# Patient Record
Sex: MALE | Race: WHITE | NOT HISPANIC OR LATINO | Employment: OTHER | URBAN - METROPOLITAN AREA
[De-identification: names, ages, dates, MRNs, and addresses within clinical notes are randomized per-mention and may not be internally consistent; named-entity substitution may affect disease eponyms.]

---

## 2017-02-16 ENCOUNTER — OFFICE VISIT - HEALTHEAST (OUTPATIENT)
Dept: FAMILY MEDICINE | Facility: CLINIC | Age: 67
End: 2017-02-16

## 2017-02-16 DIAGNOSIS — E11.9 TYPE 2 DIABETES MELLITUS (H): ICD-10-CM

## 2017-02-16 DIAGNOSIS — E78.00 PURE HYPERCHOLESTEROLEMIA: ICD-10-CM

## 2017-02-16 DIAGNOSIS — I10 ESSENTIAL HYPERTENSION: ICD-10-CM

## 2017-02-16 DIAGNOSIS — R05.9 COUGH: ICD-10-CM

## 2017-02-16 LAB
CHOLEST SERPL-MCNC: 175 MG/DL
FASTING STATUS PATIENT QL REPORTED: NO
HBA1C MFR BLD: 7.4 % (ref 3.5–6)
HDLC SERPL-MCNC: 39 MG/DL
LDLC SERPL CALC-MCNC: 105 MG/DL
TRIGL SERPL-MCNC: 157 MG/DL

## 2017-02-23 ENCOUNTER — COMMUNICATION - HEALTHEAST (OUTPATIENT)
Dept: FAMILY MEDICINE | Facility: CLINIC | Age: 67
End: 2017-02-23

## 2017-02-23 DIAGNOSIS — R05.9 COUGH: ICD-10-CM

## 2017-03-05 ENCOUNTER — COMMUNICATION - HEALTHEAST (OUTPATIENT)
Dept: FAMILY MEDICINE | Facility: CLINIC | Age: 67
End: 2017-03-05

## 2017-03-05 DIAGNOSIS — I10 HYPERTENSION: ICD-10-CM

## 2017-03-06 ENCOUNTER — OFFICE VISIT - HEALTHEAST (OUTPATIENT)
Dept: FAMILY MEDICINE | Facility: CLINIC | Age: 67
End: 2017-03-06

## 2017-03-06 ENCOUNTER — COMMUNICATION - HEALTHEAST (OUTPATIENT)
Dept: FAMILY MEDICINE | Facility: CLINIC | Age: 67
End: 2017-03-06

## 2017-03-06 DIAGNOSIS — J18.9 RLL PNEUMONIA: ICD-10-CM

## 2017-03-06 DIAGNOSIS — R05.9 COUGH: ICD-10-CM

## 2017-03-06 DIAGNOSIS — Z20.818 EXPOSURE TO STREP THROAT: ICD-10-CM

## 2017-03-28 ENCOUNTER — COMMUNICATION - HEALTHEAST (OUTPATIENT)
Dept: FAMILY MEDICINE | Facility: CLINIC | Age: 67
End: 2017-03-28

## 2017-03-31 ENCOUNTER — OFFICE VISIT - HEALTHEAST (OUTPATIENT)
Dept: FAMILY MEDICINE | Facility: CLINIC | Age: 67
End: 2017-03-31

## 2017-03-31 DIAGNOSIS — J20.9 BRONCHITIS WITH BRONCHOSPASM: ICD-10-CM

## 2017-03-31 DIAGNOSIS — E11.9 TYPE 2 DIABETES MELLITUS (H): ICD-10-CM

## 2017-03-31 DIAGNOSIS — E66.9 OBESITY, UNSPECIFIED: ICD-10-CM

## 2017-03-31 DIAGNOSIS — I10 ESSENTIAL HYPERTENSION: ICD-10-CM

## 2017-03-31 ASSESSMENT — MIFFLIN-ST. JEOR: SCORE: 1948.74

## 2017-04-07 ENCOUNTER — AMBULATORY - HEALTHEAST (OUTPATIENT)
Dept: NURSING | Facility: CLINIC | Age: 67
End: 2017-04-07

## 2017-04-25 ENCOUNTER — AMBULATORY - HEALTHEAST (OUTPATIENT)
Dept: NURSING | Facility: CLINIC | Age: 67
End: 2017-04-25

## 2017-04-25 DIAGNOSIS — Z00.00 HEALTH CARE MAINTENANCE: ICD-10-CM

## 2017-05-08 ENCOUNTER — COMMUNICATION - HEALTHEAST (OUTPATIENT)
Dept: FAMILY MEDICINE | Facility: CLINIC | Age: 67
End: 2017-05-08

## 2017-05-09 ENCOUNTER — AMBULATORY - HEALTHEAST (OUTPATIENT)
Dept: FAMILY MEDICINE | Facility: CLINIC | Age: 67
End: 2017-05-09

## 2017-05-10 ENCOUNTER — COMMUNICATION - HEALTHEAST (OUTPATIENT)
Dept: FAMILY MEDICINE | Facility: CLINIC | Age: 67
End: 2017-05-10

## 2017-06-01 ENCOUNTER — COMMUNICATION - HEALTHEAST (OUTPATIENT)
Dept: FAMILY MEDICINE | Facility: CLINIC | Age: 67
End: 2017-06-01

## 2017-06-01 DIAGNOSIS — I10 HTN (HYPERTENSION): ICD-10-CM

## 2017-06-03 ENCOUNTER — COMMUNICATION - HEALTHEAST (OUTPATIENT)
Dept: FAMILY MEDICINE | Facility: CLINIC | Age: 67
End: 2017-06-03

## 2017-06-03 DIAGNOSIS — I10 HYPERTENSION: ICD-10-CM

## 2017-08-01 ENCOUNTER — COMMUNICATION - HEALTHEAST (OUTPATIENT)
Dept: FAMILY MEDICINE | Facility: CLINIC | Age: 67
End: 2017-08-01

## 2017-08-01 DIAGNOSIS — I10 ESSENTIAL HYPERTENSION: ICD-10-CM

## 2017-08-31 ENCOUNTER — COMMUNICATION - HEALTHEAST (OUTPATIENT)
Dept: FAMILY MEDICINE | Facility: CLINIC | Age: 67
End: 2017-08-31

## 2017-08-31 DIAGNOSIS — I10 HTN (HYPERTENSION): ICD-10-CM

## 2017-09-14 ENCOUNTER — COMMUNICATION - HEALTHEAST (OUTPATIENT)
Dept: FAMILY MEDICINE | Facility: CLINIC | Age: 67
End: 2017-09-14

## 2017-09-14 DIAGNOSIS — I10 ESSENTIAL HYPERTENSION: ICD-10-CM

## 2017-10-11 ENCOUNTER — OFFICE VISIT - HEALTHEAST (OUTPATIENT)
Dept: FAMILY MEDICINE | Facility: CLINIC | Age: 67
End: 2017-10-11

## 2017-10-11 DIAGNOSIS — Z23 NEED FOR PNEUMOCOCCAL VACCINE: ICD-10-CM

## 2017-10-11 DIAGNOSIS — Z12.5 PROSTATE CANCER SCREENING: ICD-10-CM

## 2017-10-11 DIAGNOSIS — D64.9 ANEMIA: ICD-10-CM

## 2017-10-11 DIAGNOSIS — E11.9 TYPE 2 DIABETES MELLITUS (H): ICD-10-CM

## 2017-10-11 DIAGNOSIS — R22.2 LUMP OF SKIN OF BACK: ICD-10-CM

## 2017-10-11 DIAGNOSIS — E78.00 PURE HYPERCHOLESTEROLEMIA: ICD-10-CM

## 2017-10-11 DIAGNOSIS — I10 ESSENTIAL HYPERTENSION: ICD-10-CM

## 2017-10-11 DIAGNOSIS — Z23 IMMUNIZATION DUE: ICD-10-CM

## 2017-10-11 LAB
CHOLEST SERPL-MCNC: 214 MG/DL
FASTING STATUS PATIENT QL REPORTED: YES
HBA1C MFR BLD: 7.2 % (ref 3.5–6)
HDLC SERPL-MCNC: 50 MG/DL
LDLC SERPL CALC-MCNC: 137 MG/DL
PSA SERPL-MCNC: <0.1 NG/ML (ref 0–4.5)
TRIGL SERPL-MCNC: 134 MG/DL

## 2017-10-27 ENCOUNTER — RECORDS - HEALTHEAST (OUTPATIENT)
Dept: GENERAL RADIOLOGY | Facility: CLINIC | Age: 67
End: 2017-10-27

## 2017-10-27 ENCOUNTER — OFFICE VISIT - HEALTHEAST (OUTPATIENT)
Dept: FAMILY MEDICINE | Facility: CLINIC | Age: 67
End: 2017-10-27

## 2017-10-27 DIAGNOSIS — R05.9 COUGH: ICD-10-CM

## 2017-10-27 DIAGNOSIS — I10 HTN (HYPERTENSION): ICD-10-CM

## 2017-10-27 DIAGNOSIS — E11.9 DM (DIABETES MELLITUS) (H): ICD-10-CM

## 2017-11-01 ENCOUNTER — OFFICE VISIT - HEALTHEAST (OUTPATIENT)
Dept: FAMILY MEDICINE | Facility: CLINIC | Age: 67
End: 2017-11-01

## 2017-11-01 DIAGNOSIS — R05.9 COUGH: ICD-10-CM

## 2017-11-01 DIAGNOSIS — E66.9 OBESITY: ICD-10-CM

## 2017-11-01 DIAGNOSIS — H61.21 EXCESSIVE EAR WAX, RIGHT: ICD-10-CM

## 2017-11-01 ASSESSMENT — MIFFLIN-ST. JEOR: SCORE: 1939.67

## 2017-11-19 ENCOUNTER — OFFICE VISIT - HEALTHEAST (OUTPATIENT)
Dept: FAMILY MEDICINE | Facility: CLINIC | Age: 67
End: 2017-11-19

## 2017-11-19 DIAGNOSIS — R05.9 COUGH: ICD-10-CM

## 2017-11-20 ENCOUNTER — COMMUNICATION - HEALTHEAST (OUTPATIENT)
Dept: FAMILY MEDICINE | Facility: CLINIC | Age: 67
End: 2017-11-20

## 2017-11-22 ENCOUNTER — OFFICE VISIT - HEALTHEAST (OUTPATIENT)
Dept: FAMILY MEDICINE | Facility: CLINIC | Age: 67
End: 2017-11-22

## 2017-11-22 DIAGNOSIS — J45.909 ASTHMA: ICD-10-CM

## 2017-11-22 ASSESSMENT — MIFFLIN-ST. JEOR: SCORE: 1780.92

## 2017-12-16 ENCOUNTER — COMMUNICATION - HEALTHEAST (OUTPATIENT)
Dept: FAMILY MEDICINE | Facility: CLINIC | Age: 67
End: 2017-12-16

## 2018-03-23 ENCOUNTER — RECORDS - HEALTHEAST (OUTPATIENT)
Dept: ADMINISTRATIVE | Facility: OTHER | Age: 68
End: 2018-03-23

## 2018-03-28 ENCOUNTER — RECORDS - HEALTHEAST (OUTPATIENT)
Dept: ADMINISTRATIVE | Facility: OTHER | Age: 68
End: 2018-03-28

## 2018-05-04 ENCOUNTER — RECORDS - HEALTHEAST (OUTPATIENT)
Dept: ADMINISTRATIVE | Facility: OTHER | Age: 68
End: 2018-05-04

## 2018-05-17 ENCOUNTER — COMMUNICATION - HEALTHEAST (OUTPATIENT)
Dept: FAMILY MEDICINE | Facility: CLINIC | Age: 68
End: 2018-05-17

## 2018-05-17 DIAGNOSIS — E78.00 PURE HYPERCHOLESTEROLEMIA: ICD-10-CM

## 2018-05-17 DIAGNOSIS — I10 ESSENTIAL HYPERTENSION: ICD-10-CM

## 2018-05-22 ENCOUNTER — COMMUNICATION - HEALTHEAST (OUTPATIENT)
Dept: FAMILY MEDICINE | Facility: CLINIC | Age: 68
End: 2018-05-22

## 2018-05-22 ENCOUNTER — OFFICE VISIT - HEALTHEAST (OUTPATIENT)
Dept: FAMILY MEDICINE | Facility: CLINIC | Age: 68
End: 2018-05-22

## 2018-05-22 ENCOUNTER — COMMUNICATION - HEALTHEAST (OUTPATIENT)
Dept: SCHEDULING | Facility: CLINIC | Age: 68
End: 2018-05-22

## 2018-05-22 DIAGNOSIS — M79.671 RIGHT FOOT PAIN: ICD-10-CM

## 2018-05-22 ASSESSMENT — MIFFLIN-ST. JEOR: SCORE: 1944.21

## 2018-08-08 ENCOUNTER — RECORDS - HEALTHEAST (OUTPATIENT)
Dept: ADMINISTRATIVE | Facility: OTHER | Age: 68
End: 2018-08-08

## 2018-10-17 ENCOUNTER — COMMUNICATION - HEALTHEAST (OUTPATIENT)
Dept: FAMILY MEDICINE | Facility: CLINIC | Age: 68
End: 2018-10-17

## 2018-10-17 DIAGNOSIS — I10 HTN (HYPERTENSION): ICD-10-CM

## 2018-11-02 ENCOUNTER — OFFICE VISIT - HEALTHEAST (OUTPATIENT)
Dept: FAMILY MEDICINE | Facility: CLINIC | Age: 68
End: 2018-11-02

## 2018-11-02 DIAGNOSIS — H61.23 BILATERAL IMPACTED CERUMEN: ICD-10-CM

## 2018-11-02 DIAGNOSIS — L98.9 SKIN LESION: ICD-10-CM

## 2018-11-02 DIAGNOSIS — Z23 IMMUNIZATION DUE: ICD-10-CM

## 2018-11-02 DIAGNOSIS — R05.9 COUGH: ICD-10-CM

## 2018-11-02 DIAGNOSIS — E11.9 TYPE 2 DIABETES MELLITUS (H): ICD-10-CM

## 2018-11-02 DIAGNOSIS — Z12.5 SCREENING FOR PROSTATE CANCER: ICD-10-CM

## 2018-11-02 DIAGNOSIS — D64.9 ANEMIA: ICD-10-CM

## 2018-11-02 DIAGNOSIS — E78.00 PURE HYPERCHOLESTEROLEMIA: ICD-10-CM

## 2018-11-02 DIAGNOSIS — I10 ESSENTIAL HYPERTENSION: ICD-10-CM

## 2018-11-02 LAB
ALBUMIN SERPL-MCNC: 4 G/DL (ref 3.5–5)
ALP SERPL-CCNC: 77 U/L (ref 45–120)
ALT SERPL W P-5'-P-CCNC: 26 U/L (ref 0–45)
ANION GAP SERPL CALCULATED.3IONS-SCNC: 13 MMOL/L (ref 5–18)
AST SERPL W P-5'-P-CCNC: 18 U/L (ref 0–40)
BASOPHILS # BLD AUTO: 0 THOU/UL (ref 0–0.2)
BASOPHILS NFR BLD AUTO: 1 % (ref 0–2)
BILIRUB SERPL-MCNC: 0.7 MG/DL (ref 0–1)
BUN SERPL-MCNC: 24 MG/DL (ref 8–28)
CALCIUM SERPL-MCNC: 10.5 MG/DL (ref 8.5–10.5)
CHLORIDE BLD-SCNC: 107 MMOL/L (ref 98–107)
CHOLEST SERPL-MCNC: 216 MG/DL
CO2 SERPL-SCNC: 21 MMOL/L (ref 22–31)
CREAT SERPL-MCNC: 1.14 MG/DL (ref 0.7–1.3)
EOSINOPHIL # BLD AUTO: 0.3 THOU/UL (ref 0–0.4)
EOSINOPHIL NFR BLD AUTO: 5 % (ref 0–6)
ERYTHROCYTE [DISTWIDTH] IN BLOOD BY AUTOMATED COUNT: 12.7 % (ref 11–14.5)
FASTING STATUS PATIENT QL REPORTED: NO
FERRITIN SERPL-MCNC: 287 NG/ML (ref 27–300)
GFR SERPL CREATININE-BSD FRML MDRD: >60 ML/MIN/1.73M2
GLUCOSE BLD-MCNC: 179 MG/DL (ref 70–125)
HBA1C MFR BLD: 9.4 % (ref 3.5–6)
HCT VFR BLD AUTO: 35 % (ref 40–54)
HDLC SERPL-MCNC: 45 MG/DL
HGB BLD-MCNC: 11.9 G/DL (ref 14–18)
IRON SATN MFR SERPL: 20 % (ref 20–50)
IRON SERPL-MCNC: 57 UG/DL (ref 42–175)
LDLC SERPL CALC-MCNC: 131 MG/DL
LYMPHOCYTES # BLD AUTO: 2.4 THOU/UL (ref 0.8–4.4)
LYMPHOCYTES NFR BLD AUTO: 41 % (ref 20–40)
MCH RBC QN AUTO: 31.2 PG (ref 27–34)
MCHC RBC AUTO-ENTMCNC: 33.8 G/DL (ref 32–36)
MCV RBC AUTO: 92 FL (ref 80–100)
MONOCYTES # BLD AUTO: 0.5 THOU/UL (ref 0–0.9)
MONOCYTES NFR BLD AUTO: 9 % (ref 2–10)
NEUTROPHILS # BLD AUTO: 2.6 THOU/UL (ref 2–7.7)
NEUTROPHILS NFR BLD AUTO: 45 % (ref 50–70)
PLATELET # BLD AUTO: 253 THOU/UL (ref 140–440)
PMV BLD AUTO: 7.6 FL (ref 7–10)
POTASSIUM BLD-SCNC: 4.8 MMOL/L (ref 3.5–5)
PROT SERPL-MCNC: 8.4 G/DL (ref 6–8)
PSA SERPL-MCNC: <0.1 NG/ML (ref 0–6.5)
RBC # BLD AUTO: 3.8 MILL/UL (ref 4.4–6.2)
SODIUM SERPL-SCNC: 141 MMOL/L (ref 136–145)
TIBC SERPL-MCNC: 288 UG/DL (ref 313–563)
TRANSFERRIN SERPL-MCNC: 231 MG/DL (ref 212–360)
TRIGL SERPL-MCNC: 201 MG/DL
WBC: 5.8 THOU/UL (ref 4–11)

## 2018-11-19 ENCOUNTER — COMMUNICATION - HEALTHEAST (OUTPATIENT)
Dept: ADMINISTRATIVE | Facility: CLINIC | Age: 68
End: 2018-11-19

## 2018-11-21 ENCOUNTER — COMMUNICATION - HEALTHEAST (OUTPATIENT)
Dept: FAMILY MEDICINE | Facility: CLINIC | Age: 68
End: 2018-11-21

## 2018-11-21 DIAGNOSIS — E11.9 TYPE 2 DIABETES MELLITUS WITHOUT COMPLICATION, WITHOUT LONG-TERM CURRENT USE OF INSULIN (H): ICD-10-CM

## 2018-12-04 ENCOUNTER — OFFICE VISIT - HEALTHEAST (OUTPATIENT)
Dept: FAMILY MEDICINE | Facility: CLINIC | Age: 68
End: 2018-12-04

## 2018-12-04 DIAGNOSIS — I10 ESSENTIAL HYPERTENSION: ICD-10-CM

## 2018-12-04 DIAGNOSIS — R09.81 NASAL CONGESTION: ICD-10-CM

## 2018-12-04 DIAGNOSIS — R06.2 WHEEZING: ICD-10-CM

## 2018-12-04 DIAGNOSIS — J45.31 MILD PERSISTENT ASTHMA WITH ACUTE EXACERBATION: ICD-10-CM

## 2018-12-04 DIAGNOSIS — R05.9 COUGH: ICD-10-CM

## 2018-12-04 ASSESSMENT — MIFFLIN-ST. JEOR: SCORE: 1930.6

## 2019-02-01 ENCOUNTER — COMMUNICATION - HEALTHEAST (OUTPATIENT)
Dept: FAMILY MEDICINE | Facility: CLINIC | Age: 69
End: 2019-02-01

## 2019-02-01 ENCOUNTER — OFFICE VISIT - HEALTHEAST (OUTPATIENT)
Dept: FAMILY MEDICINE | Facility: CLINIC | Age: 69
End: 2019-02-01

## 2019-02-01 ENCOUNTER — AMBULATORY - HEALTHEAST (OUTPATIENT)
Dept: FAMILY MEDICINE | Facility: CLINIC | Age: 69
End: 2019-02-01

## 2019-02-01 DIAGNOSIS — J45.31 MILD PERSISTENT ASTHMA WITH ACUTE EXACERBATION: ICD-10-CM

## 2019-02-01 DIAGNOSIS — R05.9 COUGH: ICD-10-CM

## 2019-02-01 DIAGNOSIS — R06.2 WHEEZING: ICD-10-CM

## 2019-02-01 DIAGNOSIS — R06.02 SOB (SHORTNESS OF BREATH): ICD-10-CM

## 2019-02-01 ASSESSMENT — MIFFLIN-ST. JEOR: SCORE: 1921.53

## 2019-02-04 ENCOUNTER — OFFICE VISIT - HEALTHEAST (OUTPATIENT)
Dept: FAMILY MEDICINE | Facility: CLINIC | Age: 69
End: 2019-02-04

## 2019-02-04 DIAGNOSIS — J11.1 INFLUENZA-LIKE ILLNESS: ICD-10-CM

## 2019-02-04 DIAGNOSIS — E11.65 TYPE 2 DIABETES MELLITUS WITH HYPERGLYCEMIA, WITHOUT LONG-TERM CURRENT USE OF INSULIN (H): ICD-10-CM

## 2019-02-04 DIAGNOSIS — J10.1 INFLUENZA B: ICD-10-CM

## 2019-02-04 DIAGNOSIS — R39.15 URINARY URGENCY: ICD-10-CM

## 2019-02-04 LAB
FLUAV AG SPEC QL IA: ABNORMAL
FLUBV AG SPEC QL IA: ABNORMAL

## 2019-02-04 ASSESSMENT — MIFFLIN-ST. JEOR: SCORE: 1910.47

## 2019-02-06 ENCOUNTER — OFFICE VISIT - HEALTHEAST (OUTPATIENT)
Dept: FAMILY MEDICINE | Facility: CLINIC | Age: 69
End: 2019-02-06

## 2019-02-06 DIAGNOSIS — J10.1 INFLUENZA B: ICD-10-CM

## 2019-02-06 ASSESSMENT — MIFFLIN-ST. JEOR: SCORE: 1898.85

## 2019-02-09 ENCOUNTER — RECORDS - HEALTHEAST (OUTPATIENT)
Dept: GENERAL RADIOLOGY | Facility: CLINIC | Age: 69
End: 2019-02-09

## 2019-02-09 ENCOUNTER — OFFICE VISIT - HEALTHEAST (OUTPATIENT)
Dept: FAMILY MEDICINE | Facility: CLINIC | Age: 69
End: 2019-02-09

## 2019-02-09 ENCOUNTER — COMMUNICATION - HEALTHEAST (OUTPATIENT)
Dept: SCHEDULING | Facility: CLINIC | Age: 69
End: 2019-02-09

## 2019-02-09 DIAGNOSIS — I10 ESSENTIAL HYPERTENSION: ICD-10-CM

## 2019-02-09 DIAGNOSIS — R50.9 FEVER, UNSPECIFIED FEVER CAUSE: ICD-10-CM

## 2019-02-09 DIAGNOSIS — J18.9 PNEUMONIA OF BOTH LOWER LOBES DUE TO INFECTIOUS ORGANISM: ICD-10-CM

## 2019-02-09 DIAGNOSIS — R50.9 FEVER, UNSPECIFIED: ICD-10-CM

## 2019-02-09 DIAGNOSIS — R79.81 LOW OXYGEN SATURATION: ICD-10-CM

## 2019-02-09 LAB
BASOPHILS # BLD AUTO: 0 THOU/UL (ref 0–0.2)
BASOPHILS NFR BLD AUTO: 0 % (ref 0–2)
EOSINOPHIL # BLD AUTO: 0 THOU/UL (ref 0–0.4)
EOSINOPHIL NFR BLD AUTO: 0 % (ref 0–6)
ERYTHROCYTE [DISTWIDTH] IN BLOOD BY AUTOMATED COUNT: 13.3 % (ref 11–14.5)
FLUAV AG SPEC QL IA: ABNORMAL
FLUBV AG SPEC QL IA: ABNORMAL
HCT VFR BLD AUTO: 34.5 % (ref 40–54)
HGB BLD-MCNC: 11.5 G/DL (ref 14–18)
LYMPHOCYTES # BLD AUTO: 1.9 THOU/UL (ref 0.8–4.4)
LYMPHOCYTES NFR BLD AUTO: 9 % (ref 20–40)
MCH RBC QN AUTO: 30.2 PG (ref 27–34)
MCHC RBC AUTO-ENTMCNC: 33.3 G/DL (ref 32–36)
MCV RBC AUTO: 91 FL (ref 80–100)
MONOCYTES # BLD AUTO: 1.1 THOU/UL (ref 0–0.9)
MONOCYTES NFR BLD AUTO: 5 % (ref 2–10)
NEUTROPHILS # BLD AUTO: 18.2 THOU/UL (ref 2–7.7)
NEUTROPHILS NFR BLD AUTO: 86 % (ref 50–70)
PLATELET # BLD AUTO: 359 THOU/UL (ref 140–440)
PMV BLD AUTO: 11.2 FL (ref 8.5–12.5)
RBC # BLD AUTO: 3.81 MILL/UL (ref 4.4–6.2)
WBC: 21.7 THOU/UL (ref 4–11)

## 2019-02-12 ENCOUNTER — HOME CARE/HOSPICE - HEALTHEAST (OUTPATIENT)
Dept: HOME HEALTH SERVICES | Facility: HOME HEALTH | Age: 69
End: 2019-02-12

## 2019-02-13 ENCOUNTER — COMMUNICATION - HEALTHEAST (OUTPATIENT)
Dept: CARE COORDINATION | Facility: CLINIC | Age: 69
End: 2019-02-13

## 2019-02-14 ENCOUNTER — COMMUNICATION - HEALTHEAST (OUTPATIENT)
Dept: ADMINISTRATIVE | Facility: CLINIC | Age: 69
End: 2019-02-14

## 2019-02-14 ENCOUNTER — COMMUNICATION - HEALTHEAST (OUTPATIENT)
Dept: FAMILY MEDICINE | Facility: CLINIC | Age: 69
End: 2019-02-14

## 2019-02-15 ENCOUNTER — COMMUNICATION - HEALTHEAST (OUTPATIENT)
Dept: FAMILY MEDICINE | Facility: CLINIC | Age: 69
End: 2019-02-15

## 2019-02-15 ENCOUNTER — OFFICE VISIT - HEALTHEAST (OUTPATIENT)
Dept: FAMILY MEDICINE | Facility: CLINIC | Age: 69
End: 2019-02-15

## 2019-02-15 DIAGNOSIS — E11.65 UNCONTROLLED TYPE 2 DIABETES MELLITUS WITH HYPERGLYCEMIA (H): ICD-10-CM

## 2019-02-15 DIAGNOSIS — J18.9 PNEUMONIA OF BOTH LUNGS DUE TO INFECTIOUS ORGANISM, UNSPECIFIED PART OF LUNG: ICD-10-CM

## 2019-02-15 DIAGNOSIS — J10.1 INFLUENZA B: ICD-10-CM

## 2019-02-15 DIAGNOSIS — N17.9 ACUTE KIDNEY INJURY (H): ICD-10-CM

## 2019-02-15 DIAGNOSIS — Z09 ENCOUNTER FOR EXAMINATION FOLLOWING TREATMENT AT HOSPITAL: ICD-10-CM

## 2019-02-15 ASSESSMENT — MIFFLIN-ST. JEOR: SCORE: 1880.7

## 2019-02-19 ENCOUNTER — RECORDS - HEALTHEAST (OUTPATIENT)
Dept: ADMINISTRATIVE | Facility: OTHER | Age: 69
End: 2019-02-19

## 2019-02-19 ENCOUNTER — COMMUNICATION - HEALTHEAST (OUTPATIENT)
Dept: ADMINISTRATIVE | Facility: CLINIC | Age: 69
End: 2019-02-19

## 2019-02-19 ENCOUNTER — OFFICE VISIT - HEALTHEAST (OUTPATIENT)
Dept: ENDOCRINOLOGY | Facility: CLINIC | Age: 69
End: 2019-02-19

## 2019-02-19 DIAGNOSIS — E11.9 TYPE 2 DIABETES MELLITUS WITHOUT COMPLICATION, WITHOUT LONG-TERM CURRENT USE OF INSULIN (H): ICD-10-CM

## 2019-02-19 ASSESSMENT — MIFFLIN-ST. JEOR: SCORE: 1876.62

## 2019-02-20 ENCOUNTER — COMMUNICATION - HEALTHEAST (OUTPATIENT)
Dept: FAMILY MEDICINE | Facility: CLINIC | Age: 69
End: 2019-02-20

## 2019-02-25 ENCOUNTER — COMMUNICATION - HEALTHEAST (OUTPATIENT)
Dept: FAMILY MEDICINE | Facility: CLINIC | Age: 69
End: 2019-02-25

## 2019-02-26 ENCOUNTER — COMMUNICATION - HEALTHEAST (OUTPATIENT)
Dept: FAMILY MEDICINE | Facility: CLINIC | Age: 69
End: 2019-02-26

## 2019-02-26 ENCOUNTER — AMBULATORY - HEALTHEAST (OUTPATIENT)
Dept: FAMILY MEDICINE | Facility: CLINIC | Age: 69
End: 2019-02-26

## 2019-02-26 DIAGNOSIS — J45.31 MILD PERSISTENT ASTHMA WITH ACUTE EXACERBATION: ICD-10-CM

## 2019-02-27 ENCOUNTER — COMMUNICATION - HEALTHEAST (OUTPATIENT)
Dept: FAMILY MEDICINE | Facility: CLINIC | Age: 69
End: 2019-02-27

## 2019-02-27 DIAGNOSIS — I10 ESSENTIAL HYPERTENSION: ICD-10-CM

## 2019-03-05 ENCOUNTER — COMMUNICATION - HEALTHEAST (OUTPATIENT)
Dept: FAMILY MEDICINE | Facility: CLINIC | Age: 69
End: 2019-03-05

## 2019-03-13 ENCOUNTER — RECORDS - HEALTHEAST (OUTPATIENT)
Dept: ADMINISTRATIVE | Facility: OTHER | Age: 69
End: 2019-03-13

## 2019-03-14 ENCOUNTER — OFFICE VISIT - HEALTHEAST (OUTPATIENT)
Dept: FAMILY MEDICINE | Facility: CLINIC | Age: 69
End: 2019-03-14

## 2019-03-14 DIAGNOSIS — M25.562 ACUTE PAIN OF LEFT KNEE: ICD-10-CM
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
## 2019-03-14 DIAGNOSIS — J18.9 PNEUMONIA OF BOTH LUNGS DUE TO INFECTIOUS ORGANISM, UNSPECIFIED PART OF LUNG: ICD-10-CM

## 2019-03-14 ASSESSMENT — MIFFLIN-ST. JEOR: SCORE: 1863.69

## 2019-03-18 ENCOUNTER — COMMUNICATION - HEALTHEAST (OUTPATIENT)
Dept: ADMINISTRATIVE | Facility: CLINIC | Age: 69
End: 2019-03-18

## 2019-03-20 ENCOUNTER — RECORDS - HEALTHEAST (OUTPATIENT)
Dept: HEALTH INFORMATION MANAGEMENT | Facility: CLINIC | Age: 69
End: 2019-03-20

## 2019-03-20 ENCOUNTER — COMMUNICATION - HEALTHEAST (OUTPATIENT)
Dept: SCHEDULING | Facility: CLINIC | Age: 69
End: 2019-03-20

## 2019-03-20 ENCOUNTER — COMMUNICATION - HEALTHEAST (OUTPATIENT)
Dept: FAMILY MEDICINE | Facility: CLINIC | Age: 69
End: 2019-03-20

## 2019-03-20 ENCOUNTER — OFFICE VISIT - HEALTHEAST (OUTPATIENT)
Dept: FAMILY MEDICINE | Facility: CLINIC | Age: 69
End: 2019-03-20

## 2019-03-20 DIAGNOSIS — E11.65 TYPE 2 DIABETES MELLITUS WITH HYPERGLYCEMIA, UNSPECIFIED WHETHER LONG TERM INSULIN USE (H): ICD-10-CM

## 2019-03-20 DIAGNOSIS — I10 ESSENTIAL HYPERTENSION: ICD-10-CM

## 2019-03-20 DIAGNOSIS — R63.0 ANOREXIA: ICD-10-CM

## 2019-03-20 DIAGNOSIS — R19.7 DIARRHEA, UNSPECIFIED TYPE: ICD-10-CM

## 2019-03-20 LAB
ALBUMIN SERPL-MCNC: 4 G/DL (ref 3.5–5)
ALBUMIN UR-MCNC: NEGATIVE MG/DL
ALP SERPL-CCNC: 64 U/L (ref 45–120)
ALT SERPL W P-5'-P-CCNC: 25 U/L (ref 0–45)
ANION GAP SERPL CALCULATED.3IONS-SCNC: 12 MMOL/L (ref 5–18)
APPEARANCE UR: CLEAR
AST SERPL W P-5'-P-CCNC: 19 U/L (ref 0–40)
BILIRUB SERPL-MCNC: 0.6 MG/DL (ref 0–1)
BILIRUB UR QL STRIP: NEGATIVE
BUN SERPL-MCNC: 35 MG/DL (ref 8–28)
CALCIUM SERPL-MCNC: 10.1 MG/DL (ref 8.5–10.5)
CHLORIDE BLD-SCNC: 105 MMOL/L (ref 98–107)
CO2 SERPL-SCNC: 25 MMOL/L (ref 22–31)
COLOR UR AUTO: YELLOW
CREAT SERPL-MCNC: 1.32 MG/DL (ref 0.7–1.3)
ERYTHROCYTE [DISTWIDTH] IN BLOOD BY AUTOMATED COUNT: 13.4 % (ref 11–14.5)
ERYTHROCYTE [SEDIMENTATION RATE] IN BLOOD BY WESTERGREN METHOD: 89 MM/HR (ref 0–15)
GFR SERPL CREATININE-BSD FRML MDRD: 53 ML/MIN/1.73M2
GLUCOSE BLD-MCNC: 103 MG/DL (ref 70–125)
GLUCOSE UR STRIP-MCNC: NEGATIVE MG/DL
HCT VFR BLD AUTO: 34.3 % (ref 40–54)
HGB BLD-MCNC: 11.4 G/DL (ref 14–18)
HGB UR QL STRIP: NEGATIVE
KETONES UR STRIP-MCNC: NEGATIVE MG/DL
LEUKOCYTE ESTERASE UR QL STRIP: NEGATIVE
MCH RBC QN AUTO: 30.5 PG (ref 27–34)
MCHC RBC AUTO-ENTMCNC: 33.4 G/DL (ref 32–36)
MCV RBC AUTO: 91 FL (ref 80–100)
NITRATE UR QL: NEGATIVE
PH UR STRIP: 5 [PH] (ref 5–8)
PLATELET # BLD AUTO: 308 THOU/UL (ref 140–440)
PMV BLD AUTO: 7 FL (ref 7–10)
POTASSIUM BLD-SCNC: 3.7 MMOL/L (ref 3.5–5)
PROT SERPL-MCNC: 7.6 G/DL (ref 6–8)
RBC # BLD AUTO: 3.76 MILL/UL (ref 4.4–6.2)
SODIUM SERPL-SCNC: 142 MMOL/L (ref 136–145)
SP GR UR STRIP: 1.02 (ref 1–1.03)
UROBILINOGEN UR STRIP-ACNC: NORMAL
WBC: 6.1 THOU/UL (ref 4–11)

## 2019-03-21 ENCOUNTER — AMBULATORY - HEALTHEAST (OUTPATIENT)
Dept: FAMILY MEDICINE | Facility: CLINIC | Age: 69
End: 2019-03-21

## 2019-03-21 ENCOUNTER — COMMUNICATION - HEALTHEAST (OUTPATIENT)
Dept: FAMILY MEDICINE | Facility: CLINIC | Age: 69
End: 2019-03-21

## 2019-03-21 DIAGNOSIS — R19.7 DIARRHEA, UNSPECIFIED TYPE: ICD-10-CM

## 2019-03-22 ENCOUNTER — AMBULATORY - HEALTHEAST (OUTPATIENT)
Dept: LAB | Facility: CLINIC | Age: 69
End: 2019-03-22

## 2019-03-22 ENCOUNTER — COMMUNICATION - HEALTHEAST (OUTPATIENT)
Dept: FAMILY MEDICINE | Facility: CLINIC | Age: 69
End: 2019-03-22

## 2019-03-22 DIAGNOSIS — R19.7 DIARRHEA, UNSPECIFIED TYPE: ICD-10-CM

## 2019-03-22 LAB
C DIFF TOX B STL QL: NEGATIVE
RIBOTYPE 027/NAP1/BI: NORMAL

## 2019-03-23 LAB
SHIGA TOXIN 1: NEGATIVE
SHIGA TOXIN 2: NEGATIVE

## 2019-03-25 ENCOUNTER — COMMUNICATION - HEALTHEAST (OUTPATIENT)
Dept: FAMILY MEDICINE | Facility: CLINIC | Age: 69
End: 2019-03-25

## 2019-03-25 ENCOUNTER — HOSPITAL ENCOUNTER (OUTPATIENT)
Dept: CT IMAGING | Facility: CLINIC | Age: 69
Discharge: HOME | End: 2019-03-25
Attending: NURSE PRACTITIONER

## 2019-03-25 DIAGNOSIS — J18.9 PNEUMONIA OF BOTH LUNGS DUE TO INFECTIOUS ORGANISM, UNSPECIFIED PART OF LUNG: ICD-10-CM

## 2019-03-25 LAB — BACTERIA SPEC CULT: NORMAL

## 2019-03-26 ENCOUNTER — COMMUNICATION - HEALTHEAST (OUTPATIENT)
Dept: ADMINISTRATIVE | Facility: CLINIC | Age: 69
End: 2019-03-26

## 2019-03-26 ENCOUNTER — COMMUNICATION - HEALTHEAST (OUTPATIENT)
Dept: FAMILY MEDICINE | Facility: CLINIC | Age: 69
End: 2019-03-26

## 2019-03-27 ENCOUNTER — COMMUNICATION - HEALTHEAST (OUTPATIENT)
Dept: FAMILY MEDICINE | Facility: CLINIC | Age: 69
End: 2019-03-27

## 2019-03-29 ENCOUNTER — OFFICE VISIT - HEALTHEAST (OUTPATIENT)
Dept: FAMILY MEDICINE | Facility: CLINIC | Age: 69
End: 2019-03-29

## 2019-03-29 DIAGNOSIS — R42 DIZZINESS: ICD-10-CM

## 2019-03-29 DIAGNOSIS — R19.7 DIARRHEA, UNSPECIFIED TYPE: ICD-10-CM

## 2019-03-29 LAB
ANION GAP SERPL CALCULATED.3IONS-SCNC: 14 MMOL/L (ref 5–18)
BUN SERPL-MCNC: 49 MG/DL (ref 8–28)
CALCIUM SERPL-MCNC: 9.9 MG/DL (ref 8.5–10.5)
CHLORIDE BLD-SCNC: 105 MMOL/L (ref 98–107)
CO2 SERPL-SCNC: 21 MMOL/L (ref 22–31)
CREAT SERPL-MCNC: 1.69 MG/DL (ref 0.7–1.3)
GFR SERPL CREATININE-BSD FRML MDRD: 40 ML/MIN/1.73M2
GLUCOSE BLD-MCNC: 150 MG/DL (ref 70–125)
POTASSIUM BLD-SCNC: 3.4 MMOL/L (ref 3.5–5)
SODIUM SERPL-SCNC: 140 MMOL/L (ref 136–145)

## 2019-03-29 RX ORDER — LANCETS
EACH MISCELLANEOUS
Refills: 0 | Status: SHIPPED | COMMUNITY
Start: 2019-03-16 | End: 2023-03-26

## 2019-03-29 ASSESSMENT — MIFFLIN-ST. JEOR: SCORE: 1839.88

## 2019-04-29 ENCOUNTER — COMMUNICATION - HEALTHEAST (OUTPATIENT)
Dept: FAMILY MEDICINE | Facility: CLINIC | Age: 69
End: 2019-04-29

## 2019-04-29 DIAGNOSIS — E11.65 TYPE 2 DIABETES MELLITUS WITH HYPERGLYCEMIA, UNSPECIFIED WHETHER LONG TERM INSULIN USE (H): ICD-10-CM

## 2019-04-29 DIAGNOSIS — Z02.4 ENCOUNTER FOR DEPARTMENT OF TRANSPORTATION (DOT) EXAMINATION FOR DRIVING LICENSE RENEWAL: ICD-10-CM

## 2019-04-30 ENCOUNTER — AMBULATORY - HEALTHEAST (OUTPATIENT)
Dept: FAMILY MEDICINE | Facility: CLINIC | Age: 69
End: 2019-04-30

## 2019-04-30 DIAGNOSIS — J45.31 MILD PERSISTENT ASTHMA WITH ACUTE EXACERBATION: ICD-10-CM

## 2019-04-30 DIAGNOSIS — Z02.4 ENCOUNTER FOR DEPARTMENT OF TRANSPORTATION (DOT) EXAMINATION FOR DRIVING LICENSE RENEWAL: ICD-10-CM

## 2019-05-06 ENCOUNTER — HOSPITAL ENCOUNTER (OUTPATIENT)
Dept: RESPIRATORY THERAPY | Facility: CLINIC | Age: 69
Discharge: HOME | End: 2019-05-06

## 2019-05-06 DIAGNOSIS — J45.31 MILD PERSISTENT ASTHMA WITH ACUTE EXACERBATION: ICD-10-CM

## 2019-05-06 DIAGNOSIS — Z02.4 ENCOUNTER FOR DEPARTMENT OF TRANSPORTATION (DOT) EXAMINATION FOR DRIVING LICENSE RENEWAL: ICD-10-CM

## 2019-05-06 LAB — HGB BLD-MCNC: 10.5 G/DL (ref 14–18)

## 2019-05-13 ENCOUNTER — COMMUNICATION - HEALTHEAST (OUTPATIENT)
Dept: FAMILY MEDICINE | Facility: CLINIC | Age: 69
End: 2019-05-13

## 2019-05-14 ENCOUNTER — AMBULATORY - HEALTHEAST (OUTPATIENT)
Dept: FAMILY MEDICINE | Facility: CLINIC | Age: 69
End: 2019-05-14

## 2019-05-14 DIAGNOSIS — Z02.4 ENCOUNTER FOR DEPARTMENT OF TRANSPORTATION (DOT) EXAMINATION FOR DRIVING LICENSE RENEWAL: ICD-10-CM

## 2019-05-15 ENCOUNTER — HOSPITAL ENCOUNTER (OUTPATIENT)
Dept: RESPIRATORY THERAPY | Facility: CLINIC | Age: 69
Discharge: HOME | End: 2019-05-15
Attending: NURSE PRACTITIONER

## 2019-05-15 DIAGNOSIS — Z02.4 ENCOUNTER FOR DEPARTMENT OF TRANSPORTATION (DOT) EXAMINATION FOR DRIVING LICENSE RENEWAL: ICD-10-CM

## 2019-05-15 LAB
BASE EXCESS BLDA CALC-SCNC: 2 MMOL/L
COHGB MFR BLD: 98 % (ref 95–96)
HCO3, ARTERIAL CALC - HISTORICAL: 26.5 MMOL/L (ref 23–29)
O2/TOTAL GAS SETTING VFR VENT: ABNORMAL %
OXYHEMOGLOBIN - HISTORICAL: 95.8 % (ref 95–96)
PCO2 BLD: 40 MM HG (ref 35–45)
PH BLD: 7.43 [PH] (ref 7.37–7.44)
PO2 BLD: 94 MM HG (ref 75–85)
TEMPERATURE: 37 DEGREES C
VENTILATION MODE: ABNORMAL

## 2019-05-16 ENCOUNTER — COMMUNICATION - HEALTHEAST (OUTPATIENT)
Dept: FAMILY MEDICINE | Facility: CLINIC | Age: 69
End: 2019-05-16

## 2019-05-16 DIAGNOSIS — I10 ESSENTIAL HYPERTENSION: ICD-10-CM

## 2019-05-17 ENCOUNTER — COMMUNICATION - HEALTHEAST (OUTPATIENT)
Dept: FAMILY MEDICINE | Facility: CLINIC | Age: 69
End: 2019-05-17

## 2019-05-17 DIAGNOSIS — I10 ESSENTIAL HYPERTENSION: ICD-10-CM

## 2019-05-20 ENCOUNTER — OFFICE VISIT - HEALTHEAST (OUTPATIENT)
Dept: FAMILY MEDICINE | Facility: CLINIC | Age: 69
End: 2019-05-20

## 2019-05-20 ENCOUNTER — COMMUNICATION - HEALTHEAST (OUTPATIENT)
Dept: FAMILY MEDICINE | Facility: CLINIC | Age: 69
End: 2019-05-20

## 2019-05-20 DIAGNOSIS — E78.00 PURE HYPERCHOLESTEROLEMIA: ICD-10-CM

## 2019-05-20 DIAGNOSIS — E11.65 TYPE 2 DIABETES MELLITUS WITH HYPERGLYCEMIA, WITH LONG-TERM CURRENT USE OF INSULIN (H): ICD-10-CM

## 2019-05-20 DIAGNOSIS — E66.811 CLASS 1 OBESITY WITH BODY MASS INDEX (BMI) OF 31.0 TO 31.9 IN ADULT, UNSPECIFIED OBESITY TYPE, UNSPECIFIED WHETHER SERIOUS COMORBIDITY PRESENT: ICD-10-CM

## 2019-05-20 DIAGNOSIS — Z79.4 TYPE 2 DIABETES MELLITUS WITH HYPERGLYCEMIA, WITH LONG-TERM CURRENT USE OF INSULIN (H): ICD-10-CM

## 2019-05-20 DIAGNOSIS — I10 ESSENTIAL HYPERTENSION: ICD-10-CM

## 2019-05-20 LAB
ANION GAP SERPL CALCULATED.3IONS-SCNC: 11 MMOL/L (ref 5–18)
BUN SERPL-MCNC: 34 MG/DL (ref 8–28)
CALCIUM SERPL-MCNC: 9.8 MG/DL (ref 8.5–10.5)
CHLORIDE BLD-SCNC: 112 MMOL/L (ref 98–107)
CHOLEST SERPL-MCNC: 169 MG/DL
CO2 SERPL-SCNC: 22 MMOL/L (ref 22–31)
CREAT SERPL-MCNC: 0.98 MG/DL (ref 0.7–1.3)
CREAT UR-MCNC: 75.9 MG/DL
FASTING STATUS PATIENT QL REPORTED: YES
GFR SERPL CREATININE-BSD FRML MDRD: >60 ML/MIN/1.73M2
GLUCOSE BLD-MCNC: 88 MG/DL (ref 70–125)
HBA1C MFR BLD: 6.1 % (ref 3.5–6)
HDLC SERPL-MCNC: 48 MG/DL
LDLC SERPL CALC-MCNC: 101 MG/DL
MICROALBUMIN UR-MCNC: <0.5 MG/DL (ref 0–1.99)
MICROALBUMIN/CREAT UR: NORMAL MG/G
POTASSIUM BLD-SCNC: 4 MMOL/L (ref 3.5–5)
SODIUM SERPL-SCNC: 145 MMOL/L (ref 136–145)
TRIGL SERPL-MCNC: 100 MG/DL

## 2019-05-20 ASSESSMENT — MIFFLIN-ST. JEOR: SCORE: 1885.24

## 2019-05-26 ENCOUNTER — COMMUNICATION - HEALTHEAST (OUTPATIENT)
Dept: FAMILY MEDICINE | Facility: CLINIC | Age: 69
End: 2019-05-26

## 2019-07-10 ENCOUNTER — COMMUNICATION - HEALTHEAST (OUTPATIENT)
Dept: MEDSURG UNIT | Facility: CLINIC | Age: 69
End: 2019-07-10

## 2019-07-10 DIAGNOSIS — J96.00 ACUTE RESPIRATORY FAILURE, UNSPECIFIED WHETHER WITH HYPOXIA OR HYPERCAPNIA (H): ICD-10-CM

## 2019-08-15 ENCOUNTER — COMMUNICATION - HEALTHEAST (OUTPATIENT)
Dept: ENDOCRINOLOGY | Facility: CLINIC | Age: 69
End: 2019-08-15

## 2019-08-15 DIAGNOSIS — E11.9 TYPE 2 DIABETES MELLITUS WITHOUT COMPLICATION, WITHOUT LONG-TERM CURRENT USE OF INSULIN (H): ICD-10-CM

## 2019-09-11 ENCOUNTER — COMMUNICATION - HEALTHEAST (OUTPATIENT)
Dept: ENDOCRINOLOGY | Facility: CLINIC | Age: 69
End: 2019-09-11

## 2019-09-11 DIAGNOSIS — E11.9 TYPE 2 DIABETES MELLITUS WITHOUT COMPLICATION, WITHOUT LONG-TERM CURRENT USE OF INSULIN (H): ICD-10-CM

## 2019-09-12 ENCOUNTER — OFFICE VISIT - HEALTHEAST (OUTPATIENT)
Dept: FAMILY MEDICINE | Facility: CLINIC | Age: 69
End: 2019-09-12

## 2019-09-12 DIAGNOSIS — H60.391 INFECTIVE OTITIS EXTERNA, RIGHT: ICD-10-CM

## 2019-10-18 ENCOUNTER — RECORDS - HEALTHEAST (OUTPATIENT)
Dept: ADMINISTRATIVE | Facility: OTHER | Age: 69
End: 2019-10-18

## 2019-10-22 ENCOUNTER — OFFICE VISIT - HEALTHEAST (OUTPATIENT)
Dept: FAMILY MEDICINE | Facility: CLINIC | Age: 69
End: 2019-10-22

## 2019-10-22 DIAGNOSIS — H61.22 IMPACTED CERUMEN OF LEFT EAR: ICD-10-CM

## 2019-10-22 DIAGNOSIS — H66.90 EAR INFECTION: ICD-10-CM

## 2019-10-22 DIAGNOSIS — Z23 NEED FOR VACCINATION: ICD-10-CM

## 2019-10-22 DIAGNOSIS — Z76.0 ENCOUNTER FOR MEDICATION REFILL: ICD-10-CM

## 2019-10-22 ASSESSMENT — MIFFLIN-ST. JEOR: SCORE: 1848.95

## 2019-10-28 ENCOUNTER — COMMUNICATION - HEALTHEAST (OUTPATIENT)
Dept: SCHEDULING | Facility: CLINIC | Age: 69
End: 2019-10-28

## 2019-10-28 ENCOUNTER — COMMUNICATION - HEALTHEAST (OUTPATIENT)
Dept: FAMILY MEDICINE | Facility: CLINIC | Age: 69
End: 2019-10-28

## 2019-10-28 DIAGNOSIS — R05.9 COUGH: ICD-10-CM

## 2019-10-28 DIAGNOSIS — J45.31 MILD PERSISTENT ASTHMA WITH ACUTE EXACERBATION: ICD-10-CM

## 2019-10-29 ENCOUNTER — OFFICE VISIT - HEALTHEAST (OUTPATIENT)
Dept: FAMILY MEDICINE | Facility: CLINIC | Age: 69
End: 2019-10-29

## 2019-10-29 DIAGNOSIS — R11.11 NON-INTRACTABLE VOMITING WITHOUT NAUSEA, UNSPECIFIED VOMITING TYPE: ICD-10-CM

## 2019-10-29 DIAGNOSIS — M25.512 ACUTE PAIN OF LEFT SHOULDER: ICD-10-CM

## 2019-10-29 DIAGNOSIS — I10 ESSENTIAL HYPERTENSION: ICD-10-CM

## 2019-10-29 DIAGNOSIS — E11.65 UNCONTROLLED TYPE 2 DIABETES MELLITUS WITH HYPERGLYCEMIA (H): ICD-10-CM

## 2019-10-29 DIAGNOSIS — R10.32 ABDOMINAL PAIN, LEFT LOWER QUADRANT: ICD-10-CM

## 2019-10-29 LAB
ATRIAL RATE - MUSE: 97 BPM
DIASTOLIC BLOOD PRESSURE - MUSE: NORMAL
INTERPRETATION ECG - MUSE: NORMAL
P AXIS - MUSE: 64 DEGREES
PR INTERVAL - MUSE: 158 MS
QRS DURATION - MUSE: 96 MS
QT - MUSE: 374 MS
QTC - MUSE: 474 MS
R AXIS - MUSE: 49 DEGREES
SYSTOLIC BLOOD PRESSURE - MUSE: NORMAL
T AXIS - MUSE: 30 DEGREES
VENTRICULAR RATE- MUSE: 97 BPM

## 2019-10-30 ENCOUNTER — RECORDS - HEALTHEAST (OUTPATIENT)
Dept: HEALTH INFORMATION MANAGEMENT | Facility: CLINIC | Age: 69
End: 2019-10-30

## 2019-11-01 ENCOUNTER — COMMUNICATION - HEALTHEAST (OUTPATIENT)
Dept: CARE COORDINATION | Facility: CLINIC | Age: 69
End: 2019-11-01

## 2019-11-02 ENCOUNTER — COMMUNICATION - HEALTHEAST (OUTPATIENT)
Dept: SCHEDULING | Facility: CLINIC | Age: 69
End: 2019-11-02

## 2019-11-04 ENCOUNTER — OFFICE VISIT - HEALTHEAST (OUTPATIENT)
Dept: FAMILY MEDICINE | Facility: CLINIC | Age: 69
End: 2019-11-04

## 2019-11-04 DIAGNOSIS — J18.9 PNEUMONIA OF BOTH LOWER LOBES DUE TO INFECTIOUS ORGANISM: ICD-10-CM

## 2019-11-04 DIAGNOSIS — Z09 ENCOUNTER FOR EXAMINATION FOLLOWING TREATMENT AT HOSPITAL: ICD-10-CM

## 2019-11-04 DIAGNOSIS — I10 ESSENTIAL HYPERTENSION, BENIGN: ICD-10-CM

## 2019-11-04 ASSESSMENT — MIFFLIN-ST. JEOR: SCORE: 1808.13

## 2019-11-15 ENCOUNTER — COMMUNICATION - HEALTHEAST (OUTPATIENT)
Dept: FAMILY MEDICINE | Facility: CLINIC | Age: 69
End: 2019-11-15

## 2019-11-15 ENCOUNTER — COMMUNICATION - HEALTHEAST (OUTPATIENT)
Dept: ENDOCRINOLOGY | Facility: CLINIC | Age: 69
End: 2019-11-15

## 2019-11-15 DIAGNOSIS — I10 ESSENTIAL HYPERTENSION: ICD-10-CM

## 2019-11-15 DIAGNOSIS — E11.9 TYPE 2 DIABETES MELLITUS WITHOUT COMPLICATION, WITHOUT LONG-TERM CURRENT USE OF INSULIN (H): ICD-10-CM

## 2019-11-18 ENCOUNTER — OFFICE VISIT - HEALTHEAST (OUTPATIENT)
Dept: FAMILY MEDICINE | Facility: CLINIC | Age: 69
End: 2019-11-18

## 2019-11-18 DIAGNOSIS — I10 ESSENTIAL HYPERTENSION, BENIGN: ICD-10-CM

## 2019-11-18 DIAGNOSIS — E78.00 PURE HYPERCHOLESTEROLEMIA: ICD-10-CM

## 2019-11-18 DIAGNOSIS — Z23 NEED FOR VACCINATION: ICD-10-CM

## 2019-11-18 DIAGNOSIS — E11.9 TYPE 2 DIABETES MELLITUS WITHOUT COMPLICATION, WITHOUT LONG-TERM CURRENT USE OF INSULIN (H): ICD-10-CM

## 2019-11-18 DIAGNOSIS — D22.9 ATYPICAL MOLE: ICD-10-CM

## 2019-11-18 DIAGNOSIS — J45.31 MILD PERSISTENT ASTHMA WITH ACUTE EXACERBATION: ICD-10-CM

## 2019-11-18 LAB
ANION GAP SERPL CALCULATED.3IONS-SCNC: 10 MMOL/L (ref 5–18)
BUN SERPL-MCNC: 31 MG/DL (ref 8–28)
CALCIUM SERPL-MCNC: 10.2 MG/DL (ref 8.5–10.5)
CHLORIDE BLD-SCNC: 107 MMOL/L (ref 98–107)
CHOLEST SERPL-MCNC: 193 MG/DL
CO2 SERPL-SCNC: 25 MMOL/L (ref 22–31)
CREAT SERPL-MCNC: 1.09 MG/DL (ref 0.7–1.3)
FASTING STATUS PATIENT QL REPORTED: NO
GFR SERPL CREATININE-BSD FRML MDRD: >60 ML/MIN/1.73M2
GLUCOSE BLD-MCNC: 141 MG/DL (ref 70–125)
HBA1C MFR BLD: 6.5 % (ref 3.5–6)
HDLC SERPL-MCNC: 47 MG/DL
LDLC SERPL CALC-MCNC: 93 MG/DL
POTASSIUM BLD-SCNC: 4 MMOL/L (ref 3.5–5)
SODIUM SERPL-SCNC: 142 MMOL/L (ref 136–145)
TRIGL SERPL-MCNC: 265 MG/DL

## 2019-11-18 ASSESSMENT — MIFFLIN-ST. JEOR: SCORE: 1826.27

## 2019-12-05 ENCOUNTER — COMMUNICATION - HEALTHEAST (OUTPATIENT)
Dept: FAMILY MEDICINE | Facility: CLINIC | Age: 69
End: 2019-12-05

## 2019-12-05 DIAGNOSIS — I10 ESSENTIAL HYPERTENSION, BENIGN: ICD-10-CM

## 2019-12-09 ENCOUNTER — COMMUNICATION - HEALTHEAST (OUTPATIENT)
Dept: ENDOCRINOLOGY | Facility: CLINIC | Age: 69
End: 2019-12-09

## 2019-12-09 DIAGNOSIS — E11.9 TYPE 2 DIABETES MELLITUS WITHOUT COMPLICATION, WITHOUT LONG-TERM CURRENT USE OF INSULIN (H): ICD-10-CM

## 2019-12-17 ENCOUNTER — COMMUNICATION - HEALTHEAST (OUTPATIENT)
Dept: ENDOCRINOLOGY | Facility: CLINIC | Age: 69
End: 2019-12-17

## 2019-12-17 DIAGNOSIS — Z76.0 ENCOUNTER FOR MEDICATION REFILL: ICD-10-CM

## 2020-01-15 ENCOUNTER — COMMUNICATION - HEALTHEAST (OUTPATIENT)
Dept: SCHEDULING | Facility: CLINIC | Age: 70
End: 2020-01-15

## 2020-01-15 ENCOUNTER — OFFICE VISIT - HEALTHEAST (OUTPATIENT)
Dept: FAMILY MEDICINE | Facility: CLINIC | Age: 70
End: 2020-01-15
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
## 2020-01-15 DIAGNOSIS — S50.02XA CONTUSION OF LEFT ELBOW, INITIAL ENCOUNTER: ICD-10-CM
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
## 2020-01-17 ENCOUNTER — RECORDS - HEALTHEAST (OUTPATIENT)
Dept: ADMINISTRATIVE | Facility: OTHER | Age: 70
End: 2020-01-17

## 2020-01-23 ENCOUNTER — COMMUNICATION - HEALTHEAST (OUTPATIENT)
Dept: FAMILY MEDICINE | Facility: CLINIC | Age: 70
End: 2020-01-23

## 2020-02-10 ENCOUNTER — RECORDS - HEALTHEAST (OUTPATIENT)
Dept: ADMINISTRATIVE | Facility: OTHER | Age: 70
End: 2020-02-10

## 2020-03-27 ENCOUNTER — OFFICE VISIT - HEALTHEAST (OUTPATIENT)
Dept: FAMILY MEDICINE | Facility: CLINIC | Age: 70
End: 2020-03-27

## 2020-03-27 DIAGNOSIS — M48.062 SPINAL STENOSIS OF LUMBAR REGION WITH NEUROGENIC CLAUDICATION: ICD-10-CM

## 2020-03-27 DIAGNOSIS — J45.31 MILD PERSISTENT ASTHMA WITH EXACERBATION: ICD-10-CM

## 2020-03-27 DIAGNOSIS — J30.1 SEASONAL ALLERGIC RHINITIS DUE TO POLLEN: ICD-10-CM

## 2020-04-14 ENCOUNTER — COMMUNICATION - HEALTHEAST (OUTPATIENT)
Dept: FAMILY MEDICINE | Facility: CLINIC | Age: 70
End: 2020-04-14

## 2020-04-14 DIAGNOSIS — I10 ESSENTIAL HYPERTENSION, BENIGN: ICD-10-CM

## 2020-06-04 ENCOUNTER — RECORDS - HEALTHEAST (OUTPATIENT)
Dept: ADMINISTRATIVE | Facility: OTHER | Age: 70
End: 2020-06-04

## 2020-06-19 ENCOUNTER — COMMUNICATION - HEALTHEAST (OUTPATIENT)
Dept: ENDOCRINOLOGY | Facility: CLINIC | Age: 70
End: 2020-06-19

## 2020-06-19 DIAGNOSIS — E11.9 TYPE 2 DIABETES MELLITUS WITHOUT COMPLICATION, WITHOUT LONG-TERM CURRENT USE OF INSULIN (H): ICD-10-CM

## 2020-07-14 ENCOUNTER — COMMUNICATION - HEALTHEAST (OUTPATIENT)
Dept: FAMILY MEDICINE | Facility: CLINIC | Age: 70
End: 2020-07-14

## 2020-07-14 DIAGNOSIS — Z76.0 ENCOUNTER FOR MEDICATION REFILL: ICD-10-CM

## 2020-07-20 ENCOUNTER — OFFICE VISIT - HEALTHEAST (OUTPATIENT)
Dept: FAMILY MEDICINE | Facility: CLINIC | Age: 70
End: 2020-07-20

## 2020-07-20 DIAGNOSIS — Z00.01 ENCOUNTER FOR GENERAL ADULT MEDICAL EXAMINATION WITH ABNORMAL FINDINGS: ICD-10-CM

## 2020-07-20 DIAGNOSIS — M19.041 PRIMARY OSTEOARTHRITIS OF BOTH HANDS: ICD-10-CM

## 2020-07-20 DIAGNOSIS — M48.062 SPINAL STENOSIS OF LUMBAR REGION WITH NEUROGENIC CLAUDICATION: ICD-10-CM

## 2020-07-20 DIAGNOSIS — Z96.652 STATUS POST TOTAL LEFT KNEE REPLACEMENT: ICD-10-CM

## 2020-07-20 DIAGNOSIS — J45.31 MILD PERSISTENT ASTHMA WITH EXACERBATION: ICD-10-CM

## 2020-07-20 DIAGNOSIS — N40.1 BPH WITH OBSTRUCTION/LOWER URINARY TRACT SYMPTOMS: ICD-10-CM

## 2020-07-20 DIAGNOSIS — Z11.59 ENCOUNTER FOR HEPATITIS C SCREENING TEST FOR LOW RISK PATIENT: ICD-10-CM

## 2020-07-20 DIAGNOSIS — M19.042 PRIMARY OSTEOARTHRITIS OF BOTH HANDS: ICD-10-CM

## 2020-07-20 DIAGNOSIS — N13.8 BPH WITH OBSTRUCTION/LOWER URINARY TRACT SYMPTOMS: ICD-10-CM

## 2020-07-20 DIAGNOSIS — E11.9 TYPE 2 DIABETES MELLITUS WITHOUT COMPLICATION, WITHOUT LONG-TERM CURRENT USE OF INSULIN (H): ICD-10-CM

## 2020-07-20 DIAGNOSIS — I10 ESSENTIAL HYPERTENSION, BENIGN: ICD-10-CM

## 2020-07-20 DIAGNOSIS — M25.521 RIGHT ELBOW PAIN: ICD-10-CM

## 2020-07-20 DIAGNOSIS — C44.320 SQUAMOUS CELL CARCINOMA OF SKIN OF FACE: ICD-10-CM

## 2020-07-20 LAB
CREAT UR-MCNC: 65.4 MG/DL
HBA1C MFR BLD: 7 % (ref 3.5–6)
MICROALBUMIN UR-MCNC: 1.99 MG/DL (ref 0–1.99)
MICROALBUMIN/CREAT UR: 30.4 MG/G

## 2020-07-20 RX ORDER — GLUCOSAMINE HCL/CHONDROITIN SU 500-400 MG
1 CAPSULE ORAL 2 TIMES DAILY
Qty: 200 STRIP | Refills: 3 | Status: SHIPPED | OUTPATIENT
Start: 2020-07-20 | End: 2021-11-25

## 2020-07-20 ASSESSMENT — MIFFLIN-ST. JEOR: SCORE: 1864.55

## 2020-07-21 LAB — HCV AB SERPL QL IA: NEGATIVE

## 2020-09-10 ENCOUNTER — OFFICE VISIT - HEALTHEAST (OUTPATIENT)
Dept: FAMILY MEDICINE | Facility: CLINIC | Age: 70
End: 2020-09-10

## 2020-09-10 DIAGNOSIS — R68.89 FLU-LIKE SYMPTOMS: ICD-10-CM

## 2020-09-11 ENCOUNTER — AMBULATORY - HEALTHEAST (OUTPATIENT)
Dept: FAMILY MEDICINE | Facility: CLINIC | Age: 70
End: 2020-09-11

## 2020-09-11 ENCOUNTER — COMMUNICATION - HEALTHEAST (OUTPATIENT)
Dept: SCHEDULING | Facility: CLINIC | Age: 70
End: 2020-09-11

## 2020-09-11 DIAGNOSIS — R68.89 FLU-LIKE SYMPTOMS: ICD-10-CM

## 2020-09-13 ENCOUNTER — COMMUNICATION - HEALTHEAST (OUTPATIENT)
Dept: SCHEDULING | Facility: CLINIC | Age: 70
End: 2020-09-13

## 2020-09-14 ENCOUNTER — OFFICE VISIT - HEALTHEAST (OUTPATIENT)
Dept: FAMILY MEDICINE | Facility: CLINIC | Age: 70
End: 2020-09-14

## 2020-09-14 DIAGNOSIS — L03.116 CELLULITIS OF LEFT LEG: ICD-10-CM

## 2020-09-14 ASSESSMENT — MIFFLIN-ST. JEOR: SCORE: 1858.02

## 2020-09-28 ENCOUNTER — COMMUNICATION - HEALTHEAST (OUTPATIENT)
Dept: FAMILY MEDICINE | Facility: CLINIC | Age: 70
End: 2020-09-28

## 2020-09-28 DIAGNOSIS — I10 ESSENTIAL HYPERTENSION, BENIGN: ICD-10-CM

## 2020-09-28 DIAGNOSIS — Z76.0 ENCOUNTER FOR MEDICATION REFILL: ICD-10-CM

## 2020-09-28 DIAGNOSIS — E11.9 TYPE 2 DIABETES MELLITUS WITHOUT COMPLICATION, WITHOUT LONG-TERM CURRENT USE OF INSULIN (H): ICD-10-CM

## 2020-09-28 DIAGNOSIS — I10 ESSENTIAL HYPERTENSION: ICD-10-CM

## 2020-10-01 RX ORDER — ATENOLOL 50 MG/1
TABLET ORAL
Qty: 90 TABLET | Refills: 3 | Status: SHIPPED | OUTPATIENT
Start: 2020-10-01 | End: 2021-12-08

## 2020-10-01 RX ORDER — METFORMIN HCL 500 MG
1000 TABLET, EXTENDED RELEASE 24 HR ORAL 2 TIMES DAILY
Qty: 360 TABLET | Refills: 2 | Status: SHIPPED | OUTPATIENT
Start: 2020-10-01 | End: 2021-08-16 | Stop reason: SINTOL

## 2020-10-01 RX ORDER — AMLODIPINE BESYLATE 10 MG/1
TABLET ORAL
Qty: 90 TABLET | Refills: 3 | Status: SHIPPED | OUTPATIENT
Start: 2020-10-01 | End: 2021-09-30

## 2020-10-01 RX ORDER — LOSARTAN POTASSIUM 25 MG/1
TABLET ORAL
Qty: 90 TABLET | Refills: 3 | Status: SHIPPED | OUTPATIENT
Start: 2020-10-01 | End: 2021-09-30

## 2020-11-12 ENCOUNTER — COMMUNICATION - HEALTHEAST (OUTPATIENT)
Dept: FAMILY MEDICINE | Facility: CLINIC | Age: 70
End: 2020-11-12

## 2020-11-24 ENCOUNTER — OFFICE VISIT - HEALTHEAST (OUTPATIENT)
Dept: FAMILY MEDICINE | Facility: CLINIC | Age: 70
End: 2020-11-24

## 2020-11-24 DIAGNOSIS — H61.21 IMPACTED CERUMEN OF RIGHT EAR: ICD-10-CM

## 2020-11-24 DIAGNOSIS — M26.609 TMJ (TEMPOROMANDIBULAR JOINT SYNDROME): ICD-10-CM

## 2020-11-24 DIAGNOSIS — Z23 NEED FOR VACCINATION: ICD-10-CM

## 2020-11-24 ASSESSMENT — MIFFLIN-ST. JEOR: SCORE: 1867.1

## 2021-01-20 ENCOUNTER — OFFICE VISIT - HEALTHEAST (OUTPATIENT)
Dept: FAMILY MEDICINE | Facility: CLINIC | Age: 71
End: 2021-01-20

## 2021-01-20 DIAGNOSIS — I10 ESSENTIAL HYPERTENSION, BENIGN: ICD-10-CM

## 2021-01-20 DIAGNOSIS — M26.609 TMJ (TEMPOROMANDIBULAR JOINT SYNDROME): ICD-10-CM

## 2021-01-20 DIAGNOSIS — H65.01 NON-RECURRENT ACUTE SEROUS OTITIS MEDIA OF RIGHT EAR: ICD-10-CM

## 2021-01-20 ASSESSMENT — MIFFLIN-ST. JEOR: SCORE: 1900.27

## 2021-01-21 ENCOUNTER — COMMUNICATION - HEALTHEAST (OUTPATIENT)
Dept: FAMILY MEDICINE | Facility: CLINIC | Age: 71
End: 2021-01-21

## 2021-01-21 DIAGNOSIS — L30.9 DERMATITIS: ICD-10-CM

## 2021-01-22 RX ORDER — TRIAMCINOLONE ACETONIDE 1 MG/G
CREAM TOPICAL
Qty: 45 G | Refills: 0 | Status: SHIPPED | OUTPATIENT
Start: 2021-01-22

## 2021-02-06 ENCOUNTER — COMMUNICATION - HEALTHEAST (OUTPATIENT)
Dept: FAMILY MEDICINE | Facility: CLINIC | Age: 71
End: 2021-02-06

## 2021-02-06 DIAGNOSIS — J45.20 MILD INTERMITTENT ASTHMA WITHOUT COMPLICATION: ICD-10-CM

## 2021-02-06 DIAGNOSIS — R05.9 COUGH: ICD-10-CM

## 2021-02-07 ENCOUNTER — COMMUNICATION - HEALTHEAST (OUTPATIENT)
Dept: FAMILY MEDICINE | Facility: CLINIC | Age: 71
End: 2021-02-07

## 2021-02-07 DIAGNOSIS — R05.9 COUGH: ICD-10-CM

## 2021-02-07 RX ORDER — DEXAMETHASONE 4 MG/1
TABLET ORAL
Qty: 1 INHALER | Refills: 1 | Status: SHIPPED | OUTPATIENT
Start: 2021-02-07 | End: 2023-04-10

## 2021-02-20 ENCOUNTER — AMBULATORY - HEALTHEAST (OUTPATIENT)
Dept: FAMILY MEDICINE | Facility: CLINIC | Age: 71
End: 2021-02-20

## 2021-02-20 DIAGNOSIS — J45.31 MILD PERSISTENT ASTHMA WITH EXACERBATION: ICD-10-CM

## 2021-02-20 RX ORDER — ALBUTEROL SULFATE 90 UG/1
2 AEROSOL, METERED RESPIRATORY (INHALATION) EVERY 6 HOURS PRN
Qty: 3 INHALER | Refills: 3 | Status: SHIPPED | OUTPATIENT
Start: 2021-02-20 | End: 2023-02-09

## 2021-03-16 ENCOUNTER — RECORDS - HEALTHEAST (OUTPATIENT)
Dept: ADMINISTRATIVE | Facility: OTHER | Age: 71
End: 2021-03-16

## 2021-03-16 LAB — RETINOPATHY: NORMAL

## 2021-04-23 ENCOUNTER — OFFICE VISIT - HEALTHEAST (OUTPATIENT)
Dept: FAMILY MEDICINE | Facility: CLINIC | Age: 71
End: 2021-04-23

## 2021-04-23 ENCOUNTER — COMMUNICATION - HEALTHEAST (OUTPATIENT)
Dept: FAMILY MEDICINE | Facility: CLINIC | Age: 71
End: 2021-04-23

## 2021-04-23 DIAGNOSIS — J45.31 MILD PERSISTENT ASTHMA WITH ACUTE EXACERBATION: ICD-10-CM

## 2021-04-23 DIAGNOSIS — J30.1 SEASONAL ALLERGIC RHINITIS DUE TO POLLEN: ICD-10-CM

## 2021-04-23 DIAGNOSIS — R06.02 SHORTNESS OF BREATH: ICD-10-CM

## 2021-04-23 RX ORDER — CETIRIZINE HYDROCHLORIDE 10 MG/1
10 TABLET ORAL DAILY
Status: SHIPPED | COMMUNITY
Start: 2021-04-23 | End: 2023-03-28

## 2021-04-26 ENCOUNTER — OFFICE VISIT - HEALTHEAST (OUTPATIENT)
Dept: FAMILY MEDICINE | Facility: CLINIC | Age: 71
End: 2021-04-26

## 2021-04-26 DIAGNOSIS — J98.4 PNEUMONITIS: ICD-10-CM

## 2021-04-26 DIAGNOSIS — J45.31 MILD PERSISTENT ASTHMA WITH EXACERBATION: ICD-10-CM

## 2021-04-26 DIAGNOSIS — I10 ESSENTIAL HYPERTENSION, BENIGN: ICD-10-CM

## 2021-04-26 DIAGNOSIS — Z13.220 LIPID SCREENING: ICD-10-CM

## 2021-04-26 DIAGNOSIS — J96.00 ACUTE RESPIRATORY FAILURE, UNSPECIFIED WHETHER WITH HYPOXIA OR HYPERCAPNIA (H): ICD-10-CM

## 2021-04-26 DIAGNOSIS — E11.9 TYPE 2 DIABETES MELLITUS WITHOUT COMPLICATION, WITHOUT LONG-TERM CURRENT USE OF INSULIN (H): ICD-10-CM

## 2021-04-26 LAB
CHOLEST SERPL-MCNC: 218 MG/DL
FASTING STATUS PATIENT QL REPORTED: NO
HBA1C MFR BLD: 8.6 %
HDLC SERPL-MCNC: 49 MG/DL
LDLC SERPL CALC-MCNC: 121 MG/DL
TRIGL SERPL-MCNC: 239 MG/DL

## 2021-04-26 RX ORDER — ALBUTEROL SULFATE 0.83 MG/ML
SOLUTION RESPIRATORY (INHALATION)
Qty: 90 ML | Refills: 1 | Status: SHIPPED | OUTPATIENT
Start: 2021-04-26 | End: 2023-03-26

## 2021-04-26 ASSESSMENT — MIFFLIN-ST. JEOR: SCORE: 1889.78

## 2021-05-03 ENCOUNTER — RECORDS - HEALTHEAST (OUTPATIENT)
Dept: HEALTH INFORMATION MANAGEMENT | Facility: CLINIC | Age: 71
End: 2021-05-03

## 2021-05-05 ENCOUNTER — COMMUNICATION - HEALTHEAST (OUTPATIENT)
Dept: FAMILY MEDICINE | Facility: CLINIC | Age: 71
End: 2021-05-05

## 2021-05-05 DIAGNOSIS — I10 ESSENTIAL HYPERTENSION, BENIGN: ICD-10-CM

## 2021-05-06 RX ORDER — HYDROCHLOROTHIAZIDE 25 MG/1
25 TABLET ORAL DAILY
Qty: 90 TABLET | Refills: 3 | Status: SHIPPED | OUTPATIENT
Start: 2021-05-06 | End: 2022-06-07

## 2021-05-17 ENCOUNTER — OFFICE VISIT - HEALTHEAST (OUTPATIENT)
Dept: FAMILY MEDICINE | Facility: CLINIC | Age: 71
End: 2021-05-17

## 2021-05-17 DIAGNOSIS — J98.4 PNEUMONITIS: ICD-10-CM

## 2021-05-17 DIAGNOSIS — E11.9 TYPE 2 DIABETES MELLITUS WITHOUT COMPLICATION, WITHOUT LONG-TERM CURRENT USE OF INSULIN (H): ICD-10-CM

## 2021-05-17 DIAGNOSIS — R91.8 PULMONARY NODULES: ICD-10-CM

## 2021-05-17 DIAGNOSIS — I10 ESSENTIAL HYPERTENSION, BENIGN: ICD-10-CM

## 2021-05-17 ASSESSMENT — MIFFLIN-ST. JEOR: SCORE: 1912.46

## 2021-05-26 NOTE — TELEPHONE ENCOUNTER
I spoke with Lester and let him know thati faxed the letter and also called pt and he is going to

## 2021-05-26 NOTE — TELEPHONE ENCOUNTER
Who is calling:  Patient  Reason for Call:  Calling to report he needs to have his A1C draw earlier than 5/14/19 OV.    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 He is currently on FMLA but that is only for 12 weeks. After 12 weeks he will not have any coverage for being off work and does not want to lose his insurance.    FMLA started on 2/1/19 and has it through 5/1/19, but then there is 2 weeks he wont have any coverage for being off work.     Patient is wanting to know if he can come in earlier to have his A1C drawn to get back to work before his OV that is scheduled for 5/14/2019.    Please contact patient to advise on this.  Date of last appointment with primary care: 3/20/2019  Okay to leave a detailed message: Yes

## 2021-05-26 NOTE — TELEPHONE ENCOUNTER
Who is calling:  Patient .  Reason for Call:  States would like to speak to providers nurse regarding his work letter.  Please call patient back.  Date of last appointment with primary care: 3/20/2019  Okay to leave a detailed message: Yes

## 2021-05-26 NOTE — TELEPHONE ENCOUNTER
Pt contacted and I advised that A1C can not be drawn yet. Earlist would be 05/18/19/90 days from his med change with Dr. Holloway

## 2021-05-27 VITALS
WEIGHT: 249 LBS | SYSTOLIC BLOOD PRESSURE: 138 MMHG | DIASTOLIC BLOOD PRESSURE: 82 MMHG | BODY MASS INDEX: 33.72 KG/M2 | HEIGHT: 72 IN | HEART RATE: 70 BPM | OXYGEN SATURATION: 96 %

## 2021-05-27 NOTE — TELEPHONE ENCOUNTER
I have called the pt his creatinine went up from 1.1  To 1.3    I have advised him to stop victoza and metformin and take lantus 28 u daily and novolog 5 u before each meal. He will call in 1 week with his blood sugar. Currently between 90 and 130

## 2021-05-27 NOTE — TELEPHONE ENCOUNTER
I called the pt, he informs me that he would like to d/c victoza d/t the SE. The pt would like to take 4 tabs of metformin per day and 28 units of lantus per day. He will call in one wk with a BG update. I will forward to Dr Eaton.

## 2021-05-27 NOTE — TELEPHONE ENCOUNTER
Dr. Eaton patient    Patient is complaining of headaches, nausea and dizziness after starting on Victoza.   When he finally got up to 1.8 mg he developed diarrhea along with the other symptoms. He decided to decrease the medication down to 1.2 mg and the diarrhea went away, but he's still experiencing the other symptoms.    Please advise on what to do.      Lester @ [phone]

## 2021-05-27 NOTE — TELEPHONE ENCOUNTER
Who is calling:  Patient  Reason for Call: Pt requesting to speak to MD   Patient calling stating he decreased his Vistoza from 1.8 to 1.2  Pt was reading side effects of medication & he had almost all of them ( diarrhea, nausea, headache, dizzy spells)  Patients symptoms have decreased drastically since he lowered the dose , Please call patient to discuss   Date of last appointment with primary care:  3/20/19   Has the patient been recently seen:  Yes  Okay to leave a detailed message: Yes    Patient also decreased his Metformin from 2 tablets  two times a day to  1 tablet , twice daily.

## 2021-05-27 NOTE — TELEPHONE ENCOUNTER
Please advise him to call his endocrinology clinic and have him speak with his doctors nurse to inform her please. I am not managing his diabetes, endo is.

## 2021-05-28 ASSESSMENT — ASTHMA QUESTIONNAIRES
ACT_TOTALSCORE: 22
ACT_TOTALSCORE: 24
ACT_TOTALSCORE: 13
ACT_TOTALSCORE: 24

## 2021-05-28 NOTE — TELEPHONE ENCOUNTER
Refill Approved    Rx renewed per Medication Renewal Policy. Medication was last renewed on 11/2/2018 with 1 refill.   Last office visit: 3/29/2019 with PCP TORIBIO Rothman, [facility] Triage/Med Refill 5/18/2019     Requested Prescriptions   Pending Prescriptions Disp Refills     hydroCHLOROthiazide (HYDRODIURIL) 25 MG tablet [Pharmacy Med Name: HYDROCHLOROTHIAZIDE 25MG TABLETS] 90 tablet 0     Sig: TAKE 1 TABLET(25 MG) BY MOUTH DAILY       Diuretics/Combination Diuretics Refill Protocol  Passed - 5/17/2019  3:23 AM        Passed - Visit with PCP or prescribing provider visit in past 12 months     Last office visit with prescriber/PCP: 11/2/2018 Rhonda Wells MD OR same dept: 3/29/2019 Nohemi Lezama NP OR same specialty: 3/29/2019 Nohemi Lezama NP  Last physical: Visit date not found Last MTM visit: Visit date not found   Next visit within 3 mo: Visit date not found  Next physical within 3 mo: Visit date not found  Prescriber OR PCP: Rhonda Wells MD  Last diagnosis associated with med order: 1. Essential hypertension  - hydroCHLOROthiazide (HYDRODIURIL) 25 MG tablet [Pharmacy Med Name: HYDROCHLOROTHIAZIDE 25MG TABLETS]; TAKE 1 TABLET(25 MG) BY MOUTH DAILY  Dispense: 90 tablet; Refill: 0    If protocol passes may refill for 12 months if within 3 months of last provider visit (or a total of 15 months).             Passed - Serum Potassium in past 12 months      Lab Results   Component Value Date    Potassium 3.4 (L) 03/29/2019             Passed - Serum Sodium in past 12 months      Lab Results   Component Value Date    Sodium 140 03/29/2019             Passed - Blood pressure on file in past 12 months     BP Readings from Last 1 Encounters:   03/29/19 108/60             Passed - Serum Creatinine in past 12 months      Creatinine   Date Value Ref Range Status   03/29/2019 1.69 (H) 0.70 - 1.30 mg/dL Final

## 2021-05-28 NOTE — TELEPHONE ENCOUNTER
Patient notified of appointment for his  scheduled ABG on May 15 at the [facility] PFT office at 10:30 in the morning. Will call him to discuss results once available

## 2021-05-28 NOTE — TELEPHONE ENCOUNTER
I did place an order so he can get his PFT's done. 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 Doesn't look like we will need anything else going forward for his DOT exam based on chart review today.

## 2021-05-28 NOTE — TELEPHONE ENCOUNTER
I called the pt, he states that his BG levels have been between 100-130. The pt is taking lantus 28 U daily and novolog 5 U before each meal. I will inform Dr Eaton, however I did inform the pt there will likely not be any changes, but I will call if there are.

## 2021-05-28 NOTE — PROGRESS NOTES
1. Type 2 diabetes mellitus with hyperglycemia, with long-term current use of insulin (H)  Glycosylated Hemoglobin A1c    Basic Metabolic Panel    Microalbumin, Random Urine   2. Essential Hypercholesterolemia  Lipid Cascade FASTING   3. Class 1 obesity with body mass index (BMI) of 31.0 to 31.9 in adult, unspecified obesity type, unspecified whether serious comorbidity present           ASSESSMENT/PLAN:     The following high BMI interventions were performed this visit: encouragement to exercise and weight monitoring    1. Type 2 diabetes mellitus with hyperglycemia, with long-term current use of insulin (H)    - Glycosylated Hemoglobin A1c  - Basic Metabolic Panel  - Microalbumin, Random Urine    2. Essential Hypercholesterolemia    - Lipid Juab FASTING    3. Class 1 obesity with body mass index (BMI) of 31.0 to 31.9 in adult, unspecified obesity type, unspecified whether serious comorbidity present    -current BMI 32.9      There are no Patient Instructions on file for this visit.  There are no discontinued medications.  Return in about 3 months (around 8/20/2019) for diabetes.        Nohemi Lezama NP          SUBJECTIVE:  Lester Shi is a 69 y.o. male who presents for diabetes follow-up.  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  Morning blood sugars have been ranging from 90-1 42, he is taking insulin several times per day along with nighttime basal insulin.  He is now exercising by riding his bike with his granddaughter.  He does drink alcohol 1-2 times per year, he is a non-smoker.  He denies vision or urinary changes today.  He does have full sensation in his feet.  We did review his nutrition.  Breakfast: Eggs, cottage cheese with berries or  protein shake.  Lunch: [place], steak with vegetables, soup, sometimes sweets.  Dinner: Chicken with pasta, occasional snacks are fruit and sweets.  He recently had his arterial blood gas level checked for his DOT requirements, he did fail his pulmonary function test due to his history of Klinefelter's and concave chest cavity.  He did pass the ABG test per CSA guidelines.  Now we will wait to see what his A1c is today and review the required documentation from his endocrinologist to determine whether or not his diabetes is well controlled if he is able to return back to driving under the DOT guidelines.  He informs me today that the endocrinologist will need a copy of his recent labs, he will call me later today and leave a fax number for the endocrinology office and we will get those results over there as soon as we are able to.    Essential hypertension/hyperlipidemia: He is fasting today, he is due for cholesterol check.  He denies any chest pain, shortness of breath, headaches or dizziness, no reports of lower extremity swelling or diaphoresis.  Blood pressure is well controlled today on current antihypertensive therapy, he continues to take a daily baby aspirin.  He is currently not taking a statin at this time.  Chief Complaint   Patient presents with     Follow-up     A1C, and discuss PFT         Patient Active Problem List   Diagnosis     Eczema     Anemia     Otitis Media     Type 2 diabetes mellitus (H)     Essential Hypercholesterolemia     Essential Hypertension     Joint Pain, Localized In The Knee     Obesity     Allergic Rhinitis     Klinefelter's Syndrome     Varicose vein     Mild persistent asthma with acute exacerbation     Pneumonia     Hyperglycemia     Acute kidney injury (H)     Uncontrolled type 2 diabetes mellitus with hyperglycemia (H)     Encounter for examination following treatment at hospital     Dizziness       Current Outpatient Medications   Medication Sig Dispense Refill      ACCU-CHEK FASTCLIX LANCET DRUM U TO TEST TID  0     albuterol (PROAIR HFA;PROVENTIL HFA;VENTOLIN HFA) 90 mcg/actuation inhaler Inhale 2 puffs every 6 (six) hours as needed for wheezing. 8.5 g 11     albuterol (PROVENTIL) 2.5 mg /3 mL (0.083 %) nebulizer solution Take 3 mL (2.5 mg total) by nebulization every 4 (four) hours as needed for wheezing. 40 vial 0     amLODIPine (NORVASC) 5 MG tablet TAKE 1 TABLET(5 MG) BY MOUTH DAILY 90 tablet 2     ascorbic acid, vitamin C, (ASCORBIC ACID WITH DOROTHY HIPS) 500 MG tablet Take 500 mg by mouth daily.       aspirin 81 MG EC tablet Take 1 tablet (81 mg total) by mouth daily.  0     atenolol (TENORMIN) 50 MG tablet Take 1 tablet (50 mg total) by mouth 2 (two) times a day. 60 tablet 0     blood glucose test strips Use 5 each As Directed 4 (four) times a day. Dispense brand per patient's insurance at pharmacy discretion. 200 strip 2     FLOVENT  mcg/actuation inhaler INHALE 2 PUFFS BY MOUTH TWICE DAILY 1 Inhaler 3     fluticasone (FLONASE) 50 mcg/actuation nasal spray Apply 1 spray into each nostril daily as needed for rhinitis.       hydroCHLOROthiazide (HYDRODIURIL) 25 MG tablet TAKE 1 TABLET(25 MG) BY MOUTH DAILY 90 tablet 3     hydroCHLOROthiazide (HYDRODIURIL) 25 MG tablet Take 1 tablet (25 mg total) by mouth daily. 90 tablet 3     LANTUS U-100 INSULIN 100 unit/mL injection 40 unit daily am for 3 day then 28 unit daily (Patient taking differently: Inject 28 unit daily in the morning.      ) 10 mL PRN     liraglutide (VICTOZA) 0.6 mg/0.1 mL (18 mg/3 mL) PnIj injection Inject 0.6 mg daily for 1 week then increase to 1.2 mg once a day for a week then increase to, 1.8 mg daily.With or without food. 3 Syringe 1     losartan (COZAAR) 25 MG tablet Take 1 tablet (25 mg total) by mouth daily. 90 tablet 1     metFORMIN (GLUCOPHAGE XR) 500 MG 24 hr tablet take 500 mg with supper add extra pill at weekly until you are taking 2 in the morning and 2 with supper. 360 tablet 1      nebulizers Misc Use four times a day for 5 day then as  needed 1 each 0     NOVOLOG FLEXPEN U-100 INSULIN 100 unit/mL injection pen        No current facility-administered medications for this visit.        Social History     Tobacco Use   Smoking Status Former Smoker     Packs/day: 2.00     Years: 12.00     Pack years: 24.00     Last attempt to quit: 1979     Years since quittin.4   Smokeless Tobacco Never Used       REVIEW OF SYSTEMS: Denies dysuria, frequency, urgency, fevers, chills, back pain, nausea, vomiting or abdominal pain.      TOBACCO USE:  Social History     Tobacco Use   Smoking Status Former Smoker     Packs/day: 2.00     Years: 12.00     Pack years: 24.00     Last attempt to quit: 1979     Years since quittin.4   Smokeless Tobacco Never Used     Social History     Socioeconomic History     Marital status:      Spouse name: Not on file     Number of children: Not on file     Years of education: Not on file     Highest education level: Not on file   Occupational History     Not on file   Social Needs     Financial resource strain: Not on file     Food insecurity:     Worry: Not on file     Inability: Not on file     Transportation needs:     Medical: Not on file     Non-medical: Not on file   Tobacco Use     Smoking status: Former Smoker     Packs/day: 2.00     Years: 12.00     Pack years: 24.00     Last attempt to quit: 1979     Years since quittin.4     Smokeless tobacco: Never Used   Substance and Sexual Activity     Alcohol use: Yes     Comment: rare     Drug use: No     Sexual activity: Never     Partners: Female   Lifestyle     Physical activity:     Days per week: Not on file     Minutes per session: Not on file     Stress: Not on file   Relationships     Social connections:     Talks on phone: Not on file     Gets together: Not on file     Attends Nondenominational service: Not on file     Active member of club or organization: Not on file     Attends meetings of clubs  or organizations: Not on file     Relationship status: Not on file     Intimate partner violence:     Fear of current or ex partner: Not on file     Emotionally abused: Not on file     Physically abused: Not on file     Forced sexual activity: Not on file   Other Topics Concern     Not on file   Social History Narrative     Not on file         OBJECTIVE:            Vitals:    05/20/19 0832   BP: 138/90   Pulse: 69   Resp: 16   Temp: 97.9  F (36.6  C)   SpO2: 95%     Weight: (!) 243 lb (110.2 kg)    Wt Readings from Last 3 Encounters:   05/20/19 (!) 243 lb (110.2 kg)   03/29/19 (!) 233 lb (105.7 kg)   03/20/19 (!) 236 lb 1 oz (107.1 kg)     Body mass index is 32.96 kg/m .        Physical Exam:  GENERAL APPEARANCE: A&A, NAD, well hydrated, well nourished  HEAD: atraumatic, no deformity  EYES: PERRL, EOM's intact, no redness or swelling, wearing glasses  NECK: Supple, without lymphadenopathy, no thyroid mass, no JVD, no bruit  CV: RRR, no M/G/R, no edema   LUNGS: CTAB, normal respiratory effort  ABDOMEN: S&NT, no masses, no organomegaly, BS present x4   EXTREMITY: no edema, feet inspected, patient with full sensation per micro filament testing today.  No callus formation or ulcerations noted.  Nails without discoloration or thickness.  SKIN:  Normal skin turgor, no lesions/rashes, warm and dry

## 2021-05-28 NOTE — TELEPHONE ENCOUNTER
Who is calling:  Patient   Reason for Call:  He would like his labs from today to be faxed to Dr Tiffanie Gutiérrez as he is scheduled to see her on 5/22/2019. Fax number [phone].  Date of last appointment with primary care: 5/20/2019  Okay to leave a detailed message: No

## 2021-05-28 NOTE — TELEPHONE ENCOUNTER
I haven't had time yet to do this. His endocrinology appointment is around May 20th. I will get to it when I am able to in the next week and will discuss it with him then.

## 2021-05-28 NOTE — TELEPHONE ENCOUNTER
"Who is calling:  Patient is calling and he said the last time he was in to see Dr. Lezama, that she was going to look up \"everything that he needed\" and give him a call back.  He mentioned DOT Physical. I offered to make an appointment for him, but he just wants Dr. Lezama to call him back to discuss.   Reason for Call:  See above   Date of last appointment with primary care: N/A  Okay to leave a detailed message: Yes     "

## 2021-05-28 NOTE — TELEPHONE ENCOUNTER
Pt notified. He would like referral to a different endocrinology. Please send referral. Pt will await call to schedule PFT and endocrinologist. He will keep Nohemi in the loop as to when his appointments are.     Once his endocrinology appt in completed and letter is done by that specialist and he has his PFT results, he will call to get scheduled for DOT exam with Nohemi. Pt is aware of all information  H.DeGree, CMA

## 2021-05-28 NOTE — TELEPHONE ENCOUNTER
Spoke with patient regarding PFT results. He did not pass his PFT test based on current [employer] guidelines. 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 Will call [facility] PFT office tomorrow to set up a time for him to go over there on Wednesday May 15 to have his ABG collected.

## 2021-05-28 NOTE — PROGRESS NOTES
Patient came into PFT lab for an ABG ordered by MD, Patient on RA. Results are the following: ph 7.43, PCO2 40, PO2 94, Bicarb 26.5, SAO2 98%. Pt tolerated well and left lab in no distress.   RESPIRATORY CARE NOTE     Patient Name: Lester Shi  Today's Date: 5/15/2019     Problem List  Patient Active Problem List   Diagnosis     Eczema     Anemia     Otitis Media     Chest Pain     Type 2 diabetes mellitus (H)     Essential Hypercholesterolemia     Essential Hypertension     Joint Pain, Localized In The Knee     Frostbite     Obesity     Allergic Rhinitis     Klinefelter's Syndrome     Shortness Of Breath     Varicose vein     Right foot pain     Nasal congestion     Mild persistent asthma with acute exacerbation     Wheezing     Cough     Influenza B     Pneumonia     Acute respiratory failure, unspecified whether with hypoxia or hypercapnia (H)     Morbid obesity (H)     Hyperglycemia     Acute kidney injury (H)     Uncontrolled type 1 diabetes mellitus with hyperglycemia (H)     Uncontrolled type 2 diabetes mellitus with hyperglycemia (H)     Encounter for examination following treatment at hospital     Diarrhea, unspecified type     Dizziness                           Meredith BEDOYAT

## 2021-05-28 NOTE — TELEPHONE ENCOUNTER
Refill Approved    Rx renewed per Medication Renewal Policy. Medication was last renewed on 11/2/18.    Leandra Avalos, [facility] Triage/Med Refill 5/16/2019     Requested Prescriptions   Pending Prescriptions Disp Refills     amLODIPine (NORVASC) 5 MG tablet [Pharmacy Med Name: AMLODIPINE BESYLATE 5MG TABLETS] 90 tablet 0     Sig: TAKE 1 TABLET(5 MG) BY MOUTH DAILY       Calcium-Channel Blockers Protocol Passed - 5/16/2019  6:39 AM        Passed - PCP or prescribing provider visit in past 12 months or next 3 months     Last office visit with prescriber/PCP: 11/2/2018 Rhonda Wells MD OR same dept: 3/29/2019 Nohemi Lezama NP OR same specialty: 3/29/2019 Nohemi Lezama NP  Last physical: Visit date not found Last MTM visit: Visit date not found   Next visit within 3 mo: Visit date not found  Next physical within 3 mo: Visit date not found  Prescriber OR PCP: Rhonda Wells MD  Last diagnosis associated with med order: 1. Essential hypertension  - amLODIPine (NORVASC) 5 MG tablet [Pharmacy Med Name: AMLODIPINE BESYLATE 5MG TABLETS]; TAKE 1 TABLET(5 MG) BY MOUTH DAILY  Dispense: 90 tablet; Refill: 0    If protocol passes may refill for 12 months if within 3 months of last provider visit (or a total of 15 months).             Passed - Blood pressure filed in past 12 months     BP Readings from Last 1 Encounters:   03/29/19 108/60

## 2021-05-28 NOTE — PROGRESS NOTES
RESPIRATORY CARE NOTE     Patient Name: Lester Shi  Today's Date: 5/6/2019     Complete PFT done. Pt performed tests with good effort.Albuterol neb given.  Test results meet ATS criteria. Results scanned into epic. Pt left in no distress.       Meredith Mcarthur, LRT    RESPIRATORY CARE NOTE     Patient Name: Lester Shi  Today's Date: 5/6/2019     Problem List  Patient Active Problem List   Diagnosis     Eczema     Anemia     Otitis Media     Chest Pain     Type 2 diabetes mellitus (H)     Essential Hypercholesterolemia     Essential Hypertension     Joint Pain, Localized In The Knee     Frostbite     Obesity     Allergic Rhinitis     Klinefelter's Syndrome     Shortness Of Breath     Varicose vein     Right foot pain     Nasal congestion     Mild persistent asthma with acute exacerbation     Wheezing     Cough     Influenza B     Pneumonia     Acute respiratory failure, unspecified whether with hypoxia or hypercapnia (H)     Morbid obesity (H)     Hyperglycemia     Acute kidney injury (H)     Uncontrolled type 1 diabetes mellitus with hyperglycemia (H)     Uncontrolled type 2 diabetes mellitus with hyperglycemia (H)     Encounter for examination following treatment at hospital     Diarrhea, unspecified type     Dizziness                           Meredith Mcarthur LRT

## 2021-05-28 NOTE — TELEPHONE ENCOUNTER
Test Results  Who is calling?:  Patient   Who ordered the test:  Nohemi Lezama NP    Type of test: Other: PFT  Date of test:  5/6/19  Where was the test performed:  [facility]  What are your questions/concerns?:  I would like my results   Okay to leave a detailed message?:  Yes

## 2021-05-28 NOTE — TELEPHONE ENCOUNTER
Pt has an appt with Nohemi 5/20/2019. Endocrinology doctor is on medical leave and isn't scheduled with him until August. H.DeGree, CMA

## 2021-05-29 ENCOUNTER — RECORDS - HEALTHEAST (OUTPATIENT)
Dept: ADMINISTRATIVE | Facility: CLINIC | Age: 71
End: 2021-05-29

## 2021-05-29 NOTE — TELEPHONE ENCOUNTER
Pt contacted. He is requesting paperwork be completed so he can go back to work tomorrow 5/29/19.   When he was here for a DM check 5/20/19, he states he was told that Nohemi Lezama wouldn't be in the office 5/23-5/24/19 but back today and she would do it when she was back in clinic. Pt will drop off paperwork and will be called back whether or not a visit is needed.     [facility] DOT physician is in [place] and he isn't able to get this paperwork to her for completion. He has a letter from [facility] releasing him and is just needing this final paperwork filled out.  H.DeGree, CMA

## 2021-05-29 NOTE — TELEPHONE ENCOUNTER
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

## 2021-05-29 NOTE — TELEPHONE ENCOUNTER
New Appointment Needed  What is the reason for the visit:    Same Date/Next Day Appt Request  What is the reason for your visit?: Diabetes check for return to work Wednesday 05/29/2019    Provider Preference: PCP only  How soon do you need to be seen?: 05/28/2019  Waitlist offered?: No  Okay to leave a detailed message:  Yes

## 2021-05-29 NOTE — TELEPHONE ENCOUNTER
Name of form/paperwork: Other:  Return to work   Have you been seen for this request: Yes:  but needs to have follow up appointment 05/28/2019  Do we have the form: No  When is form needed by: 05/28/2019  How would you like the form returned: handed back so patient can bring to HR office 05/28/2019  Fax Number: N/A  Patient Notified form requests are processed in 3-5 business days: No  (If patient needs form sooner, please note that in this message.) Patient needs form completed 05/28/2019  Okay to leave a detailed message? Yes

## 2021-05-30 ENCOUNTER — RECORDS - HEALTHEAST (OUTPATIENT)
Dept: ADMINISTRATIVE | Facility: CLINIC | Age: 71
End: 2021-05-30

## 2021-05-30 VITALS — WEIGHT: 246.7 LBS | BODY MASS INDEX: 33.46 KG/M2

## 2021-05-30 VITALS — HEIGHT: 72 IN | WEIGHT: 257 LBS | BODY MASS INDEX: 34.81 KG/M2

## 2021-05-30 VITALS — WEIGHT: 253.2 LBS | BODY MASS INDEX: 34.34 KG/M2

## 2021-05-30 NOTE — TELEPHONE ENCOUNTER
RN cannot approve Refill Request    RN can NOT refill this medication med is not covered by policy/route to provider. Last office visit: 5/20/2019 Nohemi Lezama, NP Last Physical: Visit date not found Last MTM visit: Visit date not found Last visit same specialty: Visit date not found.  Next visit within 3 mo: Visit date not found  Next physical within 3 mo: Visit date not found      Rhonda Gabriel, [facility] Triage/Med Refill 7/11/2019    Requested Prescriptions   Pending Prescriptions Disp Refills     albuterol (PROVENTIL) 2.5 mg /3 mL (0.083 %) nebulizer solution [Pharmacy Med Name: ALBUTEROL 0.083%(2.5MG/3ML) 60X3ML] 180 mL 0     Sig: NEBULIZE 1 VIAL EVERY 4 HOURS AS NEEDED FOR WHEEZING       There is no refill protocol information for this order

## 2021-05-31 VITALS — BODY MASS INDEX: 34.58 KG/M2 | WEIGHT: 255 LBS

## 2021-05-31 VITALS — WEIGHT: 255 LBS | BODY MASS INDEX: 46.93 KG/M2 | HEIGHT: 62 IN

## 2021-05-31 VITALS — WEIGHT: 256.6 LBS | BODY MASS INDEX: 34.8 KG/M2

## 2021-05-31 VITALS — WEIGHT: 255 LBS | BODY MASS INDEX: 34.58 KG/M2

## 2021-05-31 VITALS — WEIGHT: 255 LBS | HEIGHT: 72 IN | BODY MASS INDEX: 34.54 KG/M2

## 2021-05-31 NOTE — TELEPHONE ENCOUNTER
I sent him 30 tablets. He is due for med check for BP and diabetes this month. Please schedule an appointment.

## 2021-06-01 ENCOUNTER — RECORDS - HEALTHEAST (OUTPATIENT)
Dept: ADMINISTRATIVE | Facility: CLINIC | Age: 71
End: 2021-06-01

## 2021-06-01 VITALS — BODY MASS INDEX: 34.67 KG/M2 | HEIGHT: 72 IN | WEIGHT: 256 LBS

## 2021-06-01 NOTE — TELEPHONE ENCOUNTER
Okay for 30 days of losartan but I do not see that patient has an appointment to see Nohemi back in the clinic.  Please call the patient and make sure that he has a follow-up appointment within the next month.  I suspect she will not renew his medication again unless he has that visit.

## 2021-06-01 NOTE — PROGRESS NOTES
Chief Complaint   Patient presents with     Ear Pain     Pt c/o R ear pain and possibly plugged x 3 days       HPI: 69-year-old male, who drives a bus, and who goes to  H[employer]uism, presents today with right ear pain for 3 days in duration and decreased hearing.  He also is noticing occasional runny nose.  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  He is also concerned because he drives a school bus as well as is going on a [employer] retreat this weekend and wants to be healthy.    ROS: No fever.  No otorrhea.  No cough or congestion.    SH:    reports that he quit smoking about 40 years ago. He has a 24.00 pack-year smoking history. He has never used smokeless tobacco. He reports that he drinks alcohol. He reports that he does not use drugs.      FH: The Patient's family history is not on file.     Meds:  Lester has a current medication list which includes the following prescription(s): accu-chek fastclix lancet drum, albuterol, albuterol, amlodipine, ascorbic acid (vitamin c), aspirin, atenolol, blood glucose test, flovent hfa, fluticasone propionate, hydrochlorothiazide, hydrochlorothiazide, lantus u-100 insulin, liraglutide, losartan, metformin, nebulizers, novolog flexpen u-100 insulin, azithromycin, and neomycin-polymyxin-hydrocortisone.    O:  /90   Pulse 66   Temp 97.9  F (36.6  C)   Resp 16   Wt (!) 235 lb (106.6 kg)   SpO2 97%   BMI 31.87 kg/m     Constitutional:    --Vitals as above  --No acute distress  Eyes-  --Sclera noninjected  --Lids and conjunctiva normal  ENT-  --TMs show left normal right is occluded with cerumen.  After cerumen removed there was erythema in the canal and a tympanic membrane  --Sclera noninjected  --Pharynx not erythematous  Neck-  --Neck supple    Lymph-  --No cervical lymphadenopathy  Lungs-  --Clear to Auscultation  Heart-  --Regular rate and rhythm  Skin-  --Pink and dry          A/P:   1. Infective otitis externa, right  The  patient has an otitis externa as well as a mild otitis media.  We will treat with antibiotics below.  - neomycin-polymyxin-hydrocortisone (CORTISPORIN) otic solution; Administer 3 drops into both ears 3 (three) times a day for 10 days.  Dispense: 10 mL; Refill: 0  - azithromycin (ZITHROMAX Z-CHAMP) 250 MG tablet; 2 tabs today then 1 daily x 4 days  Dispense: 6 tablet; Refill: 0    2.  Cerumen impaction  - Cerumen from the right ear was removed both with curette and with flushing.

## 2021-06-02 VITALS — WEIGHT: 241.1 LBS | BODY MASS INDEX: 32.66 KG/M2 | HEIGHT: 72 IN

## 2021-06-02 VITALS — BODY MASS INDEX: 34.27 KG/M2 | WEIGHT: 253 LBS | HEIGHT: 72 IN

## 2021-06-02 VITALS — BODY MASS INDEX: 32.78 KG/M2 | WEIGHT: 242 LBS | HEIGHT: 72 IN

## 2021-06-02 VITALS — HEIGHT: 72 IN | BODY MASS INDEX: 33.32 KG/M2 | WEIGHT: 246 LBS

## 2021-06-02 VITALS — WEIGHT: 243.5 LBS | BODY MASS INDEX: 33.02 KG/M2

## 2021-06-02 VITALS — WEIGHT: 238.25 LBS | HEIGHT: 72 IN | BODY MASS INDEX: 32.27 KG/M2

## 2021-06-02 VITALS — HEIGHT: 72 IN | BODY MASS INDEX: 33.66 KG/M2 | WEIGHT: 248.56 LBS

## 2021-06-02 VITALS — WEIGHT: 236.06 LBS | BODY MASS INDEX: 32.02 KG/M2

## 2021-06-02 VITALS — WEIGHT: 233 LBS | BODY MASS INDEX: 31.56 KG/M2 | HEIGHT: 72 IN

## 2021-06-02 VITALS — WEIGHT: 253.8 LBS | BODY MASS INDEX: 34.42 KG/M2

## 2021-06-02 VITALS — HEIGHT: 72 IN | BODY MASS INDEX: 34 KG/M2 | WEIGHT: 251 LBS

## 2021-06-02 NOTE — PROGRESS NOTES
Subjective:      Patient ID: Lester Shi is a 69 y.o. male.    Chief Complaint:    HPI     Lester Shi is a 69 y.o. male with a past medical history for diabetes mellitus type 2, hyperlipidemia, hypertension, past medical history of asthma and pneumonia, obesity, sedentary lifestyle who presents today complaining of a 2-day history of left lower quadrant abdominal pain.  Patient states it started out with a small amount of nauseousness and then one bout of emesis yesterday.  Subsequently today he has not had any nausea or emesis continuing.  The pain started out as flank pain on the left flank that radiated to the left lower quadrant.  It is now staying in the left lower quadrant of the abdomen.  He feels that the pain is roughly a 4 out of 10 that is constant but at times it increases to a 7 out of 10.  It is not associated with left arm jaw or chest pain, however he does state that he has left anterior shoulder pain with inspiration.  He is not describing pleuritic pain and no pain that is radiating to the scapular or shoulder region on the posterior thoracic wall.  He is not describing diaphoresis shortness of breath syncope presyncope bilateral lower extremity swelling.    The patient was a former smoker in his youth in his 20s or 30s but has not smoked since.    Patient has a history of blood pressure but it is never been elevated.  He did not take his blood pressure medications today and his blood pressure is 207/110 and is tachycardic at 104.  He does not have any palpitations.    He denies alcohol use, cigarette use or heavy caffeine use.      Past Medical History:   Diagnosis Date     Anemia     Created by Conversion      Eczema     Created by Conversion      Essential Hypercholesterolemia     Created by Conversion      Essential Hypertension     Created by Conversion      Klinefelter's syndrome     Created by Conversion      Obesity     Created by Conversion      Type 2 Diabetes Mellitus     Created  by Conversion      Varicose vein 2015       Past Surgical History:   Procedure Laterality Date     JOINT REPLACEMENT      left knee TKA 2013       No family history on file.    Social History     Tobacco Use     Smoking status: Former Smoker     Packs/day: 2.00     Years: 12.00     Pack years: 24.00     Last attempt to quit: 1979     Years since quittin.8     Smokeless tobacco: Never Used   Substance Use Topics     Alcohol use: Yes     Comment: rare     Drug use: No       Review of Systems  As above in HPI, otherwise balance of Review of Systems are negative.    Objective:     BP (!) 207/110 (Patient Site: Right Arm, Patient Position: Sitting, Cuff Size: Adult Large)   Pulse (!) 104   Temp 98.2  F (36.8  C) (Oral)   Resp 16   Wt (!) 230 lb (104.3 kg)   SpO2 96%   BMI 31.19 kg/m      Physical Exam  General: Patient is resting comfortably no acute distress is afebrile  HEENT: Head is normocephalic atraumatic   eyes are PERRL EOMI sclera anicteric   TMs are clear bilaterally  Throat is with mild pharyngeal wall erythema and no exudate  No cervical lymphadenopathy present  LUNGS: Clear to auscultation bilaterally  HEART: Regular rate and rhythm  Skin: Without rash non-diaphoretic    Lab:  Recent Results (from the past 24 hour(s))   Electrocardiogram Perform - Clinic   Result Value Ref Range    SYSTOLIC BLOOD PRESSURE      DIASTOLIC BLOOD PRESSURE      VENTRICULAR RATE 97 BPM    ATRIAL RATE 97 BPM    P-R INTERVAL 158 ms    QRS DURATION 96 ms    Q-T INTERVAL 374 ms    QTC CALCULATION (BEZET) 474 ms    P Axis 64 degrees    R AXIS 49 degrees    T AXIS 30 degrees    MUSE DIAGNOSIS       Sinus rhythm with Premature atrial complexes  Nonspecific ST abnormality  Abnormal ECG  When compared with ECG of 2019 16:51,  Premature atrial complexes are now Present  T wave inversion now evident in Inferior leads         Assessment:     Procedures    The primary encounter diagnosis was Abdominal pain, left  lower quadrant. Diagnoses of Acute pain of left shoulder, Non-intractable vomiting without nausea, unspecified vomiting type, Uncontrolled type 2 diabetes mellitus with hyperglycemia (H), and Essential hypertension were also pertinent to this visit.    Plan:     1. Abdominal pain, left lower quadrant  Electrocardiogram Perform - Clinic   2. Acute pain of left shoulder  Electrocardiogram Perform - Clinic   3. Non-intractable vomiting without nausea, unspecified vomiting type  Electrocardiogram Perform - Clinic   4. Uncontrolled type 2 diabetes mellitus with hyperglycemia (H)     5. Essential hypertension         Multiple etiologies and diagnoses were considered.  I am concerned that he may have a DVT, PE with radiation up into the shoulder, and he may have left lower quadrant abdominal pain consistent with diverticulitis or possibly an aneurysm since he has been a smoker in the past.,  He has multiple risk factors for cardiovascular event to include acute coronary syndrome, he has elevated blood pressure and tachycardia with symptomatic left shoulder pain and left lower quadrant abdominal pain.  However the patient did not have nauseousness shortness of breath diaphoresis or current nausea or emesis.  I am sending him by personal vehicle to the [facility] ER.  We did offer ambulance transport but the patient did decline that.  I called and gave report to the emergency room personnel and patient will present to the ER for evaluation and treatment.  He was evaluated in a timely manner and then triaged to the next higher level of care.

## 2021-06-02 NOTE — PROGRESS NOTES
TCM DISCHARGE FOLLOW UP CALL    Discharge Date:  10/31/2019  Reason for hospital stay (discharge diagnosis)::  Sepsis, Bilateral PNA  Are you feeling better, the same or worse since your discharge?:  Patient is feeling better (A little wheezing, a little bit of coughing & mucus, some ATKINSON, fatigued.  Denies abd pain, nausea, vomiting.)  Do you feel like you have a plan in the event of a health emergency?: Yes (Wife.  Informed pt of 24/7 nurse triage)    As part of your discharge plan, were  home care services ordered for you?: No    Did you receive any new medications, or was there a change to your medications?: Yes    Are you taking those medications, or do you have any established regiment?:  Pt states he is taking levofloxacin once a day, as prescribed, along w/his Flovent inhaler twice a day and albuterol inhaler PRN.  States he has albuterol nebs at home, but hasn't done one.   Do you have any follow up visits scheduled with your PCP or Specialist?:  Yes, with PCP  (RN) Is PCP appt scheduled soon enough (within 14 days of discharge date)?: Yes (Nohemi Lezama CNP 11/4/19)    RN NOTES::  Advised doing albuterol nebs q 4 hours for wheezing, cough, & shortness of breath, instead of his albuterol inhaler, especially while recovering from PNA.  Pt verbalized understanding & agrees w/plan.      Margarita Werner RN Care Manager, Population Health

## 2021-06-02 NOTE — TELEPHONE ENCOUNTER
Pt wife called in states Pt is vomited 1 time just now.  Pt was shaking before that.  Pt temp is 97.7 oral.  Pt has diabetic type 2  Pt blood sugar is 110. At this time  Pt is still shaking a little bit.  Pt ate soup few min ago.  Pt eat packed food.  Pt used inhaler for wheezing.  No abdominal pain.  No diarrhea.  No one sick at home.  No wheezing.  Pt has cold symptom.  No runny nose.  No vomited blood.  No headache.  The disposition is home care.  Care advice given per protocol..  Patient wife agrees with care advice given.   Agreed to call back if he has additional symptoms or questions.      Kevin Barragan RN, [facility] Triage/Med Refill 10/28/2019 2:32 PM      Reason for Disposition    MILD or MODERATE vomiting (e.g., 1 - 5 times / day)    Protocols used: VOMITING-A-AH

## 2021-06-02 NOTE — PROGRESS NOTES
"1. Ear infection     2. Impacted cerumen of left ear  Ear Cerumen Removal-Clinic   3. Encounter for medication refill  losartan (COZAAR) 25 MG tablet    triamcinolone (KENALOG) 0.1 % cream   4. Need for vaccination  Influenza High Dose, Seasonal 65+ yrs         ASSESSMENT/PLAN:     The following high BMI interventions were performed this visit: weight monitoring    1. Ear infection    - resolved    2. Impacted cerumen of left ear    - Ear Cerumen Removal-Clinic  -cerumen removed from left ear without complications    3. Need for vaccination    - Influenza High Dose, Seasonal 65+ yrs    4. Med refill    -Refill for Losartan #30 sent to patients pharmacy.  He is scheduled for an appointment in 4 weeks for his medication check, we will review his medications at that time and additional refills will be sent then.  He requested a refill of triamcinolone 0.1% cream which was also sent to his pharmacy.      There are no Patient Instructions on file for this visit.  Medications Discontinued During This Encounter   Medication Reason     losartan (COZAAR) 25 MG tablet Reorder     Return in about 4 weeks (around 11/19/2019) for diabetes, hypertension, asthma.         Nohemi Lezama NP          SUBJECTIVE:  Lester Shi is a 69 y.o. male presents for evaluation of right ear fullness.  Patient states he has a history of recurrent right ear infections.  He has been seen by ENT many years ago.  He was seen by Dr. Wang last month and diagnosed with otitis externa in the right ear.  He was given cortisporin ear drops and a z-juan jose and an his right ear flushed.  Patient states he does experience the sensation of his right ear plugging up while laying flat.  He will then have to sit up in a chair and it will clear up in 15-20 minutes.  He denies hearing loss.  He is not currently having pain in his ears but states the left ear \"tickles\".  He does have seasonal allergies and is no longer using Flonase or Zyrtec.  He works as a "  which is a potential exposure to all kinds of illnesses.  He currently denies ear pain, nasal congestions, cough, sore throat, fever, or chills.  He has not recently traveled.  He would like his flu vaccine today and he is up to date on pneumonia vaccine. Vital signs are stable today.       Chief Complaint   Patient presents with     Ear Pain     Follow up ear infection - RT eat         Patient Active Problem List   Diagnosis     Eczema     Anemia     Otitis Media     Type 2 diabetes mellitus (H)     Essential Hypercholesterolemia     Essential Hypertension     Joint Pain, Localized In The Knee     Obesity     Allergic Rhinitis     Klinefelter's Syndrome     Varicose vein     Mild persistent asthma with acute exacerbation     Pneumonia     Hyperglycemia     Acute kidney injury (H)     Uncontrolled type 2 diabetes mellitus with hyperglycemia (H)     Encounter for examination following treatment at hospital     Dizziness       Current Outpatient Medications   Medication Sig Dispense Refill     ACCU-CHEK FASTCLIX LANCET DRUM U TO TEST TID  0     albuterol (PROAIR HFA;PROVENTIL HFA;VENTOLIN HFA) 90 mcg/actuation inhaler Inhale 2 puffs every 6 (six) hours as needed for wheezing. 8.5 g 11     albuterol (PROVENTIL) 2.5 mg /3 mL (0.083 %) nebulizer solution NEBULIZE 1 VIAL EVERY 4 HOURS AS NEEDED FOR WHEEZING 180 mL 0     amLODIPine (NORVASC) 5 MG tablet TAKE 1 TABLET(5 MG) BY MOUTH DAILY 90 tablet 2     ascorbic acid, vitamin C, (ASCORBIC ACID WITH DOROTHY HIPS) 500 MG tablet Take 500 mg by mouth daily.       aspirin 81 MG EC tablet Take 1 tablet (81 mg total) by mouth daily.  0     atenolol (TENORMIN) 50 MG tablet Take 1 tablet (50 mg total) by mouth 2 (two) times a day. 60 tablet 0     azithromycin (ZITHROMAX Z-CHAMP) 250 MG tablet 2 tabs today then 1 daily x 4 days 6 tablet 0     blood glucose test strips Use 5 each As Directed 4 (four) times a day. Dispense brand per patient's insurance at pharmacy discretion.  200 strip 2     FLOVENT  mcg/actuation inhaler INHALE 2 PUFFS BY MOUTH TWICE DAILY 1 Inhaler 3     fluticasone (FLONASE) 50 mcg/actuation nasal spray Apply 1 spray into each nostril daily as needed for rhinitis.       hydroCHLOROthiazide (HYDRODIURIL) 25 MG tablet TAKE 1 TABLET(25 MG) BY MOUTH DAILY 90 tablet 3     hydroCHLOROthiazide (HYDRODIURIL) 25 MG tablet Take 1 tablet (25 mg total) by mouth daily. 90 tablet 3     LANTUS U-100 INSULIN 100 unit/mL injection 40 unit daily am for 3 day then 28 unit daily (Patient taking differently: Inject 28 unit daily in the morning.      ) 10 mL PRN     liraglutide (VICTOZA) 0.6 mg/0.1 mL (18 mg/3 mL) PnIj injection Inject 0.6 mg daily for 1 week then increase to 1.2 mg once a day for a week then increase to, 1.8 mg daily.With or without food. 3 Syringe 1     losartan (COZAAR) 25 MG tablet Take 1 tablet (25 mg total) by mouth daily. 30 tablet 0     metFORMIN (GLUCOPHAGE XR) 500 MG 24 hr tablet take 500 mg with supper add extra pill at weekly until you are taking 2 in the morning and 2 with supper. 360 tablet 1     nebulizers Misc Use four times a day for 5 day then as  needed 1 each 0     NOVOLOG FLEXPEN U-100 INSULIN 100 unit/mL injection pen        triamcinolone (KENALOG) 0.1 % cream Apply 1 application topically 2 (two) times a day for 14 days. 45 g 0     No current facility-administered medications for this visit.        Social History     Tobacco Use   Smoking Status Former Smoker     Packs/day: 2.00     Years: 12.00     Pack years: 24.00     Last attempt to quit: 1979     Years since quittin.8   Smokeless Tobacco Never Used       REVIEW OF SYSTEMS:  Negative except noted in HPI    TOBACCO USE:  Social History     Tobacco Use   Smoking Status Former Smoker     Packs/day: 2.00     Years: 12.00     Pack years: 24.00     Last attempt to quit: 1979     Years since quittin.8   Smokeless Tobacco Never Used     Social History     Socioeconomic History      Marital status:      Spouse name: Not on file     Number of children: Not on file     Years of education: Not on file     Highest education level: Not on file   Occupational History     Not on file   Social Needs     Financial resource strain: Not on file     Food insecurity:     Worry: Not on file     Inability: Not on file     Transportation needs:     Medical: Not on file     Non-medical: Not on file   Tobacco Use     Smoking status: Former Smoker     Packs/day: 2.00     Years: 12.00     Pack years: 24.00     Last attempt to quit: 1979     Years since quittin.8     Smokeless tobacco: Never Used   Substance and Sexual Activity     Alcohol use: Yes     Comment: rare     Drug use: No     Sexual activity: Never     Partners: Female   Lifestyle     Physical activity:     Days per week: Not on file     Minutes per session: Not on file     Stress: Not on file   Relationships     Social connections:     Talks on phone: Not on file     Gets together: Not on file     Attends [employer] service: Not on file     Active member of club or organization: Not on file     Attends meetings of clubs or organizations: Not on file     Relationship status: Not on file     Intimate partner violence:     Fear of current or ex partner: Not on file     Emotionally abused: Not on file     Physically abused: Not on file     Forced sexual activity: Not on file   Other Topics Concern     Not on file   Social History Narrative     Not on file         OBJECTIVE:            Vitals:    10/22/19 1053   BP: 132/78   Pulse: 70   Resp: 16   Temp: 98.5  F (36.9  C)   SpO2: 96%     Weight: (!) 235 lb (106.6 kg)    Wt Readings from Last 3 Encounters:   10/22/19 (!) 235 lb (106.6 kg)   19 (!) 235 lb (106.6 kg)   19 (!) 243 lb (110.2 kg)     Body mass index is 31.87 kg/m .        Physical Exam:  GENERAL APPEARANCE: A&A, NAD, well hydrated, well nourished  HEAD: atraumatic, no deformity  EYES: PERRL, EOM intact  EARS: Right TM  normal, gray with nl light reflex without effusion, external ear canal without cerumen or erythema.  Left TM occluded by cerumen.   POST CERUMEN REMOVAL LEFT EAR: Left TM normal, gray with nl light reflex without effusion    NOSE: no post nasal drainage or thrush  MOUTH: without erythema, exudate or thrush  NECK: Supple, without lymphadenopathy, no thyroid mass, no JVD, no bruit  CV: RRR, no M/G/R   LUNGS: CTAB, normal respiratory effort  SKIN:  Normal skin turgor, no lesions/rashes, warm and dry

## 2021-06-02 NOTE — TELEPHONE ENCOUNTER
Refill Approved    Rx renewed per Medication Renewal Policy. Medication was last renewed on 11/2/18.2/26/19.    Leandra Avalos, [facility] Triage/Med Refill 10/28/2019     Requested Prescriptions   Pending Prescriptions Disp Refills     albuterol (PROAIR HFA;PROVENTIL HFA;VENTOLIN HFA) 90 mcg/actuation inhaler 8.5 g 11     Sig: Inhale 2 puffs every 6 (six) hours as needed for wheezing.       Albuterol/Levalbuterol Refill Protocol Passed - 10/28/2019 10:12 AM        Passed - PCP or prescribing provider visit in last year     Last office visit with prescriber/PCP: 10/22/2019 Nohemi Lezama NP OR same dept: 10/22/2019 Nohemi Lezama NP OR [facility] specialty: 10/22/2019 Nohemi Lezama NP Last physical: Visit date not found       Next appt within 3 mo: Visit date not found  Next physical within 3 mo: Visit date not found  Prescriber OR PCP: Nohemi Lezama NP  Last diagnosis associated with med order: 1. Cough  - albuterol (PROAIR HFA;PROVENTIL HFA;VENTOLIN HFA) 90 mcg/actuation inhaler; Inhale 2 puffs every 6 (six) hours as needed for wheezing.  Dispense: 8.5 g; Refill: 11    2. Mild persistent asthma with acute exacerbation  - fluticasone propionate (FLOVENT HFA) 110 mcg/actuation inhaler; Inhale 2 puffs 2 (two) times a day.  Dispense: 1 Inhaler; Refill: 3    If protocol passes may refill for 6 months if within 3 months of last provider visit (or a total of 9 months). If patient requesting >1 inhaler per month refill x 6 months and have patient make appointment with provider.          fluticasone propionate (FLOVENT HFA) 110 mcg/actuation inhaler 1 Inhaler 3     Sig: Inhale 2 puffs 2 (two) times a day.       Asthma Medications Refill Protocol Passed - 10/28/2019 10:12 AM        Passed - PCP or prescribing provider visit in last year     Last office visit with prescriber/PCP: 10/22/2019 Nohemi Lezama NP OR [facility] dept: 10/22/2019 Nohemi Lezama NP OR same specialty: 10/22/2019 Lise,  Nohemi PLASENCIA NP  Last physical: Visit date not found Last MTM visit: Visit date not found    Next appt within 3 mo: Visit date not found Next physical within 3 mo: Visit date not found  Prescriber OR PCP: Nohemi Lezama NP  Last diagnosis associated with med order: 1. Cough  - albuterol (PROAIR HFA;PROVENTIL HFA;VENTOLIN HFA) 90 mcg/actuation inhaler; Inhale 2 puffs every 6 (six) hours as needed for wheezing.  Dispense: 8.5 g; Refill: 11    2. Mild persistent asthma with acute exacerbation  - fluticasone propionate (FLOVENT HFA) 110 mcg/actuation inhaler; Inhale 2 puffs 2 (two) times a day.  Dispense: 1 Inhaler; Refill: 3    If protocol passes may refill for 6 months if within 3 months of last provider visit (or a total of 9 months).

## 2021-06-02 NOTE — TELEPHONE ENCOUNTER
"RN Assessment/Reason for Call:   Okay to leave Detailed Message  Wife calling in, he is bed sleeping.  Concerned ; he was in the hospital 10/31/19; on  levofloxacin.  He is allergic to penicillin  Has pneumonia. Hx asthma. Doesn't have a peak flow meter.  He was acting strange; read instruction sheet.\"SI thoughts, irritable scared his wife.\"  Can't say anything to him and he gets upset.  Irritable with wife.   was frustrated with hospital Dr. ER Dr was wonderful.    RN Action/Disposition:  Nurse recommends see ER or call Dr. Or care connection; call 911 if needed.  Appt 11/4/19 with PCP.  Call back if worse symptoms  Discussed home care measures.  Agrees to plan.     Margarita Mederos RN    [facility] Triage/med refill  11/2/2019  10:27 AM      "

## 2021-06-03 VITALS
RESPIRATION RATE: 16 BRPM | SYSTOLIC BLOOD PRESSURE: 164 MMHG | TEMPERATURE: 98.1 F | OXYGEN SATURATION: 97 % | BODY MASS INDEX: 30.61 KG/M2 | HEIGHT: 72 IN | HEART RATE: 78 BPM | WEIGHT: 226 LBS | DIASTOLIC BLOOD PRESSURE: 86 MMHG

## 2021-06-03 VITALS
TEMPERATURE: 98.5 F | HEART RATE: 70 BPM | SYSTOLIC BLOOD PRESSURE: 132 MMHG | HEIGHT: 72 IN | RESPIRATION RATE: 16 BRPM | DIASTOLIC BLOOD PRESSURE: 78 MMHG | OXYGEN SATURATION: 96 % | WEIGHT: 235 LBS | BODY MASS INDEX: 31.83 KG/M2

## 2021-06-03 VITALS — BODY MASS INDEX: 32.91 KG/M2 | HEIGHT: 72 IN | WEIGHT: 243 LBS

## 2021-06-03 VITALS
TEMPERATURE: 97.9 F | HEART RATE: 66 BPM | OXYGEN SATURATION: 97 % | WEIGHT: 235 LBS | DIASTOLIC BLOOD PRESSURE: 90 MMHG | RESPIRATION RATE: 16 BRPM | SYSTOLIC BLOOD PRESSURE: 160 MMHG | BODY MASS INDEX: 31.87 KG/M2

## 2021-06-03 VITALS
BODY MASS INDEX: 31.19 KG/M2 | WEIGHT: 230 LBS | SYSTOLIC BLOOD PRESSURE: 207 MMHG | TEMPERATURE: 98.2 F | OXYGEN SATURATION: 96 % | RESPIRATION RATE: 16 BRPM | DIASTOLIC BLOOD PRESSURE: 110 MMHG | HEART RATE: 104 BPM

## 2021-06-03 NOTE — PROGRESS NOTES
1. Type 2 diabetes mellitus without complication, without long-term current use of insulin (H)  Glycosylated Hemoglobin A1c   2. Essential hypertension, benign  Basic Metabolic Panel   3. Essential Hypercholesterolemia  Lipid [place] RANDOM    CANCELED: Lipid [place] FASTING   4. Mild persistent asthma with acute exacerbation     5. Atypical mole  Ambulatory referral to Dermatology   6. Need for vaccination           ASSESSMENT/PLAN:     The following high BMI interventions were performed this visit: encouragement to exercise and weight monitoring    1. Non-Insulin dependent diabetes mellitus (H)    - Glycosylated Hemoglobin A1c  -well controlled on current Metformin dose, no med changes made  -Continue to work on weight loss, diet modifications and regular exercise.  He is planning on joining [employer] after the first of the year    2. Essential hypertension, benign    - Basic Metabolic Panel  -Well-controlled on current medications, no medication changes made today  -Continue to work on diet modifications, weight loss and regular exercise  -Limit sweets, alcohol intake and processed foods    3. Essential Hypercholesterolemia    - Lipid Cascade RANDOM  -not on statin  -The 10-year ASCVD risk score (Oziel ROLDAN Jr., et al., 2013) is: 39%    Values used to calculate the score:      Age: 69 years      Sex: Male      Is Non- : No      Diabetic: Yes      Tobacco smoker: No      Systolic Blood Pressure: 134 mmHg      Is BP treated: Yes      HDL Cholesterol: 48 mg/dL      Total Cholesterol: 169 mg/dL      4. Mild persistent asthma with acute exacerbation    -well controlled on current Flovent inhaler, continue to use albuterol as needed for wheezing.  -Avoid asthma triggers  -Asthma action plan updated today, ACT score: 24    5. Atypical mole    - Ambulatory referral to Dermatology  -atypical mole on right cheek of face, would like full body inspection completed  -Discussed the importance of  regular sunscreen use during the summertime, he should limit the amount of time he is using triamcinolone on the concerned area of the face.  Should not be using the triamcinolone for more than 5 days at a time and he should use the smallest amount possible    He will plan on checking with his insurance company regarding his shingles vaccination coverage and whether or not he can receive it here at the clinic or at a pharmacy.  We did discuss the frequency of the Shingrix dosing.      There are no Patient Instructions on file for this visit.  There are no discontinued medications.  Return in about 6 months (around 5/18/2020) for hypertension, hyperlipidemia, diabetes, annual physical.    The visit lasted a total of 45 minutes face to face with the patient.  Over 50% of the time spent counseling and educating the patient about hypertension control, diabetes, nutrition, exercise, vision, skin concerns, asthma control, asthma action plan updated, review of health maintenance plan and vaccination status    Nohemi Lezama NP          SUBJECTIVE:  Lester Shi is a 69 y.o. male who presents for med check today.    Type 2 diabetes: Patient is non-insulin-dependent, current medication: Metformin 1000 mg twice daily.  We did review his glucose meter readings today.  90-day fasting average: 128, 30-day average: 131, 14-day average: 129 and 7-day average: 127.  He denies any hypoglycemic events.  We did review his nutrition.  Breakfast typically a breakfast sandwich from [employer] before he goes to work.  Lunch is typically soup, deli meat or cottage cheese.  His biggest meal the day is dinnertime where he will have meat and vegetables or Posta with a meat.  Snacks include an energy bar, orange or apple.  He is down 9 pounds since his last office visit and he has set a goal of getting down to 200 pounds within the next year.  He is planning on joining [employer] after the first of the year, he is going to be setting  up for Medicare part B this year which is something he did not have last year.  During the summertime, he does take regular walks and plays pickle ball twice a day.  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  Has noticed some tingling in the toes intermittently but he does have hammertoes and relates his tingling to that.  Denies any vision or urinary changes.  He did see the ophthalmologist in October this year and was told to follow-up again in 1 year.  His glasses prescription was unchanged.  Denies any diarrhea with the metformin use.      Hypertension/hyperlipidemia: Blood pressure is well controlled on current antihypertensive therapy which includes atenolol, Norvasc, hydrochlorothiazide and losartan.  No history of coronary artery disease.  He is currently not taking a statin and is no longer taking aspirin at this time.  He is currently not exercising.  He is a non-smoker.  Alcohol use is 1-2 times per year.  He denies any illicit drug use.    Mild persistent asthma: Well-controlled on Flovent and as needed albuterol.  We did review his asthma triggers today and updated his asthma action plan and he is currently in the green zone.  He has had no hospitalizations in the last 12 months in regards to his asthma but he has been in the hospital for pneumonia problems this year.  He does sometimes forget to use his Flovent twice daily but overall he has been compliant.      Atypical mole: He would like a referral today to have a full body skin check completed.  He did see a dermatologist earlier this year but he felt that they did not inspect anything but his chest and they removed a small spot from his nose and he was left with a $400 bill because of this.  He has had an area on the right cheek of the face that he would like evaluated along with a full body inspection.      Need for vaccination: He is due for shingles  vaccination    Chief Complaint   Patient presents with     Follow Up     Med Check - not fasting         Patient Active Problem List   Diagnosis     Eczema     Anemia     Otitis Media     Insulin dependent diabetes mellitus (H)     Essential Hypercholesterolemia     Essential hypertension, benign     Joint Pain, Localized In The Knee     Obesity     Allergic Rhinitis     Klinefelter's Syndrome     Varicose vein     Mild persistent asthma with acute exacerbation     Pneumonia of both lungs due to infectious organism     Hyperglycemia     Acute kidney injury (H)     Uncontrolled type 2 diabetes mellitus with hyperglycemia (H)     Encounter for examination following treatment at hospital     Dizziness     Sepsis (H)     Abdominal pain, left upper quadrant       Current Outpatient Medications   Medication Sig Dispense Refill     ACCU-CHEK FASTCLIX LANCET DRUM U TO TEST TID  0     albuterol (PROAIR HFA;PROVENTIL HFA;VENTOLIN HFA) 90 mcg/actuation inhaler Inhale 2 puffs every 6 (six) hours as needed for wheezing. 8.5 g 5     albuterol (PROVENTIL) 2.5 mg /3 mL (0.083 %) nebulizer solution NEBULIZE 1 VIAL EVERY 4 HOURS AS NEEDED FOR WHEEZING 180 mL 0     amLODIPine (NORVASC) 10 MG tablet Take 1 tablet (10 mg total) by mouth daily. 30 tablet 0     ascorbic acid, vitamin C, (ASCORBIC ACID WITH DOROTHY HIPS) 500 MG tablet Take 500 mg by mouth daily.       atenolol (TENORMIN) 50 MG tablet Take 50 mg by mouth daily.       blood glucose test strips Use 5 each As Directed 4 (four) times a day. Dispense brand per patient's insurance at pharmacy discretion. 200 strip 2     fexofenadine (ALLEGRA) 180 MG tablet Take 180 mg by mouth daily.       fluticasone (FLONASE) 50 mcg/actuation nasal spray Apply 1 spray into each nostril daily as needed for rhinitis.       fluticasone propionate (FLOVENT HFA) 110 mcg/actuation inhaler Inhale 2 puffs 2 (two) times a day as needed.       hydroCHLOROthiazide (HYDRODIURIL) 25 MG tablet Take 25 mg by  mouth daily.       losartan (COZAAR) 25 MG tablet Take 1 tablet (25 mg total) by mouth daily. 30 tablet 0     metFORMIN (GLUCOPHAGE-XR) 500 MG 24 hr tablet Take 1,000 mg by mouth 2 (two) times a day with meals.       nebulizers Misc Use four times a day for 5 day then as  needed 1 each 0     triamcinolone (KENALOG) 0.1 % cream Apply 1 application topically 2 (two) times a day as needed.       No current facility-administered medications for this visit.        Social History     Tobacco Use   Smoking Status Former Smoker     Packs/day: 2.00     Years: 12.00     Pack years: 24.00     Last attempt to quit: 1979     Years since quittin.9   Smokeless Tobacco Never Used       REVIEW OF SYSTEMS: Denies radiation, diaphoresis, shortness of breath, dizziness, syncope, nausea, palpitations, and associated with activity.       TOBACCO USE:  Social History     Tobacco Use   Smoking Status Former Smoker     Packs/day: 2.00     Years: 12.00     Pack years: 24.00     Last attempt to quit: 1979     Years since quittin.9   Smokeless Tobacco Never Used     Social History     Socioeconomic History     Marital status:      Spouse name: Not on file     Number of children: Not on file     Years of education: Not on file     Highest education level: Not on file   Occupational History     Not on file   Social Needs     Financial resource strain: Not on file     Food insecurity:     Worry: Not on file     Inability: Not on file     Transportation needs:     Medical: Not on file     Non-medical: Not on file   Tobacco Use     Smoking status: Former Smoker     Packs/day: 2.00     Years: 12.00     Pack years: 24.00     Last attempt to quit: 1979     Years since quittin.9     Smokeless tobacco: Never Used   Substance and Sexual Activity     Alcohol use: Yes     Comment: rare     Drug use: No     Sexual activity: Never     Partners: Female   Lifestyle     Physical activity:     Days per week: Not on file      Minutes per session: Not on file     Stress: Not on file   Relationships     Social connections:     Talks on phone: Not on file     Gets together: Not on file     Attends Worship service: Not on file     Active member of club or organization: Not on file     Attends meetings of clubs or organizations: Not on file     Relationship status: Not on file     Intimate partner violence:     Fear of current or ex partner: Not on file     Emotionally abused: Not on file     Physically abused: Not on file     Forced sexual activity: Not on file   Other Topics Concern     Not on file   Social History Narrative     Not on file         OBJECTIVE:            Vitals:    11/18/19 1051   BP: 134/68   Pulse: 71   Resp: 18   Temp: 97.8  F (36.6  C)   SpO2: 94%     Weight: (!) 230 lb (104.3 kg)    Wt Readings from Last 3 Encounters:   11/18/19 (!) 230 lb (104.3 kg)   11/04/19 (!) 226 lb (102.5 kg)   10/31/19 (!) 239 lb 14.4 oz (108.8 kg)     Body mass index is 31.19 kg/m .        Physical Exam:  GENERAL APPEARANCE: A&A, NAD, well hydrated, well nourished  HEAD: atraumatic, no deformity  EYES: PERRL, EOM's intact, no redness or swelling, wearing glasses  EARS: TM's normal, gray with nl light reflex bilaterally  NOSE: no post nasal drainage or thrush  MOUTH: without erythema, exudate or thrush  NECK: Supple, without lymphadenopathy, no thyroid mass, no JVD, no bruit  CV: RRR, no M/G/R, 2+ LE pitting edema, bilateral posttibial pulses are equal and palpable, 2+  LUNGS: CTAB, normal respiratory effort  ABDOMEN: S&NT, no masses, no organomegaly, BS present x4    SKIN:  Normal skin turgor, no lesions/rashes, warm and dry, small area of moderate erythema on right cheek of face.  No ulceration noted or bleeding.  Area is nonraised, no pustule formation is noted.  NEURO: no gross deficits

## 2021-06-03 NOTE — PROGRESS NOTES
1. Encounter for examination following treatment at hospital     2. Pneumonia of both lower lobes due to infectious organism (H)     3. Essential hypertension, benign  amLODIPine (NORVASC) 10 MG tablet       Post Discharge Medication Reconciliation Status: discharge medications reconciled and changed, per note/orders (see AVS)        ASSESSMENT/PLAN:     The following high BMI interventions were performed this visit: weight monitoring    1. Encounter for examination following treatment at hospital      2. Pneumonia of both lower lobes due to infectious organism (H)    -antibiotic completed on 11/3/2019  -continue with prescribed nebulizer therapy for the next 48 hours  -continue with IS regularly for remainder of this week  -cover cough, wash hands  -repeat CXR in 2 weeks at next appointment with lab work    3. Essential hypertension, benign    - amLODIPine (NORVASC) 10 MG tablet; Take 1 tablet (10 mg total) by mouth daily.  Dispense: 30 tablet; Refill: 0  -Medications reviewed, if LE swelling occurs due to increased Norvasc dose, will adjust Losartan and lower Norvasc back down to 5 mg daily  -EKG reviewed today  -No other med changes made today    He will return back to the clinic in 2 weeks for his asthma, diabetes, hypertension and repeat chest x-ray for his pneumonia follow-up.       There are no Patient Instructions on file for this visit.  Medications Discontinued During This Encounter   Medication Reason     levoFLOXacin (LEVAQUIN) 750 MG tablet Therapy completed     amLODIPine (NORVASC) 5 MG tablet Dose adjustment     Return in about 2 weeks (around 11/18/2019) for hypertension, diabetes, asthma , pneumonia follow up.    The visit lasted a total of 40 minutes face to face with the patient.  Over 50% of the time spent counseling and educating the patient about blood pressure medications, current symptoms related to recent pneumonia infection, reviewed labs and imaging studies, needed medication adjustments.   "    Nohemi Lezama NP          SUBJECTIVE:  Lester Shi is a 69 y.o. male who presents for inpatient hospital follow-up for bilateral community-acquired pneumonia.    Patient was admitted to [facility] on October 29 and discharged home first.  He did complete his Levaquin medication yesterday.  He feels that he is breathing better, he is using his incentive spirometer regularly and is able to get the spirometer up to 3000.  He has noticed a few bouts of pain in the left lower quadrant of the abdomen  that is intermittent after eating, he did have diarrhea last night x1.  Stool has been brown in color.  He thinks that his diarrhea last night may have been related to what he ate yesterday.  We did review his initial symptoms that prompted him to visit the walk-in clinic, once the walk-in clinic got his white blood cell count of 22,000, they recommend he go to the ER for further follow-up.  He was found to have bilateral pneumonia in the lower lobes on his initial CT, left greater than right.  He did have elevated blood pressure during his hospital visit, blood pressure continues to be elevated today x2.  He denies any chest pain, shortness of breath, headaches, lower extremity swelling or dizziness today.  His appetite has returned, fatigue is slowly improving.  We did review the results of his PE run as well as his chest x-ray.  PE run negative. He does report that he did experience some irritability with anxiety and had some \"dark thoughts \"while he was on the Levaquin medication.  He is up-to-date with vaccinations.  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   Chief Complaint   Patient presents with     Hospital Visit Follow Up      10/29-10/31 - [facility]         Patient Active Problem List   Diagnosis     Eczema     Anemia     Otitis Media     Insulin dependent diabetes mellitus (H)     Essential Hypercholesterolemia     Essential " hypertension, benign     Joint Pain, Localized In The Knee     Obesity     Allergic Rhinitis     Klinefelter's Syndrome     Varicose vein     Mild persistent asthma with acute exacerbation     Pneumonia of both lungs due to infectious organism     Hyperglycemia     Acute kidney injury (H)     Uncontrolled type 2 diabetes mellitus with hyperglycemia (H)     Encounter for examination following treatment at hospital     Dizziness     Sepsis (H)     Abdominal pain, left upper quadrant       Current Outpatient Medications   Medication Sig Dispense Refill     ACCU-CHEK FASTCLIX LANCET DRUM U TO TEST TID  0     albuterol (PROAIR HFA;PROVENTIL HFA;VENTOLIN HFA) 90 mcg/actuation inhaler Inhale 2 puffs every 6 (six) hours as needed for wheezing. 8.5 g 5     albuterol (PROVENTIL) 2.5 mg /3 mL (0.083 %) nebulizer solution NEBULIZE 1 VIAL EVERY 4 HOURS AS NEEDED FOR WHEEZING 180 mL 0     ascorbic acid, vitamin C, (ASCORBIC ACID WITH DOROTHY HIPS) 500 MG tablet Take 500 mg by mouth daily.       atenolol (TENORMIN) 50 MG tablet Take 50 mg by mouth daily.       blood glucose test strips Use 5 each As Directed 4 (four) times a day. Dispense brand per patient's insurance at pharmacy discretion. 200 strip 2     fexofenadine (ALLEGRA) 180 MG tablet Take 180 mg by mouth daily.       fluticasone (FLONASE) 50 mcg/actuation nasal spray Apply 1 spray into each nostril daily as needed for rhinitis.       fluticasone propionate (FLOVENT HFA) 110 mcg/actuation inhaler Inhale 2 puffs 2 (two) times a day as needed.       hydroCHLOROthiazide (HYDRODIURIL) 25 MG tablet Take 25 mg by mouth daily.       losartan (COZAAR) 25 MG tablet Take 1 tablet (25 mg total) by mouth daily. 30 tablet 0     metFORMIN (GLUCOPHAGE-XR) 500 MG 24 hr tablet Take 1,000 mg by mouth 2 (two) times a day with meals.       nebulizers Misc Use four times a day for 5 day then as  needed 1 each 0     triamcinolone (KENALOG) 0.1 % cream Apply 1 application topically 2 (two) times a  day as needed.       amLODIPine (NORVASC) 10 MG tablet Take 1 tablet (10 mg total) by mouth daily. 30 tablet 0     No current facility-administered medications for this visit.        Social History     Tobacco Use   Smoking Status Former Smoker     Packs/day: 2.00     Years: 12.00     Pack years: 24.00     Last attempt to quit: 1979     Years since quittin.8   Smokeless Tobacco Never Used       REVIEW OF SYSTEMS: Denies fever/chills/sore throat/rhinorrhea/ear pain/swollen glands/shortness of breath/discomfort of chest/abdominal pain/changes in bowel habits/urinary symptoms/rash.      TOBACCO USE:  Social History     Tobacco Use   Smoking Status Former Smoker     Packs/day: 2.00     Years: 12.00     Pack years: 24.00     Last attempt to quit: 1979     Years since quittin.8   Smokeless Tobacco Never Used     Social History     Socioeconomic History     Marital status:      Spouse name: Not on file     Number of children: Not on file     Years of education: Not on file     Highest education level: Not on file   Occupational History     Not on file   Social Needs     Financial resource strain: Not on file     Food insecurity:     Worry: Not on file     Inability: Not on file     Transportation needs:     Medical: Not on file     Non-medical: Not on file   Tobacco Use     Smoking status: Former Smoker     Packs/day: 2.00     Years: 12.00     Pack years: 24.00     Last attempt to quit: 1979     Years since quittin.8     Smokeless tobacco: Never Used   Substance and Sexual Activity     Alcohol use: Yes     Comment: rare     Drug use: No     Sexual activity: Never     Partners: Female   Lifestyle     Physical activity:     Days per week: Not on file     Minutes per session: Not on file     Stress: Not on file   Relationships     Social connections:     Talks on phone: Not on file     Gets together: Not on file     Attends Restoration service: Not on file     Active member of club or  organization: Not on file     Attends meetings of clubs or organizations: Not on file     Relationship status: Not on file     Intimate partner violence:     Fear of current or ex partner: Not on file     Emotionally abused: Not on file     Physically abused: Not on file     Forced sexual activity: Not on file   Other Topics Concern     Not on file   Social History Narrative     Not on file         OBJECTIVE:            Vitals:    11/04/19 1313   BP: 164/86   Pulse:    Resp:    Temp:    SpO2:      Weight: (!) 226 lb (102.5 kg)    Wt Readings from Last 3 Encounters:   11/04/19 (!) 226 lb (102.5 kg)   10/31/19 (!) 239 lb 14.4 oz (108.8 kg)   10/29/19 (!) 230 lb (104.3 kg)     Body mass index is 30.65 kg/m .        Physical Exam:  GENERAL APPEARANCE: A&A, NAD, well hydrated, well nourished  HEAD: atraumatic, no deformity  EYES: PERRL, EOM's intact, no redness or swelling  NECK: Supple, without lymphadenopathy, no thyroid mass, no JVD, no bruit  CV: RRR, no M/G/R, 1+ bilateral LE edema    LUNGS: CTAB, normal respiratory effort  ABDOMEN: S&NT, no masses, no organomegaly, BS present x4   SKIN:  Normal skin turgor, no lesions/rashes, warm and dry   NEURO: no gross deficits

## 2021-06-03 NOTE — TELEPHONE ENCOUNTER
RN cannot approve Refill Request    RN can NOT refill this medication historical medication requested.   nd      Leandra Avalos, [facility] Triage/Med Refill 11/16/2019    Requested Prescriptions   Pending Prescriptions Disp Refills     atenolol (TENORMIN) 50 MG tablet [Pharmacy Med Name: ATENOLOL 50MG TABLETS] 90 tablet 0     Sig: TAKE 1 TABLET(50 MG) BY MOUTH DAILY       Beta-Blockers Refill Protocol Passed - 11/15/2019  3:23 AM        Passed - PCP or prescribing provider visit in past 12 months or next 3 months     Last office visit with prescriber/PCP: 11/2/2018 Rhonda Wells MD OR same dept: 11/4/2019 Nohemi Lezama NP OR same specialty: 11/4/2019 Nohemi Lezama NP  Last physical: Visit date not found Last MTM visit: Visit date not found   Next visit within 3 mo: Visit date not found  Next physical within 3 mo: Visit date not found  Prescriber OR PCP: Rhonda Wells MD  Last diagnosis associated with med order: 1. Essential hypertension  - atenolol (TENORMIN) 50 MG tablet [Pharmacy Med Name: ATENOLOL 50MG TABLETS]; TAKE 1 TABLET(50 MG) BY MOUTH DAILY  Dispense: 90 tablet; Refill: 0    If protocol passes may refill for 12 months if within 3 months of last provider visit (or a total of 15 months).             Passed - Blood pressure filed in past 12 months     BP Readings from Last 1 Encounters:   11/04/19 164/86

## 2021-06-04 VITALS
SYSTOLIC BLOOD PRESSURE: 134 MMHG | HEIGHT: 72 IN | OXYGEN SATURATION: 94 % | HEART RATE: 71 BPM | BODY MASS INDEX: 31.15 KG/M2 | RESPIRATION RATE: 18 BRPM | DIASTOLIC BLOOD PRESSURE: 68 MMHG | TEMPERATURE: 97.8 F | WEIGHT: 230 LBS

## 2021-06-04 VITALS
HEART RATE: 76 BPM | SYSTOLIC BLOOD PRESSURE: 122 MMHG | DIASTOLIC BLOOD PRESSURE: 72 MMHG | BODY MASS INDEX: 31.84 KG/M2 | OXYGEN SATURATION: 95 % | WEIGHT: 234.8 LBS

## 2021-06-04 VITALS
HEART RATE: 71 BPM | OXYGEN SATURATION: 97 % | DIASTOLIC BLOOD PRESSURE: 86 MMHG | WEIGHT: 238.44 LBS | SYSTOLIC BLOOD PRESSURE: 138 MMHG | HEIGHT: 72 IN | BODY MASS INDEX: 32.29 KG/M2

## 2021-06-04 NOTE — TELEPHONE ENCOUNTER
Refill Approved    Rx renewed per Medication Renewal Policy. Medication was last renewed on 11/4/19.    Leandra Avalos, [facility] Triage/Med Refill 12/6/2019     Requested Prescriptions   Pending Prescriptions Disp Refills     amLODIPine (NORVASC) 10 MG tablet [Pharmacy Med Name: AMLODIPINE BESYLATE 10MG TABLETS] 30 tablet 0     Sig: TAKE 1 TABLET(10 MG) BY MOUTH DAILY       Calcium-Channel Blockers Protocol Passed - 12/5/2019  3:23 AM        Passed - PCP or prescribing provider visit in past 12 months or next 3 months     Last office visit with prescriber/PCP: 11/18/2019 Nohemi Lezama NP OR same dept: 11/18/2019 Nohemi Lezama NP OR same specialty: 11/18/2019 Nohemi Lezama NP  Last physical: Visit date not found Last MTM visit: Visit date not found   Next visit within 3 mo: Visit date not found  Next physical within 3 mo: Visit date not found  Prescriber OR PCP: Nohemi Lezama NP  Last diagnosis associated with med order: 1. Essential hypertension, benign  - amLODIPine (NORVASC) 10 MG tablet [Pharmacy Med Name: AMLODIPINE BESYLATE 10MG TABLETS]; TAKE 1 TABLET(10 MG) BY MOUTH DAILY  Dispense: 30 tablet; Refill: 0    If protocol passes may refill for 12 months if within 3 months of last provider visit (or a total of 15 months).             Passed - Blood pressure filed in past 12 months     BP Readings from Last 1 Encounters:   11/18/19 134/68

## 2021-06-05 ENCOUNTER — RECORDS - HEALTHEAST (OUTPATIENT)
Dept: FAMILY MEDICINE | Facility: CLINIC | Age: 71
End: 2021-06-05

## 2021-06-05 VITALS
HEART RATE: 76 BPM | BODY MASS INDEX: 33.05 KG/M2 | SYSTOLIC BLOOD PRESSURE: 150 MMHG | WEIGHT: 244 LBS | HEIGHT: 72 IN | OXYGEN SATURATION: 98 % | DIASTOLIC BLOOD PRESSURE: 84 MMHG

## 2021-06-05 VITALS
RESPIRATION RATE: 19 BRPM | WEIGHT: 246.31 LBS | HEART RATE: 106 BPM | BODY MASS INDEX: 33.36 KG/M2 | SYSTOLIC BLOOD PRESSURE: 175 MMHG | DIASTOLIC BLOOD PRESSURE: 104 MMHG | OXYGEN SATURATION: 96 % | HEIGHT: 72 IN

## 2021-06-05 VITALS
WEIGHT: 239 LBS | HEIGHT: 72 IN | OXYGEN SATURATION: 97 % | HEART RATE: 77 BPM | SYSTOLIC BLOOD PRESSURE: 128 MMHG | BODY MASS INDEX: 32.37 KG/M2 | DIASTOLIC BLOOD PRESSURE: 80 MMHG

## 2021-06-05 VITALS
OXYGEN SATURATION: 96 % | BODY MASS INDEX: 32.1 KG/M2 | HEIGHT: 72 IN | WEIGHT: 237 LBS | HEART RATE: 77 BPM | SYSTOLIC BLOOD PRESSURE: 128 MMHG | DIASTOLIC BLOOD PRESSURE: 80 MMHG

## 2021-06-05 VITALS
RESPIRATION RATE: 23 BRPM | WEIGHT: 245.7 LBS | OXYGEN SATURATION: 94 % | HEART RATE: 84 BPM | TEMPERATURE: 98.2 F | DIASTOLIC BLOOD PRESSURE: 84 MMHG | SYSTOLIC BLOOD PRESSURE: 136 MMHG | BODY MASS INDEX: 33.32 KG/M2

## 2021-06-05 DIAGNOSIS — R06.09 OTHER DYSPNEA AND RESPIRATORY ABNORMALITY: ICD-10-CM

## 2021-06-05 DIAGNOSIS — R06.02 SHORTNESS OF BREATH: ICD-10-CM

## 2021-06-05 DIAGNOSIS — R09.89 OTHER DYSPNEA AND RESPIRATORY ABNORMALITY: ICD-10-CM

## 2021-06-05 NOTE — TELEPHONE ENCOUNTER
"RN Triage:     Patient fell on Monday on the ice. \"Pain all over\" right right knee, neck, and back.   Hit the left elbow and having pain. Patient requesting an xray. Work related, he reported the injury to his employer.  Right artificial knee advised patient to contact [facility] where orthopedic surgeon is located.     Nancy Hernandez RN, BSN [facility] Triage Nurse    Reason for Disposition    Patient wants to be seen    Protocols used: ELBOW INJURY-A-[place]      "

## 2021-06-05 NOTE — TELEPHONE ENCOUNTER
Received paperwork from [facility] on patient      Placed in clinic managers inbox for review        01/23/20

## 2021-06-05 NOTE — PROGRESS NOTES
ASSESSMENT/PLAN:       1. Fall, initial encounter    - XR Elbow Left 3 or More VWS    2. Contusion of left elbow, initial encounter    - XR Elbow Left 3 or More VWS, there are some arthritic changes in the left elbow but no evidence for fracture    3. Work related injury  I think the patient will be able to continue working because he does not have to do any significant lifting pushing or pulling.  Work activity form filled out and given to the patient.  No restrictions needed    He will continue to alternate with ice and heat and can use acetaminophen for pain.  Try to protect the posterior portion of the elbow and do not use his forearm or elbow region to push himself up.  If in 2 weeks there is no progressive improvement then he should return for recheck.  Patient will be informed of the radiologist report is different than my interpretation.          Nikita Mercado MD      PROGRESS NOTE   1/15/2020    SUBJECTIVE:  Lester Shi is a 69 y.o. male  who presents for   Chief Complaint   Patient presents with     Elbow Injury     Patient fell at work after slipping on the ice on 1/13/20 and has been having left elbow pain since. Patient notices some area of swelling around the left elbow.      Knee Injury     Patient fell at work after slipping on the ice on 1/13/20 and has been having pain in his left knee since. Patient does have a appointment with his ortho surgeron on Friday 1/17/20.      Patient is here today for an evaluation of his left elbow.  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  His elbow is been functional but painful and there is been some bruising noted.  The pain does not radiate down into his wrist or hand area and there is no numbness in his fingers.  He is right-hand dominant.  He has been able to continue doing his job.    Patient Active Problem List   Diagnosis     Eczema     Anemia      Otitis Media     Insulin dependent diabetes mellitus (H)     Essential Hypercholesterolemia     Essential hypertension, benign     Joint Pain, Localized In The Knee     Obesity     Allergic Rhinitis     Klinefelter's Syndrome     Varicose vein     Mild persistent asthma with acute exacerbation     Pneumonia of both lungs due to infectious organism     Hyperglycemia     Acute kidney injury (H)     Uncontrolled type 2 diabetes mellitus with hyperglycemia (H)     Encounter for examination following treatment at hospital     Dizziness     Sepsis (H)     Abdominal pain, left upper quadrant       Current Outpatient Medications   Medication Sig Dispense Refill     ACCU-CHEK FASTCLIX LANCET DRUM U TO TEST TID  0     albuterol (PROAIR HFA;PROVENTIL HFA;VENTOLIN HFA) 90 mcg/actuation inhaler Inhale 2 puffs every 6 (six) hours as needed for wheezing. 8.5 g 5     albuterol (PROVENTIL) 2.5 mg /3 mL (0.083 %) nebulizer solution NEBULIZE 1 VIAL EVERY 4 HOURS AS NEEDED FOR WHEEZING 180 mL 0     amLODIPine (NORVASC) 10 MG tablet TAKE 1 TABLET(10 MG) BY MOUTH DAILY 90 tablet 3     ascorbic acid, vitamin C, (ASCORBIC ACID WITH DOROTHY HIPS) 500 MG tablet Take 500 mg by mouth daily.       atenolol (TENORMIN) 50 MG tablet TAKE 1 TABLET(50 MG) BY MOUTH DAILY 90 tablet 3     blood glucose test strips Use 5 each As Directed 4 (four) times a day. Dispense brand per patient's insurance at pharmacy discretion. 200 strip 2     fexofenadine (ALLEGRA) 180 MG tablet Take 180 mg by mouth daily.       fluticasone propionate (FLOVENT HFA) 110 mcg/actuation inhaler Inhale 2 puffs 2 (two) times a day as needed.       hydroCHLOROthiazide (HYDRODIURIL) 25 MG tablet Take 25 mg by mouth daily.       losartan (COZAAR) 25 MG tablet TAKE 1 TABLET BY MOUTH ONCE DAILY 90 tablet 1     metFORMIN (GLUCOPHAGE-XR) 500 MG 24 hr tablet Take 2 tablets (1,000 mg total) by mouth 2 (two) times a day. 360 tablet 1     nebulizers Misc Use four times a day for 5 day then  as  needed 1 each 0     triamcinolone (KENALOG) 0.1 % cream Apply 1 application topically 2 (two) times a day as needed.       fluticasone (FLONASE) 50 mcg/actuation nasal spray Apply 1 spray into each nostril daily as needed for rhinitis.       No current facility-administered medications for this visit.        Social History     Tobacco Use   Smoking Status Former Smoker     Packs/day: 2.00     Years: 12.00     Pack years: 24.00     Last attempt to quit: 1979     Years since quittin.0   Smokeless Tobacco Never Used           OBJECTIVE:        No results found for this or any previous visit (from the past 240 hour(s)).    Vitals:    01/15/20 1107   BP: 122/72   Patient Site: Right Arm   Patient Position: Sitting   Cuff Size: Adult Large   Pulse: 76   SpO2: 95%   Weight: (!) 234 lb 12.8 oz (106.5 kg)     Weight: (!) 234 lb 12.8 oz (106.5 kg)          Physical Exam:  GENERAL APPEARANCE: 69-year-old male, NAD, well hydrated, well nourished  SKIN:  Normal skin turgor, no lesions/rashes   EXTREMITY: Inspection of the left elbow reveals some bruising over the posterior aspect of the elbow and forearm.  There is tenderness in that location but also in the region of the anterior elbow with passive range of motion.  On active range of motion he has about a -15 degrees of extension and 120 degrees of flexion.  There is no significant redness or warmth in the joint.  NEURO: no focal findings

## 2021-06-05 NOTE — TELEPHONE ENCOUNTER
Form was filled out and given to [place] to fax.  We will also need to include a copy of the workability form which I printed and have with the packet to fax as well.  Thank you,  Nikita Mercado MD

## 2021-06-07 NOTE — TELEPHONE ENCOUNTER
RN cannot approve Refill Request    RN can NOT refill this medication historical medication requested.       Leandra Avalos, [facility] Triage/Med Refill 4/15/2020    Requested Prescriptions   Pending Prescriptions Disp Refills     hydroCHLOROthiazide (HYDRODIURIL) 25 MG tablet [Pharmacy Med Name: HYDROCHLOROTHIAZIDE 25MG TABLETS] 90 tablet 0     Sig: TAKE 1 TABLET(25 MG) BY MOUTH DAILY       Diuretics/Combination Diuretics Refill Protocol  Passed - 4/14/2020 11:17 AM        Passed - Visit with PCP or prescribing provider visit in past 12 months     Last office visit with prescriber/PCP: 11/2/2018 Rhonda Wells MD OR same dept: 1/15/2020 Nikita Mercado MD OR same specialty: 1/15/2020 Nikita Mercado MD  Last physical: Visit date not found Last MTM visit: Visit date not found   Next visit within 3 mo: Visit date not found  Next physical within 3 mo: Visit date not found  Prescriber OR PCP: Rhonda Wells MD  Last diagnosis associated with med order: There are no diagnoses linked to this encounter.  If protocol passes may refill for 12 months if within 3 months of last provider visit (or a total of 15 months).             Passed - Serum Potassium in past 12 months      Lab Results   Component Value Date    Potassium 4.0 11/18/2019             Passed - Serum Sodium in past 12 months      Lab Results   Component Value Date    Sodium 142 11/18/2019             Passed - Blood pressure on file in past 12 months     BP Readings from Last 1 Encounters:   01/15/20 122/72             Passed - Serum Creatinine in past 12 months      Creatinine   Date Value Ref Range Status   11/18/2019 1.09 0.70 - 1.30 mg/dL Final

## 2021-06-07 NOTE — PROGRESS NOTES
"Lester Shi is a 70 y.o. male who is being evaluated via a billable telephone visit.      The patient has been notified of following:     \"This telephone visit will be conducted via a call between you and your physician/provider. We have found that certain health care needs can be provided without the need for a physical exam.  This service lets us provide the care you need with a short phone conversation.  If a prescription is necessary we can send it directly to your pharmacy.  If lab work is needed we can place an order for that and you can then stop by our lab to have the test done at a later time.    If during the course of the call the physician/provider feels a telephone visit is not appropriate, you will not be charged for this service.\"         Lester Shi complains of    Chief Complaint   Patient presents with     Back Pain     Patient has been having lower, left side back pain for the past two weeks. No pain with urination.      Ear Pain     Patient has been having right side ear pain for the past month. No fevers.      Asthma     Patient would like to follow up on his asthma. Patient has noticed some wheezing recently, feels like his breathing has changed. No shortness of breath or cough. Patient wonders if he needs a x-ray.        I have reviewed and updated the patient's Past Medical History, Social History, Family History and Medication List.    ALLERGIES  Amoxicillin; Codeine; Erythromycin base; Simvastatin; and Sulfamethoxazole-trimethoprim    Additional provider notes: See below    Assessment/Plan:  1. Mild persistent asthma    The I suggested that he resume the Flovent inhaler 2 puffs twice a day.  I shared with him that this controller medicine does not work very well when it is used intermittently.  Particularly if he has problems with springtime allergies he may want to just simply uses Flovent regularly through the spring and summer.  If he has symptoms through the winter he should use " it then as well.    2. Seasonal allergic rhinitis due to pollen  The patient is having some fullness or plugged sensation in the right ear and I suggested that he use the Flonase nasal spray 2 sprays each side of the nose daily for the nasal congestion and also his ear symptoms.  In the future if symptoms persist will need to see him to look at his ears as he has had cerumen impactions in the past.    3. Spinal stenosis of lumbar region with neurogenic claudication  The patient will continue with his current regimen for his back pain including stretching and strengthening exercises as well as regular walking and over-the-counter medications for pain management.        Phone call duration:  15 minutes    EDMOND Lowe MD    Telephone encounter today because of coronavirus pandemic.  1.  Low back pain  Left-sided 2 weeks little bit worse noted to be more of an issue when walking not interfering with his ability to sleep although does get up and sleep in the recliner quite frequently.  Not requesting any changes in therapy for his back pain.  Realizes that he needs to do his stretching and strengthening exercises and walk more.    2.  Seasonal allergies  Wondering if starting to have issues with springtime allergies.  He does use Allegra year-round but has not been using his Flonase nasal spray.  He feels congested in his nose as well as in his ears.    3.  Mild persistent asthma  the patient uses his Flovent Inhaler on a as needed basis.  He rarely uses the albuterol but complains of some wheezing periodically but no shortness of breath cough or fever.    Nikita Mercado MD

## 2021-06-08 NOTE — PROGRESS NOTES
ASSESSMENT/PLAN:     1. Cough  -The patient is leaving today.  I suspect he has at least a viral illness contributing to symptoms but given the duration, his known diabetes and concern for secondary bacterial infection.  Because of this I am treating him with an antibiotic today.  Additionally provided him with some Tessalon Perles to use as needed for the cough and albuterol inhaler as he has done well with this previously.  He should follow up here next week if things are still not resolving and at that point we would consider an x-ray.  We discussed doing one today but since an abnormal x-ray would not change my management at this time I deferred it.  He is comfortable with this.  - doxycycline (VIBRA-TABS) 100 MG tablet; Take 1 tablet (100 mg total) by mouth 2 (two) times a day for 7 days.  Dispense: 14 tablet; Refill: 0  - albuterol (PROVENTIL HFA;VENTOLIN HFA) 90 mcg/actuation inhaler; Inhale 2 puffs every 6 (six) hours as needed for wheezing.  Dispense: 8.5 g; Refill: 11  - benzonatate (TESSALON PERLES) 100 MG capsule; Take 1-2 capsules (100-200 mg total) by mouth every 6 (six) hours as needed for cough.  Dispense: 30 capsule; Refill: 0  - HM2(CBC w/o Differential)    2. Essential hypertension  -Blood pressure is out of control today likely secondary to his acute illness.  I've asked him to return for follow-up in the next few months and repeat blood pressure check.  Updating lab work today.  - Comprehensive Metabolic Panel    3. Type 2 diabetes mellitus  -A1c has increased some.  I will discuss with the patient if he is comfortable going up on the metformin or since he is working on weight loss if he would like to recheck this again in approximately 3 months.  - Glycosylated Hemoglobin A1c    4. Essential Hypercholesterolemia  -Due for updated lab work, ordered today  - Lipid Cascade      The following high BMI interventions were performed this visit: encouragement to exercise and weight  monitoring    Rhonda Wells MD      PROGRESS NOTE   2/16/2017    SUBJECTIVE:  Lester Shi is a 66 y.o. male  who presents for a cold.     He has been feeling unwell for the last 4-5 weeks. He has been trying to medicate himself. Last week he had a temp of  for a week. It would go away and then come back. 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 He has four grandchildren as well, 3 live elsewhere and one lives with them. She does always have a cough. He is getting some production now. Normally when he gets a cold it goes into his ears and this isn't.     His biggest concern is when he walks he has a hard time breathing. Maybe 5-6 years ago he ended up with an inhaler because he had a walking asthma. He will go to bed early and get up early. He is supposed to be having a birthday party for himself tonight and wanted to make sure that he is ok to go. He has a small headache today but yesterday he felt better except for his issues breathing. When he got home he felt off. He will climb up the hill and slide back down with his symptoms. He does not have a runny/stuff nose. He does use flonase when he gets stuffy, does have a history of bad allergies. His appetite is ok.     He is on a diet right now.  He has lost 15 pounds. He does not check his blood sugars or pressure. He is working on exercise when he can. He used to play pickle ball, hasn't in six months. He has no issues with the medications.     Chief Complaint   Patient presents with     Shortness of Breath     Patient states that he has troubles with becoming short of breath. Patient feels like he has been dealing with a cold as he has also had have a off and on fever, a wet sounding cough, coughing fits and headaches.          Patient Active Problem List   Diagnosis     Eczema     Anemia     Otitis Media     Chest Pain     Type 2 diabetes mellitus     Essential Hypercholesterolemia     Essential  Hypertension     Joint Pain, Localized In The Knee     Frostbite     Obesity     Allergic Rhinitis     Klinefelter's Syndrome     Shortness Of Breath     Varicose vein       Current Outpatient Prescriptions   Medication Sig Dispense Refill     amLODIPine (NORVASC) 5 MG tablet TAKE 1 TABLET BY MOUTH TWICE DAILY 180 tablet 0     atenolol (TENORMIN) 50 MG tablet TAKE ONE TABLET BY MOUTH ONCE DAILY 90 tablet 3     fexofenadine (ALLEGRA) 180 MG tablet Take 180 mg by mouth daily as needed.       fluticasone (FLONASE) 50 mcg/actuation nasal spray 1-2 sprays into each nostril daily as needed. PRN       hydroCHLOROthiazide (HYDRODIURIL) 25 MG tablet TAKE 1 TABLET BY MOUTH EVERY DAY 90 tablet 1     metFORMIN (GLUCOPHAGE) 500 MG tablet TAKE 1 TABLET BY MOUTH TWICE DAILY WITH MEALS 180 tablet 1     metFORMIN (GLUCOPHAGE) 500 MG tablet Take 1 tablet (500 mg total) by mouth 2 (two) times a day with meals. You are overdue for your diabetic visit and labs. 180 tablet 0     olopatadine (PATANOL) 0.1 % ophthalmic solution Administer 1 drop to both eyes 2 (two) times a day as needed for allergies. Instill 1 drop into both eyes twice daily at 6 to 8 hour intervals       spironolactone (ALDACTONE) 25 MG tablet TAKE 1 TABLET BY MOUTH EVERY DAY 90 tablet 1     triamcinolone (KENALOG) 0.1 % cream APPLY TO THE AFFECTED 3 TIMES DAILY AS NEEDED 30 g 1     albuterol (PROVENTIL HFA;VENTOLIN HFA) 90 mcg/actuation inhaler Inhale 2 puffs every 6 (six) hours as needed for wheezing. 8.5 g 11     benzonatate (TESSALON PERLES) 100 MG capsule Take 1-2 capsules (100-200 mg total) by mouth every 6 (six) hours as needed for cough. 30 capsule 0     doxycycline (VIBRA-TABS) 100 MG tablet Take 1 tablet (100 mg total) by mouth 2 (two) times a day for 7 days. 14 tablet 0     No current facility-administered medications for this visit.        History   Smoking Status     Former Smoker   Smokeless Tobacco     Never Used           OBJECTIVE:        Recent Results  (from the past 240 hour(s))   Glycosylated Hemoglobin A1c   Result Value Ref Range    Hemoglobin A1c 7.4 (H) 3.5 - 6.0 %   HM2(CBC w/o Differential)   Result Value Ref Range    WBC 11.5 (H) 4.0 - 11.0 thou/uL    RBC 3.55 (L) 4.40 - 6.20 mill/uL    Hemoglobin 10.8 (L) 14.0 - 18.0 g/dL    Hematocrit 31.6 (L) 40.0 - 54.0 %    MCV 89 80 - 100 fL    MCH 30.4 27.0 - 34.0 pg    MCHC 34.2 32.0 - 36.0 g/dL    RDW 13.1 11.0 - 14.5 %    Platelets 379 140 - 440 thou/uL    MPV 6.7 (L) 7.0 - 10.0 fL       Vitals:    02/16/17 1153 02/16/17 1221   BP: (!) 144/92 (!) 152/92   Patient Site: Right Arm    Patient Position: Sitting    Cuff Size: Adult Large    Pulse: 100    Temp: 98.9  F (37.2  C)    TempSrc: Oral    SpO2: 94%    Weight: (!) 246 lb 11.2 oz (111.9 kg)      Weight: 246 lb 11.2 oz (111.9 kg)        Physical Exam:  GENERAL APPEARANCE: A&A, NAD, well hydrated, well nourished  SKIN:  Normal skin turgor, no lesions/rashes   HEENT: moist mucous membranes, no rhinorrhea, pharynx with minimal erythema, tympanic membranes are clear bilaterally  NECK: Normal without lymphadenopathy  CV: RRR, no M/G/R   LUNGS: Diffuse wheezing bilaterally, no crackles  EXTREMITY: no edema   NEURO: no gross deficits

## 2021-06-09 NOTE — PROGRESS NOTES
Assessment and Plan:     Annual wellness visit    1. Encounter for general adult medical examination with abnormal findings  The patient does have spring and summer allergies related to grass and trees and uses and the symptoms are nonsedating on a as needed basis.  The patient's ability to walk is quite limited because of his neurogenic claudication.  - Hepatitis C Antibody (Anti-HCV)    2. Type 2 diabetes mellitus without complication, without long-term current use of insulin (H)     - Glycosylated Hemoglobin A1c  - Microalbumin, Random Urine  - blood glucose test strips; Use 1 each As Directed 2 (two) times a day. Dispense brand per patient's insurance at pharmacy discretion.  Dispense: 200 strip; Refill: 3  The patient's blood sugars in the morning fasting range from 115-150 and postprandial 2 hours just above 200  The patient presented for at our last milligrams twice daily    3. Right elbow pain     - XR Elbow Right 3 or More VWS, x-ray shows some osteophytes and degenerative changes in the right elbow  The patient has noticed some pain with range of motion of his right elbow and some locking.  The patient may need to limit some of his activity with his right elbow and if it progresses will need to have a referral to orthopedics.    4. Essential hypertension, benign  The patient's blood pressure is controlled on amlodipine 10 mg daily  Atenolol 50 mg daily  Hydrochlorothiazide 25 mg daily  Losartan 25 mg daily    5. Mild persistent asthma with out exacerbation  The patient has a history of persistent asthma but his symptoms are intermittent and he does not use his controller medicine which is Flovent on a regular basis and over the last month has not had to use his albuterol    6. Squamous cell carcinoma of skin of face  Recent diagnosis of squamous cell carcinoma on his right ear which has been treated by dermatology and is healing nicely    7. BPH with obstruction/lower urinary tract symptoms  The patient  has noticed some urge incontinence but also has a slow flow of urine and feels that he does not empty his bladder completely and does have nocturia times about 2.  No dysuria  I talked to the patient about treatment options for his BPH symptoms however using a medication like Flomax may actually make the problem of her urinary incontinence was.  If this problem is getting worse would suggest a referral to urology for some additional evaluation.  8. Primary osteoarthritis of both hands  The patient has pain at the base of both thumbs right side more than left    9. Spinal stenosis of lumbar region with neurogenic claudication  The patient has pain in his hips at about 2 blocks but will try to walk through it but is a very difficult time walking a mile.    10. Status post total left knee replacement  The patient does have some balance issues related to his left knee replacement and he has had some occasional falls without injury    11. Encounter for hepatitis C screening test for low risk patient     - Hepatitis C Antibody (Anti-HCV)    Test results by my chart    The patient's current medical problems were reviewed.    The following high BMI interventions were performed this visit: encouragement to exercise, weight monitoring and weight loss from baseline weight  The following health maintenance schedule was reviewed with the patient and provided in printed form in the after visit summary:   Health Maintenance   Topic Date Due     HEPATITIS C SCREENING  1950     ADVANCE CARE PLANNING  02/17/1966     ZOSTER VACCINES (1 of 2) 02/17/1998     MEDICARE ANNUAL WELLNESS VISIT  02/17/2013     FALL RISK ASSESSMENT  03/20/2020     A1C  05/18/2020     DIABETIC FOOT EXAM  05/20/2020     MICROALBUMIN  05/20/2020     INFLUENZA VACCINE RULE BASED (1) 08/01/2020     DIABETIC EYE EXAM  10/22/2020     ASTHMA ACTION PLAN  11/18/2020     BMP  11/18/2020     LIPID  11/18/2020     Asthma Control Test  11/21/2020     COLORECTAL  CANCER SCREENING  04/18/2022     TD 18+ HE  04/25/2027     PNEUMOCOCCAL IMMUNIZATION 65+ LOW/MEDIUM RISK  Completed     HEPATITIS B VACCINES  Completed        Subjective:   Chief Complaint: Lester Shi is an 70 y.o. male here for an Annual Wellness visit.   HPI:   The patient rates his physical health to be excellent  Exercises 0-2 times per week limited primarily by his lumbar spinal stenosis  0-2 alcoholic drinks per day  Does not eat enough fruits and vegetables  Carbon monoxide detector and smoke detector in the home  Does not need assistance with activities of daily living or other cares  Has some concerns about his hearing  Has to be very careful with getting to the bathroom quickly because of the potential for urge urinary incontinence  Feels that his emotional and mental health is good  Has a living well  Has had falls within the last 12 months related to balance problems which he attributes to his knee replacement and he has lumbar spinal stenosis  The patient does regularly go to Dr. [facility]lvarez at [facility] or her regular  Ongoing monitoring by dermatology for the squamous cell carcinoma on his right ear    Review of Systems:    Please see above.  The rest of the review of systems are negative for all systems.    Patient Care Team:  Nikita Mercado MD as PCP - General (Family Medicine)  Nohemi Lezama NP as Assigned PCP     Patient Active Problem List   Diagnosis     Eczema     Anemia     Otitis Media     Insulin dependent diabetes mellitus     Essential Hypercholesterolemia     Essential hypertension, benign     Joint Pain, Localized In The Knee     Obesity     Allergic Rhinitis     Klinefelter's Syndrome     Varicose vein     Mild persistent asthma with acute exacerbation     Pneumonia of both lungs due to infectious organism     Hyperglycemia     Acute kidney injury (H)     Uncontrolled type 2 diabetes mellitus with hyperglycemia (H)     Encounter for examination following treatment at  hospital     Dizziness     Sepsis (H)     Abdominal pain, left upper quadrant     Mild persistent asthma with exacerbation     Spinal stenosis of lumbar region with neurogenic claudication     Past Medical History:   Diagnosis Date     Anemia     Created by Conversion      Eczema     Created by Conversion      Essential Hypercholesterolemia     Created by Conversion      Essential Hypertension     Created by Conversion      Klinefelter's syndrome     Created by Conversion      Obesity     Created by Conversion      Type 2 Diabetes Mellitus     Created by Conversion      Varicose vein 2015      Past Surgical History:   Procedure Laterality Date     JOINT REPLACEMENT      left knee TKA 2013      No family history on file.   Social History     Socioeconomic History     Marital status:      Spouse name: Not on file     Number of children: Not on file     Years of education: Not on file     Highest education level: Not on file   Occupational History     Not on file   Social Needs     Financial resource strain: Not on file     Food insecurity     Worry: Not on file     Inability: Not on file     Transportation needs     Medical: Not on file     Non-medical: Not on file   Tobacco Use     Smoking status: Former Smoker     Packs/day: 2.00     Years: 12.00     Pack years: 24.00     Last attempt to quit: 1979     Years since quittin.5     Smokeless tobacco: Never Used   Substance and Sexual Activity     Alcohol use: Yes     Comment: rare     Drug use: No     Sexual activity: Never     Partners: Female   Lifestyle     Physical activity     Days per week: Not on file     Minutes per session: Not on file     Stress: Not on file   Relationships     Social connections     Talks on phone: Not on file     Gets together: Not on file     Attends Religion service: Not on file     Active member of club or organization: Not on file     Attends meetings of clubs or organizations: Not on file     Relationship  status: Not on file     Intimate partner violence     Fear of current or ex partner: Not on file     Emotionally abused: Not on file     Physically abused: Not on file     Forced sexual activity: Not on file   Other Topics Concern     Not on file   Social History Narrative     Not on file      Current Outpatient Medications   Medication Sig Dispense Refill     ACCU-CHEK FASTCLIX LANCET DRUM U TO TEST TID  0     albuterol (PROAIR HFA;PROVENTIL HFA;VENTOLIN HFA) 90 mcg/actuation inhaler Inhale 2 puffs every 6 (six) hours as needed for wheezing. 8.5 g 5     albuterol (PROVENTIL) 2.5 mg /3 mL (0.083 %) nebulizer solution NEBULIZE 1 VIAL EVERY 4 HOURS AS NEEDED FOR WHEEZING 180 mL 0     amLODIPine (NORVASC) 10 MG tablet TAKE 1 TABLET(10 MG) BY MOUTH DAILY 90 tablet 3     ascorbic acid, vitamin C, (ASCORBIC ACID WITH DOROTHY HIPS) 500 MG tablet Take 500 mg by mouth daily.       atenolol (TENORMIN) 50 MG tablet TAKE 1 TABLET(50 MG) BY MOUTH DAILY 90 tablet 3     blood glucose test strips Use 5 each As Directed 4 (four) times a day. Dispense brand per patient's insurance at pharmacy discretion. 200 strip 2     fexofenadine (ALLEGRA) 180 MG tablet Take 180 mg by mouth daily.       fluticasone (FLONASE) 50 mcg/actuation nasal spray Apply 1 spray into each nostril daily as needed for rhinitis.       fluticasone propionate (FLOVENT HFA) 110 mcg/actuation inhaler Inhale 2 puffs 2 (two) times a day as needed.       hydroCHLOROthiazide (HYDRODIURIL) 25 MG tablet TAKE 1 TABLET(25 MG) BY MOUTH DAILY 90 tablet 3     losartan (COZAAR) 25 MG tablet Take 1 tablet (25 mg total) by mouth daily. 90 tablet 0     metFORMIN (GLUCOPHAGE-XR) 500 MG 24 hr tablet TAKE 2 TABLETS BY MOUTH TWICE DAILY 360 tablet 0     nebulizers Misc Use four times a day for 5 day then as  needed 1 each 0     triamcinolone (KENALOG) 0.1 % cream Apply 1 application topically 2 (two) times a day as needed.       No current facility-administered medications for this  visit.       Objective:   Vital Signs:   Visit Vitals  /86 (Patient Position: Sitting, Cuff Size: Adult Large)   Pulse 71   Ht 6' (1.829 m)   Wt (!) 238 lb 7 oz (108.2 kg)   SpO2 97%   BMI 32.34 kg/m           VisionScreening:  No exam data present     PHYSICAL EXAM  Physical Exam  Vitals signs and nursing note reviewed.   Constitutional:       General: He is not in acute distress.     Appearance: He is obese. He is not ill-appearing.   HENT:      Head: Normocephalic and atraumatic.      Right Ear: Tympanic membrane, ear canal and external ear normal. There is no impacted cerumen.      Left Ear: Tympanic membrane, ear canal and external ear normal. There is no impacted cerumen.      Nose: Nose normal. No congestion or rhinorrhea.      Mouth/Throat:      Mouth: Mucous membranes are moist.      Pharynx: No oropharyngeal exudate or posterior oropharyngeal erythema.   Eyes:      General: No scleral icterus.        Right eye: No discharge.         Left eye: No discharge.      Conjunctiva/sclera: Conjunctivae normal.      Pupils: Pupils are equal, round, and reactive to light.   Neck:      Musculoskeletal: Normal range of motion. No neck rigidity.      Vascular: No carotid bruit.   Cardiovascular:      Rate and Rhythm: Normal rate and regular rhythm.      Pulses: Normal pulses.      Heart sounds: Normal heart sounds. No murmur. No gallop.    Pulmonary:      Effort: Pulmonary effort is normal. No respiratory distress.      Breath sounds: Normal breath sounds. No wheezing, rhonchi or rales.   Abdominal:      General: There is no distension.      Palpations: Abdomen is soft. There is no mass.      Tenderness: There is no abdominal tenderness. There is no guarding or rebound.      Hernia: No hernia is present.   Musculoskeletal: Normal range of motion.      Right lower leg: Edema present.      Left lower leg: Edema present.   Lymphadenopathy:      Cervical: No cervical adenopathy.   Skin:     General: Skin is warm.       Capillary Refill: Capillary refill takes 2 to 3 seconds.      Coloration: Skin is not jaundiced.      Findings: No lesion or rash.   Neurological:      General: No focal deficit present.      Mental Status: He is alert.      Cranial Nerves: No cranial nerve deficit.      Motor: No weakness.      Gait: Gait normal.      Comments: Sensation on plantar and dorsal surface of both feet using the 5.0 7 monofilament was 4 out of 4 bilaterally   Psychiatric:         Mood and Affect: Mood normal.         Behavior: Behavior normal.         Thought Content: Thought content normal.         Judgment: Judgment normal.         Assessment Results 7/20/2020   Activities of Daily Living No help needed   Instrumental Activities of Daily Living No help needed   Mini Cog Total Score 5   Some recent data might be hidden     A Mini-Cog score of 0-2 suggests the possibility of dementia, score of 3-5 suggests no dementia        Identified Health Risks:     He is at risk for lack of exercise and has been provided with information to increase physical activity for the benefit of his well-being.  The patient was counseled and encouraged to consider modifying their diet and eating habits. He was provided with information on recommended healthy diet options.  The patient was provided with written information regarding signs of hearing loss.  Information on urinary incontinence and treatment options given to patient.  He is at risk for falling and has been provided with information to reduce the risk of falling at home.  Patient's advanced directive was discussed and I am comfortable with the patient's wishes.

## 2021-06-09 NOTE — PROGRESS NOTES
Lester Shi is a 67-year-old with a history of diabetes hypertension hypercholesterolemia obesity who presents today for a 3 month history of some congestion and cough.  He was treated previously for pneumonia with Zithromax and that seemed to help but now his symptoms are back.  He is continuing to use his metered-dose inhaler but feels like it is not helping.  He finds that he cannot sleep in his bed as he has to kind of sleep propped up.  He has had some sense of shortness of breath but no chest pain no fever or chills.    He does not usually monitor any blood sugars or blood pressures.  We reviewed his recent labs.  He knows he needs to work at diet exercise and weight loss.  We reviewed heart disease prevention and cancer detection as well as immunization update.  He is due for PSA testing    Objective:/86  Pulse 80  Resp 20  Ht 6' (1.829 m)  Wt (!) 257 lb (116.6 kg)  SpO2 96%  BMI 34.86 kg/m2  Ears are normal-appearing bilaterally nose is mildly congested mouth appears normal neck supple without lymphadenopathy lungs he has scattered wheeze and rare rhonchi otherwise clear heart has regular rate and rhythm without murmur or lower extremities he has 0 to trace edema skin appears normal    Assessment: Bronchitis with bronchospasm with underlying diabetes hypertension obesity    Plan: We will start a prednisone taper he is to push fluids continue with his metered-dose inhaler good handwashing continue on his allergy medicines and follow-up with me in 1 week and let me know how things are.

## 2021-06-09 NOTE — TELEPHONE ENCOUNTER
Refill Request  Did you contact pharmacy: No  Medication name:   Requested Prescriptions     Pending Prescriptions Disp Refills     losartan (COZAAR) 25 MG tablet 90 tablet 1     Sig: Take 1 tablet (25 mg total) by mouth daily.     Who prescribed the medication: Dr. Madhav Mercado  Requested [facility]acy: [facility]  Is patient out of medication: n/a eletronic request  Patient notified refills processed in 3 business days:  no  Okay to leave a detailed message: no

## 2021-06-09 NOTE — PROGRESS NOTES
Lester,    It was a pleasure to see you in the clinic yesterday.  Your test results are available in my chart and I will review those.    The hepatitis C screening test was negative.    The urine test does show some microalbumin in the urine which means that your kidneys are allowing some protein to leave your body.  Normally the kidneys hold onto protein so it means that you have some mild disease in your kidneys.  The most important thing that you can do to keep your kidneys healthy is to avoid medicine such as ibuprofen or Aleve, it is important for you your blood pressure to be well controlled and particularly the losartan is a medicine that helps protect your kidneys.  Finally it is important to keep your blood sugars under good control for your kidney health.    The hemoglobin A1c was 7.0 which is satisfactory.  It is important for you to continue with the Metformin  One thousand milligrams twice a day to keep your blood sugar under control.    Do you have a way to check your blood pressure out of the office?  If you do I would  like for you to monitor that a couple times a week and you could send me a [employer] message with those results.  Your blood pressure was at the high end of where we want it to be when you were seen this week.  To try to protect your kidneys, if your blood pressure continues to be close to that 140/90 level we may need to increase the losartan.    The x-ray of your elbow showed some degenerative arthritis and some bone spurs which likely is the reason why your elbow hurts and will lock up.  Taking medication like acetaminophen would be safe to use and may be helpful.  If the elbow pain and locking becomes worse would have to consider having you see 1 of the orthopedic specialist.    Please get me some follow-up through my chart with a message about blood pressures outside of the office.  Thank you,  Dr. Mercado

## 2021-06-09 NOTE — TELEPHONE ENCOUNTER
Refill Approved    Rx renewed per Medication Renewal Policy. Medication was last renewed on 12/17/19.    Leandra Avalos, [facility] Triage/Med Refill 7/14/2020     Requested Prescriptions   Pending Prescriptions Disp Refills     losartan (COZAAR) 25 MG tablet 90 tablet 1     Sig: Take 1 tablet (25 mg total) by mouth daily.       Angiotensin Receptor Blocker Protocol Passed - 7/14/2020  8:02 AM        Passed - PCP or prescribing provider visit in past 12 months       Last office visit with prescriber/PCP: 1/15/2020 Nikita Mercado MD OR same dept: 1/15/2020 Nikita Mercado MD OR same specialty: 1/15/2020 Nikita Mercado MD  Last physical: Visit date not found Last MTM visit: Visit date not found   Next visit within 3 mo: Visit date not found  Next physical within 3 mo: Visit date not found  Prescriber OR PCP: Nikita Mercado MD  Last diagnosis associated with med order: 1. Encounter for medication refill  - losartan (COZAAR) 25 MG tablet; Take 1 tablet (25 mg total) by mouth daily.  Dispense: 90 tablet; Refill: 1    If protocol passes may refill for 12 months if within 3 months of last provider visit (or a total of 15 months).             Passed - Serum potassium within the past 12 months     Lab Results   Component Value Date    Potassium 4.0 11/18/2019             Passed - Blood pressure filed in past 12 months     BP Readings from Last 1 Encounters:   01/15/20 122/72             Passed - Serum creatinine within the past 12 months     Creatinine   Date Value Ref Range Status   11/18/2019 1.09 0.70 - 1.30 mg/dL Final

## 2021-06-11 NOTE — TELEPHONE ENCOUNTER
Refill Approved    Rx renewed per Medication Renewal Policy. Medication was last renewed on 7/14/20.6/19/20.    Leandra Avalos, [facility] Triage/Med Refill 10/1/2020     Requested Prescriptions   Pending Prescriptions Disp Refills     losartan (COZAAR) 25 MG tablet [Pharmacy Med Name: LOSARTAN 25MG TABLETS] 90 tablet 0     Sig: TAKE 1 TABLET(25 MG) BY MOUTH DAILY       Angiotensin Receptor Blocker Protocol Passed - 9/28/2020  1:24 PM        Passed - PCP or prescribing provider visit in past 12 months       Last office visit with prescriber/PCP: 9/14/2020 Nikita Mercado MD OR same dept: 9/14/2020 Nikita Mercado MD OR same specialty: 9/14/2020 Nikita Mercado MD  Last physical: 7/20/2020 Last MTM visit: Visit date not found   Next visit within 3 mo: Visit date not found  Next physical within 3 mo: Visit date not found  Prescriber OR PCP: Nikita Mercado MD  Last diagnosis associated with med order: 1. Encounter for medication refill  - losartan (COZAAR) 25 MG tablet [Pharmacy Med Name: LOSARTAN 25MG TABLETS]; TAKE 1 TABLET(25 MG) BY MOUTH DAILY  Dispense: 90 tablet; Refill: 0    2. Type 2 diabetes mellitus without complication, without long-term current use of insulin (H)  - metFORMIN (GLUCOPHAGE-XR) 500 MG 24 hr tablet; Take 2 tablets (1,000 mg total) by mouth 2 (two) times a day.  Dispense: 360 tablet; Refill: 0    If protocol passes may refill for 12 months if within 3 months of last provider visit (or a total of 15 months).             Passed - Serum potassium within the past 12 months     Lab Results   Component Value Date    Potassium 3.8 09/11/2020             Passed - Blood pressure filed in past 12 months     BP Readings from Last 1 Encounters:   09/14/20 128/80             Passed - Serum creatinine within the past 12 months     Creatinine   Date Value Ref Range Status   09/11/2020 1.18 0.70 - 1.30 mg/dL Final                metFORMIN (GLUCOPHAGE-XR) 500 MG 24 hr tablet 360 tablet 0     Sig: Take 2  tablets (1,000 mg total) by mouth 2 (two) times a day.       Metformin Refill Protocol Passed - 9/28/2020  1:24 PM        Passed - Blood pressure in last 12 months     BP Readings from Last 1 Encounters:   09/14/20 128/80             Passed - LFT or AST or ALT in last 12 months     Albumin   Date Value Ref Range Status   09/11/2020 3.7 3.5 - 5.0 g/dL Final     Bilirubin, Total   Date Value Ref Range Status   09/11/2020 0.7 0.0 - 1.0 mg/dL Final     Bilirubin, Direct   Date Value Ref Range Status   11/14/2012 0.21 <0.31 mg/dL Final     Alkaline Phosphatase   Date Value Ref Range Status   09/11/2020 117 45 - 120 U/L Final     AST   Date Value Ref Range Status   09/11/2020 19 0 - 40 U/L Final     ALT   Date Value Ref Range Status   09/11/2020 22 0 - 45 U/L Final     Protein, Total   Date Value Ref Range Status   09/11/2020 8.8 (H) 6.0 - 8.0 g/dL Final                Passed - GFR or Serum Creatinine in last 6 months     GFR MDRD Non Af Amer   Date Value Ref Range Status   09/11/2020 >60 >60 mL/min/1.73m2 Final     GFR MDRD Af Amer   Date Value Ref Range Status   09/11/2020 >60 >60 mL/min/1.73m2 Final             Passed - Visit with PCP or prescribing provider visit in last 6 months or next 3 months     Last office visit with prescriber/PCP: 9/14/2020 OR same dept: 9/14/2020 Nikita Mercado MD OR same specialty: 9/14/2020 Nikita Mercado MD Last physical: 7/20/2020 Last MTM visit: Visit date not found         Next appt within 3 mo: Visit date not found  Next physical within 3 mo: Visit date not found  Prescriber OR PCP: Nikita Mercado MD  Last diagnosis associated with med order: 1. Encounter for medication refill  - losartan (COZAAR) 25 MG tablet [Pharmacy Med Name: LOSARTAN 25MG TABLETS]; TAKE 1 TABLET(25 MG) BY MOUTH DAILY  Dispense: 90 tablet; Refill: 0    2. Type 2 diabetes mellitus without complication, without long-term current use of insulin (H)  - metFORMIN (GLUCOPHAGE-XR) 500 MG 24 hr tablet; Take 2 tablets  (1,000 mg total) by mouth 2 (two) times a day.  Dispense: 360 tablet; Refill: 0     If protocol passes may refill for 12 months if within 3 months of last provider visit (or a total of 15 months).           Passed - A1C in last 6 months     Hemoglobin A1c   Date Value Ref Range Status   07/20/2020 7.0 (H) 3.5 - 6.0 % Final               Passed - Microalbumin in last year      Microalbumin, Random Urine   Date Value Ref Range Status   07/20/2020 1.99 0.00 - 1.99 mg/dL Final

## 2021-06-11 NOTE — TELEPHONE ENCOUNTER
"RN Triage:     Going in for a covid19 test at 120pm. He \"has an infection in his left leg.   Small scab on inside left leg about 6\" to the ankle. Swollen, warm, and painful to the touch. Hurts to walk. Redness near ankle to 1/2 way up the calf. Pr wife the leg is \"quite swollen\". Fever 2 nights ago 102 with shaking chills. No fever now. Patient is a .   Advised ED for evaluation and to wear a mask.     Nancy Hernandez RN, BSN [facility] Triage Nurse      Reason for Disposition    [1] Fever > 101 F (38.3 C) AND [2] age > 60    Additional Information    Negative: Stitches and not infected    Bright red, wide-spread, sunburn-like rash    Patient sounds very sick or weak to the triager    Negative: Fever and localized rash is very painful    Negative: Fever and localized purple or blood-colored spots or dots that are not from injury or friction    Negative: Possible contact with poison ivy or oak    Negative: Insect bite(s) suspected    Negative: Athlete's Foot suspected (i.e., itchy rash between the toes)    Negative: Jock Itch suspected (i.e., itchy rash on inner thighs near genital area)    Negative: Wound infection suspected (i.e., pain, spreading redness, or pus; in a cut, puncture, scrape or sutured wound)    Negative: Rash of external female genital area (vulva)    Negative: Rash of penis or scrotum    Negative: Small spot, skin growth, or mole    Negative: Sounds like a life-threatening emergency to the triager    Thigh or calf pain and only 1 side and present > 1 hour    Thigh, calf, or ankle swelling in only one leg    Fever and red area (or area very tender to touch)    Negative: SEVERE difficulty breathing (e.g., struggling for each breath, speaks in single words)    Negative: Difficult to awaken or acting confused (e.g., disoriented, slurred speech)    Negative: Bluish (or gray) lips or face now    Negative: Shock suspected (e.g., cold/pale/clammy skin, too weak to stand, low BP, rapid " pulse)    Negative: Sounds like a life-threatening emergency to the triager    Negative: [1] COVID-19 exposure AND [2] no symptoms    Negative: COVID-19 and Breastfeeding, questions about    Negative: [1] Adult with possible COVID-19 symptoms AND [2] triager concerned about severity of symptoms or other causes    Negative: SEVERE or constant chest pain or pressure (Exception: mild central chest pain, present only when coughing)    Negative: MODERATE difficulty breathing (e.g., speaks in phrases, SOB even at rest, pulse 100-120)    Negative: Patient sounds very sick or weak to the triager    Negative: Fever > 103 F (39.4 C)    Negative: Chest pain or pressure    Negative: MILD difficulty breathing (e.g., minimal/no SOB at rest, SOB with walking, pulse <100)    Protocols used: CORONAVIRUS (COVID-19) DIAGNOSED OR [id number] 8.4.20, WOUND INFECTION-A-OH, RASH OR REDNESS - [id number], LEG SWELLING AND EDEMA-A-OH

## 2021-06-11 NOTE — TELEPHONE ENCOUNTER
Patient needs to be seen today in person, recommend walk in clinic since no openings at clinic today.

## 2021-06-11 NOTE — TELEPHONE ENCOUNTER
Refill Approved    Rx renewed per Medication Renewal Policy. Medication was last renewed on 12/6/19.    Leandra Avalos, [facility] Triage/Med Refill 10/1/2020     Requested Prescriptions   Pending Prescriptions Disp Refills     atenoloL (TENORMIN) 50 MG tablet [Pharmacy Med Name: ATENOLOL 50MG TABLETS] 90 tablet 3     Sig: TAKE 1 TABLET(50 MG) BY MOUTH DAILY       Beta-Blockers Refill Protocol Passed - 9/28/2020  5:58 AM        Passed - PCP or prescribing provider visit in past 12 months or next 3 months     Last office visit with prescriber/PCP: 11/18/2019 Nohemi Lezama NP OR same dept: 9/14/2020 Nikita Mercado MD OR same specialty: 9/14/2020 Nikita Mercado MD  Last physical: Visit date not found Last MTM visit: Visit date not found   Next visit within 3 mo: Visit date not found  Next physical within 3 mo: Visit date not found  Prescriber OR PCP: Nohemi Lezama NP  Last diagnosis associated with med order: 1. Essential hypertension  - atenoloL (TENORMIN) 50 MG tablet [Pharmacy Med Name: ATENOLOL 50MG TABLETS]; TAKE 1 TABLET(50 MG) BY MOUTH DAILY  Dispense: 90 tablet; Refill: 3    2. Essential hypertension, benign  - amLODIPine (NORVASC) 10 MG tablet [Pharmacy Med Name: AMLODIPINE BESYLATE 10MG TABLETS]; TAKE 1 TABLET(10 MG) BY MOUTH DAILY  Dispense: 90 tablet; Refill: 3    If protocol passes may refill for 12 months if within 3 months of last provider visit (or a total of 15 months).             Passed - Blood pressure filed in past 12 months     BP Readings from Last 1 Encounters:   09/14/20 128/80                amLODIPine (NORVASC) 10 MG tablet [Pharmacy Med Name: AMLODIPINE BESYLATE 10MG TABLETS] 90 tablet 3     Sig: TAKE 1 TABLET(10 MG) BY MOUTH DAILY       Calcium-Channel Blockers Protocol Passed - 9/28/2020  5:58 AM        Passed - PCP or prescribing provider visit in past 12 months or next 3 months     Last office visit with prescriber/PCP: 11/18/2019 Nohemi Lezama NP OR same  dept: 9/14/2020 Nikita Mercado MD OR same specialty: 9/14/2020 Nikita Mercado MD  Last physical: Visit date not found Last MTM visit: Visit date not found   Next visit within 3 mo: Visit date not found  Next physical within 3 mo: Visit date not found  Prescriber OR PCP: Nohemi Lezama NP  Last diagnosis associated with med order: 1. Essential hypertension  - atenoloL (TENORMIN) 50 MG tablet [Pharmacy Med Name: ATENOLOL 50MG TABLETS]; TAKE 1 TABLET(50 MG) BY MOUTH DAILY  Dispense: 90 tablet; Refill: 3    2. Essential hypertension, benign  - amLODIPine (NORVASC) 10 MG tablet [Pharmacy Med Name: AMLODIPINE BESYLATE 10MG TABLETS]; TAKE 1 TABLET(10 MG) BY MOUTH DAILY  Dispense: 90 tablet; Refill: 3    If protocol passes may refill for 12 months if within 3 months of last provider visit (or a total of 15 months).             Passed - Blood pressure filed in past 12 months     BP Readings from Last 1 Encounters:   09/14/20 128/80

## 2021-06-11 NOTE — PROGRESS NOTES
ASSESSMENT/PLAN:       1. Cellulitis of left leg  This seems to be improving nicely and would like to have the patient complete his clindamycin 300 mg 4 times a day.  Is much as he can he will elevate his leg but I do feel it is okay for him to return to work on September 16, 2020  If that is not going well for him or if he does not seem to do well with that return he will let me know and we will have to withdraw the return to work instructions and change the date.  Try to not scratch the skin and use CeraVe  or Vanicream as a moisturizer.    The patient has a home blood pressure unit and is reading significantly higher than were getting in the office.  I do not think the cuff is big enough and to go back to the pharmacy and try to get a [employer] BP kit with a large adult cuff.            Nikita Mercado MD      PROGRESS NOTE   9/14/2020    SUBJECTIVE:  Lester Shi is a 70 y.o. male  who presents for   Chief Complaint   Patient presents with     Hospital Visit Follow Up     ER WW 9/11 , leg infection    The patient is here for follow-up of a ER visit on September 11 for cellulitis in the left leg.  The patient symptoms started on 9/9/2020 in the evening with fever and chills.  The next day the fever chills seem to be improved but he noticed increasing pain in the left lower leg eventually with more swelling and redness.  The patient had worsening discomfort and return of some fever so went to the emergency room and diagnosis of cellulitis was made.  The patient has had a history of venous insufficiency in the lower legs with some varicosities and stasis dermatitis at times.  He noted a small abrasion on the left inner lower leg near the ankle but it never really bladder drained.  Over the last 4 days the patient's symptoms have significantly improved with retreating of the redness less pain and swelling.  The patient has been on clindamycin 300 mg 4 times a day with a 10-day course of treatment.  The patient has not  noticed any GI distress including no diarrhea or abdominal cramping.  The patient is no longer had any fever.  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  He only has to get out of the bus for couple students to help him get into the bus with the chair lift.  The patient is hoping that he can go back to work this week on Wednesday which would be September 16.      Patient Active Problem List   Diagnosis     Eczema     Anemia     Otitis Media     Insulin dependent diabetes mellitus     Essential Hypercholesterolemia     Essential hypertension, benign     Joint Pain, Localized In The Knee     Obesity     Allergic Rhinitis     Klinefelter's Syndrome     Varicose vein     Mild persistent asthma with acute exacerbation     Pneumonia of both lungs due to infectious organism     Hyperglycemia     Acute kidney injury (H)     Uncontrolled type 2 diabetes mellitus with hyperglycemia (H)     Encounter for examination following treatment at hospital     Dizziness     Sepsis (H)     Abdominal pain, left upper quadrant     Mild persistent asthma with exacerbation     Spinal stenosis of lumbar region with neurogenic claudication     Squamous cell carcinoma of skin of face     Status post total left knee replacement       Current Outpatient Medications   Medication Sig Dispense Refill     ACCU-CHEK FASTCLIX LANCET DRUM U TO TEST TID  0     amLODIPine (NORVASC) 10 MG tablet TAKE 1 TABLET(10 MG) BY MOUTH DAILY 90 tablet 3     ascorbic acid, vitamin C, (ASCORBIC ACID WITH DOROTHY HIPS) 500 MG tablet Take 500 mg by mouth daily.       atenolol (TENORMIN) 50 MG tablet TAKE 1 TABLET(50 MG) BY MOUTH DAILY 90 tablet 3     blood glucose test strips Use 1 each As Directed 2 (two) times a day. Dispense brand per patient's insurance at pharmacy discretion. 200 strip 3     clindamycin (CLEOCIN) 300 MG capsule Take 1 capsule (300 mg total) by mouth 4 (four) times a day for 10 days.  40 capsule 0     hydroCHLOROthiazide (HYDRODIURIL) 25 MG tablet TAKE 1 TABLET(25 MG) BY MOUTH DAILY 90 tablet 3     losartan (COZAAR) 25 MG tablet Take 1 tablet (25 mg total) by mouth daily. 90 tablet 0     metFORMIN (GLUCOPHAGE-XR) 500 MG 24 hr tablet TAKE 2 TABLETS BY MOUTH TWICE DAILY 360 tablet 0     nebulizers Misc Use four times a day for 5 day then as  needed 1 each 0     albuterol (PROAIR HFA;PROVENTIL HFA;VENTOLIN HFA) 90 mcg/actuation inhaler Inhale 2 puffs every 6 (six) hours as needed for wheezing. 8.5 g 5     albuterol (PROVENTIL) 2.5 mg /3 mL (0.083 %) nebulizer solution NEBULIZE 1 VIAL EVERY 4 HOURS AS NEEDED FOR WHEEZING 180 mL 0     fexofenadine (ALLEGRA) 180 MG tablet Take 180 mg by mouth daily.       fluticasone (FLONASE) 50 mcg/actuation nasal spray Apply 1 spray into each nostril daily as needed for rhinitis.       fluticasone propionate (FLOVENT HFA) 110 mcg/actuation inhaler Inhale 2 puffs 2 (two) times a day as needed.       triamcinolone (KENALOG) 0.1 % cream Apply 1 application topically 2 (two) times a day as needed.       No current facility-administered medications for this visit.        Social History     Tobacco Use   Smoking Status Former Smoker     Packs/day: 2.00     Years: 12.00     Pack years: 24.00     Last attempt to quit: 1979     Years since quittin.7   Smokeless Tobacco Never Used           OBJECTIVE:        Recent Results (from the past 240 hour(s))   Lactic Acid   Result Value Ref Range    Lactic Acid 1.3 0.7 - 2.0 mmol/L   C-Reactive Protein   Result Value Ref Range    CRP 15.4 (H) 0.0 - 0.8 mg/dL   Comprehensive Metabolic Panel   Result Value Ref Range    Sodium 139 136 - 145 mmol/L    Potassium 3.8 3.5 - 5.0 mmol/L    Chloride 102 98 - 107 mmol/L    CO2 25 22 - 31 mmol/L    Anion Gap, Calculation 12 5 - 18 mmol/L    Glucose 205 (H) 70 - 125 mg/dL    BUN 25 8 - 28 mg/dL    Creatinine 1.18 0.70 - 1.30 mg/dL    GFR MDRD Af Amer >60 >60 mL/min/1.73m2    GFR MDRD Non  Af Amer >60 >60 mL/min/1.73m2    Bilirubin, Total 0.7 0.0 - 1.0 mg/dL    Calcium 10.2 8.5 - 10.5 mg/dL    Protein, Total 8.8 (H) 6.0 - 8.0 g/dL    Albumin 3.7 3.5 - 5.0 g/dL    Alkaline Phosphatase 117 45 - 120 U/L    AST 19 0 - 40 U/L    ALT 22 0 - 45 U/L   HM1 (CBC with Diff)   Result Value Ref Range    WBC 8.5 4.0 - 11.0 thou/uL    RBC 3.61 (L) 4.40 - 6.20 mill/uL    Hemoglobin 11.6 (L) 14.0 - 18.0 g/dL    Hematocrit 33.9 (L) 40.0 - 54.0 %    MCV 94 80 - 100 fL    MCH 32.1 27.0 - 34.0 pg    MCHC 34.2 32.0 - 36.0 g/dL    RDW 14.6 (H) 11.0 - 14.5 %    Platelets 249 140 - 440 thou/uL    MPV 10.1 8.5 - 12.5 fL    Neutrophils % 61 50 - 70 %    Lymphocytes % 25 20 - 40 %    Monocytes % 10 2 - 10 %    Eosinophils % 4 0 - 6 %    Basophils % 1 0 - 2 %    Immature Granulocyte % 0 <=0 %    Neutrophils Absolute 5.2 2.0 - 7.7 thou/uL    Lymphocytes Absolute 2.1 0.8 - 4.4 thou/uL    Monocytes Absolute 0.8 0.0 - 0.9 thou/uL    Eosinophils Absolute 0.3 0.0 - 0.4 thou/uL    Basophils Absolute 0.1 0.0 - 0.2 thou/uL    Immature Granulocyte Absolute 0.0 <=0.0 thou/uL   COVID-19 Virus PCR MRF    Specimen: Respiratory   Result Value Ref Range    COVID-19 VIRUS SPECIMEN SOURCE Nasopharyngeal     2019-nCOV Not Detected        Vitals:    09/14/20 1602   BP: 128/80   Pulse: 77   SpO2: 96%   Weight: (!) 237 lb (107.5 kg)   Height: 6' (1.829 m)     Weight: (!) 237 lb (107.5 kg)          Physical Exam:  GENERAL APPEARANCE: 70-year-old male, NAD, well hydrated, well nourished  SKIN:  Normal skin turgor, no lesions/rashes except for on the left lower leg  EXTREMITY: Trace edema left lower extremity around the ankle and just proximal to that.  Most of the redness and warmth of the left leg is along the medial aspect and there is about a 3 mm scab or crusty area with no active drainage.  The marked area on his leg where the redness have been is much less than it was last Friday on 9/11/2020.  And full ROM of knee and ankle left side  NEURO: no  focal findings

## 2021-06-11 NOTE — PATIENT INSTRUCTIONS - HE
1. Use CeraVe cream or Vanicream to leg for dry skin  2 [employer] BP kit for checking BP and use large adult cuff

## 2021-06-11 NOTE — PROGRESS NOTES
"Lester Shi is a 70 y.o. male who is being evaluated via a billable telephone visit.      The patient has been notified of following:     \"This telephone visit will be conducted via a call between you and your physician/provider. We have found that certain health care needs can be provided without the need for a physical exam.  This service lets us provide the care you need with a short phone conversation.  If a prescription is necessary we can send it directly to your pharmacy.  If lab work is needed we can place an order for that and you can then stop by our lab to have the test done at a later time.    Telephone visits are billed at different rates depending on your insurance coverage. During this emergency period, for some insurers they may be billed the same as an in-person visit.  Please reach out to your insurance provider with any questions.    If during the course of the call the physician/provider feels a telephone visit is not appropriate, you will not be charged for this service.\"    Patient has given verbal consent to a Telephone visit? Yes    What phone number would you like to be contacted at? [phone]     Patient would like to receive their AVS by AVS Preference: Sabrina.    Additional provider notes: Patient is calling today requesting COVID testing.  Last night around 7 PM, he developed chills with headache and generalized aches and pains.  His temperature went from 101.6 and then up to 102. This morning it is now down to 99.  He denies any recent cough or shortness of breath, he is rather congested today, he has been having occasional bouts of diarrhea but is unsure if it is from his Metformin medication.  No new rashes, his generalized aches and pains are mainly in the back and hip region.  He denies any loss of appetite.  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     1. Flu-like symptoms  Symptomatic " COVID-19 Virus (CORONAVIRUS) PCR         Assessment/Plan:  1. Flu-like symptoms    - Symptomatic COVID-19 Virus (CORONAVIRUS) PCR; Future  -Patient was instructed to continue to social distance, mask when out in public and follow CDC guidelines  -May continue to take Tylenol for fever control and generalized aches and pains, push fluids to avoid dehydration, activity as tolerated  -Someone will call him to get him set up for curbside testing today  -Due for next med check in January 2021  -follow up in one to two weeks if no improvement with symptoms, may need influenza testing        Phone call duration:  6 minutes    Nohemi Lezama NP

## 2021-06-13 NOTE — PROGRESS NOTES
ASSESSMENT/PLAN:       1. Impacted cerumen of right ear  Resolution of the impaction with irrigation.  Explained to the patient that I did not see any evidence for a otitis media or otitis externa.    2. TMJ (temporomandibular joint syndrome)  I believe that the discomfort he has in front of the ear that goes down along the jawline is related to some temporomandibular joint and masseter muscle myalgias.  Jaw rest, moist heat, avoid chewing on hard objects including ice and gum.  Okay to use acetaminophen for pain    3. Need for vaccination    - Influenza,Quad,High Dose,PF 65 YR+    The patient has a request in place for return for diabetic recheck in January 2021    Nikita Mercado MD      PROGRESS NOTE   11/28/2020    SUBJECTIVE:  Lester Shi is a 70 y.o. male  who presents for   Chief Complaint   Patient presents with     Ear Fullness     right ear plugged, pain goes down side of face, hx of ear infections      The patient is seen today because of concerns about his right ear.  The right ear feels plugged with decreased hearing and he has some pain in the region of the right ear that goes down along the jawline.  The patient has had a history of ear infections.  The patient has diabetes mellitus and his last A1c was 7.0 in July 2020.  The patient's blood sugars have been running 115-150.  The patient has not had an influenza vaccine.    Patient Active Problem List   Diagnosis     Eczema     Anemia     Otitis Media     Insulin dependent diabetes mellitus     Essential Hypercholesterolemia     Essential hypertension, benign     Joint Pain, Localized In The Knee     Obesity     Allergic Rhinitis     Klinefelter's Syndrome     Varicose vein     Mild persistent asthma with acute exacerbation     Pneumonia of both lungs due to infectious organism     Hyperglycemia     Acute kidney injury (H)     Encounter for examination following treatment at hospital     Dizziness     Sepsis (H)     Abdominal pain, left upper  quadrant     Mild persistent asthma with exacerbation     Spinal stenosis of lumbar region with neurogenic claudication     Squamous cell carcinoma of skin of face     Status post total left knee replacement       Current Outpatient Medications   Medication Sig Dispense Refill     ACCU-CHEK FASTCLIX LANCET DRUM U TO TEST TID  0     albuterol (PROAIR HFA;PROVENTIL HFA;VENTOLIN HFA) 90 mcg/actuation inhaler Inhale 2 puffs every 6 (six) hours as needed for wheezing. 8.5 g 5     amLODIPine (NORVASC) 10 MG tablet TAKE 1 TABLET(10 MG) BY MOUTH DAILY 90 tablet 3     ascorbic acid, vitamin C, (ASCORBIC ACID WITH DOROTHY HIPS) 500 MG tablet Take 500 mg by mouth daily.       atenoloL (TENORMIN) 50 MG tablet TAKE 1 TABLET(50 MG) BY MOUTH DAILY 90 tablet 3     blood glucose test strips Use 1 each As Directed 2 (two) times a day. Dispense brand per patient's insurance at pharmacy discretion. 200 strip 3     fluticasone propionate (FLOVENT HFA) 110 mcg/actuation inhaler Inhale 2 puffs 2 (two) times a day as needed.       hydroCHLOROthiazide (HYDRODIURIL) 25 MG tablet TAKE 1 TABLET(25 MG) BY MOUTH DAILY 90 tablet 3     losartan (COZAAR) 25 MG tablet TAKE 1 TABLET(25 MG) BY MOUTH DAILY 90 tablet 3     metFORMIN (GLUCOPHAGE-XR) 500 MG 24 hr tablet Take 2 tablets (1,000 mg total) by mouth 2 (two) times a day. 360 tablet 2     nebulizers Misc Use four times a day for 5 day then as  needed 1 each 0     triamcinolone (KENALOG) 0.1 % cream Apply 1 application topically 2 (two) times a day as needed.       albuterol (PROVENTIL) 2.5 mg /3 mL (0.083 %) nebulizer solution NEBULIZE 1 VIAL EVERY 4 HOURS AS NEEDED FOR WHEEZING 180 mL 0     fexofenadine (ALLEGRA) 180 MG tablet Take 180 mg by mouth daily.       fluticasone (FLONASE) 50 mcg/actuation nasal spray Apply 1 spray into each nostril daily as needed for rhinitis.       No current facility-administered medications for this visit.        Social History     Tobacco Use   Smoking Status Former  Smoker     Packs/day: 2.00     Years: 12.00     Pack years: 24.00     Quit date: 1979     Years since quittin.9   Smokeless Tobacco Never Used           OBJECTIVE:        No results found for this or any previous visit (from the past 240 hour(s)).    Vitals:    20 1107   BP: 128/80   Pulse: 77   SpO2: 97%   Weight: (!) 239 lb (108.4 kg)   Height: 6' (1.829 m)     Weight: (!) 239 lb (108.4 kg)          Physical Exam:  GENERAL APPEARANCE: Very pleasant 70-year-old male, NAD, well hydrated, well nourished  SKIN:  Normal skin turgor, no lesions/rashes   HEENT: moist mucous membranes, no rhinorrhea, moderate cerumen impaction right side treated with flushing of the ear.  Patient has not had a history of ear surgery.  He is not had any complications related to cleaning out his ears in the past.  Successful irrigation and the ear canal was slightly red but no signs of any otitis externa.  The patient does have tenderness in the temporomandibular joint right side more than left with excursion of the jaw.  No clicking or popping heard.  NECK: Normal without adenopathy or masses  NEURO: no focal findings

## 2021-06-13 NOTE — PROGRESS NOTES
ASSESSMENT/PLAN:       1. Essential hypertension  -Updating labs today. BP is at goal. Continue on current medications and f/u in six months if doing well. Continue to work on diet, exercise and weight loss as well as low salt.   - atenolol (TENORMIN) 50 MG tablet; Take 1 tablet (50 mg total) by mouth daily.  Dispense: 90 tablet; Refill: 3  - hydroCHLOROthiazide (HYDRODIURIL) 25 MG tablet; Take 1 tablet (25 mg total) by mouth daily.  Dispense: 90 tablet; Refill: 1  - amLODIPine (NORVASC) 5 MG tablet; Take 1 tablet (5 mg total) by mouth daily.  Dispense: 90 tablet; Refill: 1  - Comprehensive Metabolic Panel    2. Essential Hypercholesterolemia  -Doing well on the current medication. Will update labs and have him f/u in six months.   - spironolactone (ALDACTONE) 25 MG tablet; Take 1 tablet (25 mg total) by mouth daily.  Dispense: 90 tablet; Refill: 1  - Lipid Cascade    3. Type 2 diabetes mellitus  -Updating lab work today. Continue to check sugars at times, and work on diet, exercise and weight loss.   - Glycosylated Hemoglobin A1c    4. Anemia  -Has had anemia in the past, updating today.   - HM1(CBC and Differential)  - HM1 (CBC with Diff)    5. Prostate cancer screening  -Discussed signs and symptoms of prostate cancer. Given age, will update PSA today, is asymptomatic, did not do a prostate exam today  - PSA (Prostatic-Specific Antigen), Annual Screen    6. Lump of skin of back  -both are likely cysts, will have him watch for now. Ok to refer to general surgery for removal as one is recurrent if he would like.     7. Need for pneumococcal vaccine  - Influenza High Dose, Seasonal 65+ yrs  - Pneumococcal conjugate vaccine 13-valent 6wks-17yrs; >50yrs    8. Immunization due  - Influenza High Dose, Seasonal 65+ yrs        The following high BMI interventions were performed this visit: encouragement to exercise and weight monitoring    Rhonda Wells MD      PROGRESS NOTE   10/11/2017    SUBJECTIVE:  Lester  RON Shi is a 67 y.o. male  who presents for follow up.     He is in general doing. Two of his friends have had his prostate out. This concerns him. The last time he has had this checked they said it was fine. He is wondering if he should/could have some prostate cancer screening done. He does get up one time a night to urinate and does have a slightly diminished stream but it has been this way for some time. He does not have urgency or frequency.     He does have a cold right now. He has been using his inhaler two times a day right now. He has been using his flonase of and on as well. He does get short of breath as well right now, was doing well with his biking this summer. He does take the allegra off and on, mainly in the spring. 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     He does struggle with his weight as well. He does have poor willpower. His daughter is a nutritionist. He knows what he should be doing for diet. He has no issues with his blood sugars however.     He does have a few bumps on his back that he would like to have looked at. There is one that was a sebaceous cyst that was removed several years ago and has recurred. The other one is what looks like a large black head. They have both been there for some time and he has been trying to get rid of them.     Chief Complaint   Patient presents with     [facility]     Patient is here today to [facility] care with Dr. Wells. He would like to review his current medications. Non-fasting if lab work is needed.          Patient Active Problem List   Diagnosis     Eczema     Anemia     Otitis Media     Chest Pain     Type 2 diabetes mellitus     Essential Hypercholesterolemia     Essential Hypertension     Joint Pain, Localized In The Knee     Frostbite     Obesity     Allergic Rhinitis     Klinefelter's Syndrome     Shortness Of Breath     Varicose vein       Current Outpatient Prescriptions   Medication Sig Dispense Refill      albuterol (PROVENTIL HFA;VENTOLIN HFA) 90 mcg/actuation inhaler Inhale 2 puffs every 6 (six) hours as needed for wheezing. 8.5 g 11     amLODIPine (NORVASC) 5 MG tablet Take 1 tablet (5 mg total) by mouth daily. 90 tablet 1     atenolol (TENORMIN) 50 MG tablet Take 1 tablet (50 mg total) by mouth daily. 90 tablet 3     fluticasone (FLONASE) 50 mcg/actuation nasal spray 1-2 sprays into each nostril daily as needed. PRN       hydroCHLOROthiazide (HYDRODIURIL) 25 MG tablet Take 1 tablet (25 mg total) by mouth daily. 90 tablet 1     olopatadine (PATANOL) 0.1 % ophthalmic solution Instill 1 drop into both eyes twice daily at 6 to 8 hour intervals 5 mL 5     triamcinolone (KENALOG) 0.1 % cream APPLY TO THE AFFECTED 3 TIMES DAILY AS NEEDED 30 g 1     fexofenadine (ALLEGRA) 180 MG tablet Take 180 mg by mouth daily as needed.       spironolactone (ALDACTONE) 25 MG tablet Take 1 tablet (25 mg total) by mouth daily. 90 tablet 1     No current facility-administered medications for this visit.        History   Smoking Status     Former Smoker   Smokeless Tobacco     Never Used           OBJECTIVE:        Recent Results (from the past 240 hour(s))   Comprehensive Metabolic Panel   Result Value Ref Range    Sodium 139 136 - 145 mmol/L    Potassium 4.7 3.5 - 5.0 mmol/L    Chloride 104 98 - 107 mmol/L    CO2 24 22 - 31 mmol/L    Anion Gap, Calculation 11 5 - 18 mmol/L    Glucose 161 (H) 70 - 125 mg/dL    BUN 29 (H) 8 - 22 mg/dL    Creatinine 1.24 0.70 - 1.30 mg/dL    GFR MDRD Af Amer >60 >60 mL/min/1.73m2    GFR MDRD Non Af Amer 58 (L) >60 mL/min/1.73m2    Bilirubin, Total 0.8 0.0 - 1.0 mg/dL    Calcium 10.1 8.5 - 10.5 mg/dL    Protein, Total 8.3 (H) 6.0 - 8.0 g/dL    Albumin 3.6 3.5 - 5.0 g/dL    Alkaline Phosphatase 81 45 - 120 U/L    AST 15 0 - 40 U/L    ALT 22 0 - 45 U/L   Glycosylated Hemoglobin A1c   Result Value Ref Range    Hemoglobin A1c 7.2 (H) 3.5 - 6.0 %   Lipid Cascade   Result Value Ref Range    Cholesterol 214 (H)  <=199 mg/dL    Triglycerides 134 <=149 mg/dL    HDL Cholesterol 50 >=40 mg/dL    LDL Calculated 137 (H) <=129 mg/dL    Patient Fasting > 8hrs? Yes    PSA (Prostatic-Specific Antigen), Annual Screen   Result Value Ref Range    PSA <0.1 0.0 - 4.5 ng/mL   HM1 (CBC with Diff)   Result Value Ref Range    WBC 9.9 4.0 - 11.0 thou/uL    RBC 3.70 (L) 4.40 - 6.20 mill/uL    Hemoglobin 11.5 (L) 14.0 - 18.0 g/dL    Hematocrit 34.4 (L) 40.0 - 54.0 %    MCV 93 80 - 100 fL    MCH 31.2 27.0 - 34.0 pg    MCHC 33.5 32.0 - 36.0 g/dL    RDW 13.3 11.0 - 14.5 %    Platelets 219 140 - 440 thou/uL    MPV 7.0 7.0 - 10.0 fL    Neutrophils % 71 (H) 50 - 70 %    Lymphocytes % 18 (L) 20 - 40 %    Monocytes % 8 2 - 10 %    Eosinophils % 3 0 - 6 %    Basophils % 1 0 - 2 %    Neutrophils Absolute 7.0 2.0 - 7.7 thou/uL    Lymphocytes Absolute 1.8 0.8 - 4.4 thou/uL    Monocytes Absolute 0.8 0.0 - 0.9 thou/uL    Eosinophils Absolute 0.3 0.0 - 0.4 thou/uL    Basophils Absolute 0.1 0.0 - 0.2 thou/uL       Vitals:    10/11/17 1029   BP: 130/80   Patient Site: Right Arm   Patient Position: Sitting   Cuff Size: Adult Large   Pulse: 84   SpO2: 95%   Weight: (!) 256 lb 9.6 oz (116.4 kg)     Weight: 256 lb 9.6 oz (116.4 kg)        Physical Exam:  GENERAL APPEARANCE: A&A, NAD, well hydrated, well nourished  SKIN:  Normal skin turgor, marble sized lesion on the right upper back that is mobile, subcutaneous, the mid back has a similar sized lesion with an open appearance, small mole in the left groin as well   HEENT: moist mucous membranes, no rhinorrhea  NECK: Normal, without LAD  CV: RRR, no M/G/R   LUNGS: CTAB  ABDOMEN: S&NT, no masses   EXTREMITY: no edema   NEURO: no gross deficits

## 2021-06-13 NOTE — PROGRESS NOTES
ASSESSMENT/PLAN:  Cough  67-year-old gentleman with asthma presents with 1 week of cough.  Chest x-ray was unremarkable.  I will give him prednisone as he was very wheezy on my examination today.  I will also give him Tessalon Perles for the cough.  He has an appointment with his primary care provider next week.  I asked him to keep that appointment.  Follow-up sooner if his symptoms do not improve or if they worsen.  He verbalized understanding and agreed with the plan  -     XR Chest PA and Lateral; Future    HTN (hypertension)  Blood pressure is elevated today.  I will asked him to follow-up with his primary care provider.    Diabetes mellitus  Diet control.  He had voiced interest in weight reduction.  I advised him to monitor his blood sugar while taking prednisone.  He verbalized understanding and agreed with the plan    SUBJECTIVE:    Lester Shi is a 67 y.o. male who comes in today cough, wheeze, and shortness of breath    H/o mild intermittent asthma  3 wks of cough and congestion  1.5 wks of SOB and wheezing  Has been using albuterol TID x 1 week without improvement in breathing  Denies fever, chill, chest pain, GI symptoms just received pneumococcal and influenza vaccines 2 wks ago    Has HTN and on meds (atenolol, amlodipine, HCTZ, spironolactone)  BP is elevated today  Denies chest pain, headache    Has diabetes  Lab Results   Component Value Date    HGBA1C 7.2 (H) 10/11/2017   He is not taking any medication at this time.  His daughter is a nutritionalist and he stated that he will consult with her about weight reduction      Review of Systems (except those mentioned above)  Constitutional: Negative.   HENT: Negative.   Eyes: Negative.   Respiratory: Negative.   Cardiovascular: Negative.   Gastrointestinal: Negative.   Endocrine: Negative.   Genitourinary: Negative.   Musculoskeletal: Negative.   Skin: Negative.   Allergic/Immunologic: Negative.   Neurological: Negative.   Hematological: Negative.    Psychiatric/Behavioral: Negative.     Patient Active Problem List    Diagnosis Date Noted     Varicose vein 09/28/2015     Shortness Of Breath      Eczema      Anemia      Otitis Media      Chest Pain      Type 2 diabetes mellitus      Essential Hypercholesterolemia      Essential Hypertension      Joint Pain, Localized In The Knee      Frostbite      Obesity      Allergic Rhinitis      Klinefelter's Syndrome      Allergies   Allergen Reactions     Amoxicillin Anaphylaxis     Codeine Nausea And Vomiting     Erythromycin Base      Simvastatin      Sulfamethoxazole-Trimethoprim Nausea And Vomiting     Tachycardia       Current Outpatient Prescriptions   Medication Sig Dispense Refill     albuterol (PROVENTIL HFA;VENTOLIN HFA) 90 mcg/actuation inhaler Inhale 2 puffs every 6 (six) hours as needed for wheezing. 8.5 g 11     amLODIPine (NORVASC) 5 MG tablet Take 1 tablet (5 mg total) by mouth daily. 90 tablet 1     atenolol (TENORMIN) 50 MG tablet Take 1 tablet (50 mg total) by mouth daily. 90 tablet 3     fexofenadine (ALLEGRA) 180 MG tablet Take 180 mg by mouth daily as needed.       fluticasone (FLONASE) 50 mcg/actuation nasal spray 1-2 sprays into each nostril daily as needed. PRN       hydroCHLOROthiazide (HYDRODIURIL) 25 MG tablet Take 1 tablet (25 mg total) by mouth daily. 90 tablet 1     olopatadine (PATANOL) 0.1 % ophthalmic solution Instill 1 drop into both eyes twice daily at 6 to 8 hour intervals 5 mL 5     spironolactone (ALDACTONE) 25 MG tablet Take 1 tablet (25 mg total) by mouth daily. 90 tablet 1     triamcinolone (KENALOG) 0.1 % cream APPLY TO THE AFFECTED 3 TIMES DAILY AS NEEDED 30 g 1     benzonatate (TESSALON PERLES) 100 MG capsule Take 1 capsule (100 mg total) by mouth every 6 (six) hours as needed for cough. 30 capsule 0     predniSONE (DELTASONE) 20 MG tablet 60mg x 3 d, 40mg x 3d, 20mg x 3d 18 tablet 0     No current facility-administered medications for this visit.      Past Medical History:    Diagnosis Date     Anemia     Created by Conversion      Eczema     Created by Conversion      Essential Hypercholesterolemia     Created by Conversion      Essential Hypertension     Created by Conversion      Klinefelter's syndrome     Created by Conversion      Obesity     Created by Conversion      Type 2 Diabetes Mellitus     Created by Conversion      Varicose vein 9/28/2015     No past surgical history on file.  Social History     Social History     Marital status:      Spouse name: N/A     Number of children: N/A     Years of education: N/A     Social History Main Topics     Smoking status: Former Smoker     Smokeless tobacco: Never Used     Alcohol use None     Drug use: None     Sexual activity: Not Asked     Other Topics Concern     None     Social History Narrative     No family history on file.      OBJECTIVE:    Vitals:    10/27/17 1710 10/27/17 1730   BP: (!) 172/102 (!) 146/96   Pulse: 96    Resp: (!) 34    Temp: 98.1  F (36.7  C)    TempSrc: Other    SpO2: 92%    Weight: (!) 255 lb (115.7 kg)      Body mass index is 34.58 kg/(m^2).    Physical Exam:  Constitutional: Patient is oriented to person, place, and time. Patient appears well-developed and well-nourished. No distress.   Head: Normocephalic and atraumatic.   Right Ear: External ear normal.   Left Ear: External ear normal.   Nose: Nose normal.   Mouth/Throat: Oropharynx is clear and moist. No oropharyngeal exudate.   Eyes: Conjunctivae and EOM are normal. Pupils are equal, round, and reactive to light. Right eye exhibits no discharge. Left eye exhibits no discharge. No scleral icterus.   Neck: Neck supple. No JVD present. No tracheal deviation present. No thyromegaly present.   Lymphadenopathy:  Patient has no cervical adenopathy.   Cardiovascular: Normal rate, regular rhythm, normal heart sounds and intact distal pulses. No murmur heard.   Pulmonary/Chest: Effort normal and breath sounds normal. No stridor. No respiratory distress.  Patient has expiratory wheezes throughout lung fields but no rales, exhibits no tenderness.   Skin: Skin is warm and dry. No rash noted. Patient is not diaphoretic. No erythema. No pallor.       Results for orders placed or performed in visit on 10/11/17   Comprehensive Metabolic Panel   Result Value Ref Range    Sodium 139 136 - 145 mmol/L    Potassium 4.7 3.5 - 5.0 mmol/L    Chloride 104 98 - 107 mmol/L    CO2 24 22 - 31 mmol/L    Anion Gap, Calculation 11 5 - 18 mmol/L    Glucose 161 (H) 70 - 125 mg/dL    BUN 29 (H) 8 - 22 mg/dL    Creatinine 1.24 0.70 - 1.30 mg/dL    GFR MDRD Af Amer >60 >60 mL/min/1.73m2    GFR MDRD Non Af Amer 58 (L) >60 mL/min/1.73m2    Bilirubin, Total 0.8 0.0 - 1.0 mg/dL    Calcium 10.1 8.5 - 10.5 mg/dL    Protein, Total 8.3 (H) 6.0 - 8.0 g/dL    Albumin 3.6 3.5 - 5.0 g/dL    Alkaline Phosphatase 81 45 - 120 U/L    AST 15 0 - 40 U/L    ALT 22 0 - 45 U/L   Glycosylated Hemoglobin A1c   Result Value Ref Range    Hemoglobin A1c 7.2 (H) 3.5 - 6.0 %   Lipid Cascade   Result Value Ref Range    Cholesterol 214 (H) <=199 mg/dL    Triglycerides 134 <=149 mg/dL    HDL Cholesterol 50 >=40 mg/dL    LDL Calculated 137 (H) <=129 mg/dL    Patient Fasting > 8hrs? Yes    PSA (Prostatic-Specific Antigen), Annual Screen   Result Value Ref Range    PSA <0.1 0.0 - 4.5 ng/mL   HM1 (CBC with Diff)   Result Value Ref Range    WBC 9.9 4.0 - 11.0 thou/uL    RBC 3.70 (L) 4.40 - 6.20 mill/uL    Hemoglobin 11.5 (L) 14.0 - 18.0 g/dL    Hematocrit 34.4 (L) 40.0 - 54.0 %    MCV 93 80 - 100 fL    MCH 31.2 27.0 - 34.0 pg    MCHC 33.5 32.0 - 36.0 g/dL    RDW 13.3 11.0 - 14.5 %    Platelets 219 140 - 440 thou/uL    MPV 7.0 7.0 - 10.0 fL    Neutrophils % 71 (H) 50 - 70 %    Lymphocytes % 18 (L) 20 - 40 %    Monocytes % 8 2 - 10 %    Eosinophils % 3 0 - 6 %    Basophils % 1 0 - 2 %    Neutrophils Absolute 7.0 2.0 - 7.7 thou/uL    Lymphocytes Absolute 1.8 0.8 - 4.4 thou/uL    Monocytes Absolute 0.8 0.0 - 0.9 thou/uL     Eosinophils Absolute 0.3 0.0 - 0.4 thou/uL    Basophils Absolute 0.1 0.0 - 0.2 thou/uL

## 2021-06-13 NOTE — PROGRESS NOTES
1. Cough     2. Excessive ear wax, right  Ear Cerumen Removal-Clinic   3. Obesity       Med list reconciled     ASSESSMENT/PLAN:     The following high BMI interventions were performed this visit: encouragement to exercise and weight monitoring    1. Cough    -Improved with prednisone taper and Tessalon Perles from walk-in clinic    2. Excessive ear wax, right    - Ear Cerumen Removal-Clinic    3. Obesity    -Discussed, encouragement to exercise    Return to clinic if cough becomes persistent upon completion of prednisone taper.  Patient is feeling much better since beginning care with therapy.  No antibiotic therapy indicated today based on physical exam findings.    The visit lasted a total of 25 minutes face to face with the patient.  Over 50% of the time spent counseling and educating the patient about the above content.      Nohemi Lezama NP          SUBJECTIVE:  Lester Shi is a 67 y.o. male presents for follow-up from his walk-in clinic visit for his cough.  Patient received a prednisone taper with Tessalon Perles for his persistent cough.  Chest x-ray in the walk-in clinic was unremarkable.  He tells me he is feeling much better but is struggling with some thick mucus production that he is unable to cough up.  I did recommend that he try the over-the-counter Mucinex D for this.  Patient is not able to take Sudafed 1 secretions are thin due to his hypertension.  He is breathing much better and is using his bronchodilator 1-2 times per day.  He has been feeling some intermittent sharp pains in both ears for the last couple of days.  He is rating his pain a 0 out of 10 currently.  Patient quit smoking in 1979.  He tells me he has asthma, however, he denies ever having spirometry performed to officially diagnose him.  Initial blood pressure today is elevated, blood pressure recheck 10 minutes later normotensive.  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  Recently received his influenza and pneumococcal vaccinations.  Chief Complaint   Patient presents with     Follow-up     [facility] follow up - breathing issue         Patient Active Problem List   Diagnosis     Eczema     Anemia     Otitis Media     Chest Pain     Type 2 diabetes mellitus     Essential Hypercholesterolemia     Essential Hypertension     Joint Pain, Localized In The Knee     Frostbite     Obesity     Allergic Rhinitis     Klinefelter's Syndrome     Shortness Of Breath     Varicose vein       Current Outpatient Prescriptions   Medication Sig Dispense Refill     albuterol (PROVENTIL HFA;VENTOLIN HFA) 90 mcg/actuation inhaler Inhale 2 puffs every 6 (six) hours as needed for wheezing. 8.5 g 11     amLODIPine (NORVASC) 5 MG tablet Take 1 tablet (5 mg total) by mouth daily. 90 tablet 1     atenolol (TENORMIN) 50 MG tablet Take 1 tablet (50 mg total) by mouth daily. 90 tablet 3     benzonatate (TESSALON PERLES) 100 MG capsule Take 1 capsule (100 mg total) by mouth every 6 (six) hours as needed for cough. 30 capsule 0     fexofenadine (ALLEGRA) 180 MG tablet Take 180 mg by mouth daily as needed.       fluticasone (FLONASE) 50 mcg/actuation nasal spray 1-2 sprays into each nostril daily as needed. PRN       hydroCHLOROthiazide (HYDRODIURIL) 25 MG tablet Take 1 tablet (25 mg total) by mouth daily. 90 tablet 1     olopatadine (PATANOL) 0.1 % ophthalmic solution Instill 1 drop into both eyes twice daily at 6 to 8 hour intervals 5 mL 5     predniSONE (DELTASONE) 20 MG tablet 60mg x 3 d, 40mg x 3d, 20mg x 3d 18 tablet 0     spironolactone (ALDACTONE) 25 MG tablet Take 1 tablet (25 mg total) by mouth daily. 90 tablet 1     triamcinolone (KENALOG) 0.1 % cream APPLY TO THE AFFECTED 3 TIMES DAILY AS NEEDED 30 g 1     No current facility-administered medications for this visit.        History   Smoking Status     Former Smoker     Quit date: 1979   Smokeless Tobacco      Never Used       REVIEW OF SYSTEMS: Denies fevers, chills, body aches, nausea, vomiting, diarrhea, shortness of breath or chest pain    TOBACCO USE:  History   Smoking Status     Former Smoker     Quit date: 1979   Smokeless Tobacco     Never Used     Social History     Social History     Marital status:      Spouse name: N/A     Number of children: N/A     Years of education: N/A     Occupational History     Not on file.     Social History Main Topics     Smoking status: Former Smoker     Quit date: 1979     Smokeless tobacco: Never Used     Alcohol use Yes      Comment: rare     Drug use: Not on file     Sexual activity: Not on file     Other Topics Concern     Not on file     Social History Narrative         OBJECTIVE:            Vitals:    11/01/17 1239   BP: 138/70   Pulse:    Resp:    Temp:    SpO2:      Weight: 255 lb (115.7 kg)  Wt Readings from Last 3 Encounters:   11/01/17 (!) 255 lb (115.7 kg)   10/27/17 (!) 255 lb (115.7 kg)   10/11/17 (!) 256 lb 9.6 oz (116.4 kg)     Body mass index is 34.58 kg/(m^2).        Physical Exam:  GENERAL APPEARANCE: A&A, NAD, well hydrated, well nourished  SKIN:  Normal skin turgor, no lesions/rashes   EARS: TM's normal, gray with nl light reflex, ear wash performed on the right ear.  Large amount of cerumen removed.  No erythema or bulging post removal  OROPHARYNX: without erythema, no post nasal drainage or thrush, mild mucosal thickening bilateral nares  NECK: Supple, without lymphadenopathy, no thyroid mass, no JVD, no bruit  CV: RRR, no M/G/R   LUNGS: CTAB, normal respiratory effort  ABDOMEN: S&NT, no masses, no organomegaly, BS present x4

## 2021-06-14 NOTE — PROGRESS NOTES
PROGRESS NOTE       SUBJECTIVE:  Lester Shi is a 67 y.o. male   Chief Complaint   Patient presents with     Follow-up     productive cough, is feeling better still has some wheezing     Patient is here today to follow-up pneumonia.  He was sick for about 2 months.  He wonders if it was started by allergies.  He began taking Flonase and cold medications.  Then he noted wheezing and went to walk-in clinic where prednisone helped him significantly.  He remained feeling me miserable, however and was using Mucinex to try to get the secretions up.  He then followed up at walk-in clinic again and had another course of prednisone.  He has been working his secretions up and is finally feeling better.  His wheezes are better and he was not treated with antibiotic.  He wonders about his ears they feel a little plugged.  This patient used to smoke but quit in 1979.    I reviewed 3 chest x-rays done within the last year.  They are all normal.    Patient Active Problem List   Diagnosis     Eczema     Anemia     Otitis Media     Chest Pain     Type 2 diabetes mellitus     Essential Hypercholesterolemia     Essential Hypertension     Joint Pain, Localized In The Knee     Frostbite     Obesity     Allergic Rhinitis     Klinefelter's Syndrome     Shortness Of Breath     Varicose vein       Current Outpatient Prescriptions   Medication Sig Dispense Refill     albuterol (PROVENTIL HFA;VENTOLIN HFA) 90 mcg/actuation inhaler Inhale 2 puffs every 6 (six) hours as needed for wheezing. 8.5 g 11     amLODIPine (NORVASC) 5 MG tablet Take 1 tablet (5 mg total) by mouth daily. 90 tablet 1     atenolol (TENORMIN) 50 MG tablet Take 1 tablet (50 mg total) by mouth daily. 90 tablet 3     fexofenadine (ALLEGRA) 180 MG tablet Take 180 mg by mouth daily as needed.       fluticasone (FLONASE) 50 mcg/actuation nasal spray 1-2 sprays into each nostril daily as needed. PRN       hydroCHLOROthiazide (HYDRODIURIL) 25 MG tablet Take 1 tablet (25 mg  total) by mouth daily. 90 tablet 1     olopatadine (PATANOL) 0.1 % ophthalmic solution Instill 1 drop into both eyes twice daily at 6 to 8 hour intervals 5 mL 5     predniSONE (DELTASONE) 20 MG tablet Take 60 mg once daily x 3 days, then 40 mg once daily x 3 days, then 20 mg once daily x 3 days. 18 tablet 0     spironolactone (ALDACTONE) 25 MG tablet Take 1 tablet (25 mg total) by mouth daily. 90 tablet 1     triamcinolone (KENALOG) 0.1 % cream APPLY TO THE AFFECTED 3 TIMES DAILY AS NEEDED 30 g 1     benzonatate (TESSALON PERLES) 100 MG capsule Take 1 capsule (100 mg total) by mouth every 6 (six) hours as needed for cough. 30 capsule 0     fluticasone (FLOVENT HFA) 220 mcg/actuation inhaler Inhale 1 puff 2 (two) times a day. 1 Inhaler 3     predniSONE (DELTASONE) 20 MG tablet 60mg x 3 d, 40mg x 3d, 20mg x 3d 18 tablet 0     No current facility-administered medications for this visit.        History   Smoking Status     Former Smoker     Quit date: 1979   Smokeless Tobacco     Never Used       REVIEW OF SYSTEMS:  Patient denies fever, chills, dizziness, headache, visual change,  chest pain, abdominal pain, extremity pain or swelling.    Positives:   cough which is sometimes productive and shortness of breath with cough        OBJECTIVE:       Vitals:    11/22/17 1137   BP: 142/80   Pulse: 74   Resp: 18   Temp: 98  F (36.7  C)   SpO2: 96%     Weight: 255 lb (115.7 kg)  Wt Readings from Last 3 Encounters:   11/22/17 (!) 255 lb (115.7 kg)   11/19/17 (!) 255 lb (115.7 kg)   11/01/17 (!) 255 lb (115.7 kg)     Body mass index is 46.64 kg/(m^2).        Physical Exam:  GENERAL APPEARANCE: A&A, NAD, well hydrated, well nourished  SKIN:  Normal skin turgor, no lesions/rashes   EARS: TM's normal, gray with nl light reflex  OROPHARYNX: without erythema, no post nasal drainage or thrush  NECK: Supple, without lymphadenopathy, no thyroid mass  CV: RRR, no M/G/R   LUNGS: Normal effort, no overt wheezes but sounds are tubular  throughout his chest.  EXTREMITY: Extremities normal, atraumatic, no swelling  NEURO: no gross deficits   PSYCHIATRIC:  Mood appropriate, memory intact        ASSESSMENT/PLAN:     1. Asthma  I explained to the patient that we use the term asthma quite loosely and basically equated with wheezes.  His course was initiated either by allergies and or a viral infection which took on a prolonged course.  Wheezes are caused by spasm of the bronchioles, secretions, and swelling.  We use steroid to treat the swelling just like we use fluticasone nasal spray for his nose.  Secretions are little more challenging but Mucinex works about the best.  Spasm is treated by the albuterol.    I reviewed inhaler technique with Lester and he learned a lot from this.  He had been using his inhaler incorrectly.  Hopefully this will help him improve more quickly.  I recommended that he use the albuterol 2 puffs twice a day weight 1015 minutes and use the Flovent 2 puffs twice a day.  Again, technique was reviewed.  He will rinse his mouth out after using the steroid inhaler.  He may use the albuterol in between these 2 administrations if needed, never exceeding 4 treatments in 24 hours.  - fluticasone (FLOVENT HFA) 220 mcg/actuation inhaler; Inhale 1 puff 2 (two) times a day.  Dispense: 1 Inhaler; Refill: 3    There are no Patient Instructions on file for this visit.  Medications Discontinued During This Encounter   Medication Reason     predniSONE (DELTASONE) 20 MG tablet Therapy completed     predniSONE (DELTASONE) 20 MG tablet Therapy completed     Return in about 4 weeks (around 12/20/2017) for Recheck asthma.  We will want to continue the Flovent inhaler until his symptoms are completely resolved.    The visit lasted a total of 30 minutes face to face with the patient.  Over 50% of the time spent counseling and educating the patient about all of the above.      Kenia Sorto MD

## 2021-06-14 NOTE — PROGRESS NOTES
Chief Complaint   Patient presents with     poss pneumonia     cough- 2x months. Pt is still not getting better after mediction. SOB       HPI    Patient is here for 2 months of a nonproductive cough, partially improved with Prednisone, but worsened afterward. No fever, chest pain. He admitted to having intermittent wheezing, and shortness of breath.     ROS: Pertinent ROS noted in HPI.     Allergies   Allergen Reactions     Amoxicillin Anaphylaxis     Codeine Nausea And Vomiting     Erythromycin Base      Simvastatin      Sulfamethoxazole-Trimethoprim Nausea And Vomiting     Tachycardia         Patient Active Problem List   Diagnosis     Eczema     Anemia     Otitis Media     Chest Pain     Type 2 diabetes mellitus     Essential Hypercholesterolemia     Essential Hypertension     Joint Pain, Localized In The Knee     Frostbite     Obesity     Allergic Rhinitis     Klinefelter's Syndrome     Shortness Of Breath     Varicose vein       No family history on file.    Social History     Social History     Marital status:      Spouse name: N/A     Number of children: N/A     Years of education: N/A     Occupational History     Not on file.     Social History Main Topics     Smoking status: Former Smoker     Quit date: 1979     Smokeless tobacco: Never Used     Alcohol use Yes      Comment: rare     Drug use: Not on file     Sexual activity: Not on file     Other Topics Concern     Not on file     Social History Narrative       Objective:    Vitals:    11/19/17 0820   BP: 138/80   Pulse: 84   Resp: 18   Temp: 97.4  F (36.3  C)   SpO2: 95%       Gen:NAD  Oropharynx: normal throat, oral mucosa  Ears: normal Tms and canals  CV:RRR, no M, R, G  Pulm: Expiratory wheezes noted at left lung. Normal effort. No crackles.     XR - no focal infiltrates per my interpretation, discussed during visit.        Cough  -     XR Chest PA and Lateral  -     predniSONE (DELTASONE) 20 MG tablet; Take 60 mg once daily x 3 days, then 40  mg once daily x 3 days, then 20 mg once daily x 3 days.      F/u with PCP within a week.

## 2021-06-14 NOTE — PROGRESS NOTES
ASSESSMENT/PLAN:       1. Non-recurrent acute serous otitis media of right ear  Note when the patient tried to do auto inflation of his middle ear space he could not get air up into the right middle ear space with good feel of clearing or popping in the left side.  Also doing that was slightly uncomfortable.  I suggested gentle auto inflation through the day and the use of fluticasone nasal spray 2 sprays each side of the nose daily.    2. TMJ (temporomandibular joint syndrome)  I talked to the patient about jaw arrest  Consider visiting with his dentist about the alignment of his teeth and the possible evidence for grinding of his teeth.  Talked about referral to ENT or the [place] [facility] and for now he wants to wait on any referrals.  He can use acetaminophen for pain.  Moist heat and gentle massage to the jaw muscles can be helpful.    3.  Hypertension  Stressed to the patient the importance of getting back on his medications regularly.  Blood pressure is markedly elevated today and shared with him my concerns for that.    For billing purposes total time spent in the care for this patient during this episode was 22 minutes.  I spent 19 minutes with the patient face-to-face in the exam room and 3 minutes reviewing the patient's medical record in regard to the need for ongoing diabetic care, preventative health services, and previous history for ENT problems.    Future labs will need to include hemoglobin A1c, lipids and CBC at a minimum.    Nikita Mercado MD      PROGRESS NOTE   1/23/2021    SUBJECTIVE:  Lester Shi is a 70 y.o. male  who presents for   Chief Complaint   Patient presents with     Ear Fullness     RT ear issue - ongoing, feels like something trying to get out, pain and itches     The patient is seen today because of discomfort in his right ear and a feeling of pressure.  He also has pain in front of his right ear.  There are some itching in the ear.  November 24, 2020 the  patient had a cerumen impaction that was flushed out and he is concerned it may be that wax has returned.  At that visit it was diagnosed that his ear pain was related to TMJ pain.  We discussed that diagnosis and treatment options.  He does not necessarily connect he is here or TMJ pain with chewing.    Note that as of late the patient has been forgetting to take his medications and today when seen later in the day had just taken his blood pressure medicine before coming to the doctor's office.  That included his amlodipine 10 mg  Atenolol 50 mg  Hydrochlorothiazide 25 mg  Losartan 25 mg    Patient Active Problem List   Diagnosis     Eczema     Anemia     Otitis Media     Insulin dependent diabetes mellitus     Essential Hypercholesterolemia     Essential hypertension, benign     Joint Pain, Localized In The Knee     Obesity     Allergic Rhinitis     Klinefelter's Syndrome     Varicose vein     Mild persistent asthma with acute exacerbation     Pneumonia of both lungs due to infectious organism     Hyperglycemia     Acute kidney injury (H)     Encounter for examination following treatment at hospital     Dizziness     Sepsis (H)     Abdominal pain, left upper quadrant     Mild persistent asthma with exacerbation     Spinal stenosis of lumbar region with neurogenic claudication     Squamous cell carcinoma of skin of face     Status post total left knee replacement       Current Outpatient Medications   Medication Sig Dispense Refill     ACCU-CHEK FASTCLIX LANCET DRUM U TO TEST TID  0     albuterol (PROAIR HFA;PROVENTIL HFA;VENTOLIN HFA) 90 mcg/actuation inhaler Inhale 2 puffs every 6 (six) hours as needed for wheezing. 8.5 g 5     albuterol (PROVENTIL) 2.5 mg /3 mL (0.083 %) nebulizer solution NEBULIZE 1 VIAL EVERY 4 HOURS AS NEEDED FOR WHEEZING 180 mL 0     amLODIPine (NORVASC) 10 MG tablet TAKE 1 TABLET(10 MG) BY MOUTH DAILY 90 tablet 3     ascorbic acid, vitamin C, (ASCORBIC ACID WITH DOROTHY HIPS) 500 MG tablet  Take 500 mg by mouth daily.       atenoloL (TENORMIN) 50 MG tablet TAKE 1 TABLET(50 MG) BY MOUTH DAILY 90 tablet 3     blood glucose test strips Use 1 each As Directed 2 (two) times a day. Dispense brand per patient's insurance at pharmacy discretion. 200 strip 3     fexofenadine (ALLEGRA) 180 MG tablet Take 180 mg by mouth daily.       fluticasone (FLONASE) 50 mcg/actuation nasal spray Apply 1 spray into each nostril daily as needed for rhinitis.       fluticasone propionate (FLOVENT HFA) 110 mcg/actuation inhaler Inhale 2 puffs 2 (two) times a day as needed.       hydroCHLOROthiazide (HYDRODIURIL) 25 MG tablet TAKE 1 TABLET(25 MG) BY MOUTH DAILY 90 tablet 3     losartan (COZAAR) 25 MG tablet TAKE 1 TABLET(25 MG) BY MOUTH DAILY 90 tablet 3     metFORMIN (GLUCOPHAGE-XR) 500 MG 24 hr tablet Take 2 tablets (1,000 mg total) by mouth 2 (two) times a day. 360 tablet 2     nebulizers Misc Use four times a day for 5 day then as  needed 1 each 0     triamcinolone (KENALOG) 0.1 % cream APPLY EXTERNALLY TO THE AFFECTED AREA TWICE DAILY FOR 14 DAYS 45 g 0     No current facility-administered medications for this visit.        Social History     Tobacco Use   Smoking Status Former Smoker     Packs/day: 2.00     Years: 12.00     Pack years: 24.00     Quit date: 1979     Years since quittin.0   Smokeless Tobacco Never Used           OBJECTIVE:        No results found for this or any previous visit (from the past 240 hour(s)).    Vitals:    21 1738 21 1829   BP: (!) 200/118 (!) 175/104   Patient Site:  Left Arm   Patient Position:  Sitting   Cuff Size:  Adult Large   Pulse: (!) 106    Resp: 19    SpO2: 96%    Weight: (!) 246 lb 5 oz (111.7 kg)    Height: 6' (1.829 m)      Weight: (!) 246 lb 5 oz (111.7 kg)          Physical Exam:  GENERAL APPEARANCE: 70-year-old male, NAD, well hydrated, well nourished  SKIN:  Normal skin turgor, no lesions/rashes   HEENT: moist mucous membranes, no rhinorrhea, both ear canals  and tympanic membranes were visualized and on the right side there is some serous fluid and distortion of the light reflex but no erythema and the left tympanic membrane is normal  The patient does continue to have discomfort with palpation over the right temporomandibular joint more so when opening his mouth and closing it.  Question if there may be some malalignment of his teeth as he closes his jaw.  NECK: Normal without adenopathy or masses  EXTREMITY: no edema and full ROM of all joints  NEURO: no focal findings

## 2021-06-14 NOTE — TELEPHONE ENCOUNTER
RN cannot approve Refill Request    RN can NOT refill this medication med is not covered by policy/route to provider. Last office visit: 11/18/2019 Nohemi Lezama NP Last Physical: Visit date not found Last MTM visit: Visit date not found Last visit same specialty: 1/20/2021 Nikita Mercado MD.  Next visit within 3 mo: Visit date not found  Next physical within 3 mo: Visit date not found      Laendra Avalos, [facility] Triage/Med Refill 1/22/2021    Requested Prescriptions   Pending Prescriptions Disp Refills     triamcinolone (KENALOG) 0.1 % cream [Pharmacy Med Name: TRIAMCINOLONE 0.1% CREAM 15GM] 45 g 0     Sig: APPLY EXTERNALLY TO THE AFFECTED AREA TWICE DAILY FOR 14 DAYS       There is no refill protocol information for this order

## 2021-06-15 NOTE — TELEPHONE ENCOUNTER
RN cannot approve Refill Request    RN can NOT refill this medication med is not covered by policy/route to provider. Last office visit: 11/18/2019 Nohemi Lezama NP Last Physical: Visit date not found Last MTM visit: Visit date not found Last visit same specialty: 1/20/2021 Nikita Mercado MD.  Next visit within 3 mo: Visit date not found  Next physical within 3 mo: Visit date not found      Kenia Lo, [facility] Triage/Med Refill 2/6/2021    Requested Prescriptions   Pending Prescriptions Disp Refills     VENTOLIN HFA 90 mcg/actuation inhaler [Pharmacy Med Name: VENTOLIN HFA INH W/DOS CTR 200PUFFS] 18 g 0     Sig: INHALE 2 PUFFS BY MOUTH EVERY 6 HOURS AS NEEDED FOR WHEEZING.       Albuterol/Levalbuterol Refill Protocol Passed - 2/6/2021 10:49 PM        Passed - PCP or prescribing provider visit in last year     Last office visit with prescriber/PCP: 11/18/2019 Nohemi Lezama NP OR same dept: 1/20/2021 Nikita Mercado MD OR same specialty: 1/20/2021 Nikita Mercado MD Last physical: Visit date not found       Next appt within 3 mo: Visit date not found  Next physical within 3 mo: Visit date not found  Prescriber OR PCP: Nohemi Lezama NP  Last diagnosis associated with med order: 1. Cough  - VENTOLIN HFA 90 mcg/actuation inhaler [Pharmacy Med Name: VENTOLIN HFA INH W/DOS CTR 200PUFFS]; INHALE 2 PUFFS BY MOUTH EVERY 6 HOURS AS NEEDED FOR WHEEZING.  Dispense: 18 g; Refill: 0    If protocol passes may refill for 6 months if within 3 months of last provider visit (or a total of 9 months). If patient requesting >1 inhaler per month refill x 6 months and have patient make appointment with provider.             FLOVENT  mcg/actuation inhaler [Pharmacy Med Name: FLOVENT  MCG ORAL INH 120INH] 1 Inhaler 0     Sig: INHALE 2 PUFFS BY MOUTH TWICE DAILY       There is no refill protocol information for this order

## 2021-06-15 NOTE — TELEPHONE ENCOUNTER
Refill Approved    Rx renewed per Medication Renewal Policy. Medication was last renewed on 2/7/2021 with 1 refill. Patient is requesting a 90 day supply.  Last office visit: 1/20/2021 with PCP Dr DAVID Mercado.     Prisca Rothman, [facility] Triage/Med Refill 2/7/2021     Requested Prescriptions   Pending Prescriptions Disp Refills     albuterol (PROAIR HFA;PROVENTIL HFA;VENTOLIN HFA) 90 mcg/actuation inhaler [Pharmacy Med Name: ALBUTEROL HFA INH(200 PUFFS)18GM] 54 g 0     Sig: INHALE 2 PUFFS BY MOUTH EVERY 6 HOURS AS NEEDED FOR WHEEZING       Albuterol/Levalbuterol Refill Protocol Passed - 2/7/2021  4:15 PM        Passed - PCP or prescribing provider visit in last year     Last office visit with prescriber/PCP: 1/20/2021 Nikita Mercado MD OR same dept: 1/20/2021 Nikita Mercado MD OR same specialty: 1/20/2021 Nikita Mercado MD Last physical: 7/20/2020       Next appt within 3 mo: Visit date not found  Next physical within 3 mo: Visit date not found  Prescriber OR PCP: Nikita Mercado MD  Last diagnosis associated with med order: 1. Cough  - albuterol (PROAIR HFA;PROVENTIL HFA;VENTOLIN HFA) 90 mcg/actuation inhaler [Pharmacy Med Name: ALBUTEROL HFA INH(200 PUFFS)18GM]; INHALE 2 PUFFS BY MOUTH EVERY 6 HOURS AS NEEDED FOR WHEEZING  Dispense: 54 g; Refill: 0    If protocol passes may refill for 6 months if within 3 months of last provider visit (or a total of 9 months). If patient requesting >1 inhaler per month refill x 6 months and have patient make appointment with provider.

## 2021-06-16 PROBLEM — J45.31 MILD PERSISTENT ASTHMA WITH EXACERBATION: Status: ACTIVE | Noted: 2020-03-27

## 2021-06-16 PROBLEM — A41.9 SEPSIS (H): Chronic | Status: ACTIVE | Noted: 2019-10-29

## 2021-06-16 PROBLEM — M48.062 SPINAL STENOSIS OF LUMBAR REGION WITH NEUROGENIC CLAUDICATION: Status: ACTIVE | Noted: 2020-03-27

## 2021-06-16 PROBLEM — Z96.652 STATUS POST TOTAL LEFT KNEE REPLACEMENT: Status: ACTIVE | Noted: 2020-07-20

## 2021-06-16 PROBLEM — Z09 ENCOUNTER FOR EXAMINATION FOLLOWING TREATMENT AT HOSPITAL: Status: ACTIVE | Noted: 2019-02-18

## 2021-06-16 PROBLEM — R91.8 PULMONARY NODULES: Status: ACTIVE | Noted: 2021-05-18

## 2021-06-16 PROBLEM — C44.320 SQUAMOUS CELL CARCINOMA OF SKIN OF FACE: Status: ACTIVE | Noted: 2020-07-20

## 2021-06-16 PROBLEM — R10.12 ABDOMINAL PAIN, LEFT UPPER QUADRANT: Status: ACTIVE | Noted: 2019-10-29

## 2021-06-16 PROBLEM — J18.9 PNEUMONIA OF BOTH LUNGS DUE TO INFECTIOUS ORGANISM: Status: ACTIVE | Noted: 2019-02-09

## 2021-06-16 PROBLEM — R42 DIZZINESS: Status: ACTIVE | Noted: 2019-03-29

## 2021-06-16 PROBLEM — J45.31 MILD PERSISTENT ASTHMA WITH ACUTE EXACERBATION: Status: ACTIVE | Noted: 2018-12-04

## 2021-06-16 NOTE — PATIENT INSTRUCTIONS - HE
Go to emergency room now for further evaluation of shortness of breath, history of asthma and allergies. Using albuterol inhaler every 2 hours, and using albuterol nebulizer 2-3 times a day. Taking Zyrtec, flovent, flonase.

## 2021-06-16 NOTE — TELEPHONE ENCOUNTER
Telephone Encounter by Monica Kate at 3/21/2019  3:33 PM     Author: Monica Kate Service: -- Author Type: --    Filed: 3/21/2019  3:35 PM Encounter Date: 3/18/2019 Status: Signed    : Monica Kate       Per insurance, Patient cannot filled medication until 4/1/19    [place] APPROVED:    Approval start date: 4/1/19  Approval end date: 4/1/20    Pharmacy has been notified of approval and will contact patient when medication is ready for pickup.

## 2021-06-16 NOTE — TELEPHONE ENCOUNTER
"Patient with history of asthma, diabetes and pneumonia. He presented as walk in patient today. No openings available in clinic today due to low staff so recommended follow up at walk in clinic or ER for evaluation to rule out hypoxia, pneumonia, COVID, asthma flare etc. Patient was reporting feeling short of breath and has been using his nebulizer therapy more often than normal. Explained to  staff multiple times that due to no openings in clinic, that I could not accommodate his visit request since he did not have an appointment scheduled today. Patient ultimately decided to go to the walk in clinic, ER is \"too expensive\" per patient.   "

## 2021-06-16 NOTE — PROGRESS NOTES
SUBJECTIVE:   Lester Shi is a 71 y.o. male presenting with a chief complaint of   Chief Complaint   Patient presents with     has allergy to trees now allergies are starting     using meds and feels S.O.B.  Had 2nd shot of covid 3/26/21. No fevers     Patient presents for evaluation of shortness of breath for the past 1.5 weeks that has been stable. Shotness of breath is usually mild, but  He was advised to go to emergency room by PCP and declined. He has a history of allergies to trees and believes that is the cause of his symptoms. His allergies usually present this time of year annually. He has been having a wet cough that he is not able to cough up the mucous, fatigue, nasal congestion, wheezing. He has a history of pneumonia.      Usually his inhalers and nebs help. Nebs improve symptoms temporarily. He wheezes at night. He has been trying a similar medication to Mucinex which has been helping. He has been taking zyrtec for a couple weeks. He has been suing his Flovent inhaler and flonase nasal spray. He is using the albuterol inhaler every 2 hours which helps temporarily. He using albuterol nebs 2-3 times a day.    Denies fever, chills, watery eyes, headache, chest pain, dizziness, weakness, sweating. Had COVID vaccines, last a month ago. No history of pulmonary embolism, DVT. He has a history of asthma, diabetes, HTN. Former tobacco use.     OBJECTIVE  /84 (Patient Site: Left Arm, Patient Position: Sitting, Cuff Size: Adult Large)   Pulse 84   Temp 98.2  F (36.8  C) (Oral)   Resp 23   Wt (!) 245 lb 11.2 oz (111.4 kg)   SpO2 94%   BMI 33.32 kg/m      Physical Exam  Constitutional:       General: He is not in acute distress.     Appearance: He is not toxic-appearing or diaphoretic.   HENT:      Nose: Congestion and rhinorrhea present.      Right Sinus: No maxillary sinus tenderness or frontal sinus tenderness.      Left Sinus: No maxillary sinus tenderness or frontal sinus tenderness.       Comments: Moderate nasal congestion with clear rhinorrhea     Mouth/Throat:      Mouth: Mucous membranes are moist.      Pharynx: Oropharynx is clear. No oropharyngeal exudate or posterior oropharyngeal erythema.   Cardiovascular:      Rate and Rhythm: Normal rate and regular rhythm.      Heart sounds: Normal heart sounds.   Pulmonary:      Effort: Tachypnea present. No respiratory distress.      Breath sounds: Normal breath sounds. No wheezing, rhonchi or rales.      Comments: Increased respiratory effort  Lymphadenopathy:      Cervical: No cervical adenopathy.       Xray chest reviewed independently by myself showing no obvious pneumonia, pulmonary nodule left upper lung  Radiologist impression: The heart is normal in size with mild aortic calcifications. Benign calcified granuloma in the left upper lobe is unchanged. Minimal linear atelectasis or scarring in the left lung base. Previous airspace opacities have resolved without recurrent infiltrate or pleural effusion. Mild degenerative changes of the spine.      ASSESSMENT:      ICD-10-CM    1. Shortness of breath  R06.02 XR Chest 2 Views   2. Seasonal allergic rhinitis due to pollen  J30.1    3. Mild persistent asthma with acute exacerbation  J45.31         PLAN:    With his shortness of breath not relieved by overusing his albuterol inhaler plus his nebulizer, recommended further evaluation now in emergency room and patient declines, requesting a chest xray. Chest xray completed without acute findings. Again recommended further evaluation in emergency room to rule out PE, as his oxygen sat is decreased from baseline and he is working harder to breathe and patient is agreeable. He is discharged in stable condition as he declines an ambulance. Report called to [facility].     Mercy Boss NP

## 2021-06-16 NOTE — TELEPHONE ENCOUNTER
Patient came in asking for appointment said his asthma is acting up and allergies wondering if he could get a appointment. I went back and ask Nohemi she said he is a forty minute patient and should go to walkin or the er so I went back out and told the patient. He asked for appointment for Monday I said do you really want to wait that long he said yes I scheduled him with Dr Mercado Monday Since he had the opening . Then patient came back in and thought Dr Wang worked today I said he is not here today. I again asked if  Nohemi had any wiggle room that he was back. She said patient should go to the ER and he needs to take his breathing issue more serious. So I told the patient what Nohemi said again about going to the Er. He said the er is to expensive so I said what about walkin care.he said he would go there.

## 2021-06-17 ENCOUNTER — COMMUNICATION - HEALTHEAST (OUTPATIENT)
Dept: SCHEDULING | Facility: CLINIC | Age: 71
End: 2021-06-17

## 2021-06-17 ENCOUNTER — HOSPITAL ENCOUNTER (EMERGENCY)
Dept: EMERGENCY MEDICINE | Facility: CLINIC | Age: 71
Discharge: HOME | End: 2021-06-17
Admitting: EMERGENCY MEDICINE

## 2021-06-17 DIAGNOSIS — L03.116 CELLULITIS OF LEFT LOWER EXTREMITY: ICD-10-CM

## 2021-06-17 ASSESSMENT — MIFFLIN-ST. JEOR: SCORE: 1907.92

## 2021-06-17 NOTE — PROGRESS NOTES
Lester,    It was nice to see you in the clinic yesterday.  Your tests are back and the lipids are elevated more than I would like to see.  I note that you have been on simvastatin and atorvastatin in the past. What side effect did you have to cause you to stop those meds? We may have other options for your cholesterol.    The A1c is elevated at 8.6. That is much higher than the last few readings. I note that you had been on Victoza injection daily in past. Why was that stopped? We need to get your blood sugars down to help you from getting infections and prevent kidney, and eye problems.    Dr. Mercado

## 2021-06-17 NOTE — PATIENT INSTRUCTIONS - HE
Patient Instructions by Shavonne Dinero CNP at 2/9/2019  1:20 PM     Author: Shavonne Dinero CNP Service: -- Author Type: Nurse Practitioner    Filed: 2/9/2019  4:12 PM Encounter Date: 2/9/2019 Status: Addendum    : Shavonne Dinero CNP (Nurse Practitioner)    Related Notes: Original Note by Shavonne Dinero CNP (Nurse Practitioner) filed at 2/9/2019  4:12 PM       Patient to follow up with Primary Care provider regarding elevated blood pressure.      Patient Education     Influenza (Adult)    Influenza is also called the flu. It is a viral illness that affects the air passages of your lungs. It is different from the common cold. The flu can easily be passed from one to person to another. It may be spread through the air by coughing and sneezing. Or it can be spread by touching the sick person and then touching your own eyes, nose, or mouth.  The flu starts 1 to 3 days after you are exposed to the flu virus. It may last for 1 to 2 weeks but many people feel tired or fatigued for many weeks afterward. You usually dont need to take antibiotics unless you have a complication. This might be an ear or sinus infection or pneumonia.  Symptoms of the flu may be mild or severe. They can include extreme tiredness (wanting to stay in bed all day), chills, fevers, muscle aches, soreness with eye movement, headache, and a dry, hacking cough.  Home care  Follow these guidelines when caring for yourself at home:    Avoid being around cigarette smoke, whether yours or other peoples.    Acetaminophen or ibuprofen will help ease your fever, muscle aches, and headache. Dont give aspirin to anyone younger than 18 who has the flu. Aspirin can harm the liver.    Nausea and loss of appetite are common with the flu. Eat light meals. Drink 6 to 8 glasses of liquids every day. Good choices are water, sport drinks, soft drinks without caffeine, juices, tea, and soup. Extra fluids will also help loosen  secretions in your nose and lungs.    Over-the-counter cold medicines will not make the flu go away faster. But the medicines may help with coughing, sore throat, and congestion in your nose and sinuses. Dont use a decongestant if you have high blood pressure.    Stay home until your fever has been gone for at least 24 hours without using medicine to reduce fever.  Follow-up care  Follow up with your healthcare provider, or as advised, if you are not getting better over the next week.  If you are age 65 or older, talk with your provider about getting a pneumococcal vaccine every 5 years. You should also get this vaccine if you have chronic asthma or COPD. All adults should get a flu vaccine every fall. Ask your provider about this.  When to seek medical advice  Call your healthcare provider right away if any of these occur:    Cough with lots of colored mucus (sputum) or blood in your mucus    Chest pain, shortness of breath, wheezing, or trouble breathing    Severe headache, or face, neck, or ear pain    New rash with fever    Fever of 100.4 F (38 C) or higher, or as directed by your healthcare provider    Confusion, behavior change, or seizure    Severe weakness or dizziness    You get a new fever or cough after getting better for a few days  Date Last Reviewed: 1/1/2017 2000-2017 [employer]. [address] [place] [address], [place] [address]. All rights reserved. This information is not intended as a substitute for professional medical care. Always follow your healthcare professional's instructions.           Patient Education     Treating Pneumonia  Pneumonia is an infection of one or both of the lungs. Pneumonia:    Is usually caused by either a virus or a bacteria    Can be very serious, especially in infants, young children, and older adults. Its also serious for those with other long-term health problems or weakened immune systems.    Is sometimes treated at home and sometimes in the  hospital    Antibiotic medicines  Antibiotics may be prescribed for pneumonia caused by bacteria. They may be pills (oral medicines), or shots (injections). Or they may be given by IV (intravenously) into a vein. If you are taking oral medicines at home:    Fill your prescription and start taking your medicine as soon as you can.    You will likely start to feel better in a day or 2, but dont stop taking the antibiotic.    Use a pill organizer to help you remember to take your medicine.    Let your healthcare provider know if you have side effects.    Take your medicine exactly as directed on the label. Talk to your provider or pharmacist if you have any questions.  Antiviral medicines  Antiviral medicine may be prescribed for pneumonia caused by a virus. For example, antiviral medicine may be prescribed for pneumonia caused by the flu virus. Antibiotics do not work against viruses. If you are taking antiviral medicine at home:    Fill your prescription and start taking your medicine as soon as you can.    Talk with your provider or pharmacist about possible side effects.    Take the medicine exactly as instructed.  To relieve symptoms  There are many medicines that can help relieve symptoms of pneumonia. Some are prescription and some are over-the-counter.  Your healthcare provider may recommend:    Acetaminophen or ibuprofen to lower your fever and to lessen headache or other pain    Cough medicine to loosen mucus or to reduce coughing  Make sure you check with your healthcare provider or pharmacist before taking any over-the-counter medicines.  Special treatments  If you are hospitalized for pneumonia, you may have other therapies, including:    Inhaled medicines to help with breathing or chest congestion    Supplemental oxygen to increase low oxygen levels  Drink fluids and eat healthy  You should eat healthy to help your body fight the infection. Drinking a lot of fluids helps to replace fluids lost from fever  and to loosen mucus in your chest.    Diet. Make healthy food choices, including fruits and vegetables, lean meats and other proteins, 100% whole grain and low- or no-fat dairy products.    Fluids. Drink at least 6 to 8 tall glasses a day. Water and 100% fruit or vegetable juice are best.  Get plenty of rest and sleep  You may be more tired than usual for a while. It is important to get enough sleep at night. Its also important to rest during the day. Talk with your healthcare provider if coughing or other symptoms are interfering with your sleep.  Preventing the spread of germs  The best thing you can do to prevent spreading germs is to wash your hands often. You should:    Rub your hand with soap and water for 20 to 30 seconds.    Clean in between your fingers, the backs of your hands, and around your nails.    Dry your hands on a separate towel or use paper towels.  You should also:    Keep alcohol-based hand  nearby.    Make sure you also clean surfaces that you touch. Use a product that kills all types of germs.    Stay away from others until you are feeling better.  When to call your healthcare provider  Call your healthcare provider if you have any of the following:    Symptoms get worse    Fever continues    Shortness of breath gets worse    Increased mucus or mucus that is darker in color    Coughing gets worse    Lips or fingers are bluish in color    Side effects from your medicine   Date Last Reviewed: 12/1/2016 2000-2017 [employer]. [address] [place] [address], [place] [address]. All rights reserved. This information is not intended as a substitute for professional medical care. Always follow your healthcare professional's instructions.

## 2021-06-17 NOTE — TELEPHONE ENCOUNTER
Refill Approved    Rx renewed per Medication Renewal Policy. Medication was last renewed on 4/15/20.    Max De La Torre, [facility] Triage/Med Refill 5/6/2021     Requested Prescriptions   Pending Prescriptions Disp Refills     hydroCHLOROthiazide (HYDRODIURIL) 25 MG tablet 90 tablet 3     Sig: Take 1 tablet (25 mg total) by mouth daily.       Diuretics/Combination Diuretics Refill Protocol  Passed - 5/5/2021 10:43 AM        Passed - Visit with PCP or prescribing provider visit in past 12 months     Last office visit with prescriber/PCP: 4/26/2021 Nikita Mercado MD OR same dept: 4/26/2021 Nikita Mercado MD OR same specialty: 4/26/2021 Nikita Mercado MD  Last physical: 7/20/2020 Last MTM visit: Visit date not found   Next visit within 3 mo: Visit date not found  Next physical within 3 mo: Visit date not found  Prescriber OR PCP: Nikita Mercado MD  Last diagnosis associated with med order: 1. Essential hypertension, benign  - hydroCHLOROthiazide (HYDRODIURIL) 25 MG tablet; Take 1 tablet (25 mg total) by mouth daily.  Dispense: 90 tablet; Refill: 3    If protocol passes may refill for 12 months if within 3 months of last provider visit (or a total of 15 months).             Passed - Serum Potassium in past 12 months      Lab Results   Component Value Date    Potassium 3.9 04/23/2021             Passed - Serum Sodium in past 12 months      Lab Results   Component Value Date    Sodium 141 04/23/2021             Passed - Blood pressure on file in past 12 months     BP Readings from Last 1 Encounters:   04/26/21 150/84             Passed - Serum Creatinine in past 12 months      Creatinine   Date Value Ref Range Status   04/23/2021 1.01 0.70 - 1.30 mg/dL Final

## 2021-06-17 NOTE — PROGRESS NOTES
TESSIE for diabetic eye exam was faxed to [facility] in [place] at [phone].    Ksenia Burns, CMA

## 2021-06-17 NOTE — PROGRESS NOTES
ASSESSMENT/PLAN:       1. Pneumonitis  New findings of a left lower lobe nodule 13 mm and also a right upper lobe nodule along with some consolidation in the left lower lobe consistent with a pneumonia.  Patient has had recurrent problems with these findings on CT particularly the left lower lobe consolidation.  The patient will finish the course of Levaquin 500 mg daily for 7 days and prednisone 40 mg daily for 5 days  We will need a follow-up CT in about 3 months    2. Mild persistent asthma with exacerbation  Asthma action plan done  Renewed his albuterol solution for neb    3. Type 2 diabetes mellitus without complication, without long-term current use of insulin (H)    - Glycosylated Hemoglobin A1c, 8.6  My chart message was sent to the patient in regard to his previous prescription for Victoza injections and not clear as to why that was discontinued.  I think the patient would benefit from a GLP-1 agonist such as Victoza or Trulicity  This would also help promote some weight loss    4. Acute respiratory failure, unspecified whether with hypoxia or hypercapnia (H)    - albuterol (PROVENTIL) 2.5 mg /3 mL (0.083 %) nebulizer solution; NEBULIZE 1 VIAL EVERY 4 HOURS AS NEEDED FOR WHEEZING  Dispense: 90 mL; Refill: 1    5. Essential hypertension, benign  Blood pressures not controlled today and I would like to have him return in about 3 weeks for a recheck of his blood pressure  May be elevated because he did not take his morning blood pressure medicine      6. Lipid screening    - Lipid [place] FASTING, 218/239/49/121  These values are elevated and we may want to consider a low dose pravastatin to see if he would tolerate that  Other option would be Zetia    - Glycosylated Hemoglobin A1c    Blood pressure, glycemic and lipid control all important to manage the patient's risk of cardiovascular disease    Test results sent by [facility]  Follow-up 3 weeks  Follow-up 3 months CT  Level of medical decision making  moderate  Number and complexity of problems include 1 acute illness with systemic symptoms, 2 chronic condition with exacerbation that being diabetes and hypertension  Amount of data reviewed included 2 unique tests that were ordered and interpreted as well as review of medical record in regard to the emergency room visit and past CT of the long exams over the past 2 years.  Risk of complications moderate in the setting of his pulmonary disease and his diabetes mellitus.         Nikita Mercado MD      PROGRESS NOTE   4/27/2021    SUBJECTIVE:  Lester Shi is a 71 y.o. male  who presents for   Chief Complaint   Patient presents with     ER Follow up     Was seen last Friday at ER. Pt feeling ok. Needs to know how to use new neb solution rx.      Asthma     Was using inhaler pretty often but since starting antibiotic only used twice yesterday.      Emergency room follow-up    1. Pneumonitis  The patient was seen in the emergency room on April 23, 2021 with a 10-day history of dyspnea.  Mild cough nonproductive.  Evaluation showed evidence on CT of the left lower lobe infiltrate consistent with pneumonia.  Also had a 13 mm nodule in the medial aspect of the left lower lobe and a nodule in the right upper lobe.  Note that in February 2019 the patient had a similar presentation with findings of bilateral lower lobe infiltrates.  Follow-up CT of the chest in March 2019 showed that the infiltrates are resolved.  Has done well through 2020 until now.  No fever and no significant sputum production  Was placed on Levaquin 500 mg daily for 7 days along with prednisone 40 mg daily for 5 days  The patient currently feels that he is doing much better.    2. Mild persistent asthma with exacerbation  The patient has a history of persistent asthma and uses Flovent inhaler 110 mcg 2 puffs twice a day  Albuterol inhaler and nebulizer for rescue medicine and therapy  Was given a prescription of the 5 mg albuterol solution however he  was not given syringes, needles or the saline solution and thus he did not  the 5 mg solution but would rather have the prefilled albuterol with saline vials to use.  Triggers for his asthma includes seasonal especially springtime and also upper respiratory infections      3. Type 2 diabetes mellitus without complication, without long-term current use of insulin (H)  The patient is due to have an A1c checked  Medication for his diabetes includes Metformin XR 1000 mg twice daily  His last A1c was satisfactory at 7.0 in July 2020  He has not been checking his blood sugar regularly    4. Acute respiratory failure, unspecified whether with hypoxia or hypercapnia (H)  Requesting refill of his albuterol neb solution    5. Essential hypertension, benign  Note that blood pressure is elevated today  His blood pressure is treated with amlodipine 10 mg daily  Atenolol 50 mg daily  Losartan 25 mg daily  Hydrochlorothiazide 25 mg daily  He did not take his morning blood pressure medicine    6. Lipid screening  The patient is due to have his lipids checked.  Previously had been treated with simvastatin and atorvastatin but they were discontinued for unclear reasons.  I suspect it was because of myalgias.    Patient Active Problem List   Diagnosis     Eczema     Anemia     Otitis Media     Insulin dependent diabetes mellitus     Essential Hypercholesterolemia     Essential hypertension, benign     Joint Pain, Localized In The Knee     Obesity     Allergic Rhinitis     Klinefelter's Syndrome     Varicose vein     Mild persistent asthma with acute exacerbation     Pneumonia of both lungs due to infectious organism     Hyperglycemia     Acute kidney injury (H)     Encounter for examination following treatment at hospital     Dizziness     Sepsis (H)     Abdominal pain, left upper quadrant     Mild persistent asthma with exacerbation     Spinal stenosis of lumbar region with neurogenic claudication     Squamous cell carcinoma  of skin of face     Status post total left knee replacement       Current Outpatient Medications   Medication Sig Dispense Refill     ACCU-CHEK FASTCLIX LANCET DRUM U TO TEST TID  0     albuterol (PROAIR HFA;PROVENTIL HFA;VENTOLIN HFA) 90 mcg/actuation inhaler Inhale 2 puffs every 6 (six) hours as needed for wheezing. 3 Inhaler 3     albuterol (PROVENTIL) 2.5 mg /3 mL (0.083 %) nebulizer solution NEBULIZE 1 VIAL EVERY 4 HOURS AS NEEDED FOR WHEEZING 90 mL 1     amLODIPine (NORVASC) 10 MG tablet TAKE 1 TABLET(10 MG) BY MOUTH DAILY 90 tablet 3     atenoloL (TENORMIN) 50 MG tablet TAKE 1 TABLET(50 MG) BY MOUTH DAILY 90 tablet 3     blood glucose test strips Use 1 each As Directed 2 (two) times a day. Dispense brand per patient's insurance at pharmacy discretion. 200 strip 3     cetirizine (ZYRTEC) 10 MG tablet Take 10 mg by mouth daily.       FLOVENT  mcg/actuation inhaler INHALE 2 PUFFS BY MOUTH TWICE DAILY 1 Inhaler 1     fluticasone (FLONASE) 50 mcg/actuation nasal spray Apply 1 spray into each nostril daily as needed for rhinitis.       hydroCHLOROthiazide (HYDRODIURIL) 25 MG tablet TAKE 1 TABLET(25 MG) BY MOUTH DAILY 90 tablet 3     levoFLOXacin (LEVAQUIN) 500 MG tablet Take 1 tablet (500 mg total) by mouth daily for 7 days. 7 tablet 0     losartan (COZAAR) 25 MG tablet TAKE 1 TABLET(25 MG) BY MOUTH DAILY 90 tablet 3     metFORMIN (GLUCOPHAGE-XR) 500 MG 24 hr tablet Take 2 tablets (1,000 mg total) by mouth 2 (two) times a day. 360 tablet 2     nebulizers Misc Use four times a day for 5 day then as  needed 1 each 0     predniSONE (DELTASONE) 20 MG tablet Take 40 mg by mouth daily for 5 days. 10 tablet 0     fexofenadine (ALLEGRA) 180 MG tablet Take 180 mg by mouth daily.       triamcinolone (KENALOG) 0.1 % cream APPLY EXTERNALLY TO THE AFFECTED AREA TWICE DAILY FOR 14 DAYS 45 g 0     No current facility-administered medications for this visit.        Social History     Tobacco Use   Smoking Status Former  Smoker     Packs/day: 2.00     Years: 12.00     Pack years: 24.00     Quit date: 1979     Years since quittin.3   Smokeless Tobacco Never Used           OBJECTIVE:        Recent Results (from the past 240 hour(s))   Troponin I   Result Value Ref Range    Troponin I 0.02 0.00 - 0.29 ng/mL   BNP(B-type Natriuretic Peptide)   Result Value Ref Range    BNP 19 0 - 72 pg/mL   INR   Result Value Ref Range    INR 1.08 0.90 - 1.10   APTT(PTT)   Result Value Ref Range    PTT 26 24 - 37 seconds   D-dimer, Quantitative   Result Value Ref Range    D-Dimer, Quant >=20.00 (HH) <=0.50 FEU ug/mL   Basic Metabolic Panel   Result Value Ref Range    Sodium 141 136 - 145 mmol/L    Potassium 3.9 3.5 - 5.0 mmol/L    Chloride 106 98 - 107 mmol/L    CO2 23 22 - 31 mmol/L    Anion Gap, Calculation 12 5 - 18 mmol/L    Glucose 192 (H) 70 - 125 mg/dL    Calcium 9.4 8.5 - 10.5 mg/dL    BUN 26 8 - 28 mg/dL    Creatinine 1.01 0.70 - 1.30 mg/dL    GFR MDRD Af Amer >60 >60 mL/min/1.73m2    GFR MDRD Non Af Amer >60 >60 mL/min/1.73m2   HM1 (CBC with Diff)   Result Value Ref Range    WBC 5.8 4.0 - 11.0 thou/uL    RBC 3.75 (L) 4.40 - 6.20 mill/uL    Hemoglobin 11.3 (L) 14.0 - 18.0 g/dL    Hematocrit 34.1 (L) 40.0 - 54.0 %    MCV 91 80 - 100 fL    MCH 30.1 27.0 - 34.0 pg    MCHC 33.1 32.0 - 36.0 g/dL    RDW 14.5 11.0 - 14.5 %    Platelets 269 140 - 440 thou/uL    MPV 9.5 8.5 - 12.5 fL    Neutrophils % 40 (L) 50 - 70 %    Lymphocytes % 35 20 - 40 %    Monocytes % 11 (H) 2 - 10 %    Eosinophils % 12 (H) 0 - 6 %    Basophils % 2 0 - 2 %    Immature Granulocyte % 0 <=0 %    Neutrophils Absolute 2.3 2.0 - 7.7 thou/uL    Lymphocytes Absolute 2.0 0.8 - 4.4 thou/uL    Monocytes Absolute 0.6 0.0 - 0.9 thou/uL    Eosinophils Absolute 0.7 (H) 0.0 - 0.4 thou/uL    Basophils Absolute 0.1 0.0 - 0.2 thou/uL    Immature Granulocyte Absolute 0.0 <=0.0 thou/uL   Lipid [place] FASTING   Result Value Ref Range    Cholesterol 218 (H) <=199 mg/dL    Triglycerides 239  (H) <=149 mg/dL    HDL Cholesterol 49 >=40 mg/dL    LDL Calculated 121 <=129 mg/dL    Patient Fasting > 8hrs? No    Glycosylated Hemoglobin A1c   Result Value Ref Range    Hemoglobin A1c 8.6 (H) <=5.6 %       Vitals:    04/26/21 0854 04/26/21 0919   BP: 162/86 150/84   Patient Site:  Left Arm   Patient Position:  Sitting   Cuff Size:  Adult Large   Pulse: 76    SpO2: 98%    Weight: (!) 244 lb (110.7 kg)    Height: 6' (1.829 m)      Weight: (!) 244 lb (110.7 kg)          Physical Exam:  GENERAL APPEARANCE: A&A, NAD, well hydrated, well nourished  SKIN:  Normal skin turgor, no lesions/rashes   HEENT: moist mucous membranes, no rhinorrhea  NECK: Normal without adenopathy or masses  CV: RRR, no M/G/R   LUNGS: There were some rhonchi heard in the left base but no rales or wheezes.  Rhonchi seem to clear after coughing.  ABDOMEN: S&NT, no masses or enlarged organs   EXTREMITY: no edema and full ROM of all joints  NEURO: no focal findings

## 2021-06-17 NOTE — PROGRESS NOTES
ASSESSMENT/PLAN:       1. Pneumonitis  Clinically it appears the patient has improved and he will use albuterol inhaler as needed  He will use Flovent 110 mcg 2 puffs twice a day    - CT Chest Without Contrast; Future    2. Pulmonary nodules  Follow-up in about 2 months with a CT of the chest to evaluate the abnormal areas noted in April.    - CT Chest Without Contrast; Future    3. Type 2 diabetes mellitus without complication, without long-term current use of insulin (H)  For the patient's diabetes 1 could consider a medication like Januvia orally which may not cause quite so much diarrhea.  Other options could include glipizide which would have more risk of hypoglycemia and weight gain    4. Essential hypertension, benign  The patient will continue on his current medications for his hypertension.    Level of medical decision making moderate  1 or more chronic conditions with exacerbation or poorly controlled issues and that being the patient's status of his lungs as well as his diabetes mellitus.   Amount of data reviewed includes looking at previous imaging of his lung in regard to his nodular opacities and pneumonitis  Risk of complications moderate in view of his current CT imaging as well as his diabetes which is not controlled.    Nikita Mercado MD      PROGRESS NOTE   5/18/2021    SUBJECTIVE:  Lester Shi is a 71 y.o. male  who presents for   Chief Complaint   Patient presents with     Follow-up     pneumonits  feeling better        1. Pneumonitis  The patient is here today for follow-up of his pneumonia and I last saw him on April 26, 2021.  The patient has completed his antibiotics and his steroids and he feels that he is doing much better.  Still having some allergy symptoms.  No significant cough or shortness of breath.  No fever.    2. Pulmonary nodules  At the time of the patient's hospital visit the CTA showed a left lower lobe consolidation along with a 13 mm nodular consolidation left medial  lung field and a nodular consolidation in the inferior right upper lobe.  Inflammatory versus neoplastic and recommendation for follow-up imaging    3. Type 2 diabetes mellitus without complication, without long-term current use of insulin (H)  The patient's last A1c was 8.6 and he takes 1000 mg twice a day of the extended release form of Metformin  With Metformin he has some diarrhea but much less and when he took Metformin and Victoza.  For that reason Victoza was stopped.      4. Essential hypertension, benign  The patient's blood pressure is improved today  Blood pressure is treated with amlodipine 10 mg daily  Atenolol 50 mg daily  Hydrochlorothiazide 25 mg daily    Patient Active Problem List   Diagnosis     Eczema     Anemia     Otitis Media     Insulin dependent diabetes mellitus     Essential Hypercholesterolemia     Essential hypertension, benign     Joint Pain, Localized In The Knee     Obesity     Allergic Rhinitis     Klinefelter's Syndrome     Varicose vein     Mild persistent asthma with acute exacerbation     Pneumonia of both lungs due to infectious organism     Hyperglycemia     Acute kidney injury (H)     Encounter for examination following treatment at hospital     Dizziness     Sepsis (H)     Abdominal pain, left upper quadrant     Mild persistent asthma with exacerbation     Spinal stenosis of lumbar region with neurogenic claudication     Squamous cell carcinoma of skin of face     Status post total left knee replacement     Pulmonary nodules       Current Outpatient Medications   Medication Sig Dispense Refill     ACCU-CHEK FASTCLIX LANCET DRUM U TO TEST TID  0     amLODIPine (NORVASC) 10 MG tablet TAKE 1 TABLET(10 MG) BY MOUTH DAILY 90 tablet 3     atenoloL (TENORMIN) 50 MG tablet TAKE 1 TABLET(50 MG) BY MOUTH DAILY 90 tablet 3     blood glucose test strips Use 1 each As Directed 2 (two) times a day. Dispense brand per patient's insurance at pharmacy discretion. 200 strip 3     cetirizine  (ZYRTEC) 10 MG tablet Take 10 mg by mouth daily.       fluticasone (FLONASE) 50 mcg/actuation nasal spray Apply 1 spray into each nostril daily as needed for rhinitis.       hydroCHLOROthiazide (HYDRODIURIL) 25 MG tablet Take 1 tablet (25 mg total) by mouth daily. 90 tablet 3     ketotifen (ALAWAY) 0.025 % (0.035 %) ophthalmic solution 1 drop 2 (two) times a day.       losartan (COZAAR) 25 MG tablet TAKE 1 TABLET(25 MG) BY MOUTH DAILY 90 tablet 3     metFORMIN (GLUCOPHAGE-XR) 500 MG 24 hr tablet Take 2 tablets (1,000 mg total) by mouth 2 (two) times a day. 360 tablet 2     nebulizers Misc Use four times a day for 5 day then as  needed 1 each 0     triamcinolone (KENALOG) 0.1 % cream APPLY EXTERNALLY TO THE AFFECTED AREA TWICE DAILY FOR 14 DAYS 45 g 0     albuterol (PROAIR HFA;PROVENTIL HFA;VENTOLIN HFA) 90 mcg/actuation inhaler Inhale 2 puffs every 6 (six) hours as needed for wheezing. 3 Inhaler 3     albuterol (PROVENTIL) 2.5 mg /3 mL (0.083 %) nebulizer solution NEBULIZE 1 VIAL EVERY 4 HOURS AS NEEDED FOR WHEEZING 90 mL 1     fexofenadine (ALLEGRA) 180 MG tablet Take 180 mg by mouth daily.       FLOVENT  mcg/actuation inhaler INHALE 2 PUFFS BY MOUTH TWICE DAILY 1 Inhaler 1     No current facility-administered medications for this visit.        Social History     Tobacco Use   Smoking Status Former Smoker     Packs/day: 2.00     Years: 12.00     Pack years: 24.00     Quit date: 1979     Years since quittin.4   Smokeless Tobacco Never Used           OBJECTIVE:        No results found for this or any previous visit (from the past 240 hour(s)).    Vitals:    21 1031   BP: 138/82   Pulse: 70   SpO2: 96%   Weight: (!) 249 lb (112.9 kg)   Height: 6' (1.829 m)     Weight: (!) 249 lb (112.9 kg)          Physical Exam:  GENERAL APPEARANCE: 71-year-old male, NAD, well hydrated, well nourished  SKIN:  Normal skin turgor, no lesions/rashes   HEENT: moist mucous membranes, no rhinorrhea, conjunctiva is  clear  NECK: Normal without adenopathy or masses  CV: RRR, no M/G/R   LUNGS: CTAB without rales rhonchi or wheezes and breath sounds are symmetric  ABDOMEN: S&NT, no masses or enlarged organs   EXTREMITY: no edema and full ROM of all joints  NEURO: no focal findings

## 2021-06-18 NOTE — PATIENT INSTRUCTIONS - HE
Patient Instructions by Nikita Mercado MD at 7/20/2020  8:00 AM     Author: Nikita Mercado MD Service: -- Author Type: Physician    Filed: 7/20/2020  8:11 AM Encounter Date: 7/20/2020 Status: Signed    : Nikita Mercado MD (Physician)         Patient Education     Exercise for a Healthier Heart  You may wonder how you can improve the health of your heart. If youre thinking about exercise, youre on the right track. You dont need to become an athlete, but you do need a certain amount of brisk exercise to help strengthen your heart. If you have been diagnosed with a heart condition, your doctor may recommend exercise to help stabilize your condition. To help make exercise a habit, choose safe, fun activities.       Be sure to check with your health care provider before starting an exercise program.    Why exercise?  Exercising regularly offers many healthy rewards. It can help you do all of the following:    Improve your blood cholesterol levels to help prevent further heart trouble    Lower your blood pressure to help prevent a stroke or heart attack    Control diabetes, or reduce your risk of getting this disease    Improve your heart and lung function    Reach and maintain a healthy weight    Make your muscles stronger and more limber so you can stay active    Prevent falls and fractures by slowing the loss of bone mass (osteoporosis)    Manage stress better  Exercise tips  Ease into your routine. Set small goals. Then build on them.  Exercise on most days. Aim for a total of 150 or more minutes of moderate to  vigorous intensity activity each week. Consider 40 minutes, 3 to 4 times a week. For best results, activity should last for 40 minutes on average. It is OK to work up to the 40 minute period over time. Examples of moderate-intensity activity is walking one mile in 15 minutes or 30 to 45 minutes of yard work.  Step up your daily activity level. Along with your exercise program, try being more active  throughout the day. Walk instead of drive. Do more household tasks or yard work.  Choose one or more activities you enjoy. Walking is one of the easiest things you can do. You can also try swimming, riding a bike, or taking an exercise class.  Stop exercising and call your doctor if you:    Have chest pain or feel dizzy or lightheaded    Feel burning, tightness, pressure, or heaviness in your chest, neck, shoulders, back, or arms    Have unusual shortness of breath    Have increased joint or muscle pain    Have palpitations or an irregular heartbeat      7712-6297 [employer]!. [address] [place] [address]. All rights reserved. This information is not intended as a substitute for professional medical care. Always follow your healthcare professional's instructions.         Patient Education   Understanding [employer] MyPlate  The USDA (US Department of Agriculture) has guidelines to help you make healthy food choices. These are called MyPlate. MyPlate shows the food groups that make up healthy meals using the image of a place setting. Before you eat, think about the healthiest choices for what to put onto your plate or into your cup or bowl. To learn more about building a healthy plate, visit www.choosemyplate.gov.       The Food Groups    Fruits: Any fruit or 100% fruit juice counts as part of the Fruit Group. Fruits may be fresh, canned, frozen, or dried, and may be whole, cut-up, or pureed. Make half your plate fruits and vegetables.    Vegetables: Any vegetable or 100% vegetable juice counts as a member of the Vegetable Group. Vegetables may be fresh, frozen, canned, or dried. They can be served raw or cooked and may be whole, cut-up, or mashed. Make half your plate fruits and vegetables.     Grains: All foods made from grains are part of the Grains Group. These include wheat, rice, oats, cornmeal, and barley such as bread, pasta, oatmeal, cereal, tortillas, and grits. Grains should be no more  than a quarter of your plate. At least half of your grains should be whole grains.    Protein: This group includes meat, poultry, seafood, beans and peas, eggs, processed soy products (like tofu), nuts (including nut butters), and seeds. Make protein choices no more than a quarter of your plate. Meat and poultry choices should be lean or low fat.    Dairy: All fluid milk products and foods made from milk that contain calcium, like yogurt and cheese are part of the Dairy Group. (Foods that have little calcium, such as cream, butter, and cream cheese, are not part of the group.) Most dairy choices should be low-fat or fat-free.    Oils: These are fats that are liquid at room temperature. They include canola, corn, olive, soybean, and sunflower oil. Foods that are mainly oil include mayonnaise, certain salad dressings, and soft margarines. You should have only 5 to 7 teaspoons of oils a day. You probably already get this much from the food you eat.  Use [employer] to Help Build Your Meals  The [employer] can help you plan and track your meals and activity. You can look up individual foods to see or compare their nutritional value. You can get guidelines for what and how much you should eat. You can compare your food choices. And you can assess personal physical activities and see ways you can improve. Go to www.[employer].gov/supertracker/.    2295-5819 [employer]. [address] [place] [address]. All rights reserved. This information is not intended as a substitute for professional medical care. Always follow your healthcare professional's instructions.           Patient Education   Signs of Hearing Loss  Hearing loss is a problem shared by many people. In fact, it is one of the most common health conditions, particularly as people age. Most people over age 65 have some hearing loss, and by age 80, almost everyone does. Because hearing loss usually occurs slowly over the years, you may  not realize your hearing ability has gotten worse.       Have your hearing checked  Contact your [facility] care provider if you:    Have to strain to hear normal conversation.    Have to watch other peoples faces very carefully to follow what theyre saying.    Need to ask people to repeat what theyve said.    Often misunderstand what people are saying.    Turn the volume of the television or radio up so high that others complain.    Feel that people are mumbling when theyre talking to you.    Find that the effort to hear leaves you feeling tired and irritated.    Notice, when using the phone, that you hear better with 1 ear than the other.    1331-8672 [employer]. [address] [place] [address], [place] [address]. All rights reserved. This information is not intended as a substitute for professional medical care. Always follow your healthcare professional's instructions.         Patient Education   Urinary Incontinence (Male)    Urinary incontinence means not being able to control the release of urine from the bladder.  Causes  Common causes of urinary incontinence in men include:    Infection    Certain medicines    Aging    Poor pelvic muscle tone    Bladder spasms    Obesity    Urinary retention  Nervous system diseases, diabetes, sleep apnea, urinary tract infections, prostate surgery, and pelvic trauma can also cause incontinence. Constipation and smoking have also been identified as risk factors.  Symptoms    Urge incontinence (also called overactive bladder) is a sudden urge to urinate even though there may not be much urine in the bladder. The need to urinate often during the night is common. It is due to bladder spasms.    Stress incontinence is involuntary urine leakage that can occur with sneezing, coughing, and other actions that put stress on the bladder.  Treatment  Treatment of urinary incontinence depends on the cause. Infections of the bladder are treated with antibiotics. Urinary retention is  treated with a bladder catheter.  Home care  Follow these guidelines when caring for yourself at home:    Don't consume foods and drinks that may irritate the bladder. These include drinks containing alcohol, caffeinate, or carbonation; chocolate; and acidic fruits and juices.    Limit fluid intake to 6 to 8 cups a day.    Lose weight if you are overweight. This will reduce your symptoms.    If needed, wear absorbent pads to catch urine. Change pads frequently to maintain hygiene and prevent skin and bladder infections.    Bathe daily to maintain good hygiene.    If an antibiotic was prescribed to treat a bladder infection, be sure to take it until finished, even if you are feeling better before then. This is to make sure your infection has cleared.    If a catheter was left in place, it is important to keep bacteria from getting into the collection bag. Don't disconnect the catheter from the collection bag.    Use a leg band to secure the catheter drainage tube, so it does not pull on the catheter. Drain the collection bag when it becomes full using the drain spout at the bottom of the bag. Don't disconnect the bag from the catheter.    Don't pull on or try to remove a catheter. The catheter must be removed by a healthcare provider.  Follow-up care  Follow up with your healthcare provider, or as advised.  When to seek medical advice  Call your healthcare provider right away if any of these occur:    Fever over 100.4 F (38 C), or as directed by your healthcare provider    Bladder pain or fullness    Abdominal swelling, nausea, or vomiting    Back pain    Weakness, dizziness, or fainting    If a catheter was left in place, return if:  ? Catheter falls out  ? Catheter stops draining for 6 hours  Date Last Reviewed: 10/1/2017    4683-5140 [employer]. [address] [place] [address], [place] [address]. All rights reserved. This information is not intended as a substitute for professional medical care. Always  follow your healthcare professional's instructions.     Patient Education   Preventing Falls in the Home  As you get older, falls are more likely. Thats because your reaction time slows. Your muscles and joints may also get stiffer, making them less flexible. Illness, medications, and vision changes can also affect your balance. A fall could leave you unable to live on your own. To make your home safer, follow these tips:    Floors    Put nonskid pads under area rugs.    Remove throw rugs.    Replace worn floor coverings.    Tack carpets firmly to each step on carpeted stairs. Put nonskid strips on the edges of uncarpeted stairs.    Keep floors and stairs free of clutter and cords.    Arrange furniture so there are clear pathways.    Clean up any spills right away.    Bathrooms    Install grab bars in the tub or shower.    Apply nonskid strips or put a nonskid rubber mat in the tub or shower.    Sit on a bath chair to bathe.    Use bathmats with nonskid backing.    Lighting    Keep a flashlight in each room.    Put a nightlight along the pathway between the bedroom and the bathroom.    7486-7298 [employer]. [address] [place] [address]. All rights reserved. This information is not intended as a substitute for professional medical care. Always follow your healthcare professional's instructions.           Advance Directive  Patients advance directive was discussed and I am comfortable with the patients wishes.  Patient Education   Personalized Prevention Plan  You are due for the preventive services outlined below.  Your care team is available to assist you in scheduling these services.  If you have already completed any of these items, please share that information with your care team to update in your medical record.  Health Maintenance   Topic Date Due   ? HEPATITIS C SCREENING  1950   ? ADVANCE CARE PLANNING  02/17/1966   ? ZOSTER VACCINES (1 of 2) 02/17/1998   ? MEDICARE ANNUAL  WELLNESS VISIT  02/17/2013   ? FALL RISK ASSESSMENT  03/20/2020   ? A1C  05/18/2020   ? DIABETIC FOOT EXAM  05/20/2020   ? MICROALBUMIN  05/20/2020   ? INFLUENZA VACCINE RULE BASED (1) 08/01/2020   ? DIABETIC EYE EXAM  10/22/2020   ? ASTHMA ACTION PLAN  11/18/2020   ? BMP  11/18/2020   ? LIPID  11/18/2020   ? Asthma Control Test  11/21/2020   ? COLORECTAL CANCER SCREENING  04/18/2022   ? TD 18+ HE  04/25/2027   ? PNEUMOCOCCAL IMMUNIZATION 65+ LOW/MEDIUM RISK  Completed   ? HEPATITIS B VACCINES  Completed

## 2021-06-18 NOTE — LETTER
Letter by Nohemi Lezama NP at      Author: Nohemi Lezama NP Service: -- Author Type: --    Filed:  Encounter Date: 2/6/2019 Status: (Other)       February 6, 2019     Patient: Lester Shi   YOB: 1950   Date of Visit: 2/6/2019       To Whom It May Concern:    It is my medical opinion that Lester Shi may return to work on Monday, February 11, 2019 no restrictions.  Please excuse Lester from his scheduled shifts starting on February 1 - February 8, 2019.  Patient diagnosed with influenza B, he was evaluated and treated in the clinic per infectious disease guidelines.     If you have any questions or concerns, please don't hesitate to call.    Sincerely,        Electronically signed by Nohemi Lezama NP

## 2021-06-18 NOTE — LETTER
Letter by Nikita Mercado MD at      Author: Nikita Mercado MD Service: -- Author Type: --    Filed:  Encounter Date: 2/4/2019 Status: (Other)       February 4, 2019     Patient: Lester Shi   YOB: 1950   Date of Visit: 2/4/2019       To Whom it May Concern:    Lester Shi was seen in my clinic on 2/4/2019.  Please excuse him from work 2/1/19 through 2/6/19 because of illness.  I expect that patient should be able to return to work 2/7/19.    If you have any questions or concerns, please don't hesitate to call.    Sincerely,         Electronically signed by Nikita Mercado MD

## 2021-06-18 NOTE — LETTER
Letter by Nohemi Lezama NP at      Author: Nohemi Lezama NP Service: -- Author Type: --    Filed:  Encounter Date: 2/15/2019 Status: (Other)       February 15, 2019     Patient: Lester Shi   YOB: 1950   Date of Visit: 2/15/2019       To Whom It May Concern:    It is my medical opinion that Lester Shi should remain out of work until further notice. Patient is currently being treated for uncontrolled diabetes. 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 He was referred to a diabetes specialist for further evaluation and management. 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     If you have any questions or concerns, please don't hesitate to call.    Sincerely,        Electronically signed by Nohemi Lezama NP

## 2021-06-18 NOTE — LETTER
Letter by Nohemi Lezama NP at      Author: Nohemi Lezama NP Service: -- Author Type: --    Filed:  Encounter Date: 2/1/2019 Status: (Other)       February 1, 2019     Patient: Lester Shi   YOB: 1950   Date of Visit: 2/1/2019       To Whom It May Concern:    It is my medical opinion that Lester Shi may return to work on Monday, February 4, 2019 no restrictions.  Please excuse Lester from his scheduled work shift on Friday, February 1, 2019 due to acute illness.  He was seen, evaluated and treated in clinic today.    If you have any questions or concerns, please don't hesitate to call.    Sincerely,        Electronically signed by Nohemi Lezama NP

## 2021-06-18 NOTE — PROGRESS NOTES
"1. Right foot pain  XR Foot Right 3 or More VWS         ASSESSMENT/PLAN:     The following high BMI interventions were performed this visit: encouragement to exercise and weight monitoring    1. Right foot pain    - XR Foot Right 3 or More VWS  -Patient instructed to rest, ice the affected area several times per day for 10 minutes at a time, may use compression wrap to the area if pain and swelling occur and elevated the affected area whenever patient is able. May take Tylenol 1000 mg four times per day or Ibuprofen 800 mg three times daily for pain and inflammation.         There are no Patient Instructions on file for this visit.  Medications Discontinued During This Encounter   Medication Reason     atenolol (TENORMIN) 50 MG tablet Therapy completed     amLODIPine (NORVASC) 5 MG tablet Duplicate order     We will notify patient of x-ray results once available.  Return to clinic if symptoms persist or become severe.  The visit lasted a total of 25 minutes face to face with the patient.  Over 50% of the time spent counseling and educating the patient about above content.      Nohemi Lezama NP          SUBJECTIVE:  Lester Shi is a 68 y.o. male who presents with right foot pain that started 1 day ago.  He states pain is intermittent and describes it as an achy, sharp sensation.  He swore he heard a pop in the right foot when he was walking down the stairs.  Aggravating factors: Walking.  Relieving factors: Elevation, 1 dose of ibuprofen and sticking his foot in a bucket of ice water.  He states his pain is a 7 out of 10 when he is walking and sitting in the exam room he is rating it a 1 out of 10 today.  Patient does have significant deformity/hammer toes on both feet, right greater than left.  He has been seen in the past by podiatry who told him \"you are 68 years old and he has nothing to worry about \".  He does drive a bus for a living and he is concerned about what he should do as far as work " restriction goes.  He first noticed the pain after he was mowing the lawn yesterday.  Denies any falls or recent trauma to the foot.  Weight is stable, no recent weight gain.  Vitals are stable, he is afebrile.  He is able to ambulate without assistive device today.  X-ray of right foot ordered.  Chief Complaint   Patient presents with     Foot Injury     RT foot yesterday -          Patient Active Problem List   Diagnosis     Eczema     Anemia     Otitis Media     Chest Pain     Type 2 diabetes mellitus     Essential Hypercholesterolemia     Essential Hypertension     Joint Pain, Localized In The Knee     Frostbite     Obesity     Allergic Rhinitis     Klinefelter's Syndrome     Shortness Of Breath     Varicose vein     Right foot pain       Current Outpatient Prescriptions   Medication Sig Dispense Refill     albuterol (PROVENTIL HFA;VENTOLIN HFA) 90 mcg/actuation inhaler Inhale 2 puffs every 6 (six) hours as needed for wheezing. 8.5 g 11     amLODIPine (NORVASC) 5 MG tablet Take 1 tablet (5 mg total) by mouth daily. 90 tablet 1     benzonatate (TESSALON PERLES) 100 MG capsule Take 1 capsule (100 mg total) by mouth every 6 (six) hours as needed for cough. 30 capsule 0     fexofenadine (ALLEGRA) 180 MG tablet Take 180 mg by mouth daily as needed.       fluticasone (FLONASE) 50 mcg/actuation nasal spray 1-2 sprays into each nostril daily as needed. PRN       fluticasone (FLOVENT HFA) 220 mcg/actuation inhaler Inhale 1 puff 2 (two) times a day. 1 Inhaler 3     hydroCHLOROthiazide (HYDRODIURIL) 25 MG tablet TAKE 1 TABLET(25 MG) BY MOUTH DAILY 90 tablet 0     olopatadine (PATANOL) 0.1 % ophthalmic solution Instill 1 drop into both eyes twice daily at 6 to 8 hour intervals 5 mL 5     spironolactone (ALDACTONE) 25 MG tablet TAKE 1 TABLET(25 MG) BY MOUTH DAILY 90 tablet 0     triamcinolone (KENALOG) 0.1 % cream APPLY TO THE AFFECTED 3 TIMES DAILY AS NEEDED 30 g 1     No current facility-administered medications for this  visit.        History   Smoking Status     Former Smoker     Quit date: 1979   Smokeless Tobacco     Never Used       REVIEW OF SYSTEMS: Denies trauma, locking, clicking, giving away, fevers, chills, sweating, rash or warmth.      TOBACCO USE:  History   Smoking Status     Former Smoker     Quit date: 1979   Smokeless Tobacco     Never Used     Social History     Social History     Marital status:      Spouse name: N/A     Number of children: N/A     Years of education: N/A     Occupational History     Not on file.     Social History Main Topics     Smoking status: Former Smoker     Quit date: 1979     Smokeless tobacco: Never Used     Alcohol use Yes      Comment: rare     Drug use: Not on file     Sexual activity: Not on file     Other Topics Concern     Not on file     Social History Narrative         OBJECTIVE:            Vitals:    05/22/18 0927   BP: 126/74   Pulse: 71   Resp: 18   Temp: 98  F (36.7  C)   SpO2: 96%     Weight: 256 lb (116.1 kg)  Wt Readings from Last 3 Encounters:   05/22/18 (!) 256 lb (116.1 kg)   11/22/17 (!) 255 lb (115.7 kg)   11/19/17 (!) 255 lb (115.7 kg)     Body mass index is 34.72 kg/(m^2).        Physical Exam:  GENERAL APPEARANCE: A&A, NAD, well hydrated, well nourished  NECK: Supple, without lymphadenopathy, no thyroid mass, no JVD, no bruit  CV: RRR, no M/G/R   LUNGS: CTAB, normal respiratory effort  ABDOMEN: S&NT, no masses, no organomegaly, BS present x4   EXTREMITY: Small amount of swelling with mild erythema noted in the right lateral metatarsal region.  Increased right foot pain with internal rotation and flexion.  He is unable to go up on his toes today due to the amount of pain he is having.  He is able to place all of his weight on his heels without difficulty.  Hammertoes noted on both feet, right greater than left.  SKIN:  Normal skin turgor, no lesions/rashes, warm and dry

## 2021-06-18 NOTE — LETTER
Letter by Sangeetha Ramirez MD at      Author: Sangeetha Ramirez MD Service: -- Author Type: --    Filed:  Encounter Date: 2/14/2019 Status: (Other)       Lester Shi  [address] [facility] [address]    February 14, 2019    Dear Mr. Shi,    Welcome to [facility]! Your appointment information is below.   Please bring the following to your appointment:    Insurance Card, so we may scan it for our records    Drivers license or valid ID, so we may scan it for our records    Co-pay (as applicable per your insurance plan)    A current list of your medications including over the counter products such as vitamins and supplements    Your medical records including copies of X-Ray films if you are transferring your care from another clinic.  If you do not have your records, please fill out the release of information form and we will request those records.     Provider: Sangeetha Ramirez MD  Appointment Date: February 28, 2019  Arrival Time: 4:30pm for Dr. Ramirez    Location: [facility]         [address]        [place], [address]    **Please allow adequate time for your commute and parking. If you are more than 10 minutes late, you may be asked to reschedule.     If you need to cancel or reschedule your appointment, please notify us at least 24 hours prior to your appointment time so we are able to make this time available for another patient.    Thank you for choosing the [facility] for your health care needs. If you have any questions, please do not hesitate to contact us at any time at   [phone]. We look forward to caring for you.     Sincerely,     [facility] staff

## 2021-06-19 NOTE — LETTER
Letter by Nohemi Lezama NP at      Author: Nohemi Lezama NP Service: -- Author Type: --    Filed:  Encounter Date: 3/22/2019 Status: (Other)         March 22, 2019     Patient: Lester Shi   YOB: 1950   Date of Visit: 3/22/2019       To Whom It May Concern:    It is my medical opinion that Lester Shi should remain out of work until pending outcome of lab work for diabetes follow up..  Please excuse Lester from his scheduled work shifts starting on February 1, 2019 through May 28, 2019.  Patient is scheduled to have his lab work completed anytime after May 18 for his diabetes follow-up.  Will be able to determine at that time if he is able to resume work after 28 May per the [facility] guidelines.     If you have any questions or concerns, please don't hesitate to call.    Sincerely,        Electronically signed by Nohemi Lezama NP

## 2021-06-19 NOTE — LETTER
Letter by Nikita Mercado MD at      Author: Nikita Mercado MD Service: -- Author Type: --    Filed:  Encounter Date: 3/25/2019 Status: (Other)         Lester Shi  [address] [facility] [address]             March 25, 2019         Dear Mr. Shi,    Below are the results from your recent visit:    Resulted Orders   Culture, Stool   Result Value Ref Range    Culture       No Salmonella, Shigella, Yersinia, or Campylobacter.    Narrative    All specimens submitted for routine culture tested for Salmonella, Shigella, Yersinia, Campylobacter, and if applicable, Shiga toxin 1 and 2 producing Enterohemorrhagic E. coli.   C. difficile Toxigenic by PCR   Result Value Ref Range    C.Difficile Toxigenic by PCR Negative Negative    Ribotype 027/NAP1/B1 Presumptive Negative Presumptive Negative    Narrative    Intended use:     The [employer] Xpert  C. difficile/Epi Assay is a qualitative in vitro diagnostic test for rapid detection of toxin B gene sequences and for presumptive identification of 027/NAP1/BI strains of toxigenic Clostridium Difficile from unformed stool specimens collected from patients suspected of having C. difficile infection (CDI). The Xpert C. difficile/Epi Assay is intended as an aid in the diagnosis of CDI. Detection of 027/NAP1/BI strains of C. difficile by the Xpert C. difficile/Epi Assay is presumptive and is solely for epidemiological purposes and is not intended to guide or monitor treatment for C. difficile infections. The [employer] Xpert C. difficile/Epi Assay is intended for use in hospital, reference or state laboratory settings.  The device is not intended for point-of-care use.       Methodology:     The [employer] Instrument Systems automate and integrate sample lysis, nucleic acid purification and amplification, and detection of the target sequence in simple or complex samples using real-time polymerase chain reaction (PCR). The [employer] Xpert  C. difficile/Epi assay detects the  toxin B gene (tcdB), the binary toxin gene (CDT), and the single-base-pair deletion at nucleotide 117 within the gene encoding a negative regulator of toxin production (tcdC?117). Presumptive identification of 027/NAP1/BI strains of C. difficile is by detection of binary toxin (CDT) gene sequences and the single base pair deletion at nucleotide 117 in the tcdC gene. The tcdC gene encodes for a negative regulator in C. difficile toxin production. A fluorescent signal becomes detected and increases each time the specific DNA strand is amplified. The Real-time PCR generates a growth curve with number of cycles on the x-axis and fluorescence on the y-axis. If the organism's DNA is not detected by the real-time PCR reaction the growth curve will be flat and will be resulted as negative.   EnteroHaemorrhagic E. coli Work Up   Result Value Ref Range    Shiga Toxin 1 Negative Negative    Shiga Toxin 2 Negative Negative       Lester, your stool test results are all back now and they are negative for Clostridium difficile as well as pathogenic bacteria.  That is good news and I hope that you are physically doing better as well.  How has the diarrhea been?    Please call with questions or contact us using [employer].    Best Regards,        Electronically signed by Nikita Mercado MD

## 2021-06-19 NOTE — LETTER
Letter by Nohemi Lezama NP at      Author: Nohemi Lezama NP Service: -- Author Type: --    Filed:  Encounter Date: 3/27/2019 Status: (Other)         Lester Shi  [address] [place] [address]             March 27, 2019         Dear Mr. Shi,    Below are the results from your recent visit:    Resulted Orders   CT Chest Without Contrast    Narrative    EXAM: CT CHEST WO CONTRAST  LOCATION: [facility]  DATE/TIME: 3/25/2019 9:42 AM    INDICATION: follow up from severe pneumonia 4 weeks ago per radiologist recommendations follow up hospitalization for severe pneumonia  COMPARISON: 2/9/2019  TECHNIQUE: Helical images were obtained through the chest. Multiplanar reformats were obtained. Dose reduction techniques were used.  IV CONTRAST: None.    FINDINGS:   LUNGS AND PLEURA: There is been resolution of the prominent bilateral airspace disease on the previous exam. Resolution of the inflammatory bronchial wall thickening and regions of endobronchial mucous debris. A few minimal regions of groundglass opacity   persist with minimal linear discoid atelectasis or scar present at lung bases. Several stable small calcified granulomata are present bilaterally, as well as a few scattered nonspecific 2 mm nodules.    MEDIASTINUM: Negative, no lymphadenopathy.    LIMITED UPPER ABDOMEN: Negative.    MUSCULOSKELETAL: Negative.      Impression    CONCLUSION:   1.  Resolution of previous inflammatory changes involving both lungs. No significant residual disease. A few widely scattered, tiny benign-appearing nodules.         Lester, your lungs look great on this CT. No signs of active infection or any other concerns. This great news. :)    Please call with questions or contact us using [employer].    Sincerely,        Electronically signed by Nohemi Lezama NP

## 2021-06-19 NOTE — LETTER
Letter by Nohemi Lezama NP at      Author: Nohemi Lezama NP Service: -- Author Type: --    Filed:  Encounter Date: 11/18/2019 Status: Signed       My Asthma Action Plan     Name: Lester Shi   YOB: 1950  Date: 11/18/2019   My doctor: Nohemi Lezama NP   My clinic: [facility]        My Rescue Medicine:   Albuterol (Proair/Ventolin/Proventil HFA) 2-4 puffs EVERY 4 HOURS as needed. Use a spacer if recommended by your provider.   My Asthma Severity:   Intermittent/Exercise Induced  Know your asthma triggers: upper respiratory infections, exercise or sports and cold air             GREEN ZONE   Good Control    I feel good    No cough or wheeze    Can work, sleep and play without asthma symptoms     Take your asthma control medicine every day.     1. If exercise triggers your asthma, take your rescue medication    15 minutes before exercise or sports, and    During exercise if you have asthma symptoms  2. Spacer to use with inhaler: If you have a spacer, make sure to use it with your inhaler             YELLOW ZONE Getting Worse  I have ANY of these:    I do not feel good    Cough or wheeze    Chest feels tight    Wake up at night 1. Keep taking your Green Zone medications  2. Start taking your rescue medicine:    every 20 minutes for up to 1 hour. Then every 4 hours for 24-48 hours.  3. If you stay in the Yellow Zone for more than 12-24 hours, contact your doctor.  4. If you do not return to the Green Zone in 12-24 hours or you get worse, start taking your oral steroid medicine if prescribed by your provider.           RED ZONE Medical Alert - Get Help  I have ANY of these:    I feel awful    Medicine is not helping    Breathing getting harder    Trouble walking or talking    Nose opens wide to breathe     1. Take your rescue medicine NOW  2. If your provider has prescribed an oral steroid medicine, start taking it NOW  3. Call your doctor NOW  4. If you  are still in the Red Zone after 20 minutes and you have not reached your doctor:    Take your rescue medicine again and    Call 911 or go to the emergency room right away    See your regular doctor within 2 weeks of an Emergency Room or Urgent Care visit for follow-up treatment.          Annual Reminders:  Meet with Asthma Educator,  Flu Shot in the Fall, consider Pneumonia Vaccination for patients with asthma (aged 19 and older).    Pharmacy:   [id number]  [place], [place] - [address] AT [facility] [address]  [place] [address]  Phone: [phone] Fax: [phone]                            Asthma Triggers  How To Control Things That Make Your Asthma Worse    Triggers are things that make your asthma worse.  Look at the list below to help you find your triggers and what you can do about them.  You can help prevent asthma flare-ups by staying away from your triggers.      Trigger                                                          What you can do   Cigarette Smoke  Tobacco smoke can make asthma worse. Do not allow smoking in your home, car or around you.  Be sure no one smokes at a [employer] day care or school.  If you smoke, ask your health care provider for ways to help you quit.  Ask family members to quit too.  Ask your health care provider for a referral to Quit Plan to help you quit smoking, or call 8-068-429-PLAN.     Colds, Flu, Bronchitis  These are common triggers of asthma. Wash your hands often.  Dont touch your eyes, nose or mouth.  Get a flu shot every year.     Dust Mites  These are tiny bugs that live in cloth or carpet. They are too small to see. Wash sheets and blankets in hot water every week.   Encase pillows and mattress in dust mite proof covers.  Avoid having carpet if you can. If you have carpet, vacuum weekly.   Use a dust mask and HEPA vacuum.   Pollen and Outdoor Mold  Some people are allergic to trees,  grass, or weed pollen, or molds. Try to keep your windows closed.  Limit time out doors when pollen count is high.   Ask you health care provider about taking medicine during allergy season.     Animal Dander  Some people are allergic to skin flakes, urine or saliva from pets with fur or feathers. Keep pets with fur or feathers out of your home.    If you cant keep the pet outdoors, then keep the pet out of your bedroom.  Keep the bedroom door closed.  Keep pets off cloth furniture and away from stuffed toys.     Mice, Rats, and Cockroaches  Some people are allergic to the waste from these pests.   Cover food and garbage.  Clean up spills and food crumbs.  Store grease in the refrigerator.   Keep food out of the bedroom.   Indoor Mold  This can be a trigger if your home has high moisture. Fix leaking faucets, pipes, or other sources of water.   Clean moldy surfaces.  Dehumidify basement if it is damp and smelly.   Smoke, Strong Odors, and Sprays  These can reduce air quality. Stay away from strong odors and sprays, such as perfume, powder, hair spray, paints, smoke incense, paint, cleaning products, candles and new carpet.   Exercise or Sports  Some people with asthma have this trigger. Be active!  Ask your doctor about taking medicine before sports or exercise to prevent symptoms.    Warm up for 5-10 minutes before and after sports or exercise.     Other Triggers of Asthma  Cold air:  Cover your nose and mouth with a scarf.  Sometimes laughing or crying can be a trigger.  Some medicines and food can trigger asthma.

## 2021-06-20 NOTE — LETTER
Letter by Nikita Mercado MD at      Author: Nikita Mercado MD Service: -- Author Type: --    Filed:  Encounter Date: 1/15/2020 Status: Signed         January 15, 2020     Patient: Lester Shi   YOB: 1950   Date of Visit: 1/15/2020       Date of Injury:  1/13/2020  Employer:  [employer]    WORK STATUS  Work Related:  Yes  Work Status:  Return to School/Work with no restrictions on 1/15/2020    RESTRICTIONS  Employee may work usual  hrs/day with the following restrictions    TOTAL BODY  Patient is able to:  Lift/Carry 10 lbs:    No restriction  Push/Pull 11-20 lbs:  No restriction  Push/Pull 21-50 lbs:  No restriction  Push/Pull  lbs:  No restriction  Reach above shoulder:  No restriction  Reach below knee level:  No restriction  Squat/Kneel:  No restriction  Climb:  No restriction  Keep foot elevated:  No restriction  Drive a motor vehicle:  No restriction    Change positions (sit/stand) every PRN    Other restrictions:  none    UPPER EXTREMITY  Restrictions on: upper extremity none    Patient is able to:  Grasping- light/heavy:  Continuously- 99%  Repetitive wrist motion:  Frequently- 66%  Torque/Crimp:  Frequently- 66%  Operate power tools:  Not applicable  Keyboard:  Continuously- 99%  Write:  Continuously- 99%    DISCHARGE  Diagnosis: Contusion left elbow  Medications:   OTC tylenol OK  Follow-Up Appt:  Only if not back to baseline in 1 week          TREATMENT PLAN  Ice, alternating with heat, Tylenol for pain    Expected Full Duty Date:  1/15/2020    I understand my treatment and care plan.      Patient Signature:  _________________________________      Provider Signature:  ________________________________  Date:  1/15/2020*  Nikita Mercado MD  Provider Phone Number:  [phone]

## 2021-06-20 NOTE — LETTER
Letter by Nikita Mercado MD at      Author: Nikita Mercado MD Service: -- Author Type: --    Filed:  Encounter Date: 9/14/2020 Status: (Other)         September 14, 2020     Patient: Lester Shi   YOB: 1950   Date of Visit: 9/14/2020       To Whom it May Concern:    Lester Shi was seen in my clinic on 9/14/2020.  Please excuse patient from work on 9/10 through 9/15/2020.  OK to return to work on 9/16/2020 without limitations.    If you have any questions or concerns, please don't hesitate to call.    Sincerely,         Electronically signed by Nikita Mercado MD

## 2021-06-21 NOTE — LETTER
Letter by Nikita Mercado MD at      Author: Nikita Mercado MD Service: -- Author Type: --    Filed:  Encounter Date: 4/26/2021 Status: (Other)       My Asthma Action Plan    Name: Lester Shi   YOB: 1950  Date: 4/27/2021   My doctor: Nikita Mercado MD   My clinic: [facility]        My Control Medicine: Fluticasone propionate (Flovent HFA) - 110 mcg 2 puffs twice  My Rescue Medicine: Albuterol nebulizer solution 2.5 mg/3ml every 4 hours as needed  Albuterol (Proair/Ventolin/Proventil HFA) 2-4 puffs EVERY 4 HOURS as needed. Use a spacer if recommended by your provider.   Currently using Prednisone 40 mg daily for 5 days My Asthma Severity:   Mild Persistent  Know your asthma triggers: upper respiratory infections and pollens               GREEN ZONE   Good Control    I feel good    No cough or wheeze    Can work, sleep and play without asthma symptoms     Take your asthma control medicine every day.     1. If exercise triggers your asthma, take your rescue medication    15 minutes before exercise or sports, and    During exercise if you have asthma symptoms  2. Spacer to use with inhaler: If you have a spacer, make sure to use it with your inhaler             YELLOW ZONE Getting Worse  I have ANY of these:    I do not feel good    Cough or wheeze    Chest feels tight    Wake up at night 1. Keep taking your Green Zone medications  2. Start taking your rescue medicine:    every 20 minutes for up to 1 hour. Then every 4 hours for 24-48 hours.  3. If you stay in the Yellow Zone for more than 12-24 hours, contact your doctor.  4. If you do not return to the Green Zone in 12-24 hours or you get worse, start taking your oral steroid medicine if prescribed by your provider.           RED ZONE Medical Alert - Get Help  I have ANY of these:    I feel awful    Medicine is not helping    Breathing getting harder    Trouble walking or talking    Nose opens wide to breathe      1. Take your rescue medicine NOW  2. If your provider has prescribed an oral steroid medicine, start taking it NOW  3. Call your doctor NOW  4. If you are still in the Red Zone after 20 minutes and you have not reached your doctor:    Take your rescue medicine again and    Call 911 or go to the emergency room right away    See your regular doctor within 2 weeks of an Emergency Room or Urgent Care visit for follow-up treatment.          Annual Reminders:  Meet with Asthma Educator,  Flu Shot in the Fall, consider Pneumonia Vaccination for patients with asthma (aged 19 and older).    Pharmacy:   [id number] - [place], [place] - [address] [facility] [address]  [place] [facility] [address]  Phone: [phone] Fax: [phone]      Electronically signed by Nikita Mercado MD   Date: 04/27/21                    Asthma Triggers  How To Control Things That Make Your Asthma Worse    Triggers are things that make your asthma worse.  Look at the list below to help you find your triggers and what you can do about them.  You can help prevent asthma flare-ups by staying away from your triggers.      Trigger                                                          What you can do   Cigarette Smoke  Tobacco smoke can make asthma worse. Do not allow smoking in your home, car or around you.  Be sure no one smokes at a kandy day care or school.  If you smoke, ask your health care provider for ways to help you quit.  Ask family members to quit too.  Ask your health care provider for a referral to Quit Plan to help you quit smoking, or call 4-398-082-PLAN.     Colds, Flu, Bronchitis  These are common triggers of asthma. Wash your hands often.  Dont touch your eyes, nose or mouth.  Get a flu shot every year.     Dust Mites  These are tiny bugs that live in cloth or carpet. They are too small to see. Wash sheets and blankets in hot water every week.   Encase pillows  and mattress in dust mite proof covers.  Avoid having carpet if you can. If you have carpet, vacuum weekly.   Use a dust mask and HEPA vacuum.   Pollen and Outdoor Mold  Some people are allergic to trees, grass, or weed pollen, or molds. Try to keep your windows closed.  Limit time out doors when pollen count is high.   Ask you health care provider about taking medicine during allergy season.     Animal Dander  Some people are allergic to skin flakes, urine or saliva from pets with fur or feathers. Keep pets with fur or feathers out of your home.    If you cant keep the pet outdoors, then keep the pet out of your bedroom.  Keep the bedroom door closed.  Keep pets off cloth furniture and away from stuffed toys.     Mice, Rats, and Cockroaches   Some people are allergic to the waste from these pests.   Cover food and garbage.  Clean up spills and food crumbs.  Store grease in the refrigerator.   Keep food out of the bedroom.   Indoor Mold  This can be a trigger if your home has high moisture. Fix leaking faucets, pipes, or other sources of water.   Clean moldy surfaces.  Dehumidify basement if it is damp and smelly.   Smoke, Strong Odors, and Sprays  These can reduce air quality. Stay away from strong odors and sprays, such as perfume, powder, hair spray, paints, smoke incense, paint, cleaning products, candles and new carpet.   Exercise or Sports  Some people with asthma have this trigger. Be active!  Ask your doctor about taking medicine before sports or exercise to prevent symptoms.    Warm up for 5-10 minutes before and after sports or exercise.     Other Triggers of Asthma  Cold air:  Cover your nose and mouth with a scarf.  Sometimes laughing or crying can be a trigger.  Some medicines and food can trigger asthma.

## 2021-06-21 NOTE — PROGRESS NOTES
ASSESSMENT/PLAN:       1. Type 2 diabetes mellitus (H)  -Updating labs today, his A1c is quite elevated now, I will recommend him starting metformin and have him follow-up here in 4-6 weeks for recheck on the dosage.  - Glycosylated Hemoglobin A1c    2. Essential hypertension  -Blood pressure is mildly elevated today but historically has been quite normal.  I am going to have him return for nurse blood pressure check in the next few weeks to ensure that it is normal again.  - spironolactone (ALDACTONE) 25 MG tablet; Take 1 tablet (25 mg total) by mouth daily.  Dispense: 90 tablet; Refill: 1  - hydroCHLOROthiazide (HYDRODIURIL) 25 MG tablet; Take 1 tablet (25 mg total) by mouth daily.  Dispense: 90 tablet; Refill: 1  - amLODIPine (NORVASC) 5 MG tablet; Take 1 tablet (5 mg total) by mouth daily.  Dispense: 90 tablet; Refill: 1  - atenolol (TENORMIN) 50 MG tablet; Take 1 tablet (50 mg total) by mouth daily.  Dispense: 90 tablet; Refill: 3  - Comprehensive Metabolic Panel    3. Anemia  -Long-standing history of anemia, I do not see any iron studies and previously we will update those today.  - HM1(CBC and Differential)  - Ferritin  - Iron and Transferrin Iron Binding Capacity  - HM1 (CBC with Diff)    4. Essential Hypercholesterolemia  -Updating labs today, he is not on a statin medication as he is intolerant to simvastatin.  We may need to consider an alternate given his diabetes.  - Lipid Cascade    5. Cough  -Occasionally has a cough related to allergies, renewing albuterol inhaler today.  Asthma control test is normal.  - albuterol (PROAIR HFA;PROVENTIL HFA;VENTOLIN HFA) 90 mcg/actuation inhaler; Inhale 2 puffs every 6 (six) hours as needed for wheezing.  Dispense: 8.5 g; Refill: 11    6. Skin lesion  -Given appearance of the lesion I am referring him to dermatology for further evaluation and assessment.  - Ambulatory referral to Dermatology    7. Bilateral impacted cerumen  -Cleaned out today with curette without  difficulty, follow-up as needed    8. Screening for prostate cancer  -Discussed risks and benefits of screening, given his friends who have prostate cancer right now he would prefer to have it done  - PSA (Prostatic-Specific Antigen), Annual Screen    9. Immunization due  - Pneumococcal polysaccharide vaccine 23-valent 1 yo or older, subq/IM          Rhonda Wells MD      PROGRESS NOTE   2018    SUBJECTIVE:  Lester Shi is a 68 y.o. male  who presents for medication check.     He is doing well in general.     He does wonder about skin cancer screening. He does not note any obvious skin changes.  He does have a spot on his left lower abdomen that has been there for some time, it is fairly dark in nature.  He does wonder about seeing a dermatologist however as a lot of his friends had skin cancer.  He does fish.  He does wear sunscreen.    He did have his DOT physical done in . Typically he is getting 120/70. Does not check his BP at home. He does not have issues with the medications.  He is currently doing this dietary change, he does watch his salt some.    He does not check his blood sugars either. He does watch what he eats some. He does eat one meal a day, eating a big lunch. He is eating 1/2 banana for breakfast, large lunch and then no further eating. He has lost 5 pounds in the last week.     He got the flovent last year when he had a pneumonia. He does have known tree allergies in the spring as well. He does get stuffy at night as well. He does take Allegra daily and that works ok for him.he does flonase occasionally as well. He is medicating himself. He's hoping that when he loses weight it may get better.     He does have urinary urgency at times. He can have urinary urgency at times where he has to get the bathroom quickly.  This does not commonly happen, but he does know people who have  recently of prostate cancer.  Chief Complaint   Patient presents with     Medication  Management     Patient is here today to review his current medications. Patient is fasting today for lab work if needed.          Patient Active Problem List   Diagnosis     Eczema     Anemia     Otitis Media     Chest Pain     Type 2 diabetes mellitus (H)     Essential Hypercholesterolemia     Essential Hypertension     Joint Pain, Localized In The Knee     Frostbite     Obesity     Allergic Rhinitis     Klinefelter's Syndrome     Shortness Of Breath     Varicose vein     Right foot pain       Current Outpatient Prescriptions   Medication Sig Dispense Refill     albuterol (PROAIR HFA;PROVENTIL HFA;VENTOLIN HFA) 90 mcg/actuation inhaler Inhale 2 puffs every 6 (six) hours as needed for wheezing. 8.5 g 11     amLODIPine (NORVASC) 5 MG tablet Take 1 tablet (5 mg total) by mouth daily. 90 tablet 1     fexofenadine (ALLEGRA) 180 MG tablet Take 180 mg by mouth daily as needed.       fluticasone (FLONASE) 50 mcg/actuation nasal spray 1-2 sprays into each nostril daily as needed. PRN       hydroCHLOROthiazide (HYDRODIURIL) 25 MG tablet Take 1 tablet (25 mg total) by mouth daily. 90 tablet 1     olopatadine (PATANOL) 0.1 % ophthalmic solution Instill 1 drop into both eyes twice daily at 6 to 8 hour intervals 5 mL 5     spironolactone (ALDACTONE) 25 MG tablet Take 1 tablet (25 mg total) by mouth daily. 90 tablet 1     triamcinolone (KENALOG) 0.1 % cream APPLY TO THE AFFECTED 3 TIMES DAILY AS NEEDED 30 g 1     atenolol (TENORMIN) 50 MG tablet Take 1 tablet (50 mg total) by mouth daily. 90 tablet 3     No current facility-administered medications for this visit.        History   Smoking Status     Former Smoker     Quit date: 1979   Smokeless Tobacco     Never Used           OBJECTIVE:        Recent Results (from the past 240 hour(s))   Glycosylated Hemoglobin A1c   Result Value Ref Range    Hemoglobin A1c 9.4 (H) 3.5 - 6.0 %   HM1 (CBC with Diff)   Result Value Ref Range    WBC 5.8 4.0 - 11.0 thou/uL    RBC 3.80 (L) 4.40 -  6.20 mill/uL    Hemoglobin 11.9 (L) 14.0 - 18.0 g/dL    Hematocrit 35.0 (L) 40.0 - 54.0 %    MCV 92 80 - 100 fL    MCH 31.2 27.0 - 34.0 pg    MCHC 33.8 32.0 - 36.0 g/dL    RDW 12.7 11.0 - 14.5 %    Platelets 253 140 - 440 thou/uL    MPV 7.6 7.0 - 10.0 fL    Neutrophils % 45 (L) 50 - 70 %    Lymphocytes % 41 (H) 20 - 40 %    Monocytes % 9 2 - 10 %    Eosinophils % 5 0 - 6 %    Basophils % 1 0 - 2 %    Neutrophils Absolute 2.6 2.0 - 7.7 thou/uL    Lymphocytes Absolute 2.4 0.8 - 4.4 thou/uL    Monocytes Absolute 0.5 0.0 - 0.9 thou/uL    Eosinophils Absolute 0.3 0.0 - 0.4 thou/uL    Basophils Absolute 0.0 0.0 - 0.2 thou/uL       Vitals:    11/02/18 1136 11/02/18 1141   BP: 150/88 144/90   Patient Site: Left Arm Left Arm   Patient Position: Sitting Sitting   Cuff Size: Adult Large Adult Large   Pulse: 78    SpO2: 96%    Weight: (!) 253 lb 12.8 oz (115.1 kg)      Weight: 253 lb 12.8 oz (115.1 kg)        Physical Exam:  GENERAL APPEARANCE: A&A, NAD, well hydrated, well nourished  SKIN:  Normal skin turgor, large dark lesion on his left lower abdomen  HEENT: moist mucous membranes, no rhinorrhea, bilateral ear canals are 80-90% occluded with wax, this is removed with a curette without difficulty.  Ear canal was unremarkable appearing after removal and tympanic membranes are normal  NECK: Normal, without lymphadenopathy  CV: RRR, no M/G/R   LUNGS: CTAB, pectus excavatum present  ABDOMEN: S&NT, no masses   EXTREMITY: no edema   NEURO: no gross deficits   Psych: His affect is appropriate, he is well-dressed and groomed, his thought process and speech pattern are normal      The 10-year ASCVD risk score (Ozielmiguel ROLDAN Jr., et al., 2013) is: 45.8%    Values used to calculate the score:      Age: 68 years      Sex: Male      Is Non- : No      Diabetic: Yes      Tobacco smoker: No      Systolic Blood Pressure: 144 mmHg      Is BP treated: Yes      HDL Cholesterol: 45 mg/dL      Total Cholesterol: 216 mg/dL

## 2021-06-21 NOTE — PROGRESS NOTES
Ear Cerumen Removal  Date/Time: 11/2/2018 12:15 PM  Performed by: MICHEAL ZHANG  Authorized by: MICHEAL ZHANG     Anesthesia:  Local Anesthetic: none  Location: bilateral ears.  Procedure type: curette    Sedation:  Patient sedated: no  Patient tolerance: Patient tolerated the procedure well with no immediate complications

## 2021-06-22 NOTE — PROGRESS NOTES
1. Cough     2. Wheezing  predniSONE (DELTASONE) 1 MG tablet   3. Mild persistent asthma with acute exacerbation  predniSONE (DELTASONE) 1 MG tablet   4. Nasal congestion  fluticasone (FLONASE) 50 mcg/actuation nasal spray   5. Essential hypertension         ASSESSMENT/PLAN:     The following high BMI interventions were performed this visit: encouragement to exercise and weight monitoring    1. Cough      2. Wheezing    - predniSONE (DELTASONE) 1 MG tablet; 40 mg daily x3 days, 30 mg daily x3 days, 20 mg daily x3 days, 10 mg daily x3 days.  Dispense: 30 tablet; Refill: 0    3. Mild persistent asthma with acute exacerbation    - predniSONE (DELTASONE) 1 MG tablet; 40 mg daily x3 days, 30 mg daily x3 days, 20 mg daily x3 days, 10 mg daily x3 days.  Dispense: 30 tablet; Refill: 0    4. Nasal congestion    - fluticasone (FLONASE) 50 mcg/actuation nasal spray; PRN .  Dispense: 16 g; Refill: 5    5. Essential hypertension    -return to clinic in 2 weeks for BP recheck, patient make sure to take dose daily. He forgot to take his BP meds yesterday      There are no Patient Instructions on file for this visit.  Medications Discontinued During This Encounter   Medication Reason     olopatadine (PATANOL) 0.1 % ophthalmic solution Cost of medication     fluticasone (FLONASE) 50 mcg/actuation nasal spray Reorder     Return in about 2 weeks (around 12/18/2018) for hypertension.        Nohemi Lezama NP          SUBJECTIVE:  Lester Shi is a 68 y.o. male who presents for evaluation of his cough with wheezing that started 1 week ago.  Symptoms have been constant, he does feel some congestion within the chest cavity.  Aggravating factors include dryness in his house, he typically wakes up in the middle of the night daily with his head and sinuses feeling plugged up.  Most the time he will sleep in his recliner or moved to the couch due to this.  He has been using his albuterol inhaler 4-5 times daily for the last week, he  does have a history of mild, persistent asthma.  He restarted his Flonase nasal spray today, he would like a refill as he is out.  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  No recent travel, vaccinations or antibiotic use.  He previously was taking Advair but has been off this now for 3-4 years.  He is only using the albuterol on an as-needed basis.  He typically gets some sort of upper respiratory infection around the same time a year annually.  We discussed the fact that he may need to return back to using the Advair on a daily basis to see if this helps control his asthma symptoms more effectively.  Blood pressure is elevated x2 today, he informs me that he did not take his dose yesterday.  He did take his pills this morning.  Currently taking amlodipine, atenolol and hydrochlorothiazide along with spironolactone.  Denies chest pain, headaches, vision changes or dizziness.  Remainder of his vital signs are stable, he is not in any acute respiratory distress today.  Chief Complaint   Patient presents with     Breathing Problem      x 1 week - poss bronchitis, pnuemonia - cough, wheezing         Patient Active Problem List   Diagnosis     Eczema     Anemia     Otitis Media     Chest Pain     Type 2 diabetes mellitus (H)     Essential Hypercholesterolemia     Essential Hypertension     Joint Pain, Localized In The Knee     Frostbite     Obesity     Allergic Rhinitis     Klinefelter's Syndrome     Shortness Of Breath     Varicose vein     Right foot pain     Nasal congestion     Mild persistent asthma with acute exacerbation     Wheezing     Cough       Current Outpatient Medications   Medication Sig Dispense Refill     albuterol (PROAIR HFA;PROVENTIL HFA;VENTOLIN HFA) 90 mcg/actuation inhaler Inhale 2 puffs every 6 (six) hours as needed for wheezing. 8.5 g 11     amLODIPine (NORVASC) 5 MG tablet Take 1 tablet (5 mg total) by mouth daily. 90  tablet 1     atenolol (TENORMIN) 50 MG tablet Take 1 tablet (50 mg total) by mouth daily. 90 tablet 3     fexofenadine (ALLEGRA) 180 MG tablet Take 180 mg by mouth daily as needed.       fluticasone (FLONASE) 50 mcg/actuation nasal spray PRN . 16 g 5     hydroCHLOROthiazide (HYDRODIURIL) 25 MG tablet Take 1 tablet (25 mg total) by mouth daily. 90 tablet 1     metFORMIN (GLUCOPHAGE) 500 MG tablet Take 1 tablet (500 mg total) by mouth daily with breakfast for 7 days, THEN 1 tablet (500 mg total) 2 (two) times a day with meals. 60 tablet 2     spironolactone (ALDACTONE) 25 MG tablet Take 1 tablet (25 mg total) by mouth daily. 90 tablet 1     triamcinolone (KENALOG) 0.1 % cream APPLY TO THE AFFECTED 3 TIMES DAILY AS NEEDED 30 g 1     predniSONE (DELTASONE) 1 MG tablet 40 mg daily x3 days, 30 mg daily x3 days, 20 mg daily x3 days, 10 mg daily x3 days. 30 tablet 0     No current facility-administered medications for this visit.        Social History     Tobacco Use   Smoking Status Former Smoker     Last attempt to quit:      Years since quittin.9   Smokeless Tobacco Never Used       REVIEW OF SYSTEMS: Denies fever/chills/sore throat/rhinorrhea/ear pain/swollen glands/abdominal pain/changes in bowel habits/urinary symptoms/rash.    TOBACCO USE:  Social History     Tobacco Use   Smoking Status Former Smoker     Last attempt to quit:      Years since quittin.9   Smokeless Tobacco Never Used     Social History     Socioeconomic History     Marital status:      Spouse name: Not on file     Number of children: Not on file     Years of education: Not on file     Highest education level: Not on file   Social Needs     Financial resource strain: Not on file     Food insecurity - worry: Not on file     Food insecurity - inability: Not on file     Transportation needs - medical: Not on file     Transportation needs - non-medical: Not on file   Occupational History     Not on file   Tobacco Use     Smoking  status: Former Smoker     Last attempt to quit: 1979     Years since quittin.9     Smokeless tobacco: Never Used   Substance and Sexual Activity     Alcohol use: Yes     Comment: rare     Drug use: Not on file     Sexual activity: Not on file   Other Topics Concern     Not on file   Social History Narrative     Not on file         OBJECTIVE:            Vitals:    18 1042   BP: (!) 160/100   Pulse:    Resp:    Temp:    SpO2:      Weight: (!) 253 lb (114.8 kg)    Wt Readings from Last 3 Encounters:   18 (!) 253 lb (114.8 kg)   18 (!) 253 lb 12.8 oz (115.1 kg)   18 (!) 256 lb (116.1 kg)     Body mass index is 34.31 kg/m .        Physical Exam:  GENERAL APPEARANCE: A&A, NAD, well hydrated, well nourished  HEAD: atraumatic, no deformity, no sinus pain  EYES: PERRL, EOM's intact, no redness or swelling  EARS: TM's normal, gray with nl light reflex  NOSE: no post nasal drainage or thrush  MOUTH: without erythema, exudate or thrush  NECK: Supple, without lymphadenopathy, no thyroid mass, no JVD, no bruit  CV: RRR, no M/G/R   LUNGS: Inspiratory and expiratory wheezes in all lung fields, normal respiratory effort  ABDOMEN: S&NT, no masses, no organomegaly, BS present x4   SKIN:  Normal skin turgor, no lesions/rashes, warm and dry   NEURO: no gross deficits

## 2021-06-23 NOTE — PROGRESS NOTES
1. Cough  XR Chest 2 Views   2. SOB (shortness of breath)  XR Chest 2 Views   3. Wheezing  XR Chest 2 Views    predniSONE (DELTASONE) 10 mg tablet   4. Mild persistent asthma with acute exacerbation  predniSONE (DELTASONE) 10 mg tablet         ASSESSMENT/PLAN:     The following high BMI interventions were performed this visit: encouragement to exercise and weight monitoring    1. Cough    - XR Chest 2 Views    2. SOB (shortness of breath)    - XR Chest 2 Views    3. Wheezing    - XR Chest 2 Views  - predniSONE (DELTASONE) 10 mg tablet; 40 mg daily x3 days, 30 mg daily x3 days, 20 mg daily x3 days, 10 mg daily x3 days.  Dispense: 30 tablet; Refill: 0    4. Mild persistent asthma with acute exacerbation    - predniSONE (DELTASONE) 10 mg tablet; 40 mg daily x3 days, 30 mg daily x3 days, 20 mg daily x3 days, 10 mg daily x3 days.  Dispense: 30 tablet; Refill: 0      There are no Patient Instructions on file for this visit.  Medications Discontinued During This Encounter   Medication Reason     predniSONE (DELTASONE) 1 MG tablet      Return if symptoms worsen or fail to improve, for URI symptoms.        Nohemi Lezama NP          SUBJECTIVE:  Lester Shi is a 68 y.o. male who presents for evaluation of his worsening shortness of breath with wheezing and cough.  Symptoms have been rather persistent, he does have pain in the rib cage today from his persistent cough.  Aggravating symptoms include laying flat and cold air.  He has been sleeping in his chair the last year due to his asthma symptoms.  He has been using his albuterol inhaler for his relief for wheezing, however, he is only using his inhaler twice a day.  I did encourage him to use his albuterol more than twice a day for his wheezing and asthma symptoms.  He was previously on Advair, he is no longer taking this and he cannot tell me why he ever stopped using it.  Based on his symptoms and recurrence, it is it is clear that his asthma is not well  controlled and I did advise him to call his insurance company since his new plan just started to find out what the cheapest form of steroid inhaler will be for him for his asthma and he will let me know.  He is also been taking an over-the-counter allergy pill for his sinus congestion and using ibuprofen as needed for the discomfort.  Oxygen sats on room air today at rest are 93%, he is rather wheezy today just sitting on the exam table.  He is rating his rib cage pain a 2 out of 10 today.  He has not traveled recently, he has been exposed to upper respiratory symptoms from his granddaughter who also has the same problem, she also has asthma.  He recently got his flu shot, last time he was in clinic for the same symptoms he received a prednisone inhaler.  When reviewing his previous x-ray imaging, in November 2017 he did have some granulomas that were seen in the lung, will repeat chest x-ray today for comparison.  He did miss work today and is requesting a return to work note for Monday.   Chief Complaint   Patient presents with     URI     x 3 days - productive cough/green in color, sob, wheezing,          Patient Active Problem List   Diagnosis     Eczema     Anemia     Otitis Media     Chest Pain     Type 2 diabetes mellitus (H)     Essential Hypercholesterolemia     Essential Hypertension     Joint Pain, Localized In The Knee     Frostbite     Obesity     Allergic Rhinitis     Klinefelter's Syndrome     Shortness Of Breath     Varicose vein     Right foot pain     Nasal congestion     Mild persistent asthma with acute exacerbation     Wheezing     Cough       Current Outpatient Medications   Medication Sig Dispense Refill     albuterol (PROAIR HFA;PROVENTIL HFA;VENTOLIN HFA) 90 mcg/actuation inhaler Inhale 2 puffs every 6 (six) hours as needed for wheezing. 8.5 g 11     amLODIPine (NORVASC) 5 MG tablet Take 1 tablet (5 mg total) by mouth daily. 90 tablet 1     atenolol (TENORMIN) 50 MG tablet Take 1 tablet (50  mg total) by mouth daily. 90 tablet 3     fexofenadine (ALLEGRA) 180 MG tablet Take 180 mg by mouth daily as needed.       fluticasone (FLONASE) 50 mcg/actuation nasal spray PRN . 16 g 5     hydroCHLOROthiazide (HYDRODIURIL) 25 MG tablet Take 1 tablet (25 mg total) by mouth daily. 90 tablet 1     metFORMIN (GLUCOPHAGE) 500 MG tablet Take 1 tablet (500 mg total) by mouth daily with breakfast for 7 days, THEN 1 tablet (500 mg total) 2 (two) times a day with meals. 60 tablet 2     spironolactone (ALDACTONE) 25 MG tablet Take 1 tablet (25 mg total) by mouth daily. 90 tablet 1     triamcinolone (KENALOG) 0.1 % cream APPLY TO THE AFFECTED 3 TIMES DAILY AS NEEDED 30 g 1     predniSONE (DELTASONE) 10 mg tablet 40 mg daily x3 days, 30 mg daily x3 days, 20 mg daily x3 days, 10 mg daily x3 days. 30 tablet 0     No current facility-administered medications for this visit.        Social History     Tobacco Use   Smoking Status Former Smoker     Last attempt to quit:      Years since quittin.1   Smokeless Tobacco Never Used       REVIEW OF SYSTEMS: Denies fever/chills/sore throat/rhinorrhea/ear pain/swollen glands/abdominal pain/changes in bowel habits/urinary symptoms/rash.      TOBACCO USE:  Social History     Tobacco Use   Smoking Status Former Smoker     Last attempt to quit:      Years since quittin.1   Smokeless Tobacco Never Used     Social History     Socioeconomic History     Marital status:      Spouse name: Not on file     Number of children: Not on file     Years of education: Not on file     Highest education level: Not on file   Social Needs     Financial resource strain: Not on file     Food insecurity - worry: Not on file     Food insecurity - inability: Not on file     Transportation needs - medical: Not on file     Transportation needs - non-medical: Not on file   Occupational History     Not on file   Tobacco Use     Smoking status: Former Smoker     Last attempt to quit: 1979     Years  since quittin.1     Smokeless tobacco: Never Used   Substance and Sexual Activity     Alcohol use: Yes     Comment: rare     Drug use: Not on file     Sexual activity: Not on file   Other Topics Concern     Not on file   Social History Narrative     Not on file         OBJECTIVE:            Vitals:    19 1026   BP: 132/84   Pulse: (!) 104   Resp: 19   Temp: 98.2  F (36.8  C)   SpO2: 93%     Weight: (!) 251 lb (113.9 kg)    Wt Readings from Last 3 Encounters:   19 (!) 251 lb (113.9 kg)   18 (!) 253 lb (114.8 kg)   18 (!) 253 lb 12.8 oz (115.1 kg)     Body mass index is 34.04 kg/m .        Physical Exam:  GENERAL APPEARANCE: A&A, NAD, well hydrated, well nourished  HEAD: atraumatic, no deformity  EYES: PERRL, EOM's intact, no redness or swelling  EARS: TM's normal, gray with nl light reflex  NOSE: no post nasal drainage or thrush  MOUTH: without erythema, exudate or thrush  NECK: Supple, without lymphadenopathy, no thyroid mass, no JVD, no bruit  CV: RRR, no M/G/R   LUNGS: Inspiratory and expiratory wheezing in all lung fields, increased respiratory effort at rest   ABDOMEN: S&NT, no masses, no organomegaly, BS present x4   SKIN:  Normal skin turgor, no lesions/rashes, warm and dry       .

## 2021-06-23 NOTE — TELEPHONE ENCOUNTER
Lester almanzar has had Influenza B for one week. Used Tamiflu.  Also was given Prednisone as well for (Asthma).     Coughing much, productive. Frustrated. Doesn't feel breathing is worse, coughing is very persistent though and bothersome for him. Discussed risk of Pneumonia with Flu and being mindful of this and being seen urgently with breathing concerns or changes.     Was thinking he could return to work this next week but says fever now back. Encouraged WIC today, use mask upon entering. He verbalized understanding plans to talk with his wife and go.     Reason for Disposition    [1] Fever returns after gone for over 24 hours AND [2] symptoms worse or not improved    Protocols used: INFLUENZA - SEASONAL-A-

## 2021-06-23 NOTE — PROGRESS NOTES
ASSESSMENT/PLAN:       1. Influenza B  - Influenza A/B Rapid Test- Nasal Swab  We will treat with Tamiflu 75 mg twice a day for 5 days  The patient will continue with the prednisone taper along with Flovent inhaler and the albuterol as needed.  Expectations discussed and a work excuse was given for him to use through Wednesday of this week.  - oseltamivir (TAMIFLU) 75 MG capsule; Take 1 capsule (75 mg total) by mouth 2 (two) times a day for 5 days.  Dispense: 10 capsule; Refill: 0  Tamiflu is prescribed because he is high risk with his age as well as his asthma history and he has a granddaughter that lives with him that also has asthma.    2. Type 2 diabetes mellitus with hyperglycemia, without long-term current use of insulin (H)  The patient did not start his Metformin right away because he has heard bad things about the medication but I strongly encouraged him to take it twice a day as his previous A1c and fasting blood sugar were significantly elevated.    3. Urinary urgency  I think is very likely that his urinary symptoms are related to his hyperglycemia and would expect that those symptoms should improve with improvement in his blood sugars.  If his symptoms persist we will need to get a urine for analysis and possible culture.  The patient's PSA has been very good.  It certainly is okay for him to drink good amount of water.        Nikita Mercado MD      PROGRESS NOTE   2/4/2019    SUBJECTIVE:  Lester Shi is a 68 y.o. male  who presents for   Chief Complaint   Patient presents with     Cough     1. Influenza B  5-day history of asthma-like symptoms that was seen and treated by Nohemi last week with prednisone and Flovent inhaler.  Over the weekend the patient developed a fever of 101 that lasted for about 24 hours and then seemed to be improved but he also has had headache myalgias sore throat.  Has some pain in the right ear and his nasal drainage has been green.    2. Type 2 diabetes mellitus with  discussed option plug LLL if NI with consistent AT use.  Samples of Systane Ultra given today. hyperglycemia, without long-term current use of insulin (H)  The patient is seen [facility] on February 20 to establish care and follow-up on his diabetes.  At his last visit his blood sugars were elevated along with A1c but he was reluctant to start Metformin until just over the last week.  He currently is taking Metformin 500 mg twice a day.    3. Urinary urgency  Over the last few months the patient has noticed increased with urinary frequency, urgency and some urinary incontinence.  He is not noticed any dysuria.  He states that he does drink a lot of water.  His PSA has been less than 1 which is likely related to his Klinefelter's syndrome.  Patient Active Problem List   Diagnosis     Eczema     Anemia     Otitis Media     Chest Pain     Type 2 diabetes mellitus (H)     Essential Hypercholesterolemia     Essential Hypertension     Joint Pain, Localized In The Knee     Frostbite     Obesity     Allergic Rhinitis     Klinefelter's Syndrome     Shortness Of Breath     Varicose vein     Right foot pain     Nasal congestion     Mild persistent asthma with acute exacerbation     Wheezing     Cough       Current Outpatient Medications   Medication Sig Dispense Refill     albuterol (PROAIR HFA;PROVENTIL HFA;VENTOLIN HFA) 90 mcg/actuation inhaler Inhale 2 puffs every 6 (six) hours as needed for wheezing. 8.5 g 11     amLODIPine (NORVASC) 5 MG tablet Take 1 tablet (5 mg total) by mouth daily. 90 tablet 1     atenolol (TENORMIN) 50 MG tablet Take 1 tablet (50 mg total) by mouth daily. 90 tablet 3     fexofenadine (ALLEGRA) 180 MG tablet Take 180 mg by mouth daily as needed.       fluticasone (FLONASE) 50 mcg/actuation nasal spray PRN . 16 g 5     fluticasone (FLOVENT HFA) 110 mcg/actuation inhaler Inhale 2 puffs 2 (two) times a day. 1 Inhaler 0     hydroCHLOROthiazide (HYDRODIURIL) 25 MG tablet Take 1 tablet (25 mg total) by mouth daily. 90 tablet 1     metFORMIN (GLUCOPHAGE) 500 MG tablet Take 1 tablet (500 mg total) by  mouth daily with breakfast for 7 days, THEN 1 tablet (500 mg total) 2 (two) times a day with meals. 60 tablet 2     predniSONE (DELTASONE) 10 mg tablet 40 mg daily x3 days, 30 mg daily x3 days, 20 mg daily x3 days, 10 mg daily x3 days. 30 tablet 0     spironolactone (ALDACTONE) 25 MG tablet Take 1 tablet (25 mg total) by mouth daily. 90 tablet 1     triamcinolone (KENALOG) 0.1 % cream APPLY TO THE AFFECTED 3 TIMES DAILY AS NEEDED 30 g 1     oseltamivir (TAMIFLU) 75 MG capsule Take 1 capsule (75 mg total) by mouth 2 (two) times a day for 5 days. 10 capsule 0     No current facility-administered medications for this visit.        Social History     Tobacco Use   Smoking Status Former Smoker     Last attempt to quit: 1979     Years since quittin.1   Smokeless Tobacco Never Used           OBJECTIVE:        Recent Results (from the past 240 hour(s))   Influenza A/B Rapid Test- Nasal Swab   Result Value Ref Range    Influenza  A, Rapid Antigen No Influenza A antigen detected No Influenza A antigen detected    Influenza B, Rapid Antigen Influenza B antigen detected (!) No Influenza B antigen detected       Vitals:    19 1006   BP: 136/70   Pulse: 76   Temp: 98  F (36.7  C)   TempSrc: Oral   Weight: (!) 248 lb 9 oz (112.7 kg)   Height: 6' (1.829 m)     Weight: (!) 248 lb 9 oz (112.7 kg)          Physical Exam:  GENERAL APPEARANCE: 68-year-old male, no respiratory distress, well hydrated, well nourished  SKIN:  Normal skin turgor, no lesions/rashes   HEENT: moist mucous membranes, no rhinorrhea, both tympanic membranes are normal and his pharynx is noninjected  NECK: Normal without adenopathy or masses  CV: RRR, no M/G/R   LUNGS: Scattered rhonchi and expiratory wheezing bilaterally.  ABDOMEN: S&NT, no masses or enlarged organs   EXTREMITY: no edema and full ROM of all joints  NEURO: no focal findings

## 2021-06-23 NOTE — PROGRESS NOTES
1. Influenza B       Work note written for patient excusing him from February 1 through February 8, 2019. He may return to work on February 11, 2019.    ASSESSMENT/PLAN:     The following high BMI interventions were performed this visit: encouragement to exercise and weight monitoring    1. Influenza B    -Prescribed Tamiflu therapy from 02/04/2019 OV.  Continue with Prednisone, Flovent two times a day and Albuterol PRN for acute asthma flare up      There are no Patient Instructions on file for this visit.  There are no discontinued medications.  Return if symptoms worsen or fail to improve, for influenza.        Nohemi Lezama NP          SUBJECTIVE:  Lester Shi is a 68 y.o. male who presents for follow-up for his influenza B.  Patient was diagnosed with influenza B on February 4 and given a prescription for Tamiflu.  Seen in clinic 3 days before that for upper respiratory issues and asthma flareup and given a prescription for prednisone and Flovent inhaler for his asthma since he has not been well controlled.  During his February 1 visit, he was given a work note excusing him from work for the next several days, he is needing a new note today due to his new influenza diagnosis.  He continues to have persistent cough that is dry, worsening fatigue and has been sleeping quite a bit since he was diagnosed with the flu.  Work note written for him excusing him from his shifts on February 1 - February 8 and he may return to work on February 11 if he feels he is ready to go back.  He is in the middle of his Tamiflu therapy, he was also given prescriptions for prednisone and Flovent for his asthma issues.  Asthma has not been well controlled for the last several months.  He also notices pain in his forehead above the right eye ever since his influenza diagnosis.  He denies any nausea, vomiting or diarrhea today.  He does admit to body aches and chills still today.  Last time he had a fever was 3 days ago, max  temp was 101.  Blood pressure is elevated x2 today along with heart rate.  He took his blood pressure medication several hours later than he normally does and has been coughing quite a bit since he has been in clinic today. Denies CP, SO, H/A, dizziness or LE swelling today.  He would like to establish care with me, we did get him set up for an appointment for this in the next month where we will complete his asthma follow-up again at that time since adding the Flovent to his regular regimen.  Chief Complaint   Patient presents with     Follow-up     Illness         Patient Active Problem List   Diagnosis     Eczema     Anemia     Otitis Media     Chest Pain     Type 2 diabetes mellitus (H)     Essential Hypercholesterolemia     Essential Hypertension     Joint Pain, Localized In The Knee     Frostbite     Obesity     Allergic Rhinitis     Klinefelter's Syndrome     Shortness Of Breath     Varicose vein     Right foot pain     Nasal congestion     Mild persistent asthma with acute exacerbation     Wheezing     Cough     Influenza B       Current Outpatient Medications   Medication Sig Dispense Refill     albuterol (PROAIR HFA;PROVENTIL HFA;VENTOLIN HFA) 90 mcg/actuation inhaler Inhale 2 puffs every 6 (six) hours as needed for wheezing. 8.5 g 11     amLODIPine (NORVASC) 5 MG tablet Take 1 tablet (5 mg total) by mouth daily. 90 tablet 1     atenolol (TENORMIN) 50 MG tablet Take 1 tablet (50 mg total) by mouth daily. 90 tablet 3     fexofenadine (ALLEGRA) 180 MG tablet Take 180 mg by mouth daily as needed.       fluticasone (FLONASE) 50 mcg/actuation nasal spray PRN . 16 g 5     fluticasone (FLOVENT HFA) 110 mcg/actuation inhaler Inhale 2 puffs 2 (two) times a day. 1 Inhaler 0     hydroCHLOROthiazide (HYDRODIURIL) 25 MG tablet Take 1 tablet (25 mg total) by mouth daily. 90 tablet 1     metFORMIN (GLUCOPHAGE) 500 MG tablet Take 1 tablet (500 mg total) by mouth daily with breakfast for 7 days, THEN 1 tablet (500 mg  total) 2 (two) times a day with meals. 60 tablet 2     oseltamivir (TAMIFLU) 75 MG capsule Take 1 capsule (75 mg total) by mouth 2 (two) times a day for 5 days. 10 capsule 0     predniSONE (DELTASONE) 10 mg tablet 40 mg daily x3 days, 30 mg daily x3 days, 20 mg daily x3 days, 10 mg daily x3 days. 30 tablet 0     spironolactone (ALDACTONE) 25 MG tablet Take 1 tablet (25 mg total) by mouth daily. 90 tablet 1     triamcinolone (KENALOG) 0.1 % cream APPLY TO THE AFFECTED 3 TIMES DAILY AS NEEDED 30 g 1     No current facility-administered medications for this visit.        Social History     Tobacco Use   Smoking Status Former Smoker     Last attempt to quit:      Years since quittin.1   Smokeless Tobacco Never Used       REVIEW OF SYSTEMS: Denies fever/sore throat/rhinorrhea/ear pain/swollen glands/abdominal pain/changes in bowel habits/urinary symptoms/rash.      TOBACCO USE:  Social History     Tobacco Use   Smoking Status Former Smoker     Last attempt to quit:      Years since quittin.1   Smokeless Tobacco Never Used     Social History     Socioeconomic History     Marital status:      Spouse name: Not on file     Number of children: Not on file     Years of education: Not on file     Highest education level: Not on file   Social Needs     Financial resource strain: Not on file     Food insecurity - worry: Not on file     Food insecurity - inability: Not on file     Transportation needs - medical: Not on file     Transportation needs - non-medical: Not on file   Occupational History     Not on file   Tobacco Use     Smoking status: Former Smoker     Last attempt to quit:      Years since quittin.1     Smokeless tobacco: Never Used   Substance and Sexual Activity     Alcohol use: Yes     Comment: rare     Drug use: Not on file     Sexual activity: Not on file   Other Topics Concern     Not on file   Social History Narrative     Not on file         OBJECTIVE:            Vitals:     02/06/19 1025   BP: (!) 150/100   Pulse:    Resp:    Temp:    SpO2:      Weight: (!) 246 lb (111.6 kg)    Wt Readings from Last 3 Encounters:   02/06/19 (!) 246 lb (111.6 kg)   02/04/19 (!) 248 lb 9 oz (112.7 kg)   02/01/19 (!) 251 lb (113.9 kg)     Body mass index is 33.36 kg/m .        Physical Exam:  GENERAL APPEARANCE: A&A, NAD, well hydrated, well nourished  HEAD: atraumatic, no deformity  EYES: PERRL, EOM's intact, no redness or swelling  EARS: TM's normal, gray with nl light reflex  NOSE: no post nasal drainage or thrush  MOUTH: without erythema, exudate or thrush  NECK: Supple, without lymphadenopathy, no thyroid mass, no JVD, no bruit  CV: RRR, no M/G/R   LUNGS: Inspiratory and expiratory wheezes in all lung fields with scattered rhonchi, mildly increased respiratory effort with activity  ABDOMEN: S&NT, no masses, no organomegaly, BS present x4    SKIN:  Normal skin turgor, no lesions/rashes, warm and dry   NEURO: no gross deficits         yes

## 2021-06-23 NOTE — TELEPHONE ENCOUNTER
Patent notified of CXR results which confirm asthma flare up.  Again urged patient to contact his insurance company to find out what the cheapest inhaled steroid will be so we can get him started on this as soon as possible to get his asthma better controlled.  He will use a prednisone taper over the next 12 days in the meantime along with his albuterol inhaler every 4 hours.

## 2021-06-23 NOTE — TELEPHONE ENCOUNTER
Inhaler for Flovent sent to patient's pharmacy.  He needs to come back to the clinic in 4 weeks though so we can reevaluate the status of his asthma.  Please make sure he knows he needs to schedule an appointment for around March 1 week and see how the Flovent inhaler is working for him in combination with his albuterol.

## 2021-06-23 NOTE — TELEPHONE ENCOUNTER
Patient Returning Call  Reason for call:  Patient calling back   Information relayed to patient: Pt called his insurance -theses inhalers are covered    $30 -COPAY  Spiriva, Flovent , [place] ,Elipta, Symbicort, Advair    12 COPAY-   Flecainide - Metasonefurote   Patient has additional questions:  Yes   If YES, what are your questions/concerns:  YES please send new RX for Inhalers   Okay to leave a detailed message?:  Yes          Patent notified of CXR results which confirm asthma flare up.  Again urged patient to contact his insurance company to find out what the cheapest inhaled steroid will be so we can get him started on this as soon as possible to get his asthma better controlled.  He will use a prednisone taper over the next 12 days in the meantime along with his albuterol inhaler every 4 hours.

## 2021-06-23 NOTE — TELEPHONE ENCOUNTER
Pt notified. Appointment created for 3/7/19 at 11:00am with Nohemi Lezama CNP.    Ksenia Burns, EDMOND

## 2021-06-23 NOTE — PROGRESS NOTES
Assessment:     1. Fever, unspecified fever cause  XR Chest 2 Views    HM1(CBC and Differential)    HM1 (CBC with Diff)    Influenza A/B Rapid Test- Nasal Swab    DISCONTINUED: ipratropium-albuterol 0.5-2.5 mg/3 mL nebulizer solution 3 mL (DUO-NEB)   2. Pneumonia of both lower lobes due to infectious organism (H)     3. Essential hypertension     4. Low oxygen saturation       Xr Chest 2 Views    Result Date: 2/9/2019  XR CHEST 2 VIEWS 2/9/2019 2:12 PM INDICATION: Persistent cough. Concern for pneumonia. COMPARISON: 02/01/2019. FINDINGS: Patchy atelectasis/infiltrate at both lung bases, left more than right. Upper lungs are clear. Heart size and pulmonary vascularity are normal. No pleural effusion or pneumothorax. Hypertrophic degenerative changes of the thoracic spine.    Xr Chest 2 Views    Result Date: 2/1/2019  XR CHEST 2 VIEWS 2/1/2019 10:54 AM INDICATION: Cough. COMPARISON: 11/19/2017. FINDINGS: Hyperexpanded lungs. No consolidation. Couple calcified granulomas. No pleural effusion. Normal heart size. Degenerative changes spine.     Results for orders placed or performed in visit on 02/09/19   Influenza A/B Rapid Test- Nasal Swab   Result Value Ref Range    Influenza  A, Rapid Antigen No Influenza A antigen detected No Influenza A antigen detected    Influenza B, Rapid Antigen Influenza B antigen detected (!) No Influenza B antigen detected   HM1 (CBC with Diff)   Result Value Ref Range    WBC 21.7 (H) 4.0 - 11.0 thou/uL    RBC 3.81 (L) 4.40 - 6.20 mill/uL    Hemoglobin 11.5 (L) 14.0 - 18.0 g/dL    Hematocrit 34.5 (L) 40.0 - 54.0 %    MCV 91 80 - 100 fL    MCH 30.2 27.0 - 34.0 pg    MCHC 33.3 32.0 - 36.0 g/dL    RDW 13.3 11.0 - 14.5 %    Platelets 359 140 - 440 thou/uL    MPV 11.2 8.5 - 12.5 fL    Neutrophils % 86 (H) 50 - 70 %    Lymphocytes % 9 (L) 20 - 40 %    Monocytes % 5 2 - 10 %    Eosinophils % 0 0 - 6 %    Basophils % 0 0 - 2 %    Neutrophils Absolute 18.2 (H) 2.0 - 7.7 thou/uL    Lymphocytes  Absolute 1.9 0.8 - 4.4 thou/uL    Monocytes Absolute 1.1 (H) 0.0 - 0.9 thou/uL    Eosinophils Absolute 0.0 0.0 - 0.4 thou/uL    Basophils Absolute 0.0 0.0 - 0.2 thou/uL          Plan:     Differential diagnosis include but not limited to upper respiratory infection, influenza, pneumonia, bronchitis, elevated blood pressure.  On exam patient found to have diminished lung sounds bilateral lower lobes.  Since patient spiked a fever today, has been having symptoms of a cough for about 1-1/2 weeks.  Influenza done, patient finished his Tamiflu treatment yesterday.  Rapid influenza positive.  Patient with O2 sat of 92% on room air, DuoNeb was given in the clinic x1.  Patient reports that he feels like he had some relief from the medication.  Recheck of oxygen saturation 89% to 90%.  Discussed results with the patient and his wife.  Will do CBC to rule out infection and chest x-ray to rule out pneumonia.  Chest x-ray positive for pneumonia, patchy atelectasis versus infiltrates according to the radiologist report.  CBC done, preliminary results with WBC 21.5.  Discussed the findings with the patient and his wife.  At this time with elevated WBC I recommended for the patient to go to the emergency room for further evaluation and possible hospital admission for treatment of his pneumonia, influenza, and low oxygen saturation.  Patient with elevated blood pressure, 162/85 and elevated heart rate.  Patient reports that he forgot to take his medications this morning.  Talked to patient about taking his medication even when his not feeling well.  Patient and the wife were in agreement with the plan of care.  Report called and given to Beka in the emergency room.      Subjective:       68 y.o. male presents for evaluation of a fever and a cough.  Patient reports that he has been having a cough for about 1.5 weeks.  He developed a fever this morning, at 100.2.  He admits to feeling like a truck hit him, shortness of breath, and  fatigue.  Patient was recently seen up with his primary care provider on February 4, 2019 and was diagnosed with influenza B.  He was also seen on February 6, 2019 where he was complaining of a cough.  He was suspected of having an upper respiratory infection with bronchitis where he was given albuterol inhaler and prednisolone.  Currently still taking albuterol inhaler and prednisolone and feels like is not giving him any relief.  He reports that he is unable to shake up his cough, he is not able to bring up the phlegm.  For his influenza B he completed his Tamiflu yesterday.  He also admits to a frontal headache.  He has not used any ibuprofen or Tylenol but he has been drinking plenty of fluids.  He denies nausea, vomiting, or diarrhea.  He has a little bit of elevated on this exam, he reports that he missed his blood pressure medications this morning.      The following portions of the patient's history were reviewed and updated as appropriate: allergies, current medications, past family history, past medical history, past social history, past surgical history and problem list.    Review of Systems  A 12 point comprehensive review of systems was negative except as noted.      Objective:      /85 (Patient Site: Right Arm, Patient Position: Sitting, Cuff Size: Adult Regular)   Pulse (!) 109   Temp 98.7  F (37.1  C) (Oral)   Resp 16   Wt (!) 243 lb 8 oz (110.5 kg)   SpO2 92%   BMI 33.02 kg/m    General appearance: alert, appears stated age, cooperative and severe distress  Head: Normocephalic, without obvious abnormality, atraumatic, sinuses nontender to percussion  Eyes: conjunctivae/corneas clear. PERRL, EOM's intact. Fundi benign.  Ears: abnormal TM right ear - bulging and air-fluid level and abnormal TM left ear - bulging and air-fluid level  Nose: Nares normal. Septum midline. Mucosa normal. No drainage or sinus tenderness., clear discharge, turbinates swollen  Throat: lips, mucosa, and tongue  normal; teeth and gums normal  Lungs: diminished breath sounds bilaterally, LLL and RLL and rhonchi bilaterally  Heart: S1, S2 normal and tachycardia  Extremities: extremities normal, atraumatic, no cyanosis or edema  Pulses: 2+ and symmetric  Skin: Skin color, texture, turgor normal. No rashes or lesions  Lymph nodes: Cervical, supraclavicular, and axillary nodes normal.  Neurologic: Grossly normal     This note has been dictated using voice recognition software. Any grammatical or context distortions are unintentional and inherent to the software

## 2021-06-24 NOTE — PROGRESS NOTES
1. Pneumonia of both lungs due to infectious organism, unspecified part of lung  CT Chest Without Contrast   2. Fall, initial encounter  XR Knee Left 1 or 2 VWS   3. Acute pain of left knee  XR Knee Left 1 or 2 VWS       ASSESSMENT/PLAN:     The following high BMI interventions were performed this visit: encouragement to exercise and weight monitoring    1. Pneumonia of both lungs due to infectious organism, unspecified part of lung    - CT Chest Without Contrast; Future    2. Fall, initial encounter    - XR Knee Left 1 or 2 VWS    3. Acute pain of left knee    - XR Knee Left 1 or 2 VWS    We will plan on checking his A1c again after May 11 to see where he is at in regards to returning back to work.  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  Future orders were placed for labs so he can return to the clinic after May 11 for his blood draw.  He is currently being managed by endocrinology.  Plan on dropping off his [facility] paperwork again since he will not be able to return back to work until sometime in June.  Dates that will need to be included on his [facility] paperwork will include first day of hospitalization through Pat 15, 2019.  He is needing a prior authorization completed by his endocrinologist for the Victoza medication.  He will plan on following up with his endocrinologist to have this completed.      There are no Patient Instructions on file for this visit.  There are no discontinued medications.  Return in about 2 months (around 5/14/2019) for diabetes.    The visit lasted a total of 40 minutes face to face with the patient.  Over 50% of the time spent counseling and educating the patient about above content.      Nohemi Lezama NP          SUBJECTIVE:  Lester Shi is a 69 y.o. male who presents for follow-up from his pneumonia and recent fall.    She was hospitalized in February for severe pneumonia along with influenza.  He is needing  a repeat chest CT performed to make sure infection has completely resolved.  Since discharging home, he feels much better, he did complete IV and oral antibiotic therapy for his severe pneumonia.  He does experience mild shortness of breath when he is increasing his activity otherwise he denies any fever, chills, body aches, nausea or vomiting.  He does have a loss of appetite but this is because he has started some new insulin that was prescribed to him by his endocrinologist.  He is now taking Victoza which has significantly decreased his appetite, he has lost 4 pounds since starting insulin therapy.  Others were placed today for his chest CT that he will complete at [facility] in the next week or 2.    Fall: She slipped and fell on the ice 48 hours ago, he has been experiencing left knee pain with swelling ever since.  He does have an artificial knee on the left.  Admits to intermittent achy, dull and throbbing sensation that is aggravated when he walks too long or when he goes up on his tippy toes.  Relieving factors include ice and ibuprofen, he is rating his pain a 4 out of 10.  He has not noticed any significant color changes of the left lower extremity, denies any calf pain today and no numbness or tingling.  Is more concerned about the hardware in the knee since he has had a total knee replacement performed.     VSS. Will follow up in May after the 11th for next A1c check since insulin was adjusted.   Chief Complaint   Patient presents with     Follow-up     pneumonia         Patient Active Problem List   Diagnosis     Eczema     Anemia     Otitis Media     Chest Pain     Type 2 diabetes mellitus (H)     Essential Hypercholesterolemia     Essential Hypertension     Joint Pain, Localized In The Knee     Frostbite     Obesity     Allergic Rhinitis     Klinefelter's Syndrome     Shortness Of Breath     Varicose vein     Right foot pain     Nasal congestion     Mild persistent asthma with acute exacerbation      Wheezing     Cough     Influenza B     Pneumonia     Acute respiratory failure, unspecified whether with hypoxia or hypercapnia (H)     Morbid obesity (H)     Hyperglycemia     Acute kidney injury (H)     Uncontrolled type 1 diabetes mellitus with hyperglycemia (H)     Uncontrolled type 2 diabetes mellitus with hyperglycemia (H)     Encounter for examination following treatment at hospital       Current Outpatient Medications   Medication Sig Dispense Refill     albuterol (PROAIR HFA;PROVENTIL HFA;VENTOLIN HFA) 90 mcg/actuation inhaler Inhale 2 puffs every 6 (six) hours as needed for wheezing. 8.5 g 11     albuterol (PROVENTIL) 2.5 mg /3 mL (0.083 %) nebulizer solution Take 3 mL (2.5 mg total) by nebulization every 4 (four) hours as needed for wheezing. 40 vial 0     amLODIPine (NORVASC) 5 MG tablet Take 1 tablet (5 mg total) by mouth daily. 90 tablet 1     ascorbic acid, vitamin C, (ASCORBIC ACID WITH DOROTHY HIPS) 500 MG tablet Take 500 mg by mouth daily.       aspirin 81 MG EC tablet Take 1 tablet (81 mg total) by mouth daily.  0     atenolol (TENORMIN) 50 MG tablet Take 1 tablet (50 mg total) by mouth 2 (two) times a day. 60 tablet 0     blood glucose test strips Use 5 each As Directed 4 (four) times a day. Dispense brand per patient's insurance at pharmacy discretion. 200 strip 2     FLOVENT  mcg/actuation inhaler INHALE 2 PUFFS BY MOUTH TWICE DAILY 1 Inhaler 3     fluticasone (FLONASE) 50 mcg/actuation nasal spray Apply 1 spray into each nostril daily as needed for rhinitis.       hydroCHLOROthiazide (HYDRODIURIL) 25 MG tablet Take 1 tablet (25 mg total) by mouth daily. 90 tablet 1     hydroCHLOROthiazide (HYDRODIURIL) 25 MG tablet TAKE 1 TABLET(25 MG) BY MOUTH DAILY 90 tablet 3     LANTUS U-100 INSULIN 100 unit/mL injection 40 unit daily am for 3 day then 28 unit daily (Patient taking differently: Inject 28 unit daily in the morning.      ) 10 mL PRN     liraglutide (VICTOZA) 0.6 mg/0.1 mL (18 mg/3 mL)  PnIj injection Inject 0.6 mg daily for 1 week then increase to 1.2 mg once a day for a week then increase to, 1.8 mg daily.With or without food. 3 Syringe 1     losartan (COZAAR) 25 MG tablet Take 1 tablet (25 mg total) by mouth daily. 90 tablet 1     metFORMIN (GLUCOPHAGE XR) 500 MG 24 hr tablet take 500 mg with supper add extra pill at weekly until you are taking 2 in the morning and 2 with supper. 360 tablet 1     nebulizers Misc Use four times a day for 5 day then as  needed 1 each 0     NOVOLOG U-100 INSULIN ASPART 100 unit/mL injection Check blood sugar three (3) times daily.  Type 2 without complications    1. Accuchek Guide strips-2 boxes of 50  2. Fast clicks lancets-1 box  RX needed at discharge (preferred by pt's insurance):  1. Novolog FlexPen, box of 5  2. Lantus SoloStar, box of 5  3. BD Henna Pen Needles, box of 100 10 mL PRN     NOVOLOG U-100 INSULIN ASPART 100 unit/mL injection Subcutaneous, 3 times daily with meals,   BG < 70 mg/dL: Treat, and call MD  BG  mg/dL: None - 0 Units  -180 mg/dL: 2 units  -220 mg/dL: 4 units  -260 mg/dL: 6 units  -300 mg/dL: 8 units  -340 mg/dL: 10 units  -400 mg/dL: 12 units  BG > 400 mg/dL: 14 units and call MD 10 mL PRN     spironolactone (ALDACTONE) 25 MG tablet Take 1 tablet (25 mg total) by mouth daily. 90 tablet 1     No current facility-administered medications for this visit.        Social History     Tobacco Use   Smoking Status Former Smoker     Last attempt to quit:      Years since quittin.2   Smokeless Tobacco Never Used       REVIEW OF SYSTEMS: Denies fever/chills/sore throat/rhinorrhea/ear pain/swollen glands/shortness of breath/discomfort of chest/abdominal pain/changes in bowel habits/urinary symptoms/rash.      TOBACCO USE:  Social History     Tobacco Use   Smoking Status Former Smoker     Last attempt to quit:      Years since quittin.2   Smokeless Tobacco Never Used     Social History      Socioeconomic History     Marital status:      Spouse name: Not on file     Number of children: Not on file     Years of education: Not on file     Highest education level: Not on file   Occupational History     Not on file   Social Needs     Financial resource strain: Not on file     Food insecurity:     Worry: Not on file     Inability: Not on file     Transportation needs:     Medical: Not on file     Non-medical: Not on file   Tobacco Use     Smoking status: Former Smoker     Last attempt to quit:      Years since quittin.2     Smokeless tobacco: Never Used   Substance and Sexual Activity     Alcohol use: Yes     Comment: rare     Drug use: No     Sexual activity: No     Partners: Female   Lifestyle     Physical activity:     Days per week: Not on file     Minutes per session: Not on file     Stress: Not on file   Relationships     Social connections:     Talks on phone: Not on file     Gets together: Not on file     Attends Jain service: Not on file     Active member of club or organization: Not on file     Attends meetings of clubs or organizations: Not on file     Relationship status: Not on file     Intimate partner violence:     Fear of current or ex partner: Not on file     Emotionally abused: Not on file     Physically abused: Not on file     Forced sexual activity: Not on file   Other Topics Concern     Not on file   Social History Narrative     Not on file         OBJECTIVE:            Vitals:    19 0843   BP: 134/70   Pulse: 81   Resp: 16   Temp: 97.5  F (36.4  C)   SpO2: 97%     Weight: (!) 238 lb 4 oz (108.1 kg)    Wt Readings from Last 3 Encounters:   19 (!) 238 lb 4 oz (108.1 kg)   19 (!) 241 lb 1.6 oz (109.4 kg)   02/15/19 (!) 242 lb (109.8 kg)     Body mass index is 32.31 kg/m .        Physical Exam:  GENERAL APPEARANCE: A&A, NAD, well hydrated, well nourished  HEAD: atraumatic, no deformity  EYES: PERRL, EOM's intact, no redness or swelling  NECK:  Supple, without lymphadenopathy, no thyroid mass, no JVD, no bruit  CV: RRR, no M/G/R   LUNGS: CTAB, normal respiratory effort  ABDOMEN: S&NT, no masses, no organomegaly, BS present x4   EXTREMITY: Moderate edema of the left knee, increased pain with flexion and palpation on the top of the kneecap and just under the middle of the kneecap on the left side.  No signs of Baker's cyst formation, no lower extremity swelling noted, negative Homans sign  SKIN:  Normal skin turgor, no lesions/rashes, warm and dry

## 2021-06-24 NOTE — TELEPHONE ENCOUNTER
Yes, metphormin XR. The directions are one tablet daily for a week, then increase to two tablets daily for a week. Then three tablets daily for a week. Then four tablets daily for a week. Then stay at four tablets daily(two in morning and two at supper)

## 2021-06-24 NOTE — TELEPHONE ENCOUNTER
New prescription sent. Frequency changed to 4 times daily. If he is checking more than this and running out early, he will have to pay out of pocket fee

## 2021-06-24 NOTE — PATIENT INSTRUCTIONS - HE
It is my pleasure seeing you in the clinic today.      Start metformin extended release 500 mg with supper add extra pill at weekly intervals until you are taking 2 in the morning and 2 with supper.    Start Victoza 0.6 mg for 1 week then after that go up to 1.2 mg once a day for a week then after that increase to, 1.8 mg daily injection subcutaneously.    Take NovoLog 5 mg before each meal for 1 week then stop continue Lantus daily.    Discontinue Spironolactone and take losartan 25 mg once a day.    Follow-up in 6 months check creatinine potassium calcium 25-hydroxy vitamin D TSH free T4 A1c fasting lipids before next visit.            Thank you for allowing me to participate in your care.        We work very hard to achieve the best quality of care.  We would be very grateful if you complete any survey you receive regarding this visit, so that we continue to improve our care.

## 2021-06-24 NOTE — PROGRESS NOTES
Hospital discharge follow up call to pt, no answer.  LVM reminding patient of upcoming hospital f/u appt 2/15/19 w/Nohemi Lezama CNP at 2:00pm.  RN will attempt call back at another time.    Margarita Werner RN Care Manager, Population Health

## 2021-06-24 NOTE — PROGRESS NOTES
1. Encounter for examination following treatment at hospital     2. Pneumonia of both lungs due to infectious organism, unspecified part of lung     3. Influenza B     4. Uncontrolled type 2 diabetes mellitus with hyperglycemia (H)  Ambulatory referral to Endocrinology   5. Acute kidney injury (H)           ASSESSMENT/PLAN:     The following high BMI interventions were performed this visit: encouragement to exercise and weight monitoring    1. Encounter for examination following treatment at hospital    -[facility] 2/9/19 - 2/12 /19  -discharged home, declined [facility]    2. Pneumonia of both lungs due to infectious organism, unspecified part of lung      3. Influenza B      4. Uncontrolled type 2 diabetes mellitus with hyperglycemia (H)    - Ambulatory referral to Endocrinology  -DOT  requirements    5. Acute kidney injury (H)    -resolved with IVF hydration      There are no Patient Instructions on file for this visit.  Medications Discontinued During This Encounter   Medication Reason     fexofenadine (ALLEGRA) 180 MG tablet Therapy completed     triamcinolone (KENALOG) 0.1 % cream Therapy completed     benzonatate (TESSALON) 100 MG capsule      predniSONE (DELTASONE) 20 MG tablet      Return in about 4 weeks (around 3/15/2019) for diabetes, check A1c level.    The visit lasted a total of 45 minutes face to face with the patient.  Over 50% of the time spent counseling and educating the patient about above content.      Nohemi Lezama NP          SUBJECTIVE:  Lester Shi is a 69 y.o. male who presents with back today for his hospital follow-up.  Patient was admitted to [facility] on February 9 - February 12 and discharged home.  He was diagnosed with community-acquired pneumonia verified by CT of the chest.  Influenza B, moderate persistent asthma with exacerbation, acute hypoxic respiratory failure, obesity with likely RUFINA, uncontrolled type 2 diabetes mellitus with an A1c of 13.5,  essential hypertension and acute kidney injury.  Diagnosed with influenza B on February 4 in the clinic and completed 5-day course of Tamiflu.  Presented to the emergency department with worsening cough, fever, chills, weakness and shortness of breath.  Chest CT revealed bilateral pneumonia.  He had significant leukocytosis of 22,000 on admission, blood and sputum cultures did not have any growth until the day of discharge.  He was able to wean off oxygen prior to discharge and he will have a repeat chest CT in 6 weeks due to extensive nodular consolidation most prominent in both lower lobes with mild mediastinal adenopathy.  He does have Klinefelter syndrome with a sunken chest cavity.  He was treated with bronchodilators and systemic steroids for his asthma and evaluated by pulmonary and will follow up with him again in 2 weeks.  He was found to have significant hyperglycemia with a level of 700 without DKA.  He did admit that he was not taking his metformin regularly.  He was started on Lantus 40 units daily while on steroids and discharged home with 28 units at at bedtime along with a NovoLog sliding scale and carbohydrate counting.  He received diabetic education while hospitalized.  Blood pressure was normotensive on discharge and he was told to resume his Norvasc 5 mg daily along with his hydrochlorothiazide 25 mg daily, spironolactone 25 mg daily and his atenolol dose was increased to 50 mg twice a day due to his readings throughout his hospitalization.    Today in clinic, patient denies pain anywhere today, he does have some mild shortness of breath still overall states he feels better.  Him and his wife did bring a list of questions that they had today which we did review in detail.  We did discuss his diabetes extensively today.  I did have him demonstrate to me in clinic twice how to check his blood sugars, he feels that him and his wife are getting the hang of carbohydrate counting along with injecting  his insulin.  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  Currently, he does not meet the requirements to continue driving until we can get his A1c below 10 and show that his diabetes is controlled.  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  His daughter is a certified nutritionist and has also been assisting the patient with his nutritional needs and carbohydrate counting.  Recommend that he get his eyes tested due to his diabetes, he has been noticing changes with his vision in the right eye, he does have a known cataract in the left eye.  He is wearing glasses today.  He has no gross neurological deficits today on exam.  We did review his glucose log that he brought with him today.  Fasting blood sugars have been ranging from 113-152, lunchtime 186-301 and dinnertime to 279-291.  Blood sugars in clinic today that her nonfasting were 267 and 277.  Patient was able to demonstrate that he is able to check his blood sugars without difficulty.  Chief Complaint   Patient presents with     Hospital Visit Follow Up     Pnuemonia         Patient Active Problem List   Diagnosis     Eczema     Anemia     Otitis Media     Chest Pain     Type 2 diabetes mellitus (H)     Essential Hypercholesterolemia     Essential Hypertension     Joint Pain, Localized In The Knee     Frostbite     Obesity     Allergic Rhinitis     Klinefelter's Syndrome     Shortness Of Breath     Varicose vein     Right foot pain     Nasal congestion     Mild persistent asthma with acute exacerbation     Wheezing     Cough     Influenza B     Pneumonia     Acute respiratory failure, unspecified whether with hypoxia or hypercapnia (H)     Morbid obesity (H)     Hyperglycemia     Acute kidney injury (H)     Uncontrolled type 1 diabetes mellitus with hyperglycemia (H)      Uncontrolled type 2 diabetes mellitus with hyperglycemia (H)     Encounter for examination following treatment at hospital       Current Outpatient Medications   Medication Sig Dispense Refill     albuterol (PROAIR HFA;PROVENTIL HFA;VENTOLIN HFA) 90 mcg/actuation inhaler Inhale 2 puffs every 6 (six) hours as needed for wheezing. 8.5 g 11     albuterol (PROVENTIL) 2.5 mg /3 mL (0.083 %) nebulizer solution Take 3 mL (2.5 mg total) by nebulization every 4 (four) hours as needed for wheezing. 40 vial 0     amLODIPine (NORVASC) 5 MG tablet Take 1 tablet (5 mg total) by mouth daily. 90 tablet 1     ascorbic acid, vitamin C, (ASCORBIC ACID WITH DOROTHY HIPS) 500 MG tablet Take 500 mg by mouth daily.       aspirin 81 MG EC tablet Take 1 tablet (81 mg total) by mouth daily.  0     atenolol (TENORMIN) 50 MG tablet Take 1 tablet (50 mg total) by mouth 2 (two) times a day. 60 tablet 0     fluticasone (FLONASE) 50 mcg/actuation nasal spray Apply 1 spray into each nostril daily as needed for rhinitis.       fluticasone (FLOVENT HFA) 110 mcg/actuation inhaler Inhale 2 puffs 2 (two) times a day. 1 Inhaler 0     hydroCHLOROthiazide (HYDRODIURIL) 25 MG tablet Take 1 tablet (25 mg total) by mouth daily. 90 tablet 1     LANTUS U-100 INSULIN 100 unit/mL injection 40 unit daily am for 3 day then 28 unit daily 10 mL PRN     nebulizers Misc Use four times a day for 5 day then as  needed 1 each 0     NOVOLOG U-100 INSULIN ASPART 100 unit/mL injection Check blood sugar three (3) times daily.  Type 2 without complications    1. Accuchek Guide strips-2 boxes of 50  2. Fast clicks lancets-1 box  RX needed at discharge (preferred by pt's insurance):  1. Novolog FlexPen, box of 5  2. Lantus SoloStar, box of 5  3. BD Henna Pen Needles, box of 100 10 mL PRN     NOVOLOG U-100 INSULIN ASPART 100 unit/mL injection Subcutaneous, 3 times daily with meals,   BG < 70 mg/dL: Treat, and call MD  BG  mg/dL: None - 0 Units  -180 mg/dL: 2 units  BG  181-220 mg/dL: 4 units  -260 mg/dL: 6 units  -300 mg/dL: 8 units  -340 mg/dL: 10 units  -400 mg/dL: 12 units  BG > 400 mg/dL: 14 units and call MD 10 mL PRN     spironolactone (ALDACTONE) 25 MG tablet Take 1 tablet (25 mg total) by mouth daily. 90 tablet 1     No current facility-administered medications for this visit.        Social History     Tobacco Use   Smoking Status Former Smoker     Last attempt to quit:      Years since quittin.1   Smokeless Tobacco Never Used       REVIEW OF SYSTEMS: Denies radiation, diaphoresis, dizziness, syncope, nausea, palpitations, and associated with activity.       TOBACCO USE:  Social History     Tobacco Use   Smoking Status Former Smoker     Last attempt to quit:      Years since quittin.1   Smokeless Tobacco Never Used     Social History     Socioeconomic History     Marital status:      Spouse name: Not on file     Number of children: Not on file     Years of education: Not on file     Highest education level: Not on file   Social Needs     Financial resource strain: Not on file     Food insecurity - worry: Not on file     Food insecurity - inability: Not on file     Transportation needs - medical: Not on file     Transportation needs - non-medical: Not on file   Occupational History     Not on file   Tobacco Use     Smoking status: Former Smoker     Last attempt to quit:      Years since quittin.1     Smokeless tobacco: Never Used   Substance and Sexual Activity     Alcohol use: Yes     Comment: rare     Drug use: No     Sexual activity: No     Partners: Female   Other Topics Concern     Not on file   Social History Narrative     Not on file         OBJECTIVE:            Vitals:    02/15/19 1358   BP: 110/72   Pulse: 72   Resp: 18   SpO2: 96%     Weight: (!) 242 lb (109.8 kg)    Wt Readings from Last 3 Encounters:   02/15/19 (!) 242 lb (109.8 kg)   02/10/19 (!) 242 lb 4.8 oz (109.9 kg)   19 (!) 243 lb 8 oz  (110.5 kg)     Body mass index is 32.82 kg/m .        Physical Exam:  GENERAL APPEARANCE: A&A, NAD, well hydrated, well nourished  HEAD: atraumatic, no deformity  EYES: PERRL, EOM's intact, no redness or swelling, wearing glasses  EARS: TM's normal, gray with nl light reflex  NOSE: no post nasal drainage or thrush  MOUTH: without erythema, exudate or thrush  NECK: Supple, without lymphadenopathy, no thyroid mass, no JVD, no bruit  CV: RRR, no M/G/R   LUNGS: Faint expiratory wheezes in all lung fields, mildly increased respiratory effort with activity  ABDOMEN: S&NT, no masses, no organomegaly, BS present x4   SKIN:  Normal skin turgor, no lesions/rashes, warm and dry   NEURO: no gross deficits, DTR's intact, face symmetrical, no drift, equal  strength

## 2021-06-24 NOTE — TELEPHONE ENCOUNTER
Refill Approved    Rx renewed per Medication Renewal Policy. Medication was last renewed on 11/2/18.    Leandra Avalos, [facility] Triage/Med Refill 2/27/2019     Requested Prescriptions   Pending Prescriptions Disp Refills     hydroCHLOROthiazide (HYDRODIURIL) 25 MG tablet [Pharmacy Med Name: HYDROCHLOROTHIAZIDE 25MG TABLETS] 90 tablet 0     Sig: TAKE 1 TABLET(25 MG) BY MOUTH DAILY    Diuretics/Combination Diuretics Refill Protocol  Passed - 2/27/2019  3:23 AM       Passed - Visit with PCP or prescribing provider visit in past 12 months    Last office visit with prescriber/PCP: 11/2/2018 Rhonda Wells MD OR same dept: 2/15/2019 Nohemi Lezama NP OR same specialty: 2/15/2019 Nohemi Lezama NP  Last physical: Visit date not found Last MTM visit: Visit date not found   Next visit within 3 mo: Visit date not found  Next physical within 3 mo: Visit date not found  Prescriber OR PCP: Rhonda Wells MD  Last diagnosis associated with med order: 1. Essential hypertension  - hydroCHLOROthiazide (HYDRODIURIL) 25 MG tablet [Pharmacy Med Name: HYDROCHLOROTHIAZIDE 25MG TABLETS]; TAKE 1 TABLET(25 MG) BY MOUTH DAILY  Dispense: 90 tablet; Refill: 0    If protocol passes may refill for 12 months if within 3 months of last provider visit (or a total of 15 months).            Passed - Serum Potassium in past 12 months     Lab Results   Component Value Date    Potassium 4.6 02/11/2019            Passed - Serum Sodium in past 12 months     Lab Results   Component Value Date    Sodium 133 (L) 02/11/2019            Passed - Blood pressure on file in past 12 months    BP Readings from Last 1 Encounters:   02/19/19 122/74            Passed - Serum Creatinine in past 12 months     Creatinine   Date Value Ref Range Status   02/11/2019 1.18 0.70 - 1.30 mg/dL Final

## 2021-06-24 NOTE — TELEPHONE ENCOUNTER
RN cannot approve Refill Request    RN can NOT refill this medication med is not covered by policy/route to provider. Last office visit: 2/15/2019 Nohemi Lezama NP Last Physical: Visit date not found Last MTM visit: Visit date not found Last visit same specialty: 2/15/2019 Nohemi Lezama NP.  Next visit within 3 mo: Visit date not found  Next physical within 3 mo: Visit date not found      Carlie Ledezma, [facility] Triage/Med Refill 2/26/2019    Requested Prescriptions   Pending Prescriptions Disp Refills     FLOVENT  mcg/actuation inhaler [Pharmacy Med Name: FLOVENT  MCG ORAL INH 120INH]  0     Sig: INHALE 2 PUFFS BY MOUTH TWICE DAILY    There is no refill protocol information for this order

## 2021-06-24 NOTE — TELEPHONE ENCOUNTER
I certainly can, however, I cannot sign off on home care orders as it is not in the NP scope of practice. Paperwork will have to be signed off by my medical director Dr. Mercado

## 2021-06-24 NOTE — TELEPHONE ENCOUNTER
Forms to be completed on Monday March 4, will be able to  on Tuesday March 5th when I return to clinic

## 2021-06-24 NOTE — TELEPHONE ENCOUNTER
Medication Request  Medication name: blood glucose test strips--Accu-chek guide test strips  Pharmacy Name and Location: [facility] [address] [place] [address] [address] AT [facility] [address]  Reason for request:  Listed as historical medication, Patient is running out of test strips, pharmacy states that patient is not due for refill yet until Mid March because on the directions it states to check it 3 times a day and patient usually checks it 5-6 times a day.  Patient states that he will not have enough to last him until that time.  When did you use medication last?:  today  Okay to leave a detailed message: yes

## 2021-06-24 NOTE — TELEPHONE ENCOUNTER
Question following Office Visit  When did you see your provider: today  What is your question: Is it still necessary  for a home health nurse to come to my house after our visit today? Please advise  Okay to leave a detailed message: Yes

## 2021-06-24 NOTE — TELEPHONE ENCOUNTER
Who is calling:  Lauren - [employer] Patients [employer]  Reason for Call:  Calling because she got the updated [facility] paperwork with the changed date to 3/29/2019 on the end of patient's medical incapacitation.     It was changed and initial and dated by EDMOND Jeffers and this is not acceptable for their work it needs to be initial and dated by the provider.    Needing to have this date initial and dated by the provider only.   If you want a new form please contact Lauren and she can get you one.  Date of last appointment with primary care: 2/15/2019  Has the patient been recently seen:  Yes  Okay to leave a detailed message: No

## 2021-06-24 NOTE — TELEPHONE ENCOUNTER
Who is calling:  Skyler, [employer]  Reason for Call:  She is asking if Nohemi Lezama NP will follow patient while he is involved with home care services.  Date of last appointment with primary care: 02/04/2019  Has the patient been recently seen:  Yes  Okay to leave a detailed message: Yes    Skyler needs a verbal only.

## 2021-06-24 NOTE — TELEPHONE ENCOUNTER
Pt dropped off [place] papers to be filled out     Please call when complete for him to        02/27/19

## 2021-06-24 NOTE — TELEPHONE ENCOUNTER
Spoke with patient and wife regarding home care services.  They are declining services at that time, they feel they are managing well without it.  They will let me know down the road if things change.

## 2021-06-24 NOTE — PROGRESS NOTES
Progress Note    Reason for Visit:  Chief Complaint     Diabetes Mellitus          Progress Note:    HPI: Patient is seen saltation at the request of the primary care physician because of uncontrolled type 2 diabetes thank you for referring this pleasant 69-year-old male patient who came to the clinic accompanied by his wife he has been aware of diabetes roughly for 10 years but he has been neglecting it he was supposed to be taking metformin 1 tablet twice a day but he has not been taking it regularly.    2-3 weeks ago he felt very sick and was admitted to hospital with influenza pneumonia and asthma.    The patient was being treated has been treated with IV antibiotics and was found to have blood sugar of 688.    Metformin was stopped and he was started on insulin right now he is taking Lantus 28 units in the morning NovoLog he takes 1 unit for every 10 g of carbs with a sliding scale of 2 points above 1 4002 points for every 40 points above 140.    His blood sugar are has been ranging between 1 9240 occasionally 300 that was his birthday cake.    His sodium was low at 133 creatinine went up when his blood sugar was high at 1.76 now came down to normal at 1.  1 8.    His blood pressure 122/74.    He has hypertension he takes Norvasc 5 mg and atenolol 50 mg hydrochlorothiazide 25 mg Spironolactone 25 mg.    He has left knee replacement 2013 he works as a .    Visit her mother had his mother had type 2 diabetes.    He has 2 adopted children he does not smoke or drink.  His A1c has been going up 7-9 0.4-13.5.    Review of Systems:    Nervous System: No headache, dizziness, fainting or memory loss. No tingling sensation of hand or feet.  Ears: No hearing loss or ringing in the ears  Eyes: No blurring of vision, redness, itching or dryness.  Nose: No nosebleed or loss of smell  Mouth: No mouth sores or loss of taste  Throat: No hoarseness or difficulty swallowing  Neck: No enlarged thyroid or lymph  nodes.  Heart: No chest pain, palpitation or irregular heartbeat. No swelling of hands or feet  Lungs: No shortness of breath, cough, night sweats, wheezing or hemoptysis.  Gastrointestinal: No nausea or vomiting, constipation or diarrhea.  No acid reflux, abdominal pain or blood in stools.  Kidney/Bladdr: No polyuria, polydipsia, nocturia or hematuria.  Genital/Sexual: No loss of libido  Skin: No rash, hair loss or hirsutism.  No abnormal striae  Muscles/Joints/Bones: No morning stiffness, muscle aches and pain or loss of height.    Current Medications:  Current Outpatient Medications   Medication Sig     albuterol (PROAIR HFA;PROVENTIL HFA;VENTOLIN HFA) 90 mcg/actuation inhaler Inhale 2 puffs every 6 (six) hours as needed for wheezing.     albuterol (PROVENTIL) 2.5 mg /3 mL (0.083 %) nebulizer solution Take 3 mL (2.5 mg total) by nebulization every 4 (four) hours as needed for wheezing.     amLODIPine (NORVASC) 5 MG tablet Take 1 tablet (5 mg total) by mouth daily.     ascorbic acid, vitamin C, (ASCORBIC ACID WITH DOROTHY HIPS) 500 MG tablet Take 500 mg by mouth daily.     aspirin 81 MG EC tablet Take 1 tablet (81 mg total) by mouth daily.     atenolol (TENORMIN) 50 MG tablet Take 1 tablet (50 mg total) by mouth 2 (two) times a day.     blood glucose test strips Use 5 each As Directed daily. Dispense brand per patient's insurance at pharmacy discretion.     fluticasone (FLONASE) 50 mcg/actuation nasal spray Apply 1 spray into each nostril daily as needed for rhinitis.     fluticasone (FLOVENT HFA) 110 mcg/actuation inhaler Inhale 2 puffs 2 (two) times a day.     hydroCHLOROthiazide (HYDRODIURIL) 25 MG tablet Take 1 tablet (25 mg total) by mouth daily.     LANTUS U-100 INSULIN 100 unit/mL injection 40 unit daily am for 3 day then 28 unit daily (Patient taking differently: Inject 28 unit daily in the morning.      )     nebulizers Misc Use four times a day for 5 day then as  needed     NOVOLOG U-100 INSULIN ASPART 100  unit/mL injection Check blood sugar three (3) times daily.  Type 2 without complications    1. Accuchek Guide strips-2 boxes of 50  2. Fast clicks lancets-1 box  RX needed at discharge (preferred by pt's insurance):  1. Novolog FlexPen, box of 5  2. Lantus SoloStar, box of 5  3. BD Henna Pen Needles, box of 100     NOVOLOG U-100 INSULIN ASPART 100 unit/mL injection Subcutaneous, 3 times daily with meals,   BG < 70 mg/dL: Treat, and call MD  BG  mg/dL: None - 0 Units  -180 mg/dL: 2 units  -220 mg/dL: 4 units  -260 mg/dL: 6 units  -300 mg/dL: 8 units  -340 mg/dL: 10 units  -400 mg/dL: 12 units  BG > 400 mg/dL: 14 units and call MD     spironolactone (ALDACTONE) 25 MG tablet Take 1 tablet (25 mg total) by mouth daily.       Patients Active Problems:  Patient Active Problem List   Diagnosis     Eczema     Anemia     Otitis Media     Chest Pain     Type 2 diabetes mellitus (H)     Essential Hypercholesterolemia     Essential Hypertension     Joint Pain, Localized In The Knee     Frostbite     Obesity     Allergic Rhinitis     Klinefelter's Syndrome     Shortness Of Breath     Varicose vein     Right foot pain     Nasal congestion     Mild persistent asthma with acute exacerbation     Wheezing     Cough     Influenza B     Pneumonia     Acute respiratory failure, unspecified whether with hypoxia or hypercapnia (H)     Morbid obesity (H)     Hyperglycemia     Acute kidney injury (H)     Uncontrolled type 1 diabetes mellitus with hyperglycemia (H)     Uncontrolled type 2 diabetes mellitus with hyperglycemia (H)     Encounter for examination following treatment at hospital       History:   reports that he quit smoking about 40 years ago. he has never used smokeless tobacco. He reports that he drinks alcohol. He reports that he does not use drugs.   reports that he quit smoking about 40 years ago. he has never used smokeless tobacco. He reports that he drinks alcohol. He reports that  he does not use drugs.  Social History     Tobacco Use   Smoking Status Former Smoker     Last attempt to quit: 1979     Years since quittin.1   Smokeless Tobacco Never Used      reports that he quit smoking about 40 years ago. he has never used smokeless tobacco. He reports that he drinks alcohol. He reports that he does not use drugs.  Social History     Substance and Sexual Activity   Sexual Activity No     Partners: Female     Past Medical History:   Diagnosis Date     Anemia     Created by Conversion      Eczema     Created by Conversion      Essential Hypercholesterolemia     Created by Conversion      Essential Hypertension     Created by Conversion      Klinefelter's syndrome     Created by Conversion      Obesity     Created by Conversion      Type 2 Diabetes Mellitus     Created by Conversion      Varicose vein 2015     No family history on file.  Past Medical History:   Diagnosis Date     Anemia     Created by Conversion      Eczema     Created by Conversion      Essential Hypercholesterolemia     Created by Conversion      Essential Hypertension     Created by Conversion      Klinefelter's syndrome     Created by Conversion      Obesity     Created by Conversion      Type 2 Diabetes Mellitus     Created by Conversion      Varicose vein 2015     Past Surgical History:   Procedure Laterality Date     JOINT REPLACEMENT      left knee TKA 2013       Vitals   height is 6' (1.829 m) and weight is 241 lb 1.6 oz (109.4 kg) (abnormal). His blood pressure is 122/74.         Exam  General appearance: The patient looked well, not in acute distress.  Eyes: no evidence of thyroid eye disease.   Retinal exam: No evidence of diabetic retinopathy.  Mouth and Throat: Normal  Neck: No evidence of thyromegaly, enlarged lymph node or tenderness  Chest: Trachea is central. Chest is clear to auscultation and percussion. Breat sounds are normal.  Cardiovascular exam: JVP is not raised. Heart sounds are normal,  no murmurs or rub  Peripheral pulses are palpable.   Abdomen: No masses or tenderness.    Back: No vertebral tenderness or kyphosis.  Extremities: No evidence of leg edema.   Skin: Normal to touch.  No abnormal striae  Neurologic exam:  Visual fields are intact by confrontation, grossly intact. No evidence of peripheral neuropathy.  Detailed foot exam normal.        Diagnosis:  No diagnosis found.    Orders:   No orders of the defined types were placed in this encounter.        Assessment and Plan: Type 2 diabetes poorly controlled.    I did advise the patient to restart the metformin 500 mg with supper at 1 tablet every week until he is taken 2 in the morning and 2 with supper.    We will add Victoza 0.6 mg once a day for a week then will go up to 1.2 then 1.8 at weekly intervals.    I did advise him after 1 week I did advise him to take NovoLog 5 mg 5 units before each meal and to stop it after 1 week.  We will continue his Lantus.    We will keep blood sugar between 100 and 180 if his blood sugar is high we may add Amaryl.  And/or Farxiga.    Hypertension on Norvasc 5 mg Spironolactone 25 mg hydrochlorothiazide 25 mg atenolol 50 mg    Blood pressure 122/74 I did advise him to stop Spironolactone and take losartan 25 mg once a day    We will check lipid profile says he is allergic to simvastatin.    Patient will return to clinic in 6 months I would fill up his driving license when ready.    I have reviewed and ordered clinical lab test    I have reviewed and ordered her medication as required.    I have reviewed her test results and advised with the performing physician.    I have reviewed the patient's old records.    I have reviewed and summarized the patient old records.

## 2021-06-24 NOTE — TELEPHONE ENCOUNTER
Pt notified. He was told in the hospital to test with each meal and every morning and night; 5 times daily. Will send to Dr Eaton to see if he supports this for the next refill that's needed.     Please f/u with patient whether this is supported or not.

## 2021-06-24 NOTE — PROGRESS NOTES
"TCM DISCHARGE FOLLOW UP CALL    Discharge Date:  2/12/2019  Reason for hospital stay (discharge diagnosis)::  CAP, Influenza B, DM2  Are you feeling better, the same or worse since your discharge?:  Patient is feeling better (Breathing \"pretty good.\"  Monitoring BS's (FBS this morning was 113) & keeping a log.  Working on dietary changes, will be seeing his daughter, who is a nutritionist, next week to go over DM diet.)  Do you feel like you have a plan in the event of a health emergency?: Yes (Call 911, call clinic)    As part of your discharge plan, were  home care services ordered for you?: Yes    Have you seen them yet, or are they scheduled to visit?: Yes (Scheduled \"tentatively\" for 2/18/19, pt states he may cancel after discussing w/PCP at appt 2/15/19)    Do you have any follow up visits scheduled with your PCP or Specialist?:  Yes, with PCP (Nohemi Lezama NP 2/15/19)  (RN) Is PCP appt scheduled soon enough (within 14 days of discharge date)?: Yes    RN NOTES::  Taking 40 units of Lantus in the morning while on Prednisone, once done w/Prednisone will decrease to 28 units daily.  He is taking Novolog w/meals on a SS base plus 1:10 CHO ratio.  Took the last dose of levofloxacin today.  He has only taken benzonatate once since home, otherwise taking Robitussin-DM prn, last dose was last night before bed.  He is taking ASA 81 mg daily, as prescribed, along w/inhalers, Albuterol prn & Flovent twice a day.  He has not scheduled a f/u w/Pulmonary yet but will call this week.  Pt will bring BS log & glucometer to appt on Friday.      Margarita Werner RN Care Manager, Population Health    "

## 2021-06-24 NOTE — TELEPHONE ENCOUNTER
"Uma      Pt saw us today. From [facility] note: \"I did advise the patient to restart the metformin 500 mg with supper at 1 tablet every week until he is taken 2 in the morning and 2 with supper.  We will add Victoza 0.6 mg once a day for a week   stop Spironolactone and take losartan 25 mg once a day\"    Pharmacy has not received anything       Please send to: [facility] [address] [place] [address] [id number] AT [facility] [address]      Please confirm @ [phone], ok to lv msg.  "

## 2021-06-25 NOTE — ED TRIAGE NOTES
Patient is here with left leg lower warm and redness with swelling. He does have a hx of this in the past.

## 2021-06-25 NOTE — PROGRESS NOTES
Patient called me this morning and he had another explosive liquid stool that he was not able to control.  Again no blood or mucus in the stool but because of having further liquid stools will order stool for bacterial culture as well as C. difficile toxin by PCR.  Patient will come to the lab to  the containers today.  Dr. Mercado

## 2021-06-25 NOTE — TELEPHONE ENCOUNTER
Wife stopped in patient is having more diarrhea now and wife has picked up the stool kit she is wondering if he should be in the hospital? She feels he is getting worse.would like Dr Mercado to call her again. Patient was seen by Dr Mercado yesterday.

## 2021-06-25 NOTE — TELEPHONE ENCOUNTER
liraglutide (VICTOZA) 0.6 mg/0.1 mL (18 mg/3 mL) PnIj injection 3 Syringe 1 2/19/2019  --   Sig: Inject 0.6 mg daily for 1 week then increase to 1.2 mg once a day for a week then increase to, 1.8 mg daily.With or without food.     Please submit a PA for the above medication.

## 2021-06-25 NOTE — TELEPHONE ENCOUNTER
Patient called to check on status of this request. Notified that we have started a PA and that this can take up to 5 to 10 business days. & that we will call him when completed.

## 2021-06-25 NOTE — TELEPHONE ENCOUNTER
Central PA team  [phone]  Pool: HE PA MED ([id number])          PA has been initiated.       PA form completed and faxed insurance via Cover My Meds     Key:  [id number]     Medication:  VICTOZA 18MG/3ML    Insurance:  [employer]        Response will be received via fax and may take up to 5-10 business days depending on plan

## 2021-06-25 NOTE — PROGRESS NOTES
Progress Notes by Ba Fernandez DO at 3/6/2017  4:30 PM     Author: Ba Fernandez DO Service: -- Author Type: Physician    Filed: 3/7/2017 11:02 AM Encounter Date: 3/6/2017 Status: Signed    : Ba Fernandez DO (Physician)       Chief Complaint   Patient presents with   ? Cough     for months. Was given antibiotics 2 weeks ago and has no relief. Pt. is having blockage in both ears, Hx of ear infections,head congestion and exposed to strep     History of Present Illness: Nursing notes reviewed. Patient stopped a 2 PPD tobacco habit at age 30. No sore throat recently, but he has been near his grand daughter who was diagnosed with strep throat. The fever and headache improved after recent doxycycline treatment, and the cough improved a little.      Review of systems: See history of present illness, otherwise negative.     Current Outpatient Prescriptions   Medication Sig Dispense Refill   ? albuterol (PROVENTIL HFA;VENTOLIN HFA) 90 mcg/actuation inhaler Inhale 2 puffs every 6 (six) hours as needed for wheezing. 8.5 g 11   ? amLODIPine (NORVASC) 5 MG tablet TAKE 1 TABLET BY MOUTH TWICE DAILY 180 tablet 0   ? atenolol (TENORMIN) 50 MG tablet TAKE ONE TABLET BY MOUTH ONCE DAILY 90 tablet 3   ? benzonatate (TESSALON PERLES) 100 MG capsule Take 1-2 capsules (100-200 mg total) by mouth every 6 (six) hours as needed for cough. 30 capsule 0   ? fexofenadine (ALLEGRA) 180 MG tablet Take 180 mg by mouth daily as needed.     ? fluticasone (FLONASE) 50 mcg/actuation nasal spray 1-2 sprays into each nostril daily as needed. PRN     ? hydroCHLOROthiazide (HYDRODIURIL) 25 MG tablet TAKE 1 TABLET BY MOUTH EVERY DAY 90 tablet 1   ? metFORMIN (GLUCOPHAGE) 500 MG tablet TAKE 1 TABLET BY MOUTH TWICE DAILY WITH MEALS 180 tablet 1   ? metFORMIN (GLUCOPHAGE) 500 MG tablet Take 1 tablet (500 mg total) by mouth 2 (two) times a day with meals. You are overdue for your diabetic visit and labs. 180 tablet 0   ? olopatadine (PATANOL)  0.1 % ophthalmic solution Administer 1 drop to both eyes 2 (two) times a day as needed for allergies. Instill 1 drop into both eyes twice daily at 6 to 8 hour intervals     ? spironolactone (ALDACTONE) 25 MG tablet TAKE 1 TABLET BY MOUTH EVERY DAY 90 tablet 1   ? triamcinolone (KENALOG) 0.1 % cream APPLY TO THE AFFECTED 3 TIMES DAILY AS NEEDED 30 g 1   ? azithromycin (ZITHROMAX Z-CHAMP) 250 MG tablet Take 2 tablets (500 mg) on  Day 1,  followed by 1 tablet (250 mg) once daily on Days 2 through 5. 6 tablet 0     No current facility-administered medications for this visit.        Past Medical History:   Diagnosis Date   ? Anemia     Created by Conversion    ? Eczema     Created by Conversion    ? Essential Hypercholesterolemia     Created by Conversion    ? Essential Hypertension     Created by Conversion    ? Klinefelter's syndrome     Created by Conversion    ? Obesity     Created by Conversion    ? Type 2 Diabetes Mellitus     Created by Conversion    ? Varicose vein 9/28/2015      No past surgical history on file.   Social History     Social History   ? Marital status:      Spouse name: N/A   ? Number of children: N/A   ? Years of education: N/A     Social History Main Topics   ? Smoking status: Former Smoker   ? Smokeless tobacco: Never Used   ? Alcohol use None   ? Drug use: None   ? Sexual activity: Not Asked     Other Topics Concern   ? None     Social History Narrative       History   Smoking Status   ? Former Smoker   Smokeless Tobacco   ? Never Used      Exam:   Blood pressure 132/78, pulse 77, temperature 98.4  F (36.9  C), temperature source Oral, resp. rate 14, weight (!) 253 lb 3.2 oz (114.9 kg), SpO2 96 %.    EXAM:   General: Vital signs reviewed. Patient is in some distress initially due to frequent cough. This improved greatly after a neb treatment, and patient stated he could breath better. Breathing is non labored appearing. Patient is alert and oriented x 3.   Tympanic membrane of bilateral  ears are dull and injected, left more darkly injected then right. Increased turbinate injection and edema, and Increased rhinorrhea noted. No pharyngeal injection or exudate noted.  Neck: supple with no adenopathy  Heart:  Heart rate is normal, regular rhythm without murmur.  Lungs:  Initial lung exam showed lungs to have far air flow bilaterally with crackles noted in the RLL, with other areas being CTA. After the neb treatment, lung sounds are clear to auscultation with good airflow except for crackles noted in right lower lobe.  Skin: warm and dry  Xray study reviewed by me with patient at time of exam, with an infiltrate noted by me in the RLL. This was not noted by radiologist in report reviewed with patient at exam. I told patient that there were some granulomas noted which were likely of no significance, and mild hyperinflation of lungs which may be related to his prior history of smoking tobacco.    Assessment/Plan   1. Exposure to strep throat  Rapid Strep A Screen-Throat    Group A Strep, RNA Direct Detection, Throat   2. Cough  XR Chest PA and Lateral   3. RLL pneumonia  azithromycin (ZITHROMAX Z-CHAMP) 250 MG tablet       Patient Instructions     Also see info below. You can still use your albuterol inhaler. I think you need to take the new antibiotic. Be seen again in 3 days if symptoms are not better, sooner if feeling any worse.    What is Pneumonia?    Pneumonia is a serious lung infection. Many cases of pneumonia are caused by bacteria or viruses. Other less common causes include    Fungi    Chemicals    Gases  You may also get pneumonia after another illness, such as a cold, flu, or bronchitis. Those most at risk include the elderly and people with chronic health problems.  Healthy Lungs    Air travels in and out of the lungs through tubes called airways.    The tubes branch into smaller passages called bronchioles. These end in balloonlike sacs called alveoli.    Blood vessels surrounding the  alveoli take oxygen into the bloodstream. At the same time, the alveoli remove carbon dioxide (a waste gas) from the blood. The carbon dioxide is then exhaled.  When You Have Pneumonia    Pneumonia causes the bronchioles and the alveoli to fill with excess mucus and become inflamed.    Your bodys response may be to cough. This can help clear out the fluid.    The fluid (or mucus) you cough up may appear green or dark yellow.    The excess mucus may make you feel short of breath.    The inflammation and infection may give you a fever.  What are the Symptoms?  Symptoms of pneumonia can come without warning. At first, you may think you have a cold or flu. But symptoms may get worse quickly, turning into pneumonia. Symyptoms can be different for bacterial and viral pneumonia. Common symptoms may include the following:    Severe cough with green or yellow mucus that doesn't improve or gets worse    Fever and chills    Nausea, vomiting or diarrhea    Shortness of breath with normal daily activities    Increased heart rate    Chest pain or discomfort when breathing in or coughing    Headache    Excessive sweating and clammy skin    6490-7741 [employer]. [address] [place] [address]. All rights reserved. This information is not intended as a substitute for professional medical care. Always follow your healthcare professional's instructions.        Discharge Instructions for Pneumonia  You have been diagnosed with pneumonia, a serious lung infection. Most cases of pneumonia are caused by bacteria. Pneumonia most often occurs in older adults, young children, and people with chronic health problems.  Home care    Take your medication exactly as directed. Dont skip doses. Continue taking your antibiotics as directed until they are all gone -- even if you start to feel better. This will prevent the pneumonia from coming back.    Drink at least 8 glasses of water daily, unless directed otherwise. This  helps to loosen and thin secretions so that you can cough them up.    Use a cool-mist humidifier in your bedroom. Be sure to clean the humidifier daily.    Coughing up mucus is normal. Dont use medications to suppress your cough unless your cough is dry, painful, or interferes with your sleep. You may use an expectorant if ordered by your doctor.    Warm compresses or a heating pad on the lowest setting can be used to relieve chest discomfort. Use several times a day for 15 to 20 minutes at a time. (To prevent injuring your skin, be sure the temperature of the compress or heating pad is warm, not hot.)    Get plenty of rest until your fever, shortness of breath, and chest pain go away.    Plan to get a flu shot every year.    Ask your doctor about a pneumonia vaccination.  Follow-up care  Make a follow-up appointment as directed by our staff.     When to seek medical care  Call 911 right away if you have any of the following:    Chest pain    Trouble breathing    Blue lips or fingernails  Otherwise, call your doctor if you have any of the following:    Fever above 101.5 F  (38.6 C)    Yellow, green, bloody, or smelly sputum    More than normal mucus production    Vomiting     5368-9787 [employer]. [address] [place] [address], [place] [address]. All rights reserved. This information is not intended as a substitute for professional medical care. Always follow your healthcare professional's instructions.           Ba Fernandez,

## 2021-06-25 NOTE — TELEPHONE ENCOUNTER
Triage note:    69 year old male called with concerns about three days of diarrhea. He denies pain.     He was in  hospital for 4 days in February 2019 for influenza B, asthma, pneumonia and DM.  They gave him Lantus and Victoza and Metformin. Metformin was started after hospital.     He estimates he has had 5 watery to soft BMs within the last 24 hours. No nausea or vomiting. No fever.  Last void 10 am. He is continuing to push fluids.  No other symptoms.    RN triaged to be seen in office today.  He agreed.  Patient warm transferred to scheduling for appointment.      Reason for Disposition    MODERATE diarrhea (e.g., 4-6 times / day more than normal) and present > 48 hours (2 days)    Protocols used: DIARRHEA-A-OH

## 2021-06-25 NOTE — PROGRESS NOTES
ASSESSMENT/PLAN:       1. Anorexia    - HM2(CBC w/o Differential)  - Erythrocyte Sedimentation Rate  - Comprehensive Metabolic Panel  - Urinalysis-UC if Indicated  I discussed with the patient that some loss of appetite as well as diarrhea are common side effects from Victoza and metformin.  He understands that the weight loss is very necessary for his diabetes and we are seeing significant improvements in his blood sugar readings.  I like to decrease his Metformin to 500 mg twice a day of the extended release form.  He will continue to monitor his blood sugar 4-5 times per day and if he starting to notice that his readings are consistently above 140 then we will need to make adjustments in his medication.    2. Diarrhea, unspecified type    - HM2(CBC w/o Differential)  - Erythrocyte Sedimentation Rate  - Comprehensive Metabolic Panel  - Urinalysis-UC if Indicated  It is difficult to know if he has diarrhea is from a acute GI infection or related to his medications.  We will see with the above blood work shows and he will get a my chart message with results  He is aware that he should take his Metformin with food to decrease side effects  If the patient continues to have diarrhea we will need to collect some stool samples and include C. difficile toxin.  Of note is that no other family members have been sick with GI symptoms    3. Type 2 diabetes mellitus with hyperglycemia, unspecified whether long term insulin use (H)  Will continue on his Lantus insulin as well as Victoza and a decreased dose of metformin at thousand milligrams daily    4. Essential hypertension  No change in medication but would like to check his electrolytes and creatinine in view of his diarrhea as well as new medications.  Note that in the last month the patient's spironolactone was stopped and he was started on losartan 25 mg daily    The patient does have follow-up with pulmonary later this week.        Nikita Mercado MD      PROGRESS NOTE    3/20/2019    SUBJECTIVE:  Lester Shi is a 69 y.o. male  who presents for   Chief Complaint   Patient presents with     Diarrhea     x 3 days- has been on metformin for 2 weeks, susi 3/17/2019- SOB, felt faint      1. Anorexia  The patient has noticed for the last 5 weeks decrease in appetite and he has had a 20 pound weight loss.  He attributes this primarily to the Victoza which was started while in the hospital in early February 2019.  The Victoza is currently at 1.8 mg daily.       2. Diarrhea, unspecified type  3 days ago the patient developed diarrhea having 3-5 stools per day mostly liquid but some more just runny with no blood or mucus in the stools.  He has had some vague lower abdominal cramping type of discomfort.  One night he had chills but no fever or night sweats.  He was on antibiotics 6 weeks ago for a pneumonia but finished those about a month ago and did not have any diarrhea until most recently.  Also during the patient's hospitalization 6 weeks ago he was diagnosed with influenza B and had severe hyperglycemia with blood sugars in the 600s.  During that hospitalization the patient was started insulin as well as Victoza and on discharge as an outpatient the endocrinologist started him on Metformin. the patient has not had any vomiting.    3. Type 2 diabetes mellitus with hyperglycemia, unspecified whether long term insulin use (H)  The patient has been diagnosed with diabetes for a few years but was not adherent to treatment and prior to his hospitalization in February was not on any medication for diabetes.  Now he is monitoring his blood sugar 5 times a day and he is taking Lantus 28 units in the morning along with Victoza 1.8 mg daily and metformin extended release thousand milligrams twice a day.  The patient's blood sugars have consistently been running between 90 and 105 with a low of 86 and a high of 150 in the last 2 weeks.  The patient has been on Metformin 2000 mg daily now for  about 10 days.  He has not had any significant hypoglycemia.    4. Essential hypertension  The patient's blood pressures been at goal he is currently on losartan 25 mg daily along with atenolol 50 mg twice a day, amlodipine 5 mg daily and hydrochlorothiazide 25 mg daily  Patient Active Problem List   Diagnosis     Eczema     Anemia     Otitis Media     Chest Pain     Type 2 diabetes mellitus (H)     Essential Hypercholesterolemia     Essential Hypertension     Joint Pain, Localized In The Knee     Frostbite     Obesity     Allergic Rhinitis     Klinefelter's Syndrome     Shortness Of Breath     Varicose vein     Right foot pain     Nasal congestion     Mild persistent asthma with acute exacerbation     Wheezing     Cough     Influenza B     Pneumonia     Acute respiratory failure, unspecified whether with hypoxia or hypercapnia (H)     Morbid obesity (H)     Hyperglycemia     Acute kidney injury (H)     Uncontrolled type 1 diabetes mellitus with hyperglycemia (H)     Uncontrolled type 2 diabetes mellitus with hyperglycemia (H)     Encounter for examination following treatment at hospital       Current Outpatient Medications   Medication Sig Dispense Refill     albuterol (PROAIR HFA;PROVENTIL HFA;VENTOLIN HFA) 90 mcg/actuation inhaler Inhale 2 puffs every 6 (six) hours as needed for wheezing. 8.5 g 11     amLODIPine (NORVASC) 5 MG tablet Take 1 tablet (5 mg total) by mouth daily. 90 tablet 1     ascorbic acid, vitamin C, (ASCORBIC ACID WITH DOROTHY HIPS) 500 MG tablet Take 500 mg by mouth daily.       aspirin 81 MG EC tablet Take 1 tablet (81 mg total) by mouth daily.  0     atenolol (TENORMIN) 50 MG tablet Take 1 tablet (50 mg total) by mouth 2 (two) times a day. 60 tablet 0     blood glucose test strips Use 5 each As Directed 4 (four) times a day. Dispense brand per patient's insurance at pharmacy discretion. 200 strip 2     FLOVENT  mcg/actuation inhaler INHALE 2 PUFFS BY MOUTH TWICE DAILY 1 Inhaler 3      fluticasone (FLONASE) 50 mcg/actuation nasal spray Apply 1 spray into each nostril daily as needed for rhinitis.       hydroCHLOROthiazide (HYDRODIURIL) 25 MG tablet Take 1 tablet (25 mg total) by mouth daily. 90 tablet 1     hydroCHLOROthiazide (HYDRODIURIL) 25 MG tablet TAKE 1 TABLET(25 MG) BY MOUTH DAILY 90 tablet 3     LANTUS U-100 INSULIN 100 unit/mL injection 40 unit daily am for 3 day then 28 unit daily (Patient taking differently: Inject 28 unit daily in the morning.      ) 10 mL PRN     liraglutide (VICTOZA) 0.6 mg/0.1 mL (18 mg/3 mL) PnIj injection Inject 0.6 mg daily for 1 week then increase to 1.2 mg once a day for a week then increase to, 1.8 mg daily.With or without food. 3 Syringe 1     losartan (COZAAR) 25 MG tablet Take 1 tablet (25 mg total) by mouth daily. 90 tablet 1     metFORMIN (GLUCOPHAGE XR) 500 MG 24 hr tablet take 500 mg with supper add extra pill at weekly until you are taking 2 in the morning and 2 with supper. 360 tablet 1     nebulizers Misc Use four times a day for 5 day then as  needed 1 each 0     albuterol (PROVENTIL) 2.5 mg /3 mL (0.083 %) nebulizer solution Take 3 mL (2.5 mg total) by nebulization every 4 (four) hours as needed for wheezing. 40 vial 0     No current facility-administered medications for this visit.        Social History     Tobacco Use   Smoking Status Former Smoker     Last attempt to quit: 1979     Years since quittin.2   Smokeless Tobacco Never Used           OBJECTIVE:        Recent Results (from the past 240 hour(s))   HM2(CBC w/o Differential)   Result Value Ref Range    WBC 6.1 4.0 - 11.0 thou/uL    RBC 3.76 (L) 4.40 - 6.20 mill/uL    Hemoglobin 11.4 (L) 14.0 - 18.0 g/dL    Hematocrit 34.3 (L) 40.0 - 54.0 %    MCV 91 80 - 100 fL    MCH 30.5 27.0 - 34.0 pg    MCHC 33.4 32.0 - 36.0 g/dL    RDW 13.4 11.0 - 14.5 %    Platelets 308 140 - 440 thou/uL    MPV 7.0 7.0 - 10.0 fL   Urinalysis-UC if Indicated   Result Value Ref Range    Color, UA Yellow  Colorless, Yellow, Straw, Light Yellow    Clarity, UA Clear Clear    Glucose, UA Negative Negative    Bilirubin, UA Negative Negative    Ketones, UA Negative Negative    Specific Gravity, UA 1.020 1.005 - 1.030    Blood, UA Negative Negative    pH, UA 5.0 5.0 - 8.0    Protein, UA Negative Negative mg/dL    Urobilinogen, UA 0.2 E.U./dL 0.2 E.U./dL, 1.0 E.U./dL    Nitrite, UA Negative Negative    Leukocytes, UA Negative Negative       Vitals:    03/20/19 1138   BP: 120/78   Patient Position: Sitting   Cuff Size: Adult Large   Pulse: 98   SpO2: 95%   Weight: (!) 236 lb 1 oz (107.1 kg)     Weight: (!) 236 lb 1 oz (107.1 kg)          Physical Exam:  GENERAL APPEARANCE: 69-year-old male, NAD, well hydrated, well nourished  SKIN:  Normal skin turgor, no lesions/rashes   HEENT: moist mucous membranes, no rhinorrhea  NECK: Normal without adenopathy or masses  CV: RRR, no M/G/R   LUNGS: CTAB  ABDOMEN: Soft round no masses or enlarged organs with some mild tenderness in the left lower quadrant and suprapubic area.  Active bowel sounds  EXTREMITY: no edema and full ROM of all joints  NEURO: no focal findings

## 2021-06-25 NOTE — TELEPHONE ENCOUNTER
Patient was called with test results.  Note elevated sed rate at 89.  Do not see any recent comparison sed rates.  Also note that the patient's hemoglobin is decreased but actually is improved from previous readings and the patient states that he has had a chronic problem with anemia.  The patient's creatinine also has gone up slightly and now is 1.32.  The patient states that this morning he feels better he had a mushy stool without mucus or blood and has a better appetite.  No fever but has a slight headache.    Plan of CARE: If The patient has further liquid or watery stools or if he has stools where there is mucus or blood then I would suggest that we get stool tests for enteric pathogens as well as C. difficile toxin.  If he continues to improve with his GI symptoms including improved appetite and normal stools then in about a week I would still suggest that we recheck a CBC, creatinine and sed rate.

## 2021-06-25 NOTE — TELEPHONE ENCOUNTER
Test Results  Who is calling?:  Patient   Who ordered the test: Nikita Mercado MD  Type of test: Lab (Sed Rate)  Date of test:  03/20/2019  Where was the test performed: [facility]/OB  What are your questions/concerns?:  Patient was able to see the above results via [employer] [employer]. Please reach out to the patient and advise.  Okay to leave a detailed message?:  Yes

## 2021-06-26 NOTE — TELEPHONE ENCOUNTER
On his lower left leg by his ankle he has a bite that has now become infected.  It is warm and red, no drainage about 4 x 4  inches .  It has been red for about a day.  He does have diabetes.  Call to the clinic to talk to someone from his care team, TORIBIO Lezama NP.  Care advice is to go to the ED.    Patient verbalized understanding and agrees with plan.    Neli May Nurse Triage Advisor 5:02 PM 6/17/2021    Reason for Disposition    Looks infected (e.g., spreading redness, pus, red streak)    Additional Information    Negative: Major bleeding (actively dripping or spurting) that can't be stopped    Negative: Amputation or bone sticking through the skin    Negative: Looks like a dislocated joint (crooked or deformed)    Negative: Serious injury with multiple fractures (broken bones)    Negative: Sounds like a life-threatening emergency to the triager    Negative: Can't stand (bear weight) or walk (e.g., 4 steps)    Negative: Skin is split open or gaping (length > 1/2 inch or 12 mm)    Negative: Bleeding won't stop after 10 minutes of direct pressure (using correct technique)    Negative: Dirt in the wound and not removed after 15 minutes of scrubbing    Negative: Numbness (new loss of sensation) of toe(s)    Protocols used: ANKLE AND FOOT INJURY-A-OH

## 2021-06-26 NOTE — ED PROVIDER NOTES
EMERGENCY DEPARTMENT ENCOUnter      NAME: Lester Shi  AGE: 71 y.o. male  YOB: 1950  MRN: [id number]  EVALUATION DATE & TIME: 6/17/2021  5:44 PM    PCP: Nikita Mercado MD Matthew Beatty PA-C      No chief complaint on file.        FINAL IMPRESSION:  1. Cellulitis of left lower extremity          ED COURSE & MEDICAL DECISION MAKING:    Pertinent Labs & Imaging studies reviewed. (See chart for details)  71 y.o. male presents to the Emergency Department for evaluation of left lower extremity swelling, pain, redness.    After obtaining full history of present illness, examining the patient reviewing vitals clinical diagnosis at this time would be left lower extremity cellulitis.  I did not feel that screening labs or ultrasound were necessary based on description of his symptoms and exam findings.  We will initiate the patient on a course of antibiotics and I recommended to the patient that in the next 48 to 72 hours if he does not see improvement or if symptoms worsen he needs to return to the ER for repeat assessment.  Overall patient is comfortable with plan of care.    6:01 PM I met with the patient to gather history and to perform my initial exam. I discussed the plan for care while in the Emergency Department. PPE ( gloves, eye protection, face shield, surgical cap, N95 mask, and surgical mask) was worn by me during patient encounters while patient wore mask.   6:09 PM Discussed plans for discharge and patient is agreeable.    At the conclusion of the encounter I discussed the results of all of the tests and the disposition. The questions were answered. The patient or family acknowledged understanding and was agreeable with the care plan.         MEDICATIONS GIVEN IN THE EMERGENCY:  Medications - No data to display    NEW PRESCRIPTIONS STARTED AT TODAY'S ER VISIT  Discharge Medication List as of 6/17/2021  6:10 PM      START taking these medications    Details   doxycycline (VIBRAMYCIN) 100  MG capsule Take 1 capsule (100 mg total) by mouth 2 (two) times a day for 10 days., Starting u 6/17/2021, Until Sun 6/27/2021, Normal         CONTINUE these medications which have NOT CHANGED    Details   ACCU-CHEK FASTCLIX LANCET DRUM U TO TEST TID, Historical Med      albuterol (PROAIR HFA;PROVENTIL HFA;VENTOLIN HFA) 90 mcg/actuation inhaler Inhale 2 puffs every 6 (six) hours as needed for wheezing., Starting Sat 2/20/2021, Normal      albuterol (PROVENTIL) 2.5 mg /3 mL (0.083 %) nebulizer solution NEBULIZE 1 VIAL EVERY 4 HOURS AS NEEDED FOR WHEEZING, Normal      amLODIPine (NORVASC) 10 MG tablet TAKE 1 TABLET(10 MG) BY MOUTH DAILY, Normal      atenoloL (TENORMIN) 50 MG tablet TAKE 1 TABLET(50 MG) BY MOUTH DAILY, Normal      blood glucose test strips Use 1 each As Directed 2 (two) times a day. Dispense brand per patient's insurance at pharmacy discretion., Starting Mon 7/20/2020, Normal      cetirizine (ZYRTEC) 10 MG tablet Take 10 mg by mouth daily., Historical Med      fexofenadine (ALLEGRA) 180 MG tablet Take 180 mg by mouth daily., Historical Med      FLOVENT  mcg/actuation inhaler INHALE 2 PUFFS BY MOUTH TWICE DAILY, Normal      fluticasone (FLONASE) 50 mcg/actuation nasal spray Apply 1 spray into each nostril daily as needed for rhinitis., Historical Med      hydroCHLOROthiazide (HYDRODIURIL) 25 MG tablet Take 1 tablet (25 mg total) by mouth daily., Starting Thu 5/6/2021, Normal      ketotifen (ALAWAY) 0.025 % (0.035 %) ophthalmic solution 1 drop 2 (two) times a day., Historical Med      losartan (COZAAR) 25 MG tablet TAKE 1 TABLET(25 MG) BY MOUTH DAILY, Normal      metFORMIN (GLUCOPHAGE-XR) 500 MG 24 hr tablet Take 2 tablets (1,000 mg total) by mouth 2 (two) times a day., Starting Thu 10/1/2020, Normal      nebulizers Misc Use four times a day for 5 day then as  needed, Normal      triamcinolone (KENALOG) 0.1 % cream APPLY EXTERNALLY TO THE AFFECTED AREA TWICE DAILY FOR 14 DAYS, Normal                 =================================================================    HPI    Patient information was obtained from: Patient    Use of : N/A         Lester Shi is a 71 y.o. male with a history of eczema, HTN, HLD, DM II, anemia, sepsis, who presents for evaluation of left lower extremity pain and redness.    Patient reports 1.5 days of pain, warmth, redness, and swelling to left lower extremity proximal to ankle. He states he bumped his leg playing pickle ball and additionally had a bug bit to that area. Patient notes a history of cellulitis 2 years ago. He endorses chills yesterday but denies fever, fatigue, recent illness, or additional medical concerns or complaints at this time. Denies a history of MRSA.    REVIEW OF SYSTEMS   Review of Systems   Constitutional: Positive for chills. Negative for fatigue and fever.   Cardiovascular: Positive for leg swelling (left lower).   Skin: Positive for color change (redness, warmth, and pain to left lower extremity).   All other systems reviewed and are negative.     PAST MEDICAL HISTORY:  Past Medical History:   Diagnosis Date     Anemia     Created by Conversion      Eczema     Created by Conversion      Essential Hypercholesterolemia     Created by Conversion      Essential Hypertension     Created by Conversion      Klinefelter's syndrome     Created by Conversion      Obesity     Created by Conversion      Type 2 Diabetes Mellitus     Created by Conversion      Varicose vein 9/28/2015       PAST SURGICAL HISTORY:  Past Surgical History:   Procedure Laterality Date     JOINT REPLACEMENT      left knee TKA 11/2013           CURRENT MEDICATIONS:    No current facility-administered medications on file prior to encounter.      Current Outpatient Medications on File Prior to Encounter   Medication Sig     ACCU-CHEK FASTCLIX LANCET DRUM U TO TEST TID     albuterol (PROAIR HFA;PROVENTIL HFA;VENTOLIN HFA) 90 mcg/actuation inhaler Inhale 2 puffs every 6 (six)  hours as needed for wheezing.     albuterol (PROVENTIL) 2.5 mg /3 mL (0.083 %) nebulizer solution NEBULIZE 1 VIAL EVERY 4 HOURS AS NEEDED FOR WHEEZING     amLODIPine (NORVASC) 10 MG tablet TAKE 1 TABLET(10 MG) BY MOUTH DAILY     atenoloL (TENORMIN) 50 MG tablet TAKE 1 TABLET(50 MG) BY MOUTH DAILY     blood glucose test strips Use 1 each As Directed 2 (two) times a day. Dispense brand per patient's insurance at pharmacy discretion.     cetirizine (ZYRTEC) 10 MG tablet Take 10 mg by mouth daily.     fexofenadine (ALLEGRA) 180 MG tablet Take 180 mg by mouth daily.     FLOVENT  mcg/actuation inhaler INHALE 2 PUFFS BY MOUTH TWICE DAILY     fluticasone (FLONASE) 50 mcg/actuation nasal spray Apply 1 spray into each nostril daily as needed for rhinitis.     hydroCHLOROthiazide (HYDRODIURIL) 25 MG tablet Take 1 tablet (25 mg total) by mouth daily.     ketotifen (ALAWAY) 0.025 % (0.035 %) ophthalmic solution 1 drop 2 (two) times a day.     losartan (COZAAR) 25 MG tablet TAKE 1 TABLET(25 MG) BY MOUTH DAILY     metFORMIN (GLUCOPHAGE-XR) 500 MG 24 hr tablet Take 2 tablets (1,000 mg total) by mouth 2 (two) times a day.     nebulizers Misc Use four times a day for 5 day then as  needed     triamcinolone (KENALOG) 0.1 % cream APPLY EXTERNALLY TO THE AFFECTED AREA TWICE DAILY FOR 14 DAYS       ALLERGIES:  Allergies   Allergen Reactions     Amoxicillin Anaphylaxis     Codeine Nausea And Vomiting     Erythromycin Base      Simvastatin Unknown     Sulfamethoxazole-Trimethoprim Nausea And Vomiting     Tachycardia         FAMILY HISTORY:  Family History   Problem Relation Age of Onset     Stroke Mother      Clotting disorder Father      Heart disease Brother        SOCIAL HISTORY:   Social History     Socioeconomic History     Marital status:      Spouse name: None     Number of children: None     Years of education: None     Highest education level: None   Occupational History     None   Social Needs     Financial resource  strain: None     Food insecurity     Worry: None     Inability: None     Transportation needs     Medical: None     Non-medical: None   Tobacco Use     Smoking status: Former Smoker     Packs/day: 2.00     Years: 12.00     Pack years: 24.00     Quit date: 1979     Years since quittin.4     Smokeless tobacco: Never Used   Substance and Sexual Activity     Alcohol use: Yes     Comment: rare     Drug use: No     Sexual activity: Never     Partners: Female   Lifestyle     Physical activity     Days per week: None     Minutes per session: None     Stress: None   Relationships     Social connections     Talks on phone: None     Gets together: None     Attends Muslim service: None     Active member of club or organization: None     Attends meetings of clubs or organizations: None     Relationship status: None     Intimate partner violence     Fear of current or ex partner: None     Emotionally abused: None     Physically abused: None     Forced sexual activity: None   Other Topics Concern     None   Social History Narrative     None       VITALS:  Patient Vitals for the past 24 hrs:   BP Temp Temp src Pulse Resp SpO2 Height Weight   21 1817 (!) 186/86 97.6  F (36.4  C) Oral 84 16 99 % -- --   21 1740 (!) 205/86 97.2  F (36.2  C) Temporal 92 16 96 % 6' (1.829 m) (!) 248 lb (112.5 kg)       PHYSICAL EXAM    Constitutional: Well developed, Well nourished, NAD, nontoxic, obese, GCS 15  HENT: Normocephalic, Atraumatic, Bilateral external ears normal  Respiratory: Normal breath sounds, No respiratory distress  Musculoskeletal: 2+ DP pulses. No edema. No cyanosis, No clubbing. Good range of motion in all major joints. No tenderness to palpation or major deformities noted. No tenderness of the CTLS spine.    Integument: Fairly large area of faint erythema, swelling, warmth that extends from the left ankle area up to the left knee.  No breaks in the skin.  No bullae.  No necrosis.  Remainder of skin exam  benign.  Findings consistent with cellulitis left lower extremity.  Neurologic: Alert & oriented x 3, Normal motor function, Normal sensory function, No focal deficits noted. Normal gait.   Psychiatric: Affect normal, Judgment normal, Mood normal. Cooperative.      LAB:  All pertinent labs reviewed and interpreted.  No results found for this visit on 06/17/21.    RADIOLOGY:  Reviewed all pertinent imaging. Please see official radiology report.  No results found.      I, Negar Karimi, am serving as a scribe to document services personally performed by Vic Arrington PA-C  based on my observation and the provider's statements to me. I, Vic Arrington PA-C attest that Negar Karimi is acting in a scribe capacity, has observed my performance of the services and has documented them in accordance with my direction.    Vic Arrington PA-C  Emergency Medicine  [facility] EMERGENCY ROOM  [address] [address]  Dept: [phone]  Loc: [phone]             Vic Arrington PA-C  06/17/21 1851

## 2021-06-27 ENCOUNTER — COMMUNICATION - HEALTHEAST (OUTPATIENT)
Dept: FAMILY MEDICINE | Facility: CLINIC | Age: 71
End: 2021-06-27

## 2021-06-27 ENCOUNTER — HEALTH MAINTENANCE LETTER (OUTPATIENT)
Age: 71
End: 2021-06-27

## 2021-06-27 DIAGNOSIS — E11.9 TYPE 2 DIABETES MELLITUS WITHOUT COMPLICATION, WITHOUT LONG-TERM CURRENT USE OF INSULIN (H): ICD-10-CM

## 2021-06-27 RX ORDER — METFORMIN HCL 500 MG
TABLET, EXTENDED RELEASE 24 HR ORAL
Qty: 360 TABLET | Refills: 0 | Status: SHIPPED | OUTPATIENT
Start: 2021-06-27 | End: 2021-07-26

## 2021-07-03 NOTE — ADDENDUM NOTE
Addendum Note by Ginny Thomson CMA at 4/30/2019 10:20 AM     Author: Ginny Thomson CMA Service: -- Author Type: Certified Medical Assistant    Filed: 4/30/2019 10:20 AM Encounter Date: 4/29/2019 Status: Signed    : Ginny Thomson CMA (Certified Medical Assistant)    Addended by: GINNY THOMSON on: 4/30/2019 10:20 AM        Modules accepted: Orders

## 2021-07-03 NOTE — ADDENDUM NOTE
Addendum Note by Nohemi Lezama NP at 4/30/2019 10:22 AM     Author: Nohemi Lezama NP Service: -- Author Type: Nurse Practitioner    Filed: 4/30/2019 10:22 AM Encounter Date: 4/29/2019 Status: Signed    : Nohemi Lezama NP (Nurse Practitioner)    Addended by: NOHEMI LEZAMA on: 4/30/2019 10:22 AM        Modules accepted: Orders

## 2021-07-06 VITALS — BODY MASS INDEX: 33.59 KG/M2 | WEIGHT: 248 LBS | HEIGHT: 72 IN

## 2021-07-07 NOTE — TELEPHONE ENCOUNTER
Refill Approved    Rx renewed per Medication Renewal Policy. Medication was last renewed on 10/1/2020.    Pamela Ott, [facility] Triage/Med Refill 6/27/2021     Requested Prescriptions   Pending Prescriptions Disp Refills     metFORMIN (GLUCOPHAGE-XR) 500 MG 24 hr tablet [Pharmacy Med Name: METFORMIN ER 500MG 24HR TABS] 360 tablet 2     Sig: TAKE 2 TABLETS(1000 MG) BY MOUTH TWICE DAILY       Metformin Refill Protocol Passed - 6/27/2021  6:00 AM        Passed - Blood pressure in last 12 months     BP Readings from Last 1 Encounters:   06/17/21 (!) 186/86             Passed - LFT or AST or ALT in last 12 months     Albumin   Date Value Ref Range Status   09/11/2020 3.7 3.5 - 5.0 g/dL Final     Bilirubin, Total   Date Value Ref Range Status   09/11/2020 0.7 0.0 - 1.0 mg/dL Final     Bilirubin, Direct   Date Value Ref Range Status   11/14/2012 0.21 <0.31 mg/dL Final     Alkaline Phosphatase   Date Value Ref Range Status   09/11/2020 117 45 - 120 U/L Final     AST   Date Value Ref Range Status   09/11/2020 19 0 - 40 U/L Final     ALT   Date Value Ref Range Status   09/11/2020 22 0 - 45 U/L Final     Protein, Total   Date Value Ref Range Status   09/11/2020 8.8 (H) 6.0 - 8.0 g/dL Final                Passed - GFR or Serum Creatinine in last 6 months     GFR MDRD Non Af Amer   Date Value Ref Range Status   04/23/2021 >60 >60 mL/min/1.73m2 Final     GFR MDRD Af Amer   Date Value Ref Range Status   04/23/2021 >60 >60 mL/min/1.73m2 Final             Passed - Visit with PCP or prescribing provider visit in last 6 months or next 3 months     Last office visit with prescriber/PCP: 5/17/2021 OR same dept: 5/17/2021 Nikita Mercado MD OR same specialty: 5/17/2021 Nikita Mercado MD Last physical: Visit date not found Last MTM visit: Visit date not found         Next appt within 3 mo: Visit date not found  Next physical within 3 mo: Visit date not found  Prescriber OR PCP: Nikita Mercado MD  Last diagnosis associated  with med order: 1. Type 2 diabetes mellitus without complication, without long-term current use of insulin (H)  - metFORMIN (GLUCOPHAGE-XR) 500 MG 24 hr tablet [Pharmacy Med Name: METFORMIN ER 500MG 24HR TABS]; TAKE 2 TABLETS(1000 MG) BY MOUTH TWICE DAILY  Dispense: 360 tablet; Refill: 2     If protocol passes may refill for 12 months if within 3 months of last provider visit (or a total of 15 months).           Passed - A1C in last 6 months     Hemoglobin A1c   Date Value Ref Range Status   04/26/2021 8.6 (H) <=5.6 % Final               Passed - Microalbumin in last year      Microalbumin, Random Urine   Date Value Ref Range Status   07/20/2020 1.99 0.00 - 1.99 mg/dL Final

## 2021-07-14 PROBLEM — E11.65 UNCONTROLLED TYPE 2 DIABETES MELLITUS WITH HYPERGLYCEMIA (H): Status: RESOLVED | Noted: 2019-02-18 | Resolved: 2020-11-28

## 2021-07-14 PROBLEM — E10.65 UNCONTROLLED TYPE 1 DIABETES MELLITUS WITH HYPERGLYCEMIA (H): Status: RESOLVED | Noted: 2019-02-15 | Resolved: 2019-05-20

## 2021-07-26 ENCOUNTER — OFFICE VISIT (OUTPATIENT)
Dept: FAMILY MEDICINE | Facility: CLINIC | Age: 71
End: 2021-07-26
Payer: COMMERCIAL

## 2021-07-26 ENCOUNTER — HOSPITAL ENCOUNTER (OUTPATIENT)
Dept: CT IMAGING | Facility: CLINIC | Age: 71
Discharge: HOME | End: 2021-07-26
Attending: FAMILY MEDICINE | Admitting: FAMILY MEDICINE
Payer: COMMERCIAL

## 2021-07-26 VITALS
DIASTOLIC BLOOD PRESSURE: 78 MMHG | BODY MASS INDEX: 32.91 KG/M2 | TEMPERATURE: 98.2 F | WEIGHT: 243 LBS | HEART RATE: 82 BPM | HEIGHT: 72 IN | SYSTOLIC BLOOD PRESSURE: 136 MMHG | RESPIRATION RATE: 19 BRPM | OXYGEN SATURATION: 98 %

## 2021-07-26 DIAGNOSIS — R91.1 NODULE OF RIGHT LUNG: ICD-10-CM

## 2021-07-26 DIAGNOSIS — J98.4 PNEUMONITIS: ICD-10-CM

## 2021-07-26 DIAGNOSIS — N28.9 LESION OF RIGHT NATIVE KIDNEY: ICD-10-CM

## 2021-07-26 DIAGNOSIS — J18.1 LUNG CONSOLIDATION (H): ICD-10-CM

## 2021-07-26 DIAGNOSIS — R91.8 PULMONARY NODULES: ICD-10-CM

## 2021-07-26 DIAGNOSIS — M79.89 LEG SWELLING: Primary | ICD-10-CM

## 2021-07-26 DIAGNOSIS — L03.116 LEFT LEG CELLULITIS: ICD-10-CM

## 2021-07-26 LAB
CREAT BLD-MCNC: 1.1 MG/DL (ref 0.7–1.3)
GFR SERPL CREATININE-BSD FRML MDRD: >60 ML/MIN/1.73M2

## 2021-07-26 PROCEDURE — 250N000011 HC RX IP 250 OP 636: Performed by: FAMILY MEDICINE

## 2021-07-26 PROCEDURE — 99214 OFFICE O/P EST MOD 30 MIN: CPT | Performed by: NURSE PRACTITIONER

## 2021-07-26 PROCEDURE — 71260 CT THORAX DX C+: CPT

## 2021-07-26 PROCEDURE — 82565 ASSAY OF CREATININE: CPT

## 2021-07-26 RX ORDER — CLINDAMYCIN HCL 300 MG
300 CAPSULE ORAL 4 TIMES DAILY
Qty: 40 CAPSULE | Refills: 0 | Status: SHIPPED | OUTPATIENT
Start: 2021-07-26 | End: 2021-08-05

## 2021-07-26 RX ORDER — IOPAMIDOL 755 MG/ML
100 INJECTION, SOLUTION INTRAVASCULAR ONCE
Status: COMPLETED | OUTPATIENT
Start: 2021-07-26 | End: 2021-07-26

## 2021-07-26 RX ADMIN — IOPAMIDOL 100 ML: 755 INJECTION, SOLUTION INTRAVENOUS at 09:09

## 2021-07-26 ASSESSMENT — MIFFLIN-ST. JEOR: SCORE: 1885.24

## 2021-07-26 NOTE — PROGRESS NOTES
Assessment & Plan     Leg swelling    Elevated extremity when sitting    Left leg cellulitis    - clindamycin (CLEOCIN) 300 MG capsule  Dispense: 40 capsule; Refill: 0  Previous tolerated this medication when he was treated for left leg cellulitis in September    Lesion of right native kidney    - MR Renal wo & w Contrast  - Adult Urology Referral  - PET W CT ATTENUATION, WHOLE BODY  We did review the findings on his recent CT scan which showed a suspicious lesion on the right kidney, the radiologist recommends further evaluation with MRI of the kidney and establishing care with a urologist for further evaluation    Nodule of right lung    - PET W CT ATTENUATION, WHOLE BODY  - Adult Pulmonary Medicine Referral  We reviewed the recent results of his CT scan which show suspicious findings in the lung consistent with carcinoma, the radiologist did recommend a PET scan to confirm/evaluate the presence of carcinoma.  He will also establish care with pulmonary specialty for further evaluation and management  He does have a previous history of recurrent pneumonia with asthma that is persistent    Lung consolidation (H)    - PET W CT ATTENUATION, WHOLE BODY  - Adult Pulmonary Medicine Referral   We reviewed the recent results of his CT scan which show suspicious findings in the lung consistent with carcinoma, the radiologist did recommend a PET scan to confirm/evaluate the presence of carcinoma.  He will also establish care with pulmonary specialty for further evaluation and management  He does have a previous history of recurrent pneumonia with asthma that is persistent          31 minutes spent on the date of the encounter doing chart review, review of test results, interpretation of tests, patient visit and documentation        BMI:   Estimated body mass index is 32.96 kg/m  as calculated from the following:    Height as of this encounter: 1.829 m (6').    Weight as of this encounter: 110.2 kg (243 lb).   Weight  management plan: Discussed healthy diet and exercise guidelines    See Patient Instructions    Return in about 4 weeks (around 8/23/2021) for follow up to discuss referral, MRI and pet scan findings. .    Nohemi Lezama NP  [facility] CHETAN Batista is a 71 year old who presents for the following health issues: Left LE swelling with redness, discuss CT scan findings.     HPI left lower leg redness with swelling: History of lower extremity cellulitis, his last flareup was towards the end of June, he did finish his antibiotic but he felt that it was not quite cleared up at that time.  Ever since then, he notes that the redness, swelling and some pain starting in the posterior region of the lower aspect of the left leg has been most prominent over the last several days.  He denied fever, chills or flulike symptoms since the redness and pain started.  He has not noticed any open wounds or drainage.  He does have a history of diabetes and is taking medications for this.  He denies any loss of sensation, he is able to ambulate and bear full weight without difficulty.  The area of concern measures 4.5 cm starting from the ankle extending upward on the left lower leg.  He found that when he was taking the oral clindamycin, his cellulitis cleared up without difficulty.  He does not recall if he developed any side effects from the medication such as rash or diarrhea.    Test results: He would like to discuss results from his recent CT scan that was just completed.  He does have some concerns regarding the possibility of carcinoma that is being questioned in one of the kidneys and in the lungs.  He is needing some additional imaging studies ordered for further evaluation which include a renal MRI and a PET scan.  He has not discussed these results yet with his family, he would like to get more information before he has the discussion with his wife and daughter.    EXAM: CT CHEST W  CONTRAST  LOCATION: [facility]  DATE/TIME: 7/26/2021 8:28 AM     INDICATION: Follow-up pneumonia and consolidation. Follow-up new nodular opacity.  COMPARISON: 04/23/2021. 10/29/2019.  TECHNIQUE: CT chest with IV contrast. Multiplanar reformats were obtained. Dose reduction techniques were used.  CONTRAST: 100ml Isovue 370.     FINDINGS:   LUNGS AND PLEURA: 14 x 15 x 32 mm left lower lobe paraspinal nodular consolidation shows no significant change allowing for differences in measurement technique (series 3, image 72). Stable right lower lobe superior segment elongated 4 x 10 mm nodular   opacity (series 3, image 68). Several other scattered bilateral under 5 mm nodules, also without significant change.      Irregular nodular consolidation and thickening along the major and minor fissure appears slightly improved.      Mild central airway thickening. No pleural effusion.     MEDIASTINUM/AXILLAE: No adenopathy. Small hiatus hernia.     UPPER ABDOMEN: Right renal upper pole exophytic 16-17 mm hyperdensity (series 3, image 146). Hepatic steatosis.      MUSCULOSKELETAL: Hypertrophic degenerative changes spine.                                                                      IMPRESSION:      1.  Interval conspicuous right renal upper pole hyperdense lesion suspicious for renal cell carcinoma. Recommend multiphase dedicated renal MRI or CT and urologic consultation.     2.  Marginal improvement of atypical consolidative thickening and clustered nodularity along the right major and minor fissures. Given the atypical appearance and very minimal change since April 2021, this remains technically indeterminate.     3.  Stable indeterminate left lower lobe irregular nodular consolidation.     4.  Recommend PET/CT for evaluation of FDG uptake concerning the above mentioned indeterminate and atypical appearing consolidative and nodular foci in the lungs. Neoplasm unable to be excluded. Pulmonary  consultation also recommended.     5.  Stable right lower lobe superior segment nodule.     6.  Few other stable scattered bilateral under 5 mm nodules.        NOTE: ABNORMAL REPORT     THE DICTATION ABOVE DESCRIBES AN ABNORMALITY FOR WHICH FOLLOW-UP IS NEEDED.         Review of Systems   Constitutional, HEENT, cardiovascular, pulmonary, gi and gu systems are negative, except as otherwise noted.      Objective    /78   Pulse 82   Temp 98.2  F (36.8  C)   Resp 19   Ht 1.829 m (6')   Wt 110.2 kg (243 lb)   SpO2 98%   BMI 32.96 kg/m    Body mass index is 32.96 kg/m .  Physical Exam   GENERAL: healthy, alert and no distress  RESP: lungs clear to auscultation - no rales, rhonchi or wheezes  CV: regular rate and rhythm, normal S1 S2, no S3 or S4, no murmur, click or rub, no peripheral edema and peripheral pulses strong  SKIN: Left LE with moderate redness and feels hot to touch, area does have 2+ pitting edema and is painful to touch.

## 2021-07-27 ENCOUNTER — TELEPHONE (OUTPATIENT)
Dept: FAMILY MEDICINE | Facility: CLINIC | Age: 71
End: 2021-07-27

## 2021-07-27 DIAGNOSIS — R91.1 NODULE OF RIGHT LUNG: Primary | ICD-10-CM

## 2021-07-27 DIAGNOSIS — J98.4 PNEUMONITIS: ICD-10-CM

## 2021-07-27 DIAGNOSIS — J18.1 LUNG CONSOLIDATION (H): ICD-10-CM

## 2021-07-27 NOTE — TELEPHONE ENCOUNTER
Reason for Call:  Other call back    Detailed comments: pt calling to state that the earliest he can get into [facility] is 9.13 or thereabout, is it okay to wait this long or go somewhere else or do something else to get in sooner.     Phone Number Patient can be reached at: Cell number on file:    No relevant phone numbers on file.     [phone]  Best Time:     Can we leave a detailed message on this number? YES    Call taken on 7/27/2021 at 4:08 PM by Pat Gomez

## 2021-07-29 ENCOUNTER — TELEPHONE (OUTPATIENT)
Dept: FAMILY MEDICINE | Facility: CLINIC | Age: 71
End: 2021-07-29

## 2021-07-29 DIAGNOSIS — R91.8 PULMONARY NODULES: ICD-10-CM

## 2021-07-29 DIAGNOSIS — N28.89 RENAL MASS, RIGHT: Primary | ICD-10-CM

## 2021-07-29 NOTE — TELEPHONE ENCOUNTER
Pt contacted. He already discuss results with Nohemi Lezama during visit 7/26/21.  Urology consult, pulmonology consult, PET scan and MRI of renal mass ordered at that visit 7/26/21.  .   Future pulmonology appt 8/3/21. He will call tomorrow for urology appt.    Should he do MRI or CT for renal mass?

## 2021-08-02 ENCOUNTER — MEDICAL CORRESPONDENCE (OUTPATIENT)
Dept: HEALTH INFORMATION MANAGEMENT | Facility: CLINIC | Age: 71
End: 2021-08-02

## 2021-08-03 ENCOUNTER — TRANSFERRED RECORDS (OUTPATIENT)
Dept: HEALTH INFORMATION MANAGEMENT | Facility: CLINIC | Age: 71
End: 2021-08-03

## 2021-08-03 NOTE — TELEPHONE ENCOUNTER
Pt wants to make sure the PET scan has been ordered.   He will await results. No further questions.

## 2021-08-03 NOTE — TELEPHONE ENCOUNTER
Orders have been placed correctly which were recommend by the radiologist. He can discuss findings with radiology team once they are completed. I will also review and will discuss with him after they are completed and we know what we are dealing with so tell him to sit tight for now. I am in the loop as he requested at last appointment.

## 2021-08-03 NOTE — TELEPHONE ENCOUNTER
Per Dr Mercado, moise with MRI as Nohemi ordered. Will cancel CT.   PET scan needs to be reordered- will send to Nohemi as she saw patient and ordered originally.     Order pending.

## 2021-08-03 NOTE — TELEPHONE ENCOUNTER
Pt calling back, wants Nohemi to call him to discuss testing, wanting to make sure it matching up with renal providers needs/wants.   I advised him if that provider wants a different or specific test that is different, they can order that or let us know.     He stated he would like to speak to Nohemi-agreed to send message

## 2021-08-03 NOTE — TELEPHONE ENCOUNTER
Brigida from radiology scheduling called back, unsure it is corrected order.    Needs clarification.    Brigida [phone] or KRYSTIN Arthur

## 2021-08-13 ENCOUNTER — TELEPHONE (OUTPATIENT)
Dept: FAMILY MEDICINE | Facility: CLINIC | Age: 71
End: 2021-08-13

## 2021-08-13 ENCOUNTER — HOSPITAL ENCOUNTER (OUTPATIENT)
Dept: MRI IMAGING | Facility: HOSPITAL | Age: 71
End: 2021-08-13
Attending: NURSE PRACTITIONER
Payer: COMMERCIAL

## 2021-08-13 ENCOUNTER — HOSPITAL ENCOUNTER (OUTPATIENT)
Dept: PET IMAGING | Facility: HOSPITAL | Age: 71
End: 2021-08-13
Attending: NURSE PRACTITIONER
Payer: COMMERCIAL

## 2021-08-13 DIAGNOSIS — N28.9 LESION OF RIGHT NATIVE KIDNEY: ICD-10-CM

## 2021-08-13 LAB — GLUCOSE BLDC GLUCOMTR-MCNC: 168 MG/DL (ref 70–125)

## 2021-08-13 PROCEDURE — 74183 MRI ABD W/O CNTR FLWD CNTR: CPT

## 2021-08-13 PROCEDURE — A9552 F18 FDG: HCPCS | Performed by: NURSE PRACTITIONER

## 2021-08-13 PROCEDURE — 343N000001 HC RX 343: Performed by: NURSE PRACTITIONER

## 2021-08-13 PROCEDURE — 78815 PET IMAGE W/CT SKULL-THIGH: CPT

## 2021-08-13 PROCEDURE — 255N000002 HC RX 255 OP 636: Performed by: NURSE PRACTITIONER

## 2021-08-13 PROCEDURE — A9585 GADOBUTROL INJECTION: HCPCS | Performed by: NURSE PRACTITIONER

## 2021-08-13 RX ORDER — GADOBUTROL 604.72 MG/ML
10 INJECTION INTRAVENOUS ONCE
Status: COMPLETED | OUTPATIENT
Start: 2021-08-13 | End: 2021-08-13

## 2021-08-13 RX ADMIN — FLUDEOXYGLUCOSE F-18 11.77 MCI.: 500 INJECTION, SOLUTION INTRAVENOUS at 08:14

## 2021-08-13 RX ADMIN — GADOBUTROL 10 ML: 604.72 INJECTION INTRAVENOUS at 11:09

## 2021-08-13 NOTE — TELEPHONE ENCOUNTER
Notify patient of his PET scan results which are showing no metastatic disease.  The nodules they are seeing in the left lower lobe in the right upper lobe however are indeterminate as to whether or not they are neoplasms versus infectious/inflammatory processes.  He has establish care with a lung specialist at [facility], he does have a number to contact his care team to get his CT-guided biopsy scheduled so we can determine what we are dealing with in regards to the nodules in the left lower lobe in the right upper lobe of the lung.  MRI of the kidney is still pending.

## 2021-08-16 ENCOUNTER — TELEPHONE (OUTPATIENT)
Dept: FAMILY MEDICINE | Facility: CLINIC | Age: 71
End: 2021-08-16

## 2021-08-16 ENCOUNTER — NURSE TRIAGE (OUTPATIENT)
Dept: NURSING | Facility: CLINIC | Age: 71
End: 2021-08-16

## 2021-08-16 DIAGNOSIS — E11.9 TYPE 2 DIABETES MELLITUS WITHOUT COMPLICATION, WITHOUT LONG-TERM CURRENT USE OF INSULIN (H): Primary | ICD-10-CM

## 2021-08-16 NOTE — TELEPHONE ENCOUNTER
Callback requested to discuss results of his renal MRI.  Callback number was left on his voicemail.  I will be available till about 5 PM to discuss these results.  Improved with office but unreachable through my in basket messaging.

## 2021-08-16 NOTE — TELEPHONE ENCOUNTER
I am out of the office this week but I will give him a call now and take care of it. No need for covering provider to call him.

## 2021-08-16 NOTE — TELEPHONE ENCOUNTER
"Telephone call -    Pt is calling requesting test results for his Renal MRI. He can see results are back in \"MyChart\" but would like to speak with Nohemi Lezama NP  about his result.  This nurse provided test results for his PET, read notes from:    Nohemi Lezama NP         8/13/21 12:19 PM  Note     Notify patient of his PET scan results which are showing no metastatic disease.  The nodules they are seeing in the left lower lobe in the right upper lobe however are indeterminate as to whether or not they are neoplasms versus infectious/inflammatory processes.  He has establish care with a lung specialist at [facility], he does have a number to contact his care team to get his CT-guided biopsy scheduled so we can determine what we are dealing with in regards to the nodules in the left lower lobe in the right upper lobe of the lung.  MRI of the kidney is still pending               Information only no triage.    Routing note to Nohemi Holder.  Patricia Cueto RN Nurse Triage Advisor 1:50 PM 8/16/2021      COVID 19 Nurse Triage Plan/Patient Instructions    Please be aware that novel coronavirus (COVID-19) may be circulating in the community. If you develop symptoms such as fever, cough, or SOB or if you have concerns about the presence of another infection including coronavirus (COVID-19), please contact your health care provider or visit [email or web address].     Disposition/Instructions    In-Person Visit with provider recommended. Reference Visit Selection Guide.    Thank you for taking steps to prevent the spread of this virus.  o Limit your contact with others.  o Wear a simple mask to cover your cough.  o Wash your hands well and often.    Resources    [facility]: About COVID-19: [email or web address]    CDC: What to Do If You're Sick: www.cdc.gov/coronavirus/2019-ncov/about/steps-when-sick.html    CDC: Ending Home Isolation: " www.cdc.gov/coronavirus/2019-ncov/hcp/disposition-in-home-patients.html     CDC: Caring for Someone: www.cdc.gov/coronavirus/2019-ncov/if-you-are-sick/care-for-someone.html     [facility]: Interim Guidance for Hospital Discharge to Home: [email or web address].[email or web address]/[email or web address]/[email or web address]/hcp/hospdischarge.pdf    [facility] clinical trials (COVID-19 research studies): [email or web address].[email or web address]/n-clinical-trials     Below are the COVID-19 hotlines at the [place] [facility] ([facility]). Interpreters are available.   o For health questions: Call 151-761-1695 or 1-986.628.1486 (7 a.m. to 7 p.m.)  o For questions about schools and childcare: Call 990-406-8631 or 1-589.568.6093 (7 a.m. to 7 p.m.)

## 2021-08-16 NOTE — TELEPHONE ENCOUNTER
Recent MRI results of the kidney.  There is no signs of carcinoma present.  He does have several small cysts in the kidney, one of them being hemorrhagic but no further follow-up is needed.  He does have several small pancreatic cyst also which could be present from his previous issues with pancreatitis.  Based on his results, recommendations are to repeat an MRI/CT of the kidney in 6 months and then every 2 years going forward.  He does have an appointment to meet with the urologist in 1 week, he will plan on keeping that appointment so they can review his MRI and discuss any further intervention or testing is needed.  Tomorrow, he will be scheduling his biopsy of the lung nodule that is present so we can get this completed and rule out carcinoma versus infectious/inflammatory presents in regards to the 8 mm nodule that is currently the concern.    He does have some concerns regarding his Metformin for his diabetes control.  Ever since he has taken the Metformin, he verbalizes that he has been experiencing diarrhea symptoms and would like to stop the medication and start something different.  Because he does not eat breakfast daily, he typically does not eat his first meal till noon or 1 in the afternoon, he is not a good candidate for the glipizide.  We did discuss trialing him on the Januvia.  We discussed the importance of having him check his blood sugars every morning when he is fasting and right before bed since we are changing his medications.  We will discussed what foods he should be focusing on including meats, vegetables and fruits, whole grains.  We discussed portion control in regards to the starchy vegetables such as potatoes, carrots and corn along with a higher glycemic fruits such as oranges, grapes and bananas.  We also discussed the importance of regular physical activity and working on a little bit of weight loss since his last A1c was 8.6.  We will plan on rechecking his lab work in 3 months  again to evaluate the effectiveness of the Januvia in regards to his A1c.  If he has any problems beforehand, he will come and follow-up with me in the clinic. He prefers to transfer his care back over to me.

## 2021-08-16 NOTE — TELEPHONE ENCOUNTER
Nohemi Lezama is going to call patient and go over results with patient - no appointment needed.     Janneth Hawk, CMA

## 2021-08-16 NOTE — TELEPHONE ENCOUNTER
Reason for Call:  Other appointment    Detailed comments: Office visit, go over results. Patient stated he just had tests for cancer.    Phone Number Patient can be reached at: Home number on file [phone] (home)    Best Time: any time this week except 8/17 between 3-5pm    Can we leave a detailed message on this number? YES    Call taken on 8/16/2021 at 2:07 PM by Lilliam Vidal

## 2021-08-17 ENCOUNTER — MYC MEDICAL ADVICE (OUTPATIENT)
Dept: FAMILY MEDICINE | Facility: CLINIC | Age: 71
End: 2021-08-17

## 2021-08-18 NOTE — TELEPHONE ENCOUNTER
I advised his wife to call their insurance and find out what the cheapest med is. Unfortunately, he is limited with options since he does not eat breakfast and several of the oral medications are faster acting so Yansmitha was the one I was hoping he would be able to afford. Anyway, they should call and let me know what his insurance says about a cheaper option. Metformin was stopped due to diarrhea

## 2021-08-22 ENCOUNTER — HEALTH MAINTENANCE LETTER (OUTPATIENT)
Age: 71
End: 2021-08-22

## 2021-08-23 ENCOUNTER — TRANSFERRED RECORDS (OUTPATIENT)
Dept: HEALTH INFORMATION MANAGEMENT | Facility: CLINIC | Age: 71
End: 2021-08-23

## 2021-08-30 ENCOUNTER — TRANSFERRED RECORDS (OUTPATIENT)
Dept: HEALTH INFORMATION MANAGEMENT | Facility: CLINIC | Age: 71
End: 2021-08-30

## 2021-09-29 DIAGNOSIS — I10 ESSENTIAL HYPERTENSION, BENIGN: ICD-10-CM

## 2021-09-29 DIAGNOSIS — Z76.0 ENCOUNTER FOR MEDICATION REFILL: ICD-10-CM

## 2021-09-30 RX ORDER — LOSARTAN POTASSIUM 25 MG/1
TABLET ORAL
Qty: 90 TABLET | Refills: 2 | Status: SHIPPED | OUTPATIENT
Start: 2021-09-30 | End: 2022-06-27

## 2021-09-30 RX ORDER — AMLODIPINE BESYLATE 10 MG/1
TABLET ORAL
Qty: 90 TABLET | Refills: 2 | Status: SHIPPED | OUTPATIENT
Start: 2021-09-30 | End: 2022-06-27

## 2021-09-30 NOTE — TELEPHONE ENCOUNTER
"Last Written Prescription Date:  10/1/2020  Last Fill Quantity: 90,  # refills: 3   Last office visit provider:  5/17/21     Requested Prescriptions   Pending Prescriptions Disp Refills     amLODIPine (NORVASC) 10 MG tablet [Pharmacy Med Name: AMLODIPINE BESYLATE 10MG TABLETS] 90 tablet 3     Sig: TAKE 1 TABLET(10 MG) BY MOUTH DAILY       Calcium Channel Blockers Protocol  Passed - 9/29/2021  5:56 AM        Passed - Blood pressure under 140/90 in past 12 months     BP Readings from Last 3 Encounters:   07/26/21 136/78   05/17/21 138/82   04/26/21 (!) 150/84                 Passed - Recent (12 mo) or future (30 days) visit within the authorizing provider's specialty     Patient has had an office visit with the authorizing provider or a provider within the authorizing providers department within the previous 12 mos or has a future within next 30 days. See \"Patient Info\" tab in inbasket, or \"Choose Columns\" in Meds & Orders section of the refill encounter.              Passed - Medication is active on med list        Passed - Patient is age 18 or older        Passed - Normal serum creatinine on file in past 12 months     Recent Labs   Lab Test 07/26/21  0843   CR 1.1       Ok to refill medication if creatinine is low               Jonathon Juan RN 09/30/21 12:08 PM  "

## 2021-09-30 NOTE — TELEPHONE ENCOUNTER
"Last Written Prescription Date:  10/1/2020  Last Fill Quantity: 90,  # refills: 3   Last office visit provider:  5/17/21     Requested Prescriptions   Pending Prescriptions Disp Refills     losartan (COZAAR) 25 MG tablet [Pharmacy Med Name: LOSARTAN 25MG TABLETS] 90 tablet 3     Sig: TAKE 1 TABLET(25 MG) BY MOUTH DAILY       Angiotensin-II Receptors Passed - 9/29/2021  5:56 AM        Passed - Last blood pressure under 140/90 in past 12 months     BP Readings from Last 3 Encounters:   07/26/21 136/78   05/17/21 138/82   04/26/21 (!) 150/84                 Passed - Recent (12 mo) or future (30 days) visit within the authorizing provider's specialty     Patient has had an office visit with the authorizing provider or a provider within the authorizing providers department within the previous 12 mos or has a future within next 30 days. See \"Patient Info\" tab in inbasket, or \"Choose Columns\" in Meds & Orders section of the refill encounter.              Passed - Medication is active on med list        Passed - Patient is age 18 or older        Passed - Normal serum creatinine on file in past 12 months     Recent Labs   Lab Test 07/26/21  0843   CR 1.1       Ok to refill medication if creatinine is low          Passed - Normal serum potassium on file in past 12 months     Recent Labs   Lab Test 04/23/21  1238   POTASSIUM 3.9                         Jonathon Juan RN 09/30/21 12:07 PM  "

## 2021-10-12 ENCOUNTER — NURSE TRIAGE (OUTPATIENT)
Dept: NURSING | Facility: CLINIC | Age: 71
End: 2021-10-12

## 2021-10-12 NOTE — TELEPHONE ENCOUNTER
Triage Call    Patient calling with symptoms of nasal congestion with greenish/yellow nasal drainage, pressure in right ear and above eyes. Does have a history of sinus infections and ear infections.    Taking Zyrtec daily for allergies.    No fever.    [place] and is waiting for the results.    Per protocol, recommendations are for patient to be Seen Today in Office.  Care advice given. Advised patient to call back if they develop any new or worsening symptoms. Patient verbalizes understanding and agrees with plan of care. Transferred to scheduling.    Nicolasa Shelton RN  10/12/21 10:40 AM  [facility] Nurse Advisor    Reason for Disposition    Earache    Additional Information    Negative: Sounds like a life-threatening emergency to the triager    Negative: Difficulty breathing, and not from stuffy nose (e.g., not relieved by cleaning out the nose)    Negative: SEVERE headache and has fever    Negative: Patient sounds very sick or weak to the triager    Negative: SEVERE sinus pain    Negative: Severe headache    Negative: Redness or swelling on the cheek, forehead, or around the eye    Negative: Fever > 103 F (39.4 C)    Negative: Fever > 101 F (38.3 C) and over 60 years of age    Negative: Fever > 100.0 F (37.8 C) and has diabetes mellitus or a weak immune system (e.g., HIV positive, cancer chemotherapy, organ transplant, splenectomy, chronic steroids)    Negative: Fever > 100.0 F (37.8 C) and bedridden (e.g., nursing home patient, stroke, chronic illness, recovering from surgery)    Negative: Sinus pain (not just congestion) and fever    Negative: Fever returns after gone for over 24 hours and symptoms worse or not improved    Negative: Fever present > 3 days (72 hours)    Protocols used: SINUS PAIN OR CONGESTION-A-OH    COVID 19 Nurse Triage Plan/Patient Instructions    Please be aware that novel coronavirus (COVID-19) may be circulating in the community. If you  develop symptoms such as fever, cough, or SOB or if you have concerns about the presence of another infection including coronavirus (COVID-19), please contact your health care provider or visit [email or web address].     Disposition/Instructions    In-Person Visit with provider recommended. Reference Visit Selection Guide.    Thank you for taking steps to prevent the spread of this virus.  o Limit your contact with others.  o Wear a simple mask to cover your cough.  o Wash your hands well and often.    Resources    [facility]: About COVID-19: [email or web address]    CDC: What to Do If You're Sick: www.cdc.gov/coronavirus/2019-ncov/about/steps-when-sick.html    CDC: Ending Home Isolation: www.cdc.gov/coronavirus/2019-ncov/hcp/disposition-in-home-patients.html     CDC: Caring for Someone: www.cdc.gov/coronavirus/2019-ncov/if-you-are-sick/care-for-someone.html     [facility]: Interim Guidance for Hospital Discharge to Home: [email or web address]/[email or web address]/hcp/hospdischarge.pdf    [facility] clinical trials (COVID-19 research studies): clinicalaffairs.[facility]-clinical-trials     Below are the COVID-19 hotlines at the [place] Department of Health ([facility]). Interpreters are available.   o For health questions: Call 888-226-2822 or 1-364.836.9594 (7 a.m. to 7 p.m.)  o For questions about schools and childcare: Call 314-527-1160 or 1-860.832.9810 (7 a.m. to 7 p.m.)

## 2021-10-17 ENCOUNTER — HEALTH MAINTENANCE LETTER (OUTPATIENT)
Age: 71
End: 2021-10-17

## 2021-11-02 ENCOUNTER — TRANSFERRED RECORDS (OUTPATIENT)
Dept: HEALTH INFORMATION MANAGEMENT | Facility: CLINIC | Age: 71
End: 2021-11-02
Payer: COMMERCIAL

## 2021-11-19 ENCOUNTER — HOSPITAL ENCOUNTER (OUTPATIENT)
Dept: CT IMAGING | Facility: CLINIC | Age: 71
Discharge: HOME | End: 2021-11-19
Attending: INTERNAL MEDICINE | Admitting: INTERNAL MEDICINE
Payer: COMMERCIAL

## 2021-11-19 DIAGNOSIS — R91.1 SOLITARY PULMONARY NODULE: ICD-10-CM

## 2021-11-19 PROCEDURE — 71250 CT THORAX DX C-: CPT

## 2021-11-23 ENCOUNTER — OFFICE VISIT (OUTPATIENT)
Dept: OTHER | Facility: CLINIC | Age: 71
End: 2021-11-23
Payer: COMMERCIAL

## 2021-11-23 VITALS
RESPIRATION RATE: 20 BRPM | HEART RATE: 75 BPM | WEIGHT: 242 LBS | OXYGEN SATURATION: 96 % | TEMPERATURE: 98.7 F | BODY MASS INDEX: 32.82 KG/M2

## 2021-11-23 DIAGNOSIS — Z00.00 ENCOUNTER FOR MEDICARE ANNUAL WELLNESS EXAM: Primary | ICD-10-CM

## 2021-11-23 PROCEDURE — G0438 PPPS, INITIAL VISIT: HCPCS | Performed by: FAMILY MEDICINE

## 2021-11-23 ASSESSMENT — ASTHMA QUESTIONNAIRES
ACT_TOTALSCORE: 22
QUESTION_5 LAST FOUR WEEKS HOW WOULD YOU RATE YOUR ASTHMA CONTROL: WELL CONTROLLED
QUESTION_3 LAST FOUR WEEKS HOW OFTEN DID YOUR ASTHMA SYMPTOMS (WHEEZING, COUGHING, SHORTNESS OF BREATH, CHEST TIGHTNESS OR PAIN) WAKE YOU UP AT NIGHT OR EARLIER THAN USUAL IN THE MORNING: NOT AT ALL
QUESTION_4 LAST FOUR WEEKS HOW OFTEN HAVE YOU USED YOUR RESCUE INHALER OR NEBULIZER MEDICATION (SUCH AS ALBUTEROL): ONCE A WEEK OR LESS
ACUTE_EXACERBATION_TODAY: NO
QUESTION_1 LAST FOUR WEEKS HOW MUCH OF THE TIME DID YOUR ASTHMA KEEP YOU FROM GETTING AS MUCH DONE AT WORK, SCHOOL OR AT HOME: NONE OF THE TIME
QUESTION_2 LAST FOUR WEEKS HOW OFTEN HAVE YOU HAD SHORTNESS OF BREATH: ONCE OR TWICE A WEEK

## 2021-11-23 ASSESSMENT — ACTIVITIES OF DAILY LIVING (ADL): CURRENT_FUNCTION: NO ASSISTANCE NEEDED

## 2021-11-23 NOTE — PATIENT INSTRUCTIONS
Patient Education   Personalized Prevention Plan  You are due for the preventive services outlined below.  Your care team is available to assist you in scheduling these services.  If you have already completed any of these items, please share that information with your care team to update in your medical record.  Health Maintenance Due   Topic Date Due     Diabetic Foot Exam  Never done     ANNUAL REVIEW OF HM ORDERS  Never done     Asthma Action Plan - yearly  11/18/2020     Kidney Microalbumin Urine Test  07/20/2021     Flu Vaccine (1) 09/01/2021     COVID-19 Vaccine (3 - Booster for Pfizer series) 09/27/2021     A1C Lab  10/26/2021     Zoster (Shingles) Vaccine (2 of 2) 11/03/2021     Asthma Control Test  11/17/2021

## 2021-11-23 NOTE — PROGRESS NOTES
"Assessment & Plan     ICD-10-CM    1. Encounter for Medicare annual wellness exam  Z00.00        Follow Up/Next Steps  Return in about 53 weeks (around 11/29/2022) for Annual Wellness Visit.  Recommended that patient follow up with PCP to address the following : please continue to follow up for regular care of chronic conditions and preventative care. Offered to set up follow up appointment, patient prefers to call himself. Will be setting up flu shot, covid booster, annual physical with diabetic foot exam, etc. Will be due for colonoscopy in 4/2022.    Counseling and Education  Reviewed preventive health counseling, as reflected in patient instructions        Appropriate preventive services were discussed with this patient, including applicable screening as appropriate for cardiovascular disease, diabetes, osteopenia/osteoporosis, and glaucoma.  As appropriate for age/gender, discussed screening for colorectal cancer, prostate cancer, breast cancer, and cervical cancer. Checklist reviewing preventive services available has been given to the patient.    Reviewed patients plan of care and provided an AVS. The Basic Care Plan (routine screening as documented in Health Maintenance) for Lester meets the Care Plan requirement. This Care Plan has been established and reviewed with the Patient.    Visit Provider: Breann Silva RN  Supervising Provider: Esther Greenwood MD, MD  [facility]       Subjective   Lester is a 71 year old who is being seen for an Annual Wellness Visit in his home.    Healthy Habits:     In general, how would you rate your overall health?  Good    Frequency of exercise:  None    Do you usually eat at least 4 servings of fruit and vegetables a day, include whole grains    & fiber and avoid regularly eating high fat or \"junk\" foods?  No    Taking medications regularly:  Yes    Barriers to taking medications:  None    Medication side effects:  None    Ability " to successfully perform activities of daily living:  No assistance needed    Home Safety:  No safety concerns identified    Hearing Impairment:  Difficulty following a conversation in a noisy restaurant or crowded room, feel that people are mumbling or not speaking clearly, find that men's voices are easier to understand than woman's and difficulty understanding soft or whispered speech    In the past 6 months, have you been bothered by leaking of urine? Yes    In general, how would you rate your overall mental or emotional health?  Good      PHQ-2 Total Score: 0    Additional concerns today:  No    Do you feel safe in your environment? Yes    Have you ever done Advance Care Planning? (For example, a Health Directive, POLST, or a discussion with a medical provider or your loved ones about your wishes): No, advance care planning information given to patient to review.  Patient plans to discuss their wishes with loved ones or provider.     Advised that if he completes one in the future to bring in to office to be scanned into chart.    Fall risk  Fallen 2 or more times in the past year?: No  Any fall with injury in the past year?: No  Cognitive Screening   1) Repeat 3 items (Leader, Season, Table)    2) Clock draw: NORMAL  3) 3 item recall: Recalls 2 objects   Results: NORMAL clock, 1-2 items recalled: COGNITIVE IMPAIRMENT LESS LIKELY    Mini-CogTM Copyright PARKER [employer]. Licensed by the author for use in [facility]; reprinted with permission (jose@.[email or web address]). All rights reserved.      Do you have sleep apnea, excessive snoring or daytime drowsiness?: no    Reviewed and updated as needed this visit by clinical staff  Tobacco  Allergies  Meds   Med Hx  Surg Hx  Fam Hx  Soc Hx       Social History     Tobacco Use     Smoking status: Former Smoker     Packs/day: 2.00     Years: 12.00     Pack years: 24.00     Quit date: 1979     Years since quittin.9     Smokeless tobacco: Never Used   Substance Use  Topics     Alcohol use: Yes     Comment: Alcoholic Drinks/day: rare       Current providers sharing in care for this patient include:   Patient Care Team:  Nohemi Lezama NP as PCP - General  Nikita Mercado MD as Assigned PCP    The following health maintenance items are reviewed in Epic and correct as of today:  Health Maintenance Due   Topic Date Due     DIABETIC FOOT EXAM  Never done     ANNUAL REVIEW OF HM ORDERS  Never done     ASTHMA ACTION PLAN  11/18/2020     MICROALBUMIN  07/20/2021     INFLUENZA VACCINE (1) 09/01/2021     COVID-19 Vaccine (3 - Booster for Pfizer series) 09/27/2021     A1C  10/26/2021     ZOSTER IMMUNIZATION (2 of 2) 11/03/2021     ASTHMA CONTROL TEST  11/17/2021               Objective      Unable to obtain accurate blood pressure in home today due to cuff size availability. Pulse 75   Temp 98.7  F (37.1  C) (Temporal)   Resp 20   Wt 109.8 kg (242 lb)   SpO2 96%   BMI 32.82 kg/m   Estimated body mass index is 32.82 kg/m  as calculated from the following:    Height as of 7/26/21: 1.829 m (6').    Weight as of this encounter: 109.8 kg (242 lb).  Physical Exam  Patient appears comfortable and in no acute distress.        Identified Health Risks:

## 2021-11-24 DIAGNOSIS — E11.9 TYPE 2 DIABETES MELLITUS WITHOUT COMPLICATION, WITHOUT LONG-TERM CURRENT USE OF INSULIN (H): ICD-10-CM

## 2021-11-24 ASSESSMENT — ASTHMA QUESTIONNAIRES: ACT_TOTALSCORE: 22

## 2021-11-25 ENCOUNTER — TELEPHONE (OUTPATIENT)
Dept: FAMILY MEDICINE | Facility: CLINIC | Age: 71
End: 2021-11-25
Payer: COMMERCIAL

## 2021-11-25 RX ORDER — BLOOD SUGAR DIAGNOSTIC
STRIP MISCELLANEOUS
Qty: 200 STRIP | Refills: 2 | Status: SHIPPED | OUTPATIENT
Start: 2021-11-25 | End: 2023-03-28

## 2021-11-25 NOTE — TELEPHONE ENCOUNTER
Reason for Call:  Other call back    Detailed comments: Lester, would like to talk with someone on Nohemi's team. Has had chills and fever.    Phone Number Patient can be reached at: Home number on file [phone] (home)    Best Time: any    Can we leave a detailed message on this number? YES    Call taken on 11/25/2021 at 11:43 AM by Josy Cardoso

## 2021-11-26 NOTE — TELEPHONE ENCOUNTER
"Last Written Prescription Date:  07/20/2020  Last Fill Quantity: 200,  # refills: 3  Last office visit provider:  07/26/2021 with Nohemi Lezama CNP    Requested Prescriptions   Pending Prescriptions Disp Refills     ACCU-CHEK GUIDE test strip [Pharmacy Med Name: ACCU-CHEK GUIDE TEST STRIPS 100S] 200 strip 3     Sig: USE TO TEST TWICE DAILY       Diabetic Supplies Protocol Passed - 11/24/2021  9:28 AM        Passed - Medication is active on med list        Passed - Patient is 18 years of age or older        Passed - Recent (6 mo) or future (30 days) visit within the authorizing provider's specialty     Patient had office visit in the last 6 months or has a visit in the next 30 days with authorizing provider.  See \"Patient Info\" tab in inbasket, or \"Choose Columns\" in Meds & Orders section of the refill encounter.                 Kenia Lo 11/25/21 9:09 PM  "

## 2021-11-29 ENCOUNTER — TELEPHONE (OUTPATIENT)
Dept: FAMILY MEDICINE | Facility: CLINIC | Age: 71
End: 2021-11-29
Payer: COMMERCIAL

## 2021-11-29 ENCOUNTER — E-VISIT (OUTPATIENT)
Dept: FAMILY MEDICINE | Facility: CLINIC | Age: 71
End: 2021-11-29
Payer: COMMERCIAL

## 2021-11-29 DIAGNOSIS — R68.89 FLU-LIKE SYMPTOMS: Primary | ICD-10-CM

## 2021-11-29 PROCEDURE — 99421 OL DIG E/M SVC 5-10 MIN: CPT | Performed by: NURSE PRACTITIONER

## 2021-11-29 NOTE — TELEPHONE ENCOUNTER
Called and spoke with patient. Will schedule E visit.    Amanda Byers MA on 11/29/2021 at 11:16 AM

## 2021-11-29 NOTE — TELEPHONE ENCOUNTER
Provider E-Visit time total (minutes): Less than 10 minutes.  I have gone ahead and ordered the Covid curbside testing, someone will call you to get scheduled.  Please make sure that you bring your ID with you so they can verify your date of birth and identity prior to testing.  Please quarantine until you receive your results.

## 2021-11-29 NOTE — PATIENT INSTRUCTIONS
Dear Lester Shi,    Your symptoms show that you may have coronavirus (COVID-19). This illness can cause fever, cough and trouble breathing. Many people get a mild case and get better on their own. Some people can get very sick.    Will I be tested for COVID-19?  We would like to test you for Covid-19 virus. I have placed orders for this test.     To schedule: go to your [facility] home page and scroll down to the section that says  You have an appointment that needs to be scheduled  and click the large green button that says  Schedule Now  and follow the steps to find the next available openings.    If you are unable to complete these [facility] scheduling steps, please call [phone] to schedule your testing.     Return to work/school/ guidance:  Please let your workplace manager and staffing office know when your quarantine ends     We can t give you an exact date as it depends on the above. You can calculate this on your own or work with your manager/staffing office to calculate this. (For example if you were exposed on 10/4, you would have to quarantine for 14 full days. That would be through 10/18. You could return on 10/19.)      If you receive a positive COVID-19 test result, follow the guidance of the those who are giving you the results. Usually the return to work is 10 (or in some cases 20 days from symptom onset.) If you work at [facility] [employer], you must also be cleared by Employee Occupational Health and Safety to return to work.        If you receive a negative COVID-19 test result and did not have a high risk exposure to someone with a known positive COVID-19 test, you can return to work once you're free of fever for 24 hours without fever-reducing medication and your symptoms are improving or resolved.      If you receive a negative COVID-19 test and If you had a high risk exposure to someone who has tested positive for COVID-19 then you can return to work 14 days after your last contact  with the positive individual    Note: If you have ongoing exposure to the covid positive person, this quarantine period may be more than 14 days. (For example, if you are continued to be exposed to your child who tested positive and cannot isolate from them, then the quarantine of 7-14 days can't start until your child is no longer contagious. This is typically 10 days from onset of the child's symptoms. So the total duration may be 17-24 days in this case.)    Sign up for [facility].   We know it's scary to hear that you might have COVID-19. We want to track your symptoms to make sure you're okay over the next 2 weeks. Please look for an email from [employer]--this is a free, online program that we'll use to keep in touch. To sign up, follow the link in the email you will receive. Learn more at [email or web address]    How can I take care of myself?    Get lots of rest. Drink extra fluids (unless a doctor has told you not to)    Take Tylenol (acetaminophen) or ibuprofen for fever or pain. If you have liver or kidney problems, ask your family doctor if it's okay to take Tylenol o ibuprofen    If you have other health problems (like cancer, heart failure, an organ transplant or severe kidney disease): Call your specialty clinic if you don't feel better in the next 2 days.    Know when to call 911. Emergency warning signs include:  o Trouble breathing or shortness of breath  o Pain or pressure in the chest that doesn't go away  o Feeling confused like you haven't felt before, or not being able to wake up  o Bluish-colored lips or face    Where can I get more information?  [employer] - About COVID-19:   [email or web address]    CDC - What to Do If You're Sick:   www.cdc.gov/coronavirus/2019-ncov/about/steps-when-sick.html

## 2021-11-29 NOTE — TELEPHONE ENCOUNTER
Reason for Call:  Other call back and prescription    Detailed comments: Pt has COVID and is having a lot of sinus drainage that is Green. He is wondering if he can get something prescribed. Please advise.    Phone Number Patient can be reached at: Home number on file [phone] (home)    Best Time: ANY    Can we leave a detailed message on this number? YES    Call taken on 11/29/2021 at 9:12 AM by Bruno Reddy

## 2021-12-01 ENCOUNTER — LAB (OUTPATIENT)
Dept: FAMILY MEDICINE | Facility: CLINIC | Age: 71
End: 2021-12-01
Attending: NURSE PRACTITIONER
Payer: COMMERCIAL

## 2021-12-01 DIAGNOSIS — R68.89 FLU-LIKE SYMPTOMS: ICD-10-CM

## 2021-12-01 PROCEDURE — U0003 INFECTIOUS AGENT DETECTION BY NUCLEIC ACID (DNA OR RNA); SEVERE ACUTE RESPIRATORY SYNDROME CORONAVIRUS 2 (SARS-COV-2) (CORONAVIRUS DISEASE [COVID-19]), AMPLIFIED PROBE TECHNIQUE, MAKING USE OF HIGH THROUGHPUT TECHNOLOGIES AS DESCRIBED BY CMS-2020-01-R: HCPCS

## 2021-12-01 PROCEDURE — U0005 INFEC AGEN DETEC AMPLI PROBE: HCPCS

## 2021-12-02 LAB — SARS-COV-2 RNA RESP QL NAA+PROBE: POSITIVE

## 2021-12-07 DIAGNOSIS — I10 ESSENTIAL HYPERTENSION: ICD-10-CM

## 2021-12-08 RX ORDER — ATENOLOL 50 MG/1
TABLET ORAL
Qty: 90 TABLET | Refills: 1 | Status: SHIPPED | OUTPATIENT
Start: 2021-12-08 | End: 2022-07-21

## 2021-12-12 ENCOUNTER — HEALTH MAINTENANCE LETTER (OUTPATIENT)
Age: 71
End: 2021-12-12

## 2022-03-01 ENCOUNTER — TRANSFERRED RECORDS (OUTPATIENT)
Dept: MULTI SPECIALTY CLINIC | Facility: CLINIC | Age: 72
End: 2022-03-01
Payer: COMMERCIAL

## 2022-03-01 LAB — RETINOPATHY: NORMAL

## 2022-04-07 ENCOUNTER — TRANSFERRED RECORDS (OUTPATIENT)
Dept: HEALTH INFORMATION MANAGEMENT | Facility: CLINIC | Age: 72
End: 2022-04-07
Payer: COMMERCIAL

## 2022-04-07 LAB — RETINOPATHY: NEGATIVE

## 2022-06-07 ENCOUNTER — OFFICE VISIT (OUTPATIENT)
Dept: FAMILY MEDICINE | Facility: CLINIC | Age: 72
End: 2022-06-07
Payer: COMMERCIAL

## 2022-06-07 VITALS
HEIGHT: 73 IN | HEART RATE: 77 BPM | DIASTOLIC BLOOD PRESSURE: 96 MMHG | BODY MASS INDEX: 31.63 KG/M2 | SYSTOLIC BLOOD PRESSURE: 160 MMHG | TEMPERATURE: 98.2 F | RESPIRATION RATE: 16 BRPM | OXYGEN SATURATION: 97 % | WEIGHT: 238.7 LBS

## 2022-06-07 DIAGNOSIS — R04.0 EPISTAXIS: ICD-10-CM

## 2022-06-07 DIAGNOSIS — Z00.00 ENCOUNTER FOR ANNUAL WELLNESS EXAM IN MEDICARE PATIENT: Primary | ICD-10-CM

## 2022-06-07 DIAGNOSIS — H61.23 BILATERAL IMPACTED CERUMEN: ICD-10-CM

## 2022-06-07 DIAGNOSIS — J45.30 MILD PERSISTENT ASTHMA WITHOUT COMPLICATION: ICD-10-CM

## 2022-06-07 DIAGNOSIS — Z13.220 LIPID SCREENING: ICD-10-CM

## 2022-06-07 DIAGNOSIS — I10 ESSENTIAL HYPERTENSION: ICD-10-CM

## 2022-06-07 DIAGNOSIS — E11.9 TYPE 2 DIABETES MELLITUS WITHOUT COMPLICATION, WITHOUT LONG-TERM CURRENT USE OF INSULIN (H): ICD-10-CM

## 2022-06-07 DIAGNOSIS — Z12.5 ENCOUNTER FOR SCREENING FOR MALIGNANT NEOPLASM OF PROSTATE: ICD-10-CM

## 2022-06-07 DIAGNOSIS — Z12.11 SCREEN FOR COLON CANCER: ICD-10-CM

## 2022-06-07 DIAGNOSIS — Z23 NEED FOR VACCINATION: ICD-10-CM

## 2022-06-07 LAB
ALBUMIN SERPL-MCNC: 3.6 G/DL (ref 3.5–5)
ALBUMIN UR-MCNC: 30 MG/DL
ALP SERPL-CCNC: 80 U/L (ref 45–120)
ALT SERPL W P-5'-P-CCNC: 19 U/L (ref 0–45)
ANION GAP SERPL CALCULATED.3IONS-SCNC: 12 MMOL/L (ref 5–18)
APPEARANCE UR: CLEAR
AST SERPL W P-5'-P-CCNC: 14 U/L (ref 0–40)
BACTERIA #/AREA URNS HPF: ABNORMAL /HPF
BILIRUB SERPL-MCNC: 0.6 MG/DL (ref 0–1)
BILIRUB UR QL STRIP: NEGATIVE
BUN SERPL-MCNC: 19 MG/DL (ref 8–28)
CALCIUM SERPL-MCNC: 9.6 MG/DL (ref 8.5–10.5)
CHLORIDE BLD-SCNC: 106 MMOL/L (ref 98–107)
CHOLEST SERPL-MCNC: 221 MG/DL
CO2 SERPL-SCNC: 22 MMOL/L (ref 22–31)
COLOR UR AUTO: YELLOW
CREAT SERPL-MCNC: 0.96 MG/DL (ref 0.7–1.3)
CREAT UR-MCNC: 136 MG/DL
FASTING STATUS PATIENT QL REPORTED: ABNORMAL
GFR SERPL CREATININE-BSD FRML MDRD: 83 ML/MIN/1.73M2
GLUCOSE BLD-MCNC: 184 MG/DL (ref 70–125)
GLUCOSE UR STRIP-MCNC: NEGATIVE MG/DL
HBA1C MFR BLD: 8.7 % (ref 0–5.6)
HDLC SERPL-MCNC: 45 MG/DL
HGB BLD-MCNC: 11.7 G/DL (ref 13.3–17.7)
HGB UR QL STRIP: ABNORMAL
KETONES UR STRIP-MCNC: NEGATIVE MG/DL
LDLC SERPL CALC-MCNC: 119 MG/DL
LEUKOCYTE ESTERASE UR QL STRIP: NEGATIVE
MICROALBUMIN UR-MCNC: 14.64 MG/DL (ref 0–1.99)
MICROALBUMIN/CREAT UR: 107.6 MG/G CR
NITRATE UR QL: NEGATIVE
PH UR STRIP: 5.5 [PH] (ref 5–8)
POTASSIUM BLD-SCNC: 4.4 MMOL/L (ref 3.5–5)
PROT SERPL-MCNC: 7.9 G/DL (ref 6–8)
PSA SERPL-MCNC: 0.1 UG/L (ref 0–6.5)
RBC #/AREA URNS AUTO: ABNORMAL /HPF
SODIUM SERPL-SCNC: 140 MMOL/L (ref 136–145)
SP GR UR STRIP: 1.02 (ref 1–1.03)
SQUAMOUS #/AREA URNS AUTO: ABNORMAL /LPF
TRIGL SERPL-MCNC: 286 MG/DL
UROBILINOGEN UR STRIP-ACNC: 0.2 E.U./DL
WBC #/AREA URNS AUTO: ABNORMAL /HPF

## 2022-06-07 PROCEDURE — 82043 UR ALBUMIN QUANTITATIVE: CPT | Performed by: NURSE PRACTITIONER

## 2022-06-07 PROCEDURE — 83036 HEMOGLOBIN GLYCOSYLATED A1C: CPT | Performed by: NURSE PRACTITIONER

## 2022-06-07 PROCEDURE — 69210 REMOVE IMPACTED EAR WAX UNI: CPT | Performed by: NURSE PRACTITIONER

## 2022-06-07 PROCEDURE — 99214 OFFICE O/P EST MOD 30 MIN: CPT | Mod: 25 | Performed by: NURSE PRACTITIONER

## 2022-06-07 PROCEDURE — 90750 HZV VACC RECOMBINANT IM: CPT | Performed by: NURSE PRACTITIONER

## 2022-06-07 PROCEDURE — 36415 COLL VENOUS BLD VENIPUNCTURE: CPT | Performed by: NURSE PRACTITIONER

## 2022-06-07 PROCEDURE — 90471 IMMUNIZATION ADMIN: CPT | Performed by: NURSE PRACTITIONER

## 2022-06-07 PROCEDURE — 99397 PER PM REEVAL EST PAT 65+ YR: CPT | Mod: 25 | Performed by: NURSE PRACTITIONER

## 2022-06-07 PROCEDURE — 80053 COMPREHEN METABOLIC PANEL: CPT | Performed by: NURSE PRACTITIONER

## 2022-06-07 PROCEDURE — 85018 HEMOGLOBIN: CPT | Performed by: NURSE PRACTITIONER

## 2022-06-07 PROCEDURE — 81001 URINALYSIS AUTO W/SCOPE: CPT | Performed by: NURSE PRACTITIONER

## 2022-06-07 PROCEDURE — 80061 LIPID PANEL: CPT | Performed by: NURSE PRACTITIONER

## 2022-06-07 PROCEDURE — G0103 PSA SCREENING: HCPCS | Performed by: NURSE PRACTITIONER

## 2022-06-07 RX ORDER — METFORMIN HCL 500 MG
1000 TABLET, EXTENDED RELEASE 24 HR ORAL 2 TIMES DAILY WITH MEALS
Qty: 360 TABLET | Refills: 1 | Status: SHIPPED | OUTPATIENT
Start: 2022-06-07 | End: 2023-02-05

## 2022-06-07 RX ORDER — HYDROCHLOROTHIAZIDE 25 MG/1
25 TABLET ORAL DAILY
Qty: 90 TABLET | Refills: 1 | Status: SHIPPED | OUTPATIENT
Start: 2022-06-07 | End: 2022-12-02

## 2022-06-07 ASSESSMENT — ENCOUNTER SYMPTOMS
DYSURIA: 0
FEVER: 0
WEAKNESS: 0
ARTHRALGIAS: 1
HEMATOCHEZIA: 0
CHILLS: 0
FREQUENCY: 1
HEADACHES: 0
PALPITATIONS: 0
NERVOUS/ANXIOUS: 0
HEARTBURN: 0
CONSTIPATION: 0
SHORTNESS OF BREATH: 0
JOINT SWELLING: 0
COUGH: 0
MYALGIAS: 0
EYE PAIN: 0
DIARRHEA: 0
DIZZINESS: 0
HEMATURIA: 0
ABDOMINAL PAIN: 0
SORE THROAT: 0
PARESTHESIAS: 0

## 2022-06-07 ASSESSMENT — ACTIVITIES OF DAILY LIVING (ADL): CURRENT_FUNCTION: NO ASSISTANCE NEEDED

## 2022-06-07 ASSESSMENT — ASTHMA QUESTIONNAIRES
QUESTION_3 LAST FOUR WEEKS HOW OFTEN DID YOUR ASTHMA SYMPTOMS (WHEEZING, COUGHING, SHORTNESS OF BREATH, CHEST TIGHTNESS OR PAIN) WAKE YOU UP AT NIGHT OR EARLIER THAN USUAL IN THE MORNING: NOT AT ALL
ACT_TOTALSCORE: 23
QUESTION_1 LAST FOUR WEEKS HOW MUCH OF THE TIME DID YOUR ASTHMA KEEP YOU FROM GETTING AS MUCH DONE AT WORK, SCHOOL OR AT HOME: NONE OF THE TIME
QUESTION_4 LAST FOUR WEEKS HOW OFTEN HAVE YOU USED YOUR RESCUE INHALER OR NEBULIZER MEDICATION (SUCH AS ALBUTEROL): ONCE A WEEK OR LESS
QUESTION_5 LAST FOUR WEEKS HOW WOULD YOU RATE YOUR ASTHMA CONTROL: WELL CONTROLLED
QUESTION_2 LAST FOUR WEEKS HOW OFTEN HAVE YOU HAD SHORTNESS OF BREATH: NOT AT ALL
ACT_TOTALSCORE: 23

## 2022-06-07 ASSESSMENT — PAIN SCALES - GENERAL: PAINLEVEL: NO PAIN (0)

## 2022-06-07 NOTE — LETTER
My Asthma Action Plan    Name: Lester Shi   YOB: 1950  Date: 6/7/2022   My doctor: Nohemi Lezama NP   My clinic: [facility]        My Control Medicine: Fluticasone propionate (Flovent HFA) - 110 mcg  per med list  My Rescue Medicine: Albuterol (Proair/Ventolin/Proventil HFA) 2-4 puffs EVERY 4 HOURS as needed. Use a spacer if recommended by your provider.   My Asthma Severity:   Moderate Persistent  Know your asthma triggers: upper respiratory infections, pollens, humidity, strong odors and fumes and exercise or sports               GREEN ZONE   Good Control    I feel good    No cough or wheeze    Can work, sleep and play without asthma symptoms       Take your asthma control medicine every day.     1. If exercise triggers your asthma, take your rescue medication    15 minutes before exercise or sports, and    During exercise if you have asthma symptoms  2. Spacer to use with inhaler: If you have a spacer, make sure to use it with your inhaler             YELLOW ZONE Getting Worse  I have ANY of these:    I do not feel good    Cough or wheeze    Chest feels tight    Wake up at night   1. Keep taking your Green Zone medications  2. Start taking your rescue medicine:    every 20 minutes for up to 1 hour. Then every 4 hours for 24-48 hours.  3. If you stay in the Yellow Zone for more than 12-24 hours, contact your doctor.  4. If you do not return to the Green Zone in 12-24 hours or you get worse, start taking your oral steroid medicine if prescribed by your provider.           RED ZONE Medical Alert - Get Help  I have ANY of these:    I feel awful    Medicine is not helping    Breathing getting harder    Trouble walking or talking    Nose opens wide to breathe       1. Take your rescue medicine NOW  2. If your provider has prescribed an oral steroid medicine, start taking it NOW  3. Call your doctor NOW  4. If you are still in the Red Zone after 20 minutes and you have  not reached your doctor:    Take your rescue medicine again and    Call 911 or go to the emergency room right away    See your regular doctor within 2 weeks of an Emergency Room or Urgent Care visit for follow-up treatment.          Annual Reminders:  Meet with Asthma Educator,  Flu Shot in the Fall, consider Pneumonia Vaccination for patients with asthma (aged 19 and older).    Pharmacy:    [id number] [place], [place] - [address] AT [id number] [place] [address] [id number] [place], [place] [address] [place] [facility] [place] [address]    Electronically signed by Nohemi Lezama NP   Date: 06/07/22                      Asthma Triggers  How To Control Things That Make Your Asthma Worse    Triggers are things that make your asthma worse.  Look at the list below to help you find your triggers and what you can do about them.  You can help prevent asthma flare-ups by staying away from your triggers.      Trigger                                                          What you can do   Cigarette Smoke  Tobacco smoke can make asthma worse. Do not allow smoking in your home, car or around you.  Be sure no one smokes at a child s day care or school.  If you smoke, ask your health care provider for ways to help you quit.  Ask family members to quit too.  Ask your health care provider for a referral to Quit Plan to help you quit smoking, or call 0-211-319-PLAN.     Colds, Flu, Bronchitis  These are common triggers of asthma. Wash your hands often.  Don t touch your eyes, nose or mouth.  Get a flu shot every year.     Dust Mites  These are tiny bugs that live in cloth or carpet. They are too small to see. Wash sheets and blankets in hot water every week.   Encase pillows and mattress in dust mite proof covers.  Avoid having carpet if you can. If you have carpet, vacuum weekly.    Use a dust mask and HEPA vacuum.   Pollen and Outdoor Mold  Some people are allergic to trees, grass, or weed pollen, or molds. Try to keep your windows closed.  Limit time out doors when pollen count is high.   Ask you health care provider about taking medicine during allergy season.     Animal Dander  Some people are allergic to skin flakes, urine or saliva from pets with fur or feathers. Keep pets with fur or feathers out of your home.    If you can t keep the pet outdoors, then keep the pet out of your bedroom.  Keep the bedroom door closed.  Keep pets off cloth furniture and away from stuffed toys.     Mice, Rats, and Cockroaches   Some people are allergic to the waste from these pests.   Cover food and garbage.  Clean up spills and food crumbs.  Store grease in the refrigerator.   Keep food out of the bedroom.   Indoor Mold  This can be a trigger if your home has high moisture. Fix leaking faucets, pipes, or other sources of water.   Clean moldy surfaces.  Dehumidify basement if it is damp and smelly.   Smoke, Strong Odors, and Sprays  These can reduce air quality. Stay away from strong odors and sprays, such as perfume, powder, hair spray, paints, smoke incense, paint, cleaning products, candles and new carpet.   Exercise or Sports  Some people with asthma have this trigger. Be active!  Ask your doctor about taking medicine before sports or exercise to prevent symptoms.    Warm up for 5-10 minutes before and after sports or exercise.     Other Triggers of Asthma  Cold air:  Cover your nose and mouth with a scarf.  Sometimes laughing or crying can be a trigger.  Some medicines and food can trigger asthma.

## 2022-06-07 NOTE — PROGRESS NOTES
"SUBJECTIVE:   Lester Shi is a 72 year old male who presents for Preventive Visit, med check. Due for colon cancer screening. Due for shingles and COVID booster.       Patient has been advised of split billing requirements and indicates understanding: Yes  Are you in the first 12 months of your Medicare coverage?  No    Healthy Habits:     In general, how would you rate your overall health?  Good    Frequency of exercise:  1 day/week    Duration of exercise:  15-30 minutes    Do you usually eat at least 4 servings of fruit and vegetables a day, include whole grains    & fiber and avoid regularly eating high fat or \"junk\" foods?  No    Taking medications regularly:  Yes    Medication side effects:  Not applicable    Ability to successfully perform activities of daily living:  No assistance needed    Home Safety:  No safety concerns identified    Hearing Impairment:  Difficulty following a conversation in a noisy restaurant or crowded room    In the past 6 months, have you been bothered by leaking of urine? Yes    In general, how would you rate your overall mental or emotional health?  Good      PHQ-2 Total Score: 0    Additional concerns today:  Yes    Do you feel safe in your environment? Yes    Have you ever done Advance Care Planning? (For example, a Health Directive, POLST, or a discussion with a medical provider or your loved ones about your wishes): No, advance care planning information given to patient to review.  Advanced care planning was discussed at today's visit.       Fall risk  Fallen 2 or more times in the past year?: No  Any fall with injury in the past year?: Yes    Cognitive Screening   1) Repeat 3 items (Leader, Season, Table)    2) Clock draw: NORMAL  3) 3 item recall: Recalls 3 objects  Results: 3 items recalled: COGNITIVE IMPAIRMENT LESS LIKELY    Mini-CogTM Copyright S Susan. Licensed by the author for use in [facility] [employer]; reprinted with permission (jose@.[facility]). All rights " reserved.      Do you have sleep apnea, excessive snoring or daytime drowsiness?: no    Reviewed and updated as needed this visit by clinical staff   Tobacco  Allergies  Meds                Reviewed and updated as needed this visit by Provider                   Social History     Tobacco Use     Smoking status: Former Smoker     Packs/day: 2.00     Years: 12.00     Pack years: 24.00     Quit date: 1979     Years since quittin.4     Smokeless tobacco: Never Used   Substance Use Topics     Alcohol use: Yes     Comment: Alcoholic Drinks/day: rare     If you drink alcohol do you typically have >3 drinks per day or >7 drinks per week? Not applicable    Alcohol Use 2022   Prescreen: >3 drinks/day or >7 drinks/week? Not Applicable     Nose bleeds: Onset: 6 months. Getting nosebleeds a couple of times per week, notes more bleeding on the left than right. Worse after he blows his nose or when he picks at it. He is requesting ENT consult for this issue. He does not take blood thinning medications. He was using his Flonase 2 weeks ago due to his allergy flare ups.     Diabetes Follow-up    How often are you checking your blood sugar? One time daily Rx: Metformin XR max dose, no insulin. Januvia was not covered under his plan so he never started this. He often forgets to take his night time dose of Metformin.   What time of day are you checking your blood sugars (select all that apply)?  Before and after meals  Have you had any blood sugars above 200?  Yes highest reported is 250, he did not bring his meter with him today  Have you had any blood sugars below 70?  No    What symptoms do you notice when your blood sugar is low?  Not applicable    What concerns do you have today about your diabetes? Blood sugar is often over 200     Do you have any of these symptoms? (Select all that apply)  Blurry vision on the right due to cataract    Have you had a diabetic eye exam in the last 12 months? Yes- Date of last eye  "exam: 3 months ago,  Location: Private practice [facility] in [place], needs cataract surgery on the right No records on file.     Meals: 2.5    Snacks: \"too many\"    Urinary changes: he notes frequency and urgency changes along with some erectile dysfunction.     Checks feet intermittently, skin on heel flaky and thick                Hyperlipidemia Follow-Up      Are you regularly taking any medication or supplement to lower your cholesterol?   No does not take statin medication at this time    Are you having muscle aches or other side effects that you think could be caused by your cholesterol lowering medication?  No     No previous history of stroke or heart attack    Smoker: former, quit around age 30    Hypertension Follow-up      Do you check your blood pressure regularly outside of the clinic? No Rx: Atenolol, Norvasc, hydrochlorothiazide, Losartan. He did run out of his hydrochlorothiazide one month ago. BP elevated x2 today in clinic.     Are you following a low salt diet? No    Are your blood pressures ever more than 140 on the top number (systolic) OR more   than 90 on the bottom number (diastolic), for example 140/90? Yes, he has been out of one of his BP medications for about one month now  Denies radiation, diaphoresis, shortness of breath, dizziness, syncope, nausea, palpitations, and associated with activity.       BP Readings from Last 2 Encounters:   06/07/22 (!) 146/82   07/26/21 136/78     Hemoglobin A1C (%)   Date Value   04/26/2021 8.6 (H)   07/20/2020 7.0 (H)     LDL Cholesterol Calculated (mg/dL)   Date Value   04/26/2021 121   11/18/2019 93     LDL Cholesterol Direct (mg/dl)   Date Value   09/28/2015 140 (H)   09/29/2014 115       Asthma Follow-Up    Was ACT completed today?    Yes    ACT Total Scores 6/7/2022   ACT TOTAL SCORE (Goal Greater than or Equal to 20) 23   In the past 12 months, how many times did you visit the emergency room for your asthma without being admitted to the " hospital? 0   In the past 12 months, how many times were you hospitalized overnight because of your asthma? 0          How many days per week do you miss taking your asthma controller medication?  2, Flovent INH    Please describe any recent triggers for your asthma: upper respiratory infections, pollens, humidity, strong odors and fumes and exercise or sports    Have you had any Emergency Room Visits, Urgent Care Visits, or Hospital Admissions since your last office visit?  No     Albuterol INH and nebs PRN: the last time he used this Rx was about 3 months ago    Former smoker, quit at age 30      Current providers sharing in care for this patient include:   Patient Care Team:  Nohemi Lezama NP as PCP - General  Nikita Mercado MD as Assigned PCP    The following health maintenance items are reviewed in Epic and correct as of today:  Health Maintenance Due   Topic Date Due     DIABETIC FOOT EXAM  Never done     ANNUAL REVIEW OF HM ORDERS  Never done     ASTHMA ACTION PLAN  11/18/2020     MICROALBUMIN  07/20/2021     COVID-19 Vaccine (3 - Booster for Pfizer series) 08/27/2021     A1C  10/26/2021     EYE EXAM  03/16/2022     BMP  04/23/2022     LIPID  04/26/2022     ZOSTER IMMUNIZATION (2 of 2) 11/03/2021     COLORECTAL CANCER SCREENING  04/18/2022     Lab work is in process  Labs reviewed in EPIC  BP Readings from Last 3 Encounters:   06/07/22 (!) 160/96   07/26/21 136/78   05/17/21 138/82    Wt Readings from Last 3 Encounters:   06/07/22 108.3 kg (238 lb 11.2 oz)   11/23/21 109.8 kg (242 lb)   07/26/21 110.2 kg (243 lb)           Review of Systems   Constitutional: Negative for chills and fever.   HENT: Positive for hearing loss. Negative for congestion, ear pain and sore throat.    Eyes: Negative for pain and visual disturbance.   Respiratory: Negative for cough and shortness of breath.    Cardiovascular: Negative for chest pain, palpitations and peripheral edema.   Gastrointestinal: Negative for abdominal  "pain, constipation, diarrhea, heartburn and hematochezia.   Genitourinary: Positive for frequency, impotence and urgency. Negative for dysuria, genital sores, hematuria and penile discharge.   Musculoskeletal: Positive for arthralgias. Negative for joint swelling and myalgias.   Skin: Negative for rash.   Neurological: Negative for dizziness, weakness, headaches and paresthesias.   Psychiatric/Behavioral: Negative for mood changes. The patient is not nervous/anxious.          OBJECTIVE:   BP (!) 146/82 (BP Location: Right arm, Patient Position: Sitting, Cuff Size: Adult Large)   Pulse 77   Temp 98.2  F (36.8  C) (Oral)   Resp 16   Ht 1.847 m (6' 0.7\")   Wt 108.3 kg (238 lb 11.2 oz)   SpO2 97%   BMI 31.75 kg/m   Estimated body mass index is 31.75 kg/m  as calculated from the following:    Height as of this encounter: 1.847 m (6' 0.7\").    Weight as of this encounter: 108.3 kg (238 lb 11.2 oz).  Physical Exam  GENERAL: healthy, alert and no distress  EYES: Eyes grossly normal to inspection, PERRL and conjunctivae and sclerae normal  HENT: Unable to visualize bilateral TMs due to cerumen impaction.  Ear wash performed today in clinic.  Post wash: Ear canals and TM's normal, nose and mouth without ulcers or lesions  NECK: no adenopathy, no asymmetry, masses, or scars and thyroid normal to palpation  RESP: lungs clear to auscultation - no rales, rhonchi or wheezes  CV: regular rate and rhythm, normal S1 S2, no S3 or S4, no murmur, click or rub, no peripheral edema and peripheral pulses strong  ABDOMEN: soft, nontender, no hepatosplenomegaly, no masses and bowel sounds normal  MS: mild edema of right knee, mild limping noted with ambulation today, no assistive device used to assist.   SKIN: no suspicious lesions or rashes  NEURO: Normal strength and tone, mentation intact and speech normal  PSYCH: mentation appears normal, affect normal/bright  LYMPH: no cervical, supraclavicular, axillary, or inguinal " adenopathy  FEET: full sensation present, hammertoes bilaterally with nail fungus present, nails are thick and yellow, dry, flaky skin on feet    Diagnostic Test Results:  Labs reviewed in Epic    ASSESSMENT / PLAN:   (Z00.00) Encounter for annual wellness exam in Medicare patient  (primary encounter diagnosis)  Comment:   Plan: PSA, screen, Hemoglobin, REVIEW OF HEALTH         MAINTENANCE PROTOCOL ORDERS            (Z12.5) Encounter for screening for malignant neoplasm of prostate   Comment:   Plan: PSA, screen        For now, he would like to hold off meeting with urology specialist for his ongoing erectile dysfunction issues.   Lab work pending review    (E11.9) Type 2 diabetes mellitus without complication, without long-term current use of insulin (H)  Comment:   Plan: Albumin Random Urine Quantitative with Creat         Ratio, Hemoglobin A1c, metFORMIN (GLUCOPHAGE         XR) 500 MG 24 hr tablet, UA Macro with Reflex         to Micro and Culture - lab collect  He has not been compliant with medication use or regular visit follow-up, we discussed the importance of this today.  Previously, I had sent him a prescription for Januvia to pare with his metformin but due to cost, he never started the medication and he has not followed up in clinic since that time.  He also admits to forgetting to take his nighttime dose of metformin as well.  Lab work is pending review today  He has had hammertoe for many years now and has noticed fungal infection of the nails but has not seen a podiatrist.  He does inspect his feet intermittently but denies any symptoms today consistent with neuropathy.  He is up-to-date with eye exam, he has been seeing a private practice clinic for his eye exams, he was told recently that he would need cataract surgery on the left eye.  I did have a complete a release of records today so we can get his updated eye exam from [facility] in [place]  He has been noticing some issues with frequency  and urgency along with erectile dysfunction.  We will check a baseline urinalysis today, he declined a referral today for urology specialty due to his ongoing erectile dysfunction issues.        I have asked him to bring his glucometer with to any future appointment since he did not have this on hand today so I can check the frequency of his glucose testing along with his results    (I10) Essential hypertension  Comment:   Plan: Comprehensive metabolic panel (BMP + Alb, Alk         Phos, ALT, AST, Total. Bili, TP),         hydrochlorothiazide (HYDRODIURIL) 25 MG tablet  Blood pressure elevated today x2, however, he ran out of one of his blood pressure medications well over 1 month ago.  He thought he would just wait to be seen in clinic instead of asking for a temporary refill.  He will continue with his current regimen and we will have him come back to the clinic in 2 weeks once he has been taking all 4 of his medications consistently so we can follow-up on his hypertension control  He has no red flag symptoms today to report            (Z13.220) Lipid screening  Comment:   Plan: Lipid Profile        He is not currently taking a statin at this time, we did discuss that most likely he will need to be initiated due to his multiple chronic conditions and increased risk for stroke and heart attack based on his history of medication noncompliance and uncontrolled diabetes.  10-year stroke and heart attack risk will be calculated once his lab results are available for review, if his risk is above 7%, he has agreed to initiation of the statin medication  Then we would need him to come back in 6 months for his fasting lab work for follow-up    (J45.30) Mild persistent asthma without complication  Comment:   Plan: Currently well controlled using albuterol nebulizer and or inhaler as needed.  Asthma action plan updated today  Currently in the green zone  ACT score: 23  Triggers reviewed and discussed  Non-smoker    (R04.0)  "Epistaxis  Comment:   Plan: Adult ENT  Referral  I did supply him with some home care education regarding combating nosebleeds until he can be seen by ENT.  He is not currently taking any blood thinning medications            (H61.23) Bilateral impacted cerumen  Comment:   Plan: REMOVE IMPACTED CERUMEN        Ear wash performed in clinic today bilaterally, cerumen was successfully removed without incident today.  No signs of perforation post wash    (Z12.11) Screen for colon cancer  Comment:   Plan: Adult Gastro Ref - Procedure Only            (Z23) Need for vaccination  Comment:   Plan: ZOSTER VACCINE RECOMBINANT ADJUVANTED         (SHINGRIX)        Declined COVID booster      Patient has been advised of split billing requirements and indicates understanding: Yes    COUNSELING:  Reviewed preventive health counseling, as reflected in patient instructions       Regular exercise       Healthy diet/nutrition       Vision screening       Hearing screening       Dental care       Bladder control       Fall risk prevention       Colon cancer screening    Estimated body mass index is 31.75 kg/m  as calculated from the following:    Height as of this encounter: 1.847 m (6' 0.7\").    Weight as of this encounter: 108.3 kg (238 lb 11.2 oz).    Weight management plan: Discussed healthy diet and exercise guidelines    He reports that he quit smoking about 43 years ago. He has a 24.00 pack-year smoking history. He has never used smokeless tobacco.      Appropriate preventive services were discussed with this patient, including applicable screening as appropriate for cardiovascular disease, diabetes, osteopenia/osteoporosis, and glaucoma.  As appropriate for age/gender, discussed screening for colorectal cancer, prostate cancer, breast cancer, and cervical cancer. Checklist reviewing preventive services available has been given to the patient.    Reviewed patients plan of care and provided an AVS. The Intermediate Care " Plan ( asthma action plan, low back pain action plan, and migraine action plan) for Lester meets the Care Plan requirement. This Care Plan has been established and reviewed with the Patient.    Counseling Resources:  ATP IV Guidelines  Pooled Cohorts Equation Calculator  Breast Cancer Risk Calculator  Breast Cancer: Medication to Reduce Risk  FRAX Risk Assessment  ICSI Preventive Guidelines  Dietary Guidelines for Americans, 2010  [employer] MyPlate  ASA Prophylaxis  Lung CA Screening    Nohemi Lezama NP  [facility]    Identified Health Risks:

## 2022-06-08 DIAGNOSIS — E11.9 TYPE 2 DIABETES MELLITUS WITHOUT COMPLICATION, WITHOUT LONG-TERM CURRENT USE OF INSULIN (H): ICD-10-CM

## 2022-06-08 DIAGNOSIS — E78.5 HYPERLIPIDEMIA, UNSPECIFIED HYPERLIPIDEMIA TYPE: Primary | ICD-10-CM

## 2022-06-08 RX ORDER — ROSUVASTATIN CALCIUM 10 MG/1
10 TABLET, COATED ORAL DAILY
Qty: 90 TABLET | Refills: 1 | Status: SHIPPED | OUTPATIENT
Start: 2022-06-08 | End: 2022-10-11

## 2022-06-08 RX ORDER — PIOGLITAZONEHYDROCHLORIDE 15 MG/1
15 TABLET ORAL DAILY
Qty: 90 TABLET | Refills: 1 | Status: SHIPPED | OUTPATIENT
Start: 2022-06-08 | End: 2022-10-11 | Stop reason: DRUGHIGH

## 2022-06-11 DIAGNOSIS — I10 ESSENTIAL HYPERTENSION: ICD-10-CM

## 2022-06-11 RX ORDER — HYDROCHLOROTHIAZIDE 25 MG/1
TABLET ORAL
Qty: 90 TABLET | Refills: 1 | OUTPATIENT
Start: 2022-06-11

## 2022-06-23 ENCOUNTER — OFFICE VISIT (OUTPATIENT)
Dept: OTOLARYNGOLOGY | Facility: CLINIC | Age: 72
End: 2022-06-23
Payer: COMMERCIAL

## 2022-06-23 DIAGNOSIS — R04.0 EPISTAXIS: ICD-10-CM

## 2022-06-23 DIAGNOSIS — J31.0 CHRONIC RHINITIS: Primary | ICD-10-CM

## 2022-06-23 PROCEDURE — 99203 OFFICE O/P NEW LOW 30 MIN: CPT | Performed by: OTOLARYNGOLOGY

## 2022-06-23 RX ORDER — AZELASTINE 1 MG/ML
SPRAY, METERED NASAL
Qty: 30 ML | Refills: 11 | Status: SHIPPED | OUTPATIENT
Start: 2022-06-23 | End: 2024-04-04

## 2022-06-23 NOTE — PATIENT INSTRUCTIONS
Soak cotton ball with Afrin nasal spray and place in the bleeding nostril with gentle pressure for 20 minutes     AYR brand nasal saline gel during dry, winter months

## 2022-06-23 NOTE — PROGRESS NOTES
CHIEF COMPLAINT:   Diagnoses       Codes Comments    Epistaxis     R04.0              HISTORY OF PRESENT ILLNESS    Lester was seen at the behest of Nohemi Lezama NP for epistaxis. This has been ongoing problem for the past 6 months.  He is experiencing frequent nosebleeds 1-2x per week or once every 3 weeks.  Normally triggered with nose blowing.  Ibuprofen for knee.  No anticoagulants.     Referral note:    Nose bleeds: Onset: 6 months. Getting nosebleeds a couple of times per week, notes more bleeding on the left than right. Worse after he blows his nose or when he picks at it. He is requesting ENT consult for this issue. He does not take blood thinning medications. He was using his Flonase 2 weeks ago due to his allergy flare ups.           REVIEW OF SYSTEMS    Review of Systems as per HPI and PMHx, otherwise 10 system review system are negative.     Amoxicillin, Codeine, Sulfamethoxazole-trimethoprim [sulfamethoxazole w/trimethoprim], Erythromycin base [erythromycin], and Simvastatin     There were no vitals taken for this visit.    HEAD: Normal appearance and symmetry:  No cutaneous lesions.      NECK:  supple     EARS: normal TM, EACs    EYES:  EOMI    CN VII/XII:  intact     NOSE:     Dorsum:   straight  Septum:  deviaed left  Mucosa:  moist        ORAL CAVITY/OROPHARYNX:     Lips:  Normal.  Tongue: normal, midline  Mucosa:   no lesions     NECK:  Trachea:  midline.              Thyroid:  normal              Adenopathy:  none        NEURO:   Alert and Oriented     GAIT AND STATION:  normal     RESPIRATORY:   Symmetry and Respiratory effort     PSYCH:  Normal mood and affect     SKIN:   warm and dry         IMPRESSION:    Encounter Diagnosis   Name Primary?     Epistaxis           RECOMMENDATIONS:    Nasal saline spray daily  Trial of astelin nasal spray  Return as needed

## 2022-06-23 NOTE — LETTER
6/23/2022         RE: Lester Shi  [address]  [place] [address]        Dear Colleague,    Thank you for referring your patient, Lester Shi, to the [facility]. Please see a copy of my visit note below.    CHIEF COMPLAINT:   Diagnoses       Codes Comments    Epistaxis     R04.0              HISTORY OF PRESENT ILLNESS    Lester was seen at the behest of Nohemi Lezama NP for epistaxis. This has been ongoing problem for the past 6 months.  He is experiencing frequent nosebleeds 1-2x per week or once every 3 weeks.  Normally triggered with nose blowing.  Ibuprofen for knee.  No anticoagulants.     Referral note:    Nose bleeds: Onset: 6 months. Getting nosebleeds a couple of times per week, notes more bleeding on the left than right. Worse after he blows his nose or when he picks at it. He is requesting ENT consult for this issue. He does not take blood thinning medications. He was using his Flonase 2 weeks ago due to his allergy flare ups.           REVIEW OF SYSTEMS    Review of Systems as per HPI and PMHx, otherwise 10 system review system are negative.     Amoxicillin, Codeine, Sulfamethoxazole-trimethoprim [sulfamethoxazole w/trimethoprim], Erythromycin base [erythromycin], and Simvastatin     There were no vitals taken for this visit.    HEAD: Normal appearance and symmetry:  No cutaneous lesions.      NECK:  supple     EARS: normal TM, EACs    EYES:  EOMI    CN VII/XII:  intact     NOSE:     Dorsum:   straight  Septum:  deviaed left  Mucosa:  moist        ORAL CAVITY/OROPHARYNX:     Lips:  Normal.  Tongue: normal, midline  Mucosa:   no lesions     NECK:  Trachea:  midline.              Thyroid:  normal              Adenopathy:  none        NEURO:   Alert and Oriented     GAIT AND STATION:  normal     RESPIRATORY:   Symmetry and Respiratory effort     PSYCH:  Normal mood and affect     SKIN:   warm and dry         IMPRESSION:    Encounter Diagnosis   Name  Primary?     Epistaxis           RECOMMENDATIONS:    Nasal saline spray daily  Trial of astelin nasal spray  Return as needed      Again, thank you for allowing me to participate in the care of your patient.        Sincerely,        Rancho Sanchez MD

## 2022-06-26 DIAGNOSIS — I10 ESSENTIAL HYPERTENSION, BENIGN: ICD-10-CM

## 2022-06-27 DIAGNOSIS — Z76.0 ENCOUNTER FOR MEDICATION REFILL: ICD-10-CM

## 2022-06-27 RX ORDER — AMLODIPINE BESYLATE 10 MG/1
TABLET ORAL
Qty: 90 TABLET | Refills: 2 | Status: SHIPPED | OUTPATIENT
Start: 2022-06-27 | End: 2023-03-20

## 2022-06-27 NOTE — TELEPHONE ENCOUNTER
"Routing refill request to provider for review/approval because:  bp out of range    Last Written Prescription Date:  9/30/21  Last Fill Quantity: 90,  # refills: 2   Last office visit provider:  6/7/22     Requested Prescriptions   Pending Prescriptions Disp Refills     amLODIPine (NORVASC) 10 MG tablet [Pharmacy Med Name: AMLODIPINE BESYLATE 10MG TABLETS] 90 tablet 2     Sig: TAKE 1 TABLET(10 MG) BY MOUTH DAILY       Calcium Channel Blockers Protocol  Failed - 6/26/2022  7:25 AM        Failed - Blood pressure under 140/90 in past 12 months     BP Readings from Last 3 Encounters:   06/07/22 (!) 160/96   07/26/21 136/78   05/17/21 138/82                 Passed - Recent (12 mo) or future (30 days) visit within the authorizing provider's specialty     Patient has had an office visit with the authorizing provider or a provider within the authorizing providers department within the previous 12 mos or has a future within next 30 days. See \"Patient Info\" tab in inbasket, or \"Choose Columns\" in Meds & Orders section of the refill encounter.              Passed - Medication is active on med list        Passed - Patient is age 18 or older        Passed - Normal serum creatinine on file in past 12 months     Recent Labs   Lab Test 06/07/22  1316   CR 0.96       Ok to refill medication if creatinine is low               Leandra Avalos RN 06/26/22 7:04 PM  "

## 2022-06-28 RX ORDER — LOSARTAN POTASSIUM 25 MG/1
25 TABLET ORAL DAILY
Qty: 90 TABLET | Refills: 2 | Status: SHIPPED | OUTPATIENT
Start: 2022-06-28 | End: 2022-11-02 | Stop reason: DRUGHIGH

## 2022-06-28 NOTE — TELEPHONE ENCOUNTER
"Routing refill request to provider for review/approval because:  BP not in range.    Last Written Prescription Date:  9/30/21  Last Fill Quantity: 90,  # refills: 2   Last office visit provider:  6/7/22     Requested Prescriptions   Pending Prescriptions Disp Refills     losartan (COZAAR) 25 MG tablet 90 tablet 2     Sig: Take 1 tablet (25 mg) by mouth daily       Angiotensin-II Receptors Failed - 6/28/2022  7:26 AM        Failed - Last blood pressure under 140/90 in past 12 months     BP Readings from Last 3 Encounters:   06/07/22 (!) 160/96   07/26/21 136/78   05/17/21 138/82                 Passed - Recent (12 mo) or future (30 days) visit within the authorizing provider's specialty     Patient has had an office visit with the authorizing provider or a provider within the authorizing providers department within the previous 12 mos or has a future within next 30 days. See \"Patient Info\" tab in inbasket, or \"Choose Columns\" in Meds & Orders section of the refill encounter.              Passed - Medication is active on med list        Passed - Patient is age 18 or older        Passed - Normal serum creatinine on file in past 12 months     Recent Labs   Lab Test 06/07/22  1316   CR 0.96       Ok to refill medication if creatinine is low          Passed - Normal serum potassium on file in past 12 months     Recent Labs   Lab Test 06/07/22  1316   POTASSIUM 4.4                         Max De La Torre RN 06/28/22 7:26 AM  "

## 2022-07-07 ENCOUNTER — ALLIED HEALTH/NURSE VISIT (OUTPATIENT)
Dept: FAMILY MEDICINE | Facility: CLINIC | Age: 72
End: 2022-07-07
Payer: COMMERCIAL

## 2022-07-07 VITALS
RESPIRATION RATE: 14 BRPM | OXYGEN SATURATION: 98 % | SYSTOLIC BLOOD PRESSURE: 168 MMHG | WEIGHT: 238 LBS | DIASTOLIC BLOOD PRESSURE: 94 MMHG | HEART RATE: 68 BPM | BODY MASS INDEX: 32.23 KG/M2 | HEIGHT: 72 IN

## 2022-07-07 DIAGNOSIS — I10 ESSENTIAL HYPERTENSION: Primary | ICD-10-CM

## 2022-07-07 PROCEDURE — 99207 PR NO CHARGE NURSE ONLY: CPT

## 2022-07-07 NOTE — PROGRESS NOTES
Lester Shi is a 72 year old patient who comes in today for a Blood Pressure check.  Initial BP:  BP (!) 168/94 (BP Location: Right arm, Patient Position: Sitting, Cuff Size: Adult Large)   Pulse 68   Resp 14   Ht 1.829 m (6')   Wt 108 kg (238 lb)   SpO2 98%   BMI 32.28 kg/m       68  Disposition: results routed to provider

## 2022-07-08 ENCOUNTER — TRANSFERRED RECORDS (OUTPATIENT)
Dept: HEALTH INFORMATION MANAGEMENT | Facility: CLINIC | Age: 72
End: 2022-07-08

## 2022-07-20 DIAGNOSIS — I10 ESSENTIAL HYPERTENSION: ICD-10-CM

## 2022-07-20 NOTE — TELEPHONE ENCOUNTER
"Routing refill request to provider for review/approval because:  bp out of range    Last Written Prescription Date:  12/8/21  Last Fill Quantity: 90,  # refills: 1   Last office visit provider:  6/7/22     Requested Prescriptions   Pending Prescriptions Disp Refills     atenolol (TENORMIN) 50 MG tablet [Pharmacy Med Name: ATENOLOL 50MG TABLETS] 90 tablet 1     Sig: TAKE 1 TABLET(50 MG) BY MOUTH DAILY       Beta-Blockers Protocol Failed - 7/20/2022 11:18 AM        Failed - Blood pressure under 140/90 in past 12 months     BP Readings from Last 3 Encounters:   07/07/22 (!) 168/94   06/07/22 (!) 160/96   07/26/21 136/78                 Passed - Patient is age 6 or older        Passed - Recent (12 mo) or future (30 days) visit within the authorizing provider's specialty     Patient has had an office visit with the authorizing provider or a provider within the authorizing providers department within the previous 12 mos or has a future within next 30 days. See \"Patient Info\" tab in inbasket, or \"Choose Columns\" in Meds & Orders section of the refill encounter.              Passed - Medication is active on med list             Leandra Avalos RN 07/20/22 1:51 PM  "

## 2022-07-21 RX ORDER — ATENOLOL 50 MG/1
TABLET ORAL
Qty: 90 TABLET | Refills: 1 | Status: SHIPPED | OUTPATIENT
Start: 2022-07-21 | End: 2023-01-24

## 2022-08-29 ENCOUNTER — TRANSFERRED RECORDS (OUTPATIENT)
Dept: HEALTH INFORMATION MANAGEMENT | Facility: CLINIC | Age: 72
End: 2022-08-29

## 2022-10-02 ENCOUNTER — HEALTH MAINTENANCE LETTER (OUTPATIENT)
Age: 72
End: 2022-10-02

## 2022-10-07 ENCOUNTER — OFFICE VISIT (OUTPATIENT)
Dept: FAMILY MEDICINE | Facility: CLINIC | Age: 72
End: 2022-10-07
Payer: COMMERCIAL

## 2022-10-07 VITALS
WEIGHT: 228.8 LBS | RESPIRATION RATE: 16 BRPM | DIASTOLIC BLOOD PRESSURE: 86 MMHG | SYSTOLIC BLOOD PRESSURE: 144 MMHG | OXYGEN SATURATION: 94 % | BODY MASS INDEX: 31.03 KG/M2 | TEMPERATURE: 98.2 F | HEART RATE: 79 BPM

## 2022-10-07 DIAGNOSIS — Z23 NEED FOR VACCINATION: ICD-10-CM

## 2022-10-07 DIAGNOSIS — I10 ESSENTIAL HYPERTENSION, BENIGN: ICD-10-CM

## 2022-10-07 DIAGNOSIS — R05.1 ACUTE COUGH: ICD-10-CM

## 2022-10-07 DIAGNOSIS — R09.81 SINUS CONGESTION: ICD-10-CM

## 2022-10-07 DIAGNOSIS — J01.10 ACUTE NON-RECURRENT FRONTAL SINUSITIS: ICD-10-CM

## 2022-10-07 DIAGNOSIS — E78.5 HYPERLIPIDEMIA, UNSPECIFIED HYPERLIPIDEMIA TYPE: ICD-10-CM

## 2022-10-07 DIAGNOSIS — E11.9 TYPE 2 DIABETES MELLITUS WITHOUT COMPLICATION, WITHOUT LONG-TERM CURRENT USE OF INSULIN (H): Primary | ICD-10-CM

## 2022-10-07 DIAGNOSIS — Z12.11 COLON CANCER SCREENING: ICD-10-CM

## 2022-10-07 LAB
ANION GAP SERPL CALCULATED.3IONS-SCNC: 11 MMOL/L (ref 7–15)
BUN SERPL-MCNC: 31.9 MG/DL (ref 8–23)
CALCIUM SERPL-MCNC: 10 MG/DL (ref 8.8–10.2)
CHLORIDE SERPL-SCNC: 105 MMOL/L (ref 98–107)
CHOLEST SERPL-MCNC: 206 MG/DL
CREAT SERPL-MCNC: 1.13 MG/DL (ref 0.67–1.17)
DEPRECATED HCO3 PLAS-SCNC: 26 MMOL/L (ref 22–29)
GFR SERPL CREATININE-BSD FRML MDRD: 68 ML/MIN/1.73M2
GLUCOSE SERPL-MCNC: 176 MG/DL (ref 70–99)
HBA1C MFR BLD: 7.5 % (ref 0–5.6)
HDLC SERPL-MCNC: 42 MG/DL
LDLC SERPL CALC-MCNC: 110 MG/DL
NONHDLC SERPL-MCNC: 164 MG/DL
POTASSIUM SERPL-SCNC: 4.1 MMOL/L (ref 3.4–5.3)
SODIUM SERPL-SCNC: 142 MMOL/L (ref 136–145)
TRIGL SERPL-MCNC: 268 MG/DL

## 2022-10-07 PROCEDURE — 36415 COLL VENOUS BLD VENIPUNCTURE: CPT | Performed by: NURSE PRACTITIONER

## 2022-10-07 PROCEDURE — 99214 OFFICE O/P EST MOD 30 MIN: CPT | Performed by: NURSE PRACTITIONER

## 2022-10-07 PROCEDURE — 80061 LIPID PANEL: CPT | Performed by: NURSE PRACTITIONER

## 2022-10-07 PROCEDURE — 80048 BASIC METABOLIC PNL TOTAL CA: CPT | Performed by: NURSE PRACTITIONER

## 2022-10-07 PROCEDURE — 83036 HEMOGLOBIN GLYCOSYLATED A1C: CPT | Performed by: NURSE PRACTITIONER

## 2022-10-07 RX ORDER — DOXYCYCLINE 100 MG/1
100 CAPSULE ORAL 2 TIMES DAILY
Qty: 20 CAPSULE | Refills: 0 | Status: SHIPPED | OUTPATIENT
Start: 2022-10-07 | End: 2022-10-17

## 2022-10-07 ASSESSMENT — PAIN SCALES - GENERAL: PAINLEVEL: NO PAIN (0)

## 2022-10-07 NOTE — PROGRESS NOTES
Assessment & Plan     Type 2 diabetes mellitus without complication, without long-term current use of insulin (H)    - Hemoglobin A1c  - Hemoglobin A1c  Not insulin-dependent, he is only been checking his blood sugars Premeal several days a week, we discussed the importance of checking minimum once daily in the morning when he is fasting.  He has been compliant with his medications which include metformin and Actos, the Actos we added several months ago.  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  This was noncancerous  No signs of neuropathy on exam today, continue with regular skin inspection of the feet    Essential hypertension, benign    - BASIC METABOLIC PANEL  - BASIC METABOLIC PANEL  Blood pressure is well controlled today so continue with current regimen.    Hyperlipidemia, unspecified hyperlipidemia type    - Lipid Profile  - Lipid Profile  He stopped taking his cholesterol medication over a month ago, he did not like the side effects profile risk that was listed on the medication insert.  We did discuss the risks of stroke and heart attack for him which are significantly elevated today as he does take 4 medications for blood pressure control and has a history of diabetes as well  We will see what his cholesterol numbers look like today and calculate his 10-year ASCVD risk score and if above 7, I strongly urged him to restart the Crestor    Acute cough    Recommend use of over-the-counter Robitussin or Delsym    Sinus congestion    Continue with nasal spray as recommended by his ENT due to his history of bloody noses on the left    Acute non-recurrent frontal sinusitis    - doxycycline hyclate (VIBRAMYCIN) 100 MG  capsule  Dispense: 20 capsule; Refill: 0    Colon cancer screening    Patient declined    Need for vaccination    Patient declined        Return in about 3 months (around 1/7/2023) for Follow up, in person, med check, est care with new PCP. Him and his wife are looking into going to one of the [facility] since it is near their home now.     Nohemi Lezama NP  [facility] BRYANT Batista is a 72 year old, presenting for the following health issues:  Recheck Medication (Non fasting- regular follow up)      History of Present Illness       Diabetes:   He presents for follow up of diabetes.  He is checking home blood glucose a few times a week. He checks blood glucose before and after meals.  Blood glucose is never over 200 and never under 70. When his blood glucose is low, the patient is asymptomatic for confusion, blurred vision, lethargy and reports not feeling dizzy, shaky, or weak.  He has no concerns regarding his diabetes at this time.  He is not experiencing numbness or burning in feet, excessive thirst, blurry vision, weight changes or redness, sores or blisters on feet.         Hypertension: He presents for follow up of hypertension.  He does not check blood pressure  regularly outside of the clinic. Outside blood pressures have been over 140/90. He does not follow a low salt diet.         Diabetes Follow-up    Rx: Metformin, Actos, no insulin  Meals per day:  3  Snacks per day: 2  ETOH:  Once per month, 1   Soda: Zevia and Bubbly, no diet coke for a couple months  H20: 30 ounces per day  Urinary changes: none  He is working out 2 days per week with the 8 week wellness program      BP Readings from Last 2 Encounters:   10/07/22 (!) 144/86   07/07/22 (!) 168/94     Hemoglobin A1C (%)   Date Value   06/07/2022 8.7 (H)   04/26/2021 8.6 (H)     LDL Cholesterol Calculated (mg/dL)   Date Value   06/07/2022 119   04/26/2021 121     LDL Cholesterol Direct (mg/dl)   Date Value    09/28/2015 140 (H)   09/29/2014 115         Hyperlipidemia Follow-Up      Are you regularly taking any medication or supplement to lower your cholesterol?   No, he stop taking his statin one month ago, he worries about side effects of the medications.   Are you having muscle aches or other side effects that you think could be caused by your cholesterol lowering medication?  No   No history of stroke or CAD  Smoker:  Former, quit 40 years ago. Smoked from age 16 to 28    Hypertension: He is taking Norvasc, Atenolol, hydrochlorothiazide, Losartan for BP control       How many servings of fruits and vegetables do you eat daily?  0-1    On average, how many sweetened beverages do you drink each day (Examples: soda, juice, sweet tea, etc.  Do NOT count diet or artificially sweetened beverages)?   1    How many days per week do you exercise enough to make your heart beat faster? 3 or less    How many minutes a day do you exercise enough to make your heart beat faster? 60 or more  How many days per week do you miss taking your medication? 7    What makes it hard for you to take your medications?  stopped taking his Crestor one month ago    Acute Illness  Acute illness concerns: sinus congestion, cough  Onset/Duration: 3 weeks, COVID testing has been negative  Symptoms:  Fever: No  Chills/Sweats: No  Headache (location?): YES- comes and goes, center of forehead  Sinus Pressure: YES  Conjunctivitis:  No, is having cataract surgery in the newt few weeks, right eye  Ear Pain: no  Rhinorrhea: YES- yellow to green at times  Congestion: YES  Sore Throat: No  Cough: YES-productive of yellow sputum, productive of green sputum, waxing and waning over time  Wheeze: YES- has asthma, has inhaler therapy at home that he uses   Decreased Appetite: No  Nausea: No  Vomiting: No  Diarrhea: YES- comes and goes  Dysuria/Freq.: No  Dysuria or Hematuria: No  Fatigue/Achiness: YES, stayed home from work the first day and slept 13  hours  Sick/Strep Exposure: yes,  for elementary and high school, granddaughter had a cold  Therapies tried and outcome: Dayquil, Allegra, Nyquil, Benadryl, Flonase    Review of Systems   CONSTITUTIONAL: NEGATIVE for fever, chills, change in weight  CV: NEGATIVE for chest pain, palpitations or peripheral edema      Objective    BP (!) 144/86 (BP Location: Right arm, Patient Position: Sitting, Cuff Size: Adult Regular)   Pulse 79   Temp 98.2  F (36.8  C) (Oral)   Resp 16   Wt 103.8 kg (228 lb 12.8 oz)   SpO2 94%   BMI 31.03 kg/m    Body mass index is 31.03 kg/m .  Physical Exam   GENERAL: healthy, alert and no distress  EYES: Eyes grossly normal to inspection, PERRL and conjunctivae and sclerae normal  HENT: ear canals and TM's normal,  mouth without ulcers or lesions, nares filled with moderate amount of thick, yellow to green secretions, moderate swelling noted  NECK: no adenopathy, no asymmetry, masses, or scars   RESP: lungs clear to auscultation - no rales, rhonchi or wheezes, harsh cough noted, nonproductive  CV: regular rate and rhythm, normal S1 S2, no S3 or S4, no murmur, click or rub, no peripheral edema and peripheral pulses strong  ABDOMEN: soft, nontender, no hepatosplenomegaly, no masses and bowel sounds normal  SKIN: no suspicious lesions or rashes  NEURO: Normal strength and tone, mentation intact and speech normal

## 2022-10-11 ENCOUNTER — TELEPHONE (OUTPATIENT)
Dept: FAMILY MEDICINE | Facility: CLINIC | Age: 72
End: 2022-10-11

## 2022-10-11 DIAGNOSIS — E78.5 HYPERLIPIDEMIA, UNSPECIFIED HYPERLIPIDEMIA TYPE: ICD-10-CM

## 2022-10-11 DIAGNOSIS — E11.9 TYPE 2 DIABETES MELLITUS WITHOUT COMPLICATION, WITHOUT LONG-TERM CURRENT USE OF INSULIN (H): Primary | ICD-10-CM

## 2022-10-11 RX ORDER — ROSUVASTATIN CALCIUM 10 MG/1
10 TABLET, COATED ORAL DAILY
Qty: 90 TABLET | Refills: 1 | Status: SHIPPED | OUTPATIENT
Start: 2022-10-11 | End: 2023-02-05

## 2022-10-11 RX ORDER — PIOGLITAZONEHYDROCHLORIDE 30 MG/1
30 TABLET ORAL DAILY
Qty: 90 TABLET | Refills: 1 | Status: SHIPPED | OUTPATIENT
Start: 2022-10-11 | End: 2023-03-28

## 2022-10-11 NOTE — TELEPHONE ENCOUNTER
Spoke with patient regarding his recent lab results.  He has agreed to a dose adjustment on the Actos for managing his type 2 diabetes, A1c continues to remain over 7, it has improved when compared to his last med check since adding in the 15 mg of the Actos, I will go ahead and increase it to 30 mg a day now and he will follow-up in 4 to 6 months for recheck.    In regards to his cholesterol levels, his current ASCVD risk score exceeds 55%, he has agreed to start the Crestor 10 mg a day and will come back in 4 to 6 months to have his fasting levels rechecked at his next medication visit.     GFR remains over 60, all of his questions were addressed today and he has no further concerns and agrees with plan of care. medications were sent to [facility] in [place] per his request today

## 2022-11-02 ENCOUNTER — OFFICE VISIT (OUTPATIENT)
Dept: INTERNAL MEDICINE | Facility: CLINIC | Age: 72
End: 2022-11-02
Payer: COMMERCIAL

## 2022-11-02 VITALS
SYSTOLIC BLOOD PRESSURE: 148 MMHG | OXYGEN SATURATION: 97 % | WEIGHT: 233 LBS | BODY MASS INDEX: 31.56 KG/M2 | HEART RATE: 78 BPM | DIASTOLIC BLOOD PRESSURE: 84 MMHG | HEIGHT: 72 IN

## 2022-11-02 DIAGNOSIS — E11.9 TYPE 2 DIABETES MELLITUS WITHOUT COMPLICATION, WITHOUT LONG-TERM CURRENT USE OF INSULIN (H): ICD-10-CM

## 2022-11-02 DIAGNOSIS — I10 ESSENTIAL HYPERTENSION, BENIGN: Primary | ICD-10-CM

## 2022-11-02 PROCEDURE — 99203 OFFICE O/P NEW LOW 30 MIN: CPT | Performed by: INTERNAL MEDICINE

## 2022-11-02 RX ORDER — LOSARTAN POTASSIUM 100 MG/1
100 TABLET ORAL DAILY
Qty: 90 TABLET | Refills: 3 | Status: SHIPPED | OUTPATIENT
Start: 2022-11-02 | End: 2023-03-28

## 2022-11-02 NOTE — PROGRESS NOTES
Office Visit - New Patient   Lester Shi   72 year old  male    Date of visit: 11/2/2022  Physician: JAQUI GEE MD, MD     Assessment and Plan    Acute visit, concerns about a severely elevated blood pressure reading noted yesterday when he was at [facility] and supposed to undergo cataract surgery.    My assessment is  Artificially elevated blood pressure reading probably because of using a too-small blood pressure cuff at [facility], but blood pressure probably does need better control in the context of type 2 diabetes, obesity, and hyperlipidemia.  We will increase Lester's losartan from 25 to 100 mg a day, continue his atenolol, hydrochlorothiazide, and amlodipine, and encouraged him to keep focused on lifestyle improvement measures and weight loss.    The story was that Lester was in [facility] yesterday, hoping to get his cataract surgery, and they got an initial blood pressure reading of about 210/110.  Lester noticed that they were using too-small blood pressure cuff.  He has a large upper arm, it is very used to getting his blood pressure measured with an appropriately large sized cuff.  He actually told the medical assistant at [facility] about to small cuff, but apparently they did not have a larger one.    Here in the clinic today November 2, 2022, would we do use a proper sized cuff, we get a reading of 148/84 which is not too bad, and is similar to previous readings over the last year or so.  He does need better blood pressure control, therefore increased his losartan to a full dose of 100 mg daily.  He has normal kidney function markers when checked most recently October 7, 2022.    I reminded Lester that target blood pressure is around 120/80 at rest in the seated position, on the right upper arm with an appropriate sized cuff, after he is been sitting quietly for 3-5 minutes.  I showed Lester the markers on a blood pressure cuff, indicating  whether it is the correct size been wrapped around the arm.  He is going to update his home blood pressure machine with a large cuff.  He might want to spend the extra money and buy his machine from a medical supply store such as [employer] or [employer].    Will have Lestre continue his atenolol 50 mg a day, hydrochlorothiazide 25 mg a day, and amlodipine 10 mg a day.    I do note that Lester has  Bilateral ankle edema, possible contribution from high-dose amlodipine, he is also known to have varicose veins which could also be a contributor, but if amlodipine is helping controlling blood pressure, that might be a reasonable trade off.    Type 2 diabetes, taking a combination of metformin and pioglitazone  A1c measured October 7, 2022 was 7.5 which is not horrible, but he needs to get down below 7.  His A1c was below 7 in 2019.  Current on eye exams at [facility].    I told Lester that pioglitazone is not prescribed particularly often these days for type 2 diabetes.  Instead the escalation in therapy after metformin is usually a GLP-1 agonist such as Trulicity or Ozempic, which are once a week injections, or SGLT2 daily pill, such as Ozempic, Farxiga, or Invokana.  Both the GLP-1 agonist and SGLT2 pills are quite expensive however, could be 100s of dollars per month.    He will continue to focus on careful eating, physical activity, weight loss.    Morbid obesity with body mass index 31.6, with complications of type 2 diabetes, hypertension, and hyperlipidemia.  He told me he does not have obstructive sleep apnea.  He told me that his all-time maximum weight was about 270 pounds, and nowadays he is around 230 pounds.  I asked him to set a goal to try to lose 30 pounds over the next 6 months.  He told me that alcohol consumption is practically 0.  I reminded him of the importance of eating slower, portion control, and identifying problem foods to curtail or eliminate such as  "starches, sweets, and fried foods.    Asthma, worse in the wintertime.  He takes Flovent steroid inhaler daily during his problematic asthma season.    Nasal allergies, for which he uses azelastine nasal spray and cetirizine tablets.    Hyperlipidemia context of diabetes and hypertension.  Takes rosuvastatin 10 mg a day.    Cataracts, hopefully he will be able to get his cataract replacement surgery reschedule soon.    He had COVID in 2021, no leftover symptoms.  I would encourage him to get the bivalent COVID-19 booster, and he gave me an emphatic \"maybe\"    Advanced osteoarthritis right knee, and Lester told me that he has had for knee replacement surgery sometime in the next year.  Dr Axel Henriquez at [facility]    I also encouraged him to get a seasonal flu shot, and his reaction was also \"maybe\"    Klinefelter syndrome, currently not on any testosterone replacement.  At some point he may be worth checking his testosterone levels, but if he is bothered by low libido symptoms.  He is , and has 2 adopted children.    History of squamous cell carcinoma the face    Varicose veins, which could be a contributor to ankle swelling.    Screening colonoscopy April 18, 2012, it has been just over 10 years.   he told me he has another screening scheduled for December 19, 2022, which would be a 10-year interval.      Immunization History   Administered Date(s) Administered     COVID-19,PF,Pfizer (12+ Yrs) 03/06/2021, 03/27/2021     DT (PEDS <7y) 02/15/1995, 02/14/2005     FLU 6-35 months 09/25/2009, 11/28/2013     HepB-Adult 03/25/2015, 05/19/2015, 01/19/2016     Influenza (H1N1) 10/14/2009     Influenza (High Dose) 3 valent vaccine 10/11/2017, 10/22/2019     Influenza, Quad, High Dose, Pf, 65yr+ (Fluzone HD) 11/24/2020     Pneumo Conj 13-V (2010&after) 10/11/2017     Pneumococcal 23 valent 11/02/2018     Td,adult,historic,unspecified 02/14/2005     Tdap (Adacel,Boostrix) 04/25/2017     Zoster vaccine recombinant " adjuvanted (SHINGRIX) 09/08/2021, 06/07/2022     ---------------------------------------------------------------------------------------------------------------------------  Chief Complaint   Chief Complaint   Patient presents with     Hypertension        ---------------------------------------------------------------------------------------------------------------------------  History of Present Illness  This 72 year old old     Acute visit, concerns about a severely elevated blood pressure reading noted yesterday when he was at [facility] and supposed to undergo cataract surgery.    My assessment is  Artificially elevated blood pressure reading probably because of using a too-small blood pressure cuff at [facility], but blood pressure probably does need better control in the context of type 2 diabetes, obesity, and hyperlipidemia.  We will increase Lester's losartan from 25 to 100 mg a day, continue his atenolol, hydrochlorothiazide, and amlodipine, and encouraged him to keep focused on lifestyle improvement measures and weight loss.    The story was that Lester was in [facility] yesterday, hoping to get his cataract surgery, and they got an initial blood pressure reading of about 210/110.  Lester noticed that they were using too-small blood pressure cuff.  He has a large upper arm, it is very used to getting his blood pressure measured with an appropriately large sized cuff.  He actually told the medical assistant at [facility] about to small cuff, but apparently they did not have a larger one.    Here in the clinic today November 2, 2022, would we do use a proper sized cuff, we get a reading of 148/84 which is not too bad, and is similar to previous readings over the last year or so.  He does need better blood pressure control, therefore increased his losartan to a full dose of 100 mg daily.  He has normal kidney function markers when checked most recently  October 7, 2022.    I reminded Lester that target blood pressure is around 120/80 at rest in the seated position, on the right upper arm with an appropriate sized cuff, after he is been sitting quietly for 3-5 minutes.  I showed Lester the markers on a blood pressure cuff, indicating whether it is the correct size been wrapped around the arm.  He is going to update his home blood pressure machine with a large cuff.  He might want to spend the extra money and buy his machine from a medical supply store such as [employer] or [employer].    Will have Lester continue his atenolol 50 mg a day, hydrochlorothiazide 25 mg a day, and amlodipine 10 mg a day.      Wt Readings from Last 3 Encounters:   11/02/22 105.7 kg (233 lb)   10/07/22 103.8 kg (228 lb 12.8 oz)   07/07/22 108 kg (238 lb)     BP Readings from Last 3 Encounters:   11/02/22 (!) 148/84   10/07/22 (!) 144/86   07/07/22 (!) 168/94       Lab Results   Component Value Date    WBC 5.8 04/23/2021    HGB 11.7 (L) 06/07/2022    HCT 34.1 (L) 04/23/2021     04/23/2021    CHOL 206 (H) 10/07/2022    TRIG 268 (H) 10/07/2022    HDL 42 10/07/2022    ALT 19 06/07/2022    AST 14 06/07/2022     10/07/2022    BUN 31.9 (H) 10/07/2022    CO2 26 10/07/2022    PSA 0.10 06/07/2022    INR 1.08 04/23/2021    MICROALBUR 14.64 (H) 06/07/2022         Review of Systems: A comprehensive review of systems was negative except as noted.  ---------------------------------------------------------------------------------------------------------------------------    Medications and Allergies   Current Outpatient Medications   Medication Sig Dispense Refill     ACCU-CHEK FASTCLIX LANCET DRUM [ACCU-CHEK FASTCLIX LANCET DRUM] U TO TEST TID  0     ACCU-CHEK GUIDE test strip USE TO TEST TWICE DAILY 200 strip 2     albuterol (PROAIR HFA;PROVENTIL HFA;VENTOLIN HFA) 90 mcg/actuation inhaler [ALBUTEROL (PROAIR HFA;PROVENTIL HFA;VENTOLIN HFA) 90 MCG/ACTUATION INHALER]  Inhale 2 puffs every 6 (six) hours as needed for wheezing. 3 Inhaler 3     albuterol (PROVENTIL) 2.5 mg /3 mL (0.083 %) nebulizer solution [ALBUTEROL (PROVENTIL) 2.5 MG /3 ML (0.083 %) NEBULIZER SOLUTION] NEBULIZE 1 VIAL EVERY 4 HOURS AS NEEDED FOR WHEEZING 90 mL 1     amLODIPine (NORVASC) 10 MG tablet TAKE 1 TABLET(10 MG) BY MOUTH DAILY 90 tablet 2     atenolol (TENORMIN) 50 MG tablet TAKE 1 TABLET(50 MG) BY MOUTH DAILY 90 tablet 1     azelastine (ASTELIN) 0.1 % nasal spray 2 sprays each nostril 1-2x daily as needed for nasal congestion (use nightly for first 2 week) 30 mL 11     cetirizine (ZYRTEC) 10 MG tablet Take 10 mg by mouth daily       fexofenadine (ALLEGRA) 180 MG tablet [FEXOFENADINE (ALLEGRA) 180 MG TABLET] Take 180 mg by mouth daily.       FLOVENT  mcg/actuation inhaler [FLOVENT  MCG/ACTUATION INHALER] INHALE 2 PUFFS BY MOUTH TWICE DAILY 1 Inhaler 1     fluticasone (FLONASE) 50 mcg/actuation nasal spray [FLUTICASONE (FLONASE) 50 MCG/ACTUATION NASAL SPRAY] Apply 1 spray into each nostril daily as needed for rhinitis.       hydrochlorothiazide (HYDRODIURIL) 25 MG tablet Take 1 tablet (25 mg) by mouth daily 90 tablet 1     losartan (COZAAR) 25 MG tablet Take 1 tablet (25 mg) by mouth daily 90 tablet 2     metFORMIN (GLUCOPHAGE XR) 500 MG 24 hr tablet Take 2 tablets (1,000 mg) by mouth 2 times daily (with meals) 360 tablet 1     nebulizers Misc [NEBULIZERS MISC] Use four times a day for 5 day then as  needed 1 each 0     pioglitazone (ACTOS) 30 MG tablet Take 1 tablet (30 mg) by mouth daily 90 tablet 1     rosuvastatin (CRESTOR) 10 MG tablet Take 1 tablet (10 mg) by mouth daily 90 tablet 1     triamcinolone (KENALOG) 0.1 % cream [TRIAMCINOLONE (KENALOG) 0.1 % CREAM] APPLY EXTERNALLY TO THE AFFECTED AREA TWICE DAILY FOR 14 DAYS 45 g 0     Allergies   Allergen Reactions     Amoxicillin Anaphylaxis     Codeine Nausea and Vomiting     Sulfamethoxazole-Trimethoprim [Sulfamethoxazole W/Trimethoprim]  Nausea and Vomiting     Tachycardia       Erythromycin Base [Erythromycin] Unknown     Simvastatin Unknown        Active Ambulatory Problems     Diagnosis Date Noted     Eczema      Anemia      Otitis Media      Insulin dependent diabetes mellitus      Essential Hypercholesterolemia      Essential hypertension, benign      Joint Pain, Localized In The Knee      Obesity      Allergic Rhinitis      Klinefelter's Syndrome      Varicose vein 09/28/2015     Mild persistent asthma with acute exacerbation 12/04/2018     Pneumonia of both lungs due to infectious organism 02/09/2019     Hyperglycemia      Acute kidney injury (H)      Encounter for examination following treatment at hospital 02/18/2019     Dizziness 03/29/2019     Sepsis (H) 10/29/2019     Abdominal pain, left upper quadrant 10/29/2019     Mild persistent asthma with exacerbation 03/27/2020     Spinal stenosis of lumbar region with neurogenic claudication 03/27/2020     Squamous cell carcinoma of skin of face 07/20/2020     Status post total left knee replacement 07/20/2020     Nodule of right lung 05/18/2021     Lung consolidation (H) 07/26/2021     Lesion of right native kidney 07/26/2021     Hyperlipidemia, unspecified hyperlipidemia type 10/07/2022     Resolved Ambulatory Problems     Diagnosis Date Noted     Acute respiratory failure, unspecified whether with hypoxia or hypercapnia (H)      Uncontrolled type 1 diabetes mellitus with hyperglycemia (H) 02/15/2019     Uncontrolled type 2 diabetes mellitus with hyperglycemia (H) 02/18/2019     Morbid obesity (H)      Past Medical History:   Diagnosis Date     Anemia, unspecified      Cataract      Chronic osteoarthritis      Colon polyps      Diabetes mellitus, type 2 (H)      Type II or unspecified type diabetes mellitus without mention of complication, not stated as uncontrolled      Uncomplicated asthma      Unspecified essential hypertension      Past Surgical History:   Procedure Laterality Date      CATARACT EXTRACTION Left 2000     DENTAL SURGERY  1985     JOINT REPLACEMENT      left knee TKA 2013      Past Medical History:   Diagnosis Date     Anemia, unspecified     Created by Conversion      Cataract     left eye removed, present in right eye     Chronic osteoarthritis     right knee     Colon polyps      Contact dermatitis and other eczema, due to unspecified cause     Created by Conversion      Diabetes mellitus, type 2 (H)      Klinefelter's syndrome     Created by Conversion      Obesity, unspecified     Created by Conversion      Pure hypercholesterolemia     Created by Conversion      Type II or unspecified type diabetes mellitus without mention of complication, not stated as uncontrolled     Created by Conversion      Uncomplicated asthma     adult asthma- wheezing     Unspecified essential hypertension     Created by Conversion      Varicose vein 2015        Family and Social History   Family History   Problem Relation Age of Onset     Cerebrovascular Disease Mother      Clotting Disorder Father      Heart Disease Brother         Social History     Tobacco Use     Smoking status: Former     Packs/day: 2.00     Years: 12.00     Pack years: 24.00     Types: Cigarettes     Quit date: 1979     Years since quittin.8     Smokeless tobacco: Never   Substance Use Topics     Alcohol use: Yes     Comment: Alcoholic Drinks/day: rare     Drug use: No        Physical Exam   General Appearance:       BP (!) 148/84 (BP Location: Right arm, Patient Position: Sitting, Cuff Size: Adult Large)   Pulse 78   Ht 1.829 m (6')   Wt 105.7 kg (233 lb)   SpO2 97%   BMI 31.60 kg/m      Lester appears generally well today.  Blood pressure taken in clinic with a large cuff gives a not too bad initial reading of 148/84.  Lungs clear  Heart regular rate rhythm  Obese abdomen, but nontender  1+ bilateral ankle edema (chronic).       Additional Information        JAQUI GEE MD, MD  Internal Medicine

## 2022-11-02 NOTE — PATIENT INSTRUCTIONS
Acute visit, concerns about a severely elevated blood pressure reading noted yesterday when he was at [facility] and supposed to undergo cataract surgery.    My assessment is  Artificially elevated blood pressure reading probably because of using a too-small blood pressure cuff at [facility], but blood pressure probably does need better control in the context of type 2 diabetes, obesity, and hyperlipidemia.  We will increase Lester's losartan from 25 to 100 mg a day, continue his atenolol, hydrochlorothiazide, and amlodipine, and encouraged him to keep focused on lifestyle improvement measures and weight loss.    The story was that Lester was in [facility] yesterday, hoping to get his cataract surgery, and they got an initial blood pressure reading of about 210/110.  Lester noticed that they were using too-small blood pressure cuff.  He has a large upper arm, it is very used to getting his blood pressure measured with an appropriately large sized cuff.  He actually told the medical assistant at [facility] about to small cuff, but apparently they did not have a larger one.    Here in the clinic today November 2, 2022, would we do use a proper sized cuff, we get a reading of 148/84 which is not too bad, and is similar to previous readings over the last year or so.  He does need better blood pressure control, therefore increased his losartan to a full dose of 100 mg daily.  He has normal kidney function markers when checked most recently October 7, 2022.    I reminded Lester that target blood pressure is around 120/80 at rest in the seated position, on the right upper arm with an appropriate sized cuff, after he is been sitting quietly for 3-5 minutes.  I showed Lester the markers on a blood pressure cuff, indicating whether it is the correct size been wrapped around the arm.  He is going to update his home blood pressure machine with a large cuff.  He might want to  spend the extra money and buy his machine from a medical supply store such as [facility] or [employer] [facility] [employer].    Will have Lester continue his atenolol 50 mg a day, hydrochlorothiazide 25 mg a day, and amlodipine 10 mg a day.    I do note that Lester has  Bilateral ankle edema, possible contribution from high-dose amlodipine, he is also known to have varicose veins which could also be a contributor, but if amlodipine is helping controlling blood pressure, that might be a reasonable trade off.    Type 2 diabetes, taking a combination of metformin and pioglitazone  A1c measured October 7, 2022 was 7.5 which is not horrible, but he needs to get down below 7.  His A1c was below 7 in 2019.  Current on eye exams at [facility].    I told Lester that pioglitazone is not prescribed particularly often these days for type 2 diabetes.  Instead the escalation in therapy after metformin is usually a GLP-1 agonist such as Trulicity or Ozempic, which are once a week injections, or SGLT2 daily pill, such as Ozempic, Farxiga, or Invokana.  Both the GLP-1 agonist and SGLT2 pills are quite expensive however, could be 100s of dollars per month.    He will continue to focus on careful eating, physical activity, weight loss.    Morbid obesity with body mass index 31.6, with complications of type 2 diabetes, hypertension, and hyperlipidemia.  He told me he does not have obstructive sleep apnea.  He told me that his all-time maximum weight was about 270 pounds, and nowadays he is around 230 pounds.  I asked him to set a goal to try to lose 30 pounds over the next 6 months.  He told me that alcohol consumption is practically 0.  I reminded him of the importance of eating slower, portion control, and identifying problem foods to curtail or eliminate such as starches, sweets, and fried foods.    Asthma, worse in the wintertime.  He takes Flovent steroid inhaler daily during his problematic asthma season.    Nasal  "allergies, for which he uses azelastine nasal spray and cetirizine tablets.    Hyperlipidemia context of diabetes and hypertension.  Takes rosuvastatin 10 mg a day.    Cataracts, hopefully he will be able to get his cataract replacement surgery reschedule soon.    He had COVID in 2021, no leftover symptoms.  I would encourage him to get the bivalent COVID-19 booster, and he gave me an emphatic \"maybe\"    Advanced osteoarthritis right knee, and Lester told me that he has had for knee replacement surgery sometime in the next year.  Dr Axel Henriquez at [facility]    I also encouraged him to get a seasonal flu shot, and his reaction was also \"maybe\"    Klinefelter syndrome, currently not on any testosterone replacement.  At some point he may be worth checking his testosterone levels, but if he is bothered by low libido symptoms.  He is , and has 2 adopted children.    History of squamous cell carcinoma the face    Varicose veins, which could be a contributor to ankle swelling.    Screening colonoscopy April 18, 2012, it has been just over 10 years.   he told me he has another screening scheduled for December 19, 2022, which would be a 10-year interval.      Immunization History   Administered Date(s) Administered    COVID-19,PF,Pfizer (12+ Yrs) 03/06/2021, 03/27/2021    DT (PEDS <7y) 02/15/1995, 02/14/2005    FLU 6-35 months 09/25/2009, 11/28/2013    HepB-Adult 03/25/2015, 05/19/2015, 01/19/2016    Influenza (H1N1) 10/14/2009    Influenza (High Dose) 3 valent vaccine 10/11/2017, 10/22/2019    Influenza, Quad, High Dose, Pf, 65yr+ (Fluzone HD) 11/24/2020    Pneumo Conj 13-V (2010&after) 10/11/2017    Pneumococcal 23 valent 11/02/2018    Td,adult,historic,unspecified 02/14/2005    Tdap (Adacel,Boostrix) 04/25/2017    Zoster vaccine recombinant adjuvanted (SHINGRIX) 09/08/2021, 06/07/2022       "

## 2022-11-17 ENCOUNTER — TRANSFERRED RECORDS (OUTPATIENT)
Dept: HEALTH INFORMATION MANAGEMENT | Facility: CLINIC | Age: 72
End: 2022-11-17

## 2022-11-21 ENCOUNTER — NURSE TRIAGE (OUTPATIENT)
Dept: NURSING | Facility: CLINIC | Age: 72
End: 2022-11-21

## 2022-11-21 NOTE — TELEPHONE ENCOUNTER
"Nurse Triage SBAR    Is this a 2nd Level Triage? YES, LICENSED PRACTITIONER REVIEW IS REQUIRED  Please call patient at [phone] (home)   Please advise where you prefer patient to be seen Disposition ED/UCC or clinic with PCP approval.    Situation:     Patient woke at 5 a.m. with sudden onset of vertigo.    Background:       Diabetic taking Metformin and Actos  Cortisone injection 11/18/22/shoulder    Assessment:     Patient calling with spouse reporting sudden onset of vertigo/dizziness this morning after waking at 5 a.m..  Denies any previous history.  Stating symptoms have improved since 5 a.m., ongoing vertigo \"leaning\" to side when ambulating. Patient is ambulating on his own stating \"80 percent better\" then when first waking.  Reporting having a cortisone injection for shoulder on 11/18/22.  Denies other symptoms.  Blood sugar during triage 159 fasting.  Denies any neurologic deficit including numbness/tingling, weakness. Denies chest pain or pressure.    Protocol Recommended Disposition:   Go To ED/UCC Now (Or To Office With PCP Approval)    Recommendation:     Please advise where you prefer patient to be seen.     Routed to provider    Does the patient meet one of the following criteria for ADS visit consideration? 16+ years old, with an [facility] PCP     TIP  Providers, please consider if this condition is appropriate for management at one of our Acute and Diagnostic Services sites.     If patient is a good candidate, please use dotphrase <dot>triageresponse and select Refer to ADS to document.Reason for Disposition    Spinning or tilting sensation (vertigo) present now and one or more stroke risk factors (i.e., hypertension, diabetes mellitus, prior stroke/TIA, heart attack, age over 60) (Exception: prior physician evaluation for this AND no different/worse than usual)    Additional Information    Negative: SEVERE difficulty breathing (e.g., struggling for each breath, speaks in single words)    Negative: " Shock suspected (e.g., cold/pale/clammy skin, too weak to stand, low BP, rapid pulse)    Negative: Difficult to awaken or acting confused (e.g., disoriented, slurred speech)    Negative: Fainted, and still feels dizzy afterwards    Negative: Overdose (accidental or intentional) of medications    Negative: New neurologic deficit that is present now: * Weakness of the face, arm, or leg on one side of the body * Numbness of the face, arm, or leg on one side of the body * Loss of speech or garbled speech    Negative: Heart beating < 50 beats per minute OR > 140 beats per minute    Negative: Sounds like a life-threatening emergency to the triager    Negative: Chest pain    Negative: Rectal bleeding, bloody stool, or tarry-black stool    Negative: Vomiting is main symptom    Negative: Diarrhea is main symptom    Negative: Headache is main symptom    Negative: Heat exhaustion suspected (i.e., dehydration from heat exposure)    Negative: Patient states that they are having an anxiety or panic attack    Negative: Dizziness from low blood sugar (i.e., < 60 mg/dl or 3.5 mmol/l)    Negative: SEVERE dizziness (e.g., unable to stand, requires support to walk, feels like passing out now)    Negative: SEVERE headache or neck pain    Protocols used: DIZZINESS-A-OH

## 2022-11-21 NOTE — TELEPHONE ENCOUNTER
Provider Response to 2nd Level Triage Request    I have reviewed the RN documentation. My recommendation is:  Refer to Urgent Care     Clarice Mireles MD on 11/21/2022 at 12:04 PM'

## 2022-11-21 NOTE — TELEPHONE ENCOUNTER
Patient notified that an appointment is needed with someone if this happens again. He has an appointment in january but will go sooner if this happens again. Sinus issue going on and slight headache right now.     Lilliam Rome RN on 11/21/2022 at 1:12 PM

## 2022-11-23 ENCOUNTER — NURSE TRIAGE (OUTPATIENT)
Dept: NURSING | Facility: CLINIC | Age: 72
End: 2022-11-23

## 2022-11-23 ENCOUNTER — OFFICE VISIT (OUTPATIENT)
Dept: FAMILY MEDICINE | Facility: CLINIC | Age: 72
End: 2022-11-23
Payer: COMMERCIAL

## 2022-11-23 VITALS
SYSTOLIC BLOOD PRESSURE: 186 MMHG | HEART RATE: 63 BPM | TEMPERATURE: 97.9 F | OXYGEN SATURATION: 96 % | DIASTOLIC BLOOD PRESSURE: 91 MMHG | RESPIRATION RATE: 16 BRPM | WEIGHT: 230 LBS | BODY MASS INDEX: 31.19 KG/M2

## 2022-11-23 DIAGNOSIS — H66.001 NON-RECURRENT ACUTE SUPPURATIVE OTITIS MEDIA OF RIGHT EAR WITHOUT SPONTANEOUS RUPTURE OF TYMPANIC MEMBRANE: Primary | ICD-10-CM

## 2022-11-23 PROCEDURE — 99213 OFFICE O/P EST LOW 20 MIN: CPT | Performed by: PHYSICIAN ASSISTANT

## 2022-11-23 RX ORDER — DOXYCYCLINE 100 MG/1
100 CAPSULE ORAL 2 TIMES DAILY
Qty: 20 CAPSULE | Refills: 0 | Status: SHIPPED | OUTPATIENT
Start: 2022-11-23 | End: 2022-12-03

## 2022-11-23 NOTE — PROGRESS NOTES
Assessment & Plan:      Problem List Items Addressed This Visit        Nervous and Auditory    Otitis Media - Primary    Relevant Medications    doxycycline hyclate (VIBRAMYCIN) 100 MG capsule     Medical Decision Making  Patient presents with right ear pains and on and off dizziness for 2 weeks.  Physical exam shows acute right otitis media.  Will treat with oral antibiotics.  Continue with plenty of fluids and over-the-counter analgesics as needed.  Allergies and medication interactions reviewed.  Discussed signs of worsening symptoms and when to follow-up with PCP if no symptom improvement.     Subjective:      Lester Shi is a 72 year old male here for evaluation of right ear pain and dizziness.  Onset of symptoms was 2 weeks ago.  Patient has had gradually worsening symptoms since then.  Notes pressure and ear pains in the right ear over the last 1 to 2 days.  Patient will occasionally get room spinning dizziness when he sits up quickly.  This dizziness quickly improves     The following portions of the patient's history were reviewed and updated as appropriate: allergies, current medications, and problem list.     Review of Systems  Pertinent items are noted in HPI.    Allergies  Allergies   Allergen Reactions     Amoxicillin Anaphylaxis     Codeine Nausea and Vomiting     Sulfamethoxazole-Trimethoprim [Sulfamethoxazole W/Trimethoprim] Nausea and Vomiting     Tachycardia       Erythromycin Base [Erythromycin] Unknown     Simvastatin Unknown       Family History   Problem Relation Age of Onset     Cerebrovascular Disease Mother      Clotting Disorder Father      Heart Disease Brother        Social History     Tobacco Use     Smoking status: Former     Packs/day: 2.00     Years: 12.00     Pack years: 24.00     Types: Cigarettes     Quit date: 1979     Years since quittin.9     Smokeless tobacco: Never   Substance Use Topics     Alcohol use: Yes     Comment: Alcoholic Drinks/day: rare         Objective:      BP (!) 186/91   Pulse 63   Temp 97.9  F (36.6  C) (Oral)   Resp 16   Wt 104.3 kg (230 lb)   SpO2 96%   BMI 31.19 kg/m    General appearance - alert, well appearing, and in no distress and non-toxic  Ears - Right: TM intact with purulent fluid, bulging, and erythema.  Left: TM intact with no fluid, bulging, erythema    The use of Dragon/Dojo dictation services was used to construct the content of this note; any grammatical errors are non-intentional. Please contact the author directly if you are in need of any clarification.

## 2022-11-23 NOTE — TELEPHONE ENCOUNTER
Patient had vertigo on Monday and Tuesday fine.  Today has an ear infection tickling and pain at the same time right ear.  Vertigo is also present today also.  Patient feels he can't drive.  Patient denies fever.  Patient had ear infections before and now one is present.  Patient does not feel that he can drive and is requesting a virtual visit.  Patient denies pink or red swelling behind the ear and denies stiff neck.  Patient states that ear pain is becoming severe.    Reason for Disposition    Severe earache pain    Additional Information    Negative: Sounds like a life-threatening emergency to the triager    Negative: Pink or red swelling behind the ear    Negative: Stiff neck (can't touch chin to chest)    Negative: Patient sounds very sick or weak to the triager    Protocols used: EARACHE-A-OH

## 2022-11-23 NOTE — PATIENT INSTRUCTIONS
"Your child was seen today for an infection of the middle ear, also called otitis media.    Treatment:  - Use antibiotics as prescribed until completion, even if symptoms improve  - May give tylenol or ibuprofen for irritation and discomfort (see tables below for doses)  - Should notice symptom improvement in the next 36-48 hours  - Recommend daily use of a probiotic while taking prescribed medication (a common brand is Culturelle, yogurt with \"active cultures\" are also appropriate)    When to come back sooner for re-evaluation?  - If symptoms have not begun improving after 72 hours of taking antibiotics  - Develops a fever of 100.4F or current fever worsens  - Becomes short of breath  - Neck stiffness  - Difficulty swallowing   - Signs of dehydration including severe thirst, dark urine, dry skin, cracked lips    Dosing Tables  11/23/2022  Wt Readings from Last 1 Encounters:   11/23/22 104.3 kg (230 lb)       Acetaminophen Dosing Instructions  (May take every 4-6 hours)      WEIGHT   AGE Infant/Children's  160mg/5ml Children's   Chewable Tabs  80 mg each Jose Strength  Chewable Tabs  160 mg     Milliliter (ml) Soft Chew Tabs Chewable Tabs   6-11 lbs 0-3 months 1.25 ml     12-17 lbs 4-11 months 2.5 ml     18-23 lbs 12-23 months 3.75 ml     24-35 lbs 2-3 years 5 ml 2 tabs    36-47 lbs 4-5 years 7.5 ml 3 tabs    48-59 lbs 6-8 years 10 ml 4 tabs 2 tabs   60-71 lbs 9-10 years 12.5 ml 5 tabs 2.5 tabs   72-95 lbs 11 years 15 ml 6 tabs 3 tabs   96 lbs and over 12 years   4 tabs     Ibuprofen Dosing Instructions- Liquid  (May take every 6-8 hours)      WEIGHT   AGE Concentrated Drops   50 mg/1.25 ml Infant/Children's   100 mg/5ml     Dropperful Milliliter (ml)   12-17 lbs 6- 11 months 1 (1.25 ml)    18-23 lbs 12-23 months 1 1/2 (1.875 ml)    24-35 lbs 2-3 years  5 ml   36-47 lbs 4-5 years  7.5 ml   48-59 lbs 6-8 years  10 ml   60-71 lbs 9-10 years  12.5 ml   72-95 lbs 11 years  15 ml       Ibuprofen Dosing Instructions- " Tablets/Caplets  (May take every 6-8 hours)    WEIGHT AGE Children's   Chewable Tabs   50 mg Jose Strength   Chewable Tabs   100 mg Jose Strength   Caplets    100 mg     Tablet Tablet Caplet   24-35 lbs 2-3 years 2 tabs     36-47 lbs 4-5 years 3 tabs     48-59 lbs 6-8 years 4 tabs 2 tabs 2 caps   60-71 lbs 9-10 years 5 tabs 2.5 tabs 2.5 caps   72-95 lbs 11 years 6 tabs 3 tabs 3 caps

## 2022-12-08 ENCOUNTER — OFFICE VISIT (OUTPATIENT)
Dept: INTERNAL MEDICINE | Facility: CLINIC | Age: 72
End: 2022-12-08
Payer: COMMERCIAL

## 2022-12-08 VITALS
SYSTOLIC BLOOD PRESSURE: 144 MMHG | OXYGEN SATURATION: 95 % | TEMPERATURE: 97.7 F | HEART RATE: 70 BPM | WEIGHT: 236 LBS | HEIGHT: 70 IN | RESPIRATION RATE: 18 BRPM | BODY MASS INDEX: 33.79 KG/M2 | DIASTOLIC BLOOD PRESSURE: 86 MMHG

## 2022-12-08 DIAGNOSIS — B02.9 HERPES ZOSTER WITHOUT COMPLICATION: Primary | ICD-10-CM

## 2022-12-08 PROCEDURE — 99213 OFFICE O/P EST LOW 20 MIN: CPT | Performed by: INTERNAL MEDICINE

## 2022-12-08 RX ORDER — VALACYCLOVIR HYDROCHLORIDE 1 G/1
1000 TABLET, FILM COATED ORAL 3 TIMES DAILY
Qty: 21 TABLET | Refills: 0 | Status: SHIPPED | OUTPATIENT
Start: 2022-12-08 | End: 2023-01-27

## 2022-12-08 NOTE — PATIENT INSTRUCTIONS
Acute visit    Likely shingles, uncomplicated, location is left anterior chest under the breast, with characteristic left-sided pain that radiates around his chest to the back, but does not cross the midline, symptom onset Sunday, December 4, so we are still close enough in time that I think he would benefit from a course of Valtrex.    There is a cluster of herpetic looking ulcerated lesions under his left breast, looks very suspicious for shingles.  Did not see any lesions on his lateral chest or back however.    Prescription sent to his [facility] for Valtrex 1000 mg tablet 3 times a day for 7 days.  I told him to start it today, get his first dose in his evening.    He first noticed a burning sensation under the left breast this past weekend, then saw blisters, which by appearance today December 8 really does not look herpetic.    I told him as far as skin care, he can wash the area with gentle soap and lukewarm water, then pat skin dry.  Okay to apply baby powder or other drying powder to soothe the skin.    He might also try over-the-counter zinc oxide cream, for example Desitin cream as would be used for diaper rash.  That might help the schedule better.    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  He should stay away from babies and toddlers who have not yet received varicella vaccination.    Because he still has open lesions, theoretically he is still contagious.  Once the lesions crusted over, then he would not be contagious Lester had already received 2 shots of recombinant shingles vaccine, second shot was June 2022.    Even though the vaccine is thought to be 90% effective, I guess this occurrence of the other 10%.  I told him that he does not need to get another vaccination.    Hypertension in the context of type 2 diabetes, obesity, and hyperlipidemia.  We will increase   November 2, 2022, I increase Lester's losartan from 25 to 100 mg a day  continue his atenolol,  hydrochlorothiazide, and amlodipine, and encouraged him to keep focused on lifestyle improvement measures and weight loss.     Lester was at [facility] 11-1-2022, hoping to get his cataract surgery, and they got an initial blood pressure reading of about 210/110.  Lester noticed that they were using too-small blood pressure cuff.      I reminded Lester that target blood pressure is around 120/80 at rest in the seated position, on the right upper arm with an appropriate sized cuff, after he is been sitting quietly for 3-5 minutes.  I showed Lester the markers on a blood pressure cuff, indicating whether it is the correct size been wrapped around the arm.    He is going to update his home blood pressure machine with a large cuff.  He might want to spend the extra money and buy his machine from a medical supply store such as [employer] or [employer].     Will have Lester continue his atenolol 50 mg a day, hydrochlorothiazide 25 mg a day, and amlodipine 10 mg a day.    Bilateral ankle edema, possible contribution from high-dose amlodipine, he is also known to have varicose veins which could also be a contributor, but if amlodipine is helping controlling blood pressure, that might be a reasonable trade off.     Type 2 diabetes, taking a combination of metformin and pioglitazone  A1c measured October 7, 2022 was 7.5 which is not horrible, but he needs to get down below 7.  His A1c was below 7 in 2019.  Current on eye exams at [facility].     I told Lester that pioglitazone is not prescribed particularly often these days for type 2 diabetes.  Instead the escalation in therapy after metformin is usually a GLP-1 agonist such as Trulicity or Ozempic, which are once a week injections, or SGLT2 daily pill, such as Ozempic, Farxiga, or Invokana.  Both the GLP-1 agonist and SGLT2 pills are quite expensive however, could be 100s of dollars per month.     He will continue to focus on  "careful eating, physical activity, weight loss.     Morbid obesity with body mass index 31.6, with complications of type 2 diabetes, hypertension, and hyperlipidemia.  He told me he does not have obstructive sleep apnea.  He told me that his all-time maximum weight was about 270 pounds, and nowadays he is around 230 pounds.  I asked him to set a goal to try to lose 30 pounds over the next 6 months.  He told me that alcohol consumption is practically 0.  I reminded him of the importance of eating slower, portion control, and identifying problem foods to curtail or eliminate such as starches, sweets, and fried foods.     Asthma, worse in the wintertime.  He takes Flovent steroid inhaler daily during his problematic asthma season.     Nasal allergies, for which he uses azelastine nasal spray and cetirizine tablets.     Hyperlipidemia context of diabetes and hypertension.  Takes rosuvastatin 10 mg a day.     Cataracts, hopefully he will be able to get his cataract replacement surgery reschedule soon.     He had COVID in 2021, no leftover symptoms.  I would encourage him to get the bivalent COVID-19 booster, and he gave me an emphatic \"maybe\"     Advanced osteoarthritis right knee, and Lester told me that he has had for knee replacement surgery sometime in the next year.  Dr Axel Henriquez at [facility]     I also encouraged him to get a seasonal flu shot, and his reaction was also \"maybe\"     Klinefelter syndrome, currently not on any testosterone replacement.  At some point he may be worth checking his testosterone levels, but if he is bothered by low libido symptoms.  He is , and has 2 adopted children.     History of squamous cell carcinoma the face     Varicose veins, which could be a contributor to ankle swelling.     Screening colonoscopy April 18, 2012, it has been just over 10 years.   he told me he has another screening scheduled for December 19, 2022, which would be a 10-year interval.  "

## 2022-12-08 NOTE — PROGRESS NOTES
Office Visit - Follow Up   Lester Shi   72 year old male    Date of Visit: 12/8/2022    Chief Complaint   Patient presents with     Rash     On left side of chest        -------------------------------------------------------------------------------------------------------------------------  Assessment and Plan    Acute symptom visit    Likely shingles, uncomplicated, location is left anterior chest under the breast, with characteristic left-sided pain that radiates around his chest to the back, but does not cross the midline, symptom onset Sunday, December 4, so we are still close enough in time that I think he would benefit from a course of Valtrex.    There is a cluster of herpetic looking ulcerated lesions under his left breast, looks very suspicious for shingles.  Did not see any lesions on his lateral chest or back however.    Prescription sent to his [facility] for Valtrex 1000 mg tablet 3 times a day for 7 days.  I told him to start it today, get his first dose in his evening.    He first noticed a burning sensation under the left breast this past weekend, then saw blisters, which by appearance today December 8 really does not look herpetic.    I told him as far as skin care, he can wash the area with gentle soap and lukewarm water, then pat skin dry.  Okay to apply baby powder or other drying powder to soothe the skin.    He might also try over-the-counter zinc oxide cream, for example Desitin cream as would be used for diaper rash.  That might help the schedule better.    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  He should stay away from babies and toddlers who have not yet received varicella vaccination.    Because he still has open lesions, theoretically he is still contagious.  Once the lesions crusted over, then he would not be contagious Lester had already received 2 shots of recombinant shingles vaccine, second shot was June 2022.    Even though the vaccine is thought to  be 90% effective, I guess this occurrence of the other 10%.  I told him that he does not need to get another vaccination.    Hypertension in the context of type 2 diabetes, obesity, and hyperlipidemia.  We will increase   November 2, 2022, I increase Lester's losartan from 25 to 100 mg a day  continue his atenolol, hydrochlorothiazide, and amlodipine, and encouraged him to keep focused on lifestyle improvement measures and weight loss.     Lester was at [facility] 11-1-2022, hoping to get his cataract surgery, and they got an initial blood pressure reading of about 210/110.  Lester noticed that they were using too-small blood pressure cuff.      I reminded Lester that target blood pressure is around 120/80 at rest in the seated position, on the right upper arm with an appropriate sized cuff, after he is been sitting quietly for 3-5 minutes.  I showed Lester the markers on a blood pressure cuff, indicating whether it is the correct size been wrapped around the arm.    He is going to update his home blood pressure machine with a large cuff.  He might want to spend the extra money and buy his machine from a medical supply store such as [employer] or [employer].     Will have Lester continue his atenolol 50 mg a day, hydrochlorothiazide 25 mg a day, and amlodipine 10 mg a day.    Bilateral ankle edema, possible contribution from high-dose amlodipine, he is also known to have varicose veins which could also be a contributor, but if amlodipine is helping controlling blood pressure, that might be a reasonable trade off.     Type 2 diabetes, taking a combination of metformin and pioglitazone  A1c measured October 7, 2022 was 7.5 which is not horrible, but he needs to get down below 7.  His A1c was below 7 in 2019.  Current on eye exams at [facility].     I told Lester that pioglitazone is not prescribed particularly often these days for type 2 diabetes.  Instead the escalation in  "therapy after metformin is usually a GLP-1 agonist such as Trulicity or Ozempic, which are once a week injections, or SGLT2 daily pill, such as Ozempic, Farxiga, or Invokana.  Both the GLP-1 agonist and SGLT2 pills are quite expensive however, could be 100s of dollars per month.     He will continue to focus on careful eating, physical activity, weight loss.     Morbid obesity with body mass index 31.6, with complications of type 2 diabetes, hypertension, and hyperlipidemia.  He told me he does not have obstructive sleep apnea.  He told me that his all-time maximum weight was about 270 pounds, and nowadays he is around 230 pounds.  I asked him to set a goal to try to lose 30 pounds over the next 6 months.  He told me that alcohol consumption is practically 0.  I reminded him of the importance of eating slower, portion control, and identifying problem foods to curtail or eliminate such as starches, sweets, and fried foods.     Asthma, worse in the wintertime.  He takes Flovent steroid inhaler daily during his problematic asthma season.     Nasal allergies, for which he uses azelastine nasal spray and cetirizine tablets.     Hyperlipidemia context of diabetes and hypertension.  Takes rosuvastatin 10 mg a day.     Cataracts, hopefully he will be able to get his cataract replacement surgery reschedule soon.     He had COVID in 2021, no leftover symptoms.  I would encourage him to get the bivalent COVID-19 booster, and he gave me an emphatic \"maybe\"     Advanced osteoarthritis right knee, and Lester told me that he has had for knee replacement surgery sometime in the next year.  Dr Axel Henriquez at [facility]     I also encouraged him to get a seasonal flu shot, and his reaction was also \"maybe\"     Klinefelter syndrome, currently not on any testosterone replacement.  At some point he may be worth checking his testosterone levels, but if he is bothered by low libido symptoms.  He is , and has 2 adopted " children.     History of squamous cell carcinoma the face     Varicose veins, which could be a contributor to ankle swelling.     Screening colonoscopy April 18, 2012, it has been just over 10 years.   he told me he has another screening scheduled for December 19, 2022, which would be a 10-year interval.      --------------------------------------------------------------------------------------------------------------------------  History of Present Illness  This 72 year old old       Rash (On left side of chest)        Rash  Associated symptoms include a rash.   History of Present Illness         Reason for visit:  Rash on my body  Symptom onset:  3-7 days ago  Symptoms include:  Tenderness  Symptom intensity:  Moderate  Symptom progression:  Staying the same  Had these symptoms before:  Yes  Has tried/received treatment for these symptoms:  No  What makes it worse:  Scraching  What makes it better:  Ointment      Wt Readings from Last 3 Encounters:   12/08/22 107 kg (236 lb)   11/23/22 104.3 kg (230 lb)   11/02/22 105.7 kg (233 lb)     BP Readings from Last 3 Encounters:   12/08/22 (!) 144/86   11/23/22 (!) 186/91   11/02/22 (!) 148/84       ---------------------------------------------------------------------------------------------------------------------------    Medications, Allergies, Social, and Problem List   Current Outpatient Medications   Medication Sig Dispense Refill     ACCU-CHEK FASTCLIX LANCET DRUM [ACCU-CHEK FASTCLIX LANCET DRUM] U TO TEST TID  0     ACCU-CHEK GUIDE test strip USE TO TEST TWICE DAILY 200 strip 2     albuterol (PROAIR HFA;PROVENTIL HFA;VENTOLIN HFA) 90 mcg/actuation inhaler [ALBUTEROL (PROAIR HFA;PROVENTIL HFA;VENTOLIN HFA) 90 MCG/ACTUATION INHALER] Inhale 2 puffs every 6 (six) hours as needed for wheezing. 3 Inhaler 3     albuterol (PROVENTIL) 2.5 mg /3 mL (0.083 %) nebulizer solution [ALBUTEROL (PROVENTIL) 2.5 MG /3 ML (0.083 %) NEBULIZER SOLUTION] NEBULIZE 1 VIAL EVERY 4 HOURS AS  NEEDED FOR WHEEZING 90 mL 1     amLODIPine (NORVASC) 10 MG tablet TAKE 1 TABLET(10 MG) BY MOUTH DAILY 90 tablet 2     atenolol (TENORMIN) 50 MG tablet TAKE 1 TABLET(50 MG) BY MOUTH DAILY 90 tablet 1     azelastine (ASTELIN) 0.1 % nasal spray 2 sprays each nostril 1-2x daily as needed for nasal congestion (use nightly for first 2 week) 30 mL 11     cetirizine (ZYRTEC) 10 MG tablet Take 10 mg by mouth daily       fexofenadine (ALLEGRA) 180 MG tablet [FEXOFENADINE (ALLEGRA) 180 MG TABLET] Take 180 mg by mouth daily.       FLOVENT  mcg/actuation inhaler [FLOVENT  MCG/ACTUATION INHALER] INHALE 2 PUFFS BY MOUTH TWICE DAILY 1 Inhaler 1     fluticasone (FLONASE) 50 mcg/actuation nasal spray [FLUTICASONE (FLONASE) 50 MCG/ACTUATION NASAL SPRAY] Apply 1 spray into each nostril daily as needed for rhinitis.       hydrochlorothiazide (HYDRODIURIL) 25 MG tablet TAKE 1 TABLET(25 MG) BY MOUTH DAILY 90 tablet 0     losartan (COZAAR) 100 MG tablet Take 1 tablet (100 mg) by mouth daily 90 tablet 3     metFORMIN (GLUCOPHAGE XR) 500 MG 24 hr tablet Take 2 tablets (1,000 mg) by mouth 2 times daily (with meals) 360 tablet 1     nebulizers Misc [NEBULIZERS MISC] Use four times a day for 5 day then as  needed 1 each 0     pioglitazone (ACTOS) 30 MG tablet Take 1 tablet (30 mg) by mouth daily 90 tablet 1     rosuvastatin (CRESTOR) 10 MG tablet Take 1 tablet (10 mg) by mouth daily 90 tablet 1     triamcinolone (KENALOG) 0.1 % cream [TRIAMCINOLONE (KENALOG) 0.1 % CREAM] APPLY EXTERNALLY TO THE AFFECTED AREA TWICE DAILY FOR 14 DAYS 45 g 0     Allergies   Allergen Reactions     Amoxicillin Anaphylaxis     Codeine Nausea and Vomiting     Sulfamethoxazole-Trimethoprim [Sulfamethoxazole W/Trimethoprim] Nausea and Vomiting     Tachycardia       Erythromycin Base [Erythromycin] Unknown     Simvastatin Unknown     Social History     Tobacco Use     Smoking status: Former     Packs/day: 2.00     Years: 12.00     Pack years: 24.00      "Types: Cigarettes     Quit date: 1979     Years since quittin.9     Smokeless tobacco: Never   Substance Use Topics     Alcohol use: Yes     Comment: Alcoholic Drinks/day: rare     Drug use: No     Patient Active Problem List   Diagnosis     Eczema     Anemia     Otitis Media     Essential Hypercholesterolemia     Essential hypertension, benign     Joint Pain, Localized In The Knee     Obesity     Allergic Rhinitis     Klinefelter's Syndrome     Varicose vein     Mild persistent asthma with acute exacerbation     Pneumonia of both lungs due to infectious organism     Acute kidney injury (H)     Encounter for examination following treatment at hospital     Dizziness     Sepsis (H)     Abdominal pain, left upper quadrant     Mild persistent asthma with exacerbation     Spinal stenosis of lumbar region with neurogenic claudication     Squamous cell carcinoma of skin of face     Status post total left knee replacement     Nodule of right lung     Lung consolidation (H)     Lesion of right native kidney     Hyperlipidemia, unspecified hyperlipidemia type        Reviewed, reconciled and updated       Physical Exam   General Appearance:       BP (!) 144/86 (BP Location: Right arm, Patient Position: Sitting, Cuff Size: Adult Large)   Pulse 70   Temp 97.7  F (36.5  C) (Oral)   Resp 18   Ht 1.778 m (5' 10\")   Wt 107 kg (236 lb)   SpO2 95%   BMI 33.86 kg/m      There is a cluster of herpetic looking ulcerated lesions under his left breast, looks very suspicious for shingles.  Did not see any lesions on his lateral chest or back however.     Additional Information        JAQUI GEE MD, MD          "

## 2022-12-20 DIAGNOSIS — I10 ESSENTIAL HYPERTENSION: ICD-10-CM

## 2022-12-20 RX ORDER — HYDROCHLOROTHIAZIDE 25 MG/1
TABLET ORAL
Qty: 90 TABLET | Refills: 0 | OUTPATIENT
Start: 2022-12-20

## 2022-12-20 NOTE — TELEPHONE ENCOUNTER
Patient had a prescription sent for 90 days. To early of request.  Lilliam Rome RN on 12/20/2022 at 3:13 PM

## 2023-01-24 DIAGNOSIS — I10 ESSENTIAL HYPERTENSION: ICD-10-CM

## 2023-01-25 RX ORDER — ATENOLOL 50 MG/1
TABLET ORAL
Qty: 90 TABLET | OUTPATIENT
Start: 2023-01-25

## 2023-01-27 ENCOUNTER — OFFICE VISIT (OUTPATIENT)
Dept: FAMILY MEDICINE | Facility: CLINIC | Age: 73
End: 2023-01-27
Payer: COMMERCIAL

## 2023-01-27 VITALS
RESPIRATION RATE: 14 BRPM | DIASTOLIC BLOOD PRESSURE: 70 MMHG | TEMPERATURE: 98.2 F | BODY MASS INDEX: 34.36 KG/M2 | HEIGHT: 70 IN | HEART RATE: 71 BPM | WEIGHT: 240 LBS | OXYGEN SATURATION: 95 % | SYSTOLIC BLOOD PRESSURE: 136 MMHG

## 2023-01-27 DIAGNOSIS — I10 ESSENTIAL HYPERTENSION: ICD-10-CM

## 2023-01-27 DIAGNOSIS — E11.36 TYPE 2 DIABETES MELLITUS WITH DIABETIC CATARACT, WITH LONG-TERM CURRENT USE OF INSULIN (H): ICD-10-CM

## 2023-01-27 DIAGNOSIS — E78.5 HYPERLIPIDEMIA, UNSPECIFIED HYPERLIPIDEMIA TYPE: ICD-10-CM

## 2023-01-27 DIAGNOSIS — G89.29 CHRONIC PAIN OF RIGHT KNEE: ICD-10-CM

## 2023-01-27 DIAGNOSIS — R60.0 LOCALIZED EDEMA: ICD-10-CM

## 2023-01-27 DIAGNOSIS — E78.00 PURE HYPERCHOLESTEROLEMIA: ICD-10-CM

## 2023-01-27 DIAGNOSIS — Z00.00 MEDICARE ANNUAL WELLNESS VISIT, SUBSEQUENT: Primary | ICD-10-CM

## 2023-01-27 DIAGNOSIS — R60.9 EDEMA, UNSPECIFIED TYPE: ICD-10-CM

## 2023-01-27 DIAGNOSIS — E66.09 CLASS 1 OBESITY DUE TO EXCESS CALORIES WITH SERIOUS COMORBIDITY AND BODY MASS INDEX (BMI) OF 34.0 TO 34.9 IN ADULT: ICD-10-CM

## 2023-01-27 DIAGNOSIS — Z12.11 SCREEN FOR COLON CANCER: ICD-10-CM

## 2023-01-27 DIAGNOSIS — Z79.4 TYPE 2 DIABETES MELLITUS WITH DIABETIC CATARACT, WITH LONG-TERM CURRENT USE OF INSULIN (H): ICD-10-CM

## 2023-01-27 DIAGNOSIS — M25.561 CHRONIC PAIN OF RIGHT KNEE: ICD-10-CM

## 2023-01-27 DIAGNOSIS — E66.811 CLASS 1 OBESITY DUE TO EXCESS CALORIES WITH SERIOUS COMORBIDITY AND BODY MASS INDEX (BMI) OF 34.0 TO 34.9 IN ADULT: ICD-10-CM

## 2023-01-27 PROBLEM — H52.223 REGULAR ASTIGMATISM OF BOTH EYES: Status: ACTIVE | Noted: 2019-02-27

## 2023-01-27 PROBLEM — Z96.1 PSEUDOPHAKIA OF LEFT EYE: Status: ACTIVE | Noted: 2019-02-27

## 2023-01-27 LAB
ANION GAP SERPL CALCULATED.3IONS-SCNC: 13 MMOL/L (ref 7–15)
BUN SERPL-MCNC: 40.4 MG/DL (ref 8–23)
CALCIUM SERPL-MCNC: 10.1 MG/DL (ref 8.8–10.2)
CHLORIDE SERPL-SCNC: 102 MMOL/L (ref 98–107)
CHOLEST SERPL-MCNC: 174 MG/DL
CREAT SERPL-MCNC: 1.12 MG/DL (ref 0.67–1.17)
DEPRECATED HCO3 PLAS-SCNC: 25 MMOL/L (ref 22–29)
ERYTHROCYTE [DISTWIDTH] IN BLOOD BY AUTOMATED COUNT: 14.8 % (ref 10–15)
GFR SERPL CREATININE-BSD FRML MDRD: 69 ML/MIN/1.73M2
GLUCOSE SERPL-MCNC: 186 MG/DL (ref 70–99)
HBA1C MFR BLD: 8.6 % (ref 0–5.6)
HCT VFR BLD AUTO: 33.8 % (ref 40–53)
HDLC SERPL-MCNC: 56 MG/DL
HGB BLD-MCNC: 11.1 G/DL (ref 13.3–17.7)
LDLC SERPL CALC-MCNC: 82 MG/DL
MCH RBC QN AUTO: 29.9 PG (ref 26.5–33)
MCHC RBC AUTO-ENTMCNC: 32.8 G/DL (ref 31.5–36.5)
MCV RBC AUTO: 91 FL (ref 78–100)
NONHDLC SERPL-MCNC: 118 MG/DL
PLATELET # BLD AUTO: 255 10E3/UL (ref 150–450)
POTASSIUM SERPL-SCNC: 3.9 MMOL/L (ref 3.4–5.3)
RBC # BLD AUTO: 3.71 10E6/UL (ref 4.4–5.9)
SODIUM SERPL-SCNC: 140 MMOL/L (ref 136–145)
TRIGL SERPL-MCNC: 181 MG/DL
WBC # BLD AUTO: 7.9 10E3/UL (ref 4–11)

## 2023-01-27 PROCEDURE — 36415 COLL VENOUS BLD VENIPUNCTURE: CPT | Performed by: NURSE PRACTITIONER

## 2023-01-27 PROCEDURE — 80048 BASIC METABOLIC PNL TOTAL CA: CPT | Performed by: NURSE PRACTITIONER

## 2023-01-27 PROCEDURE — 85027 COMPLETE CBC AUTOMATED: CPT | Performed by: NURSE PRACTITIONER

## 2023-01-27 PROCEDURE — G0439 PPPS, SUBSEQ VISIT: HCPCS | Performed by: NURSE PRACTITIONER

## 2023-01-27 PROCEDURE — 83036 HEMOGLOBIN GLYCOSYLATED A1C: CPT | Performed by: NURSE PRACTITIONER

## 2023-01-27 PROCEDURE — 99214 OFFICE O/P EST MOD 30 MIN: CPT | Mod: 25 | Performed by: NURSE PRACTITIONER

## 2023-01-27 PROCEDURE — 80061 LIPID PANEL: CPT | Performed by: NURSE PRACTITIONER

## 2023-01-27 RX ORDER — HYDROCHLOROTHIAZIDE 25 MG/1
25 TABLET ORAL DAILY
Qty: 90 TABLET | Refills: 3 | Status: SHIPPED | OUTPATIENT
Start: 2023-01-27 | End: 2023-05-08

## 2023-01-27 RX ORDER — ATENOLOL 50 MG/1
50 TABLET ORAL DAILY
Qty: 90 TABLET | Refills: 3 | Status: SHIPPED | OUTPATIENT
Start: 2023-01-27 | End: 2023-04-10

## 2023-01-27 ASSESSMENT — ENCOUNTER SYMPTOMS
JOINT SWELLING: 0
WEAKNESS: 0
HEARTBURN: 0
CONSTIPATION: 0
NAUSEA: 0
MYALGIAS: 0
ARTHRALGIAS: 1
HEADACHES: 0
HEMATURIA: 0
HEMATOCHEZIA: 0
DIARRHEA: 0
FEVER: 0
ABDOMINAL PAIN: 0
DYSURIA: 0
PARESTHESIAS: 0
SORE THROAT: 0
SHORTNESS OF BREATH: 0
PALPITATIONS: 0
EYE PAIN: 0
NERVOUS/ANXIOUS: 0
COUGH: 0
FREQUENCY: 1
CHILLS: 0
DIZZINESS: 0

## 2023-01-27 ASSESSMENT — ASTHMA QUESTIONNAIRES
QUESTION_1 LAST FOUR WEEKS HOW MUCH OF THE TIME DID YOUR ASTHMA KEEP YOU FROM GETTING AS MUCH DONE AT WORK, SCHOOL OR AT HOME: A LITTLE OF THE TIME
QUESTION_2 LAST FOUR WEEKS HOW OFTEN HAVE YOU HAD SHORTNESS OF BREATH: NOT AT ALL
QUESTION_3 LAST FOUR WEEKS HOW OFTEN DID YOUR ASTHMA SYMPTOMS (WHEEZING, COUGHING, SHORTNESS OF BREATH, CHEST TIGHTNESS OR PAIN) WAKE YOU UP AT NIGHT OR EARLIER THAN USUAL IN THE MORNING: NOT AT ALL
ACT_TOTALSCORE: 22
ACT_TOTALSCORE: 22
QUESTION_4 LAST FOUR WEEKS HOW OFTEN HAVE YOU USED YOUR RESCUE INHALER OR NEBULIZER MEDICATION (SUCH AS ALBUTEROL): ONCE A WEEK OR LESS
QUESTION_5 LAST FOUR WEEKS HOW WOULD YOU RATE YOUR ASTHMA CONTROL: WELL CONTROLLED

## 2023-01-27 ASSESSMENT — ACTIVITIES OF DAILY LIVING (ADL): CURRENT_FUNCTION: NO ASSISTANCE NEEDED

## 2023-01-27 NOTE — PROGRESS NOTES
"SUBJECTIVE:   Lester is a 72 year old who presents for Preventive Visit.  - present to [facility] care and medicare wellness. 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 He has noticed more edema to his lower ankles so he increased his hydrochlorothiazide. He will notice sob, but this has been chronic. Had a stress echo test 2021 and it was normal. He stated he \"always had a problem with water\". He is not watching the salt in his diet, and he stated he enjoys using garlic salt. He stated his wife does use salt with cooking. He thinks he got a HA with Cozaar so he splint the dose to BID.   His blood sugars are 130 to 180 range in the AM.   He can get urgency so wears depends.   Right knee pain; see's Ortho and wears a brace.   He does see his urologist once per year due to the tumor they found in his kidney over 1-2 year ago.  This was noncancerous    Patient has been advised of split billing requirements and indicates understanding: Yes  Are you in the first 12 months of your Medicare coverage?  No    Healthy Habits:     In general, how would you rate your overall health?  Good    Frequency of exercise:  1 day/week    Duration of exercise:  Less than 15 minutes    Do you usually eat at least 4 servings of fruit and vegetables a day, include whole grains    & fiber and avoid regularly eating high fat or \"junk\" foods?  No    Taking medications regularly:  Yes    Medication side effects:  None    Ability to successfully perform activities of daily living:  No assistance needed    Home Safety:  No safety concerns identified    Hearing Impairment:  Difficulty understanding soft or whispered speech    In the past 6 months, have you been bothered by leaking of urine? Yes    In general, how would you rate your overall mental or emotional health?  Good      PHQ-2 Total Score: 0    Additional concerns today:  Yes      Have you ever done Advance Care Planning? (For example, a Health Directive, POLST, or a " discussion with a medical provider or your loved ones about your wishes): Yes, patient states has an Advance Care Planning document and will bring a copy to the clinic.     Fall risk  Fallen 2 or more times in the past year?: Yes  Any fall with injury in the past year?: No    Cognitive Screening   1) Repeat 3 items (Leader, Season, Table)    2) Clock draw: NORMAL  3) 3 item recall: Recalls 2 objects   Results: NORMAL clock, 1-2 items recalled: COGNITIVE IMPAIRMENT LESS LIKELY    Mini-CogTM Copyright S Susan. Licensed by the author for use in Health system; reprinted with permission (jose@[facility]). All      Do you have sleep apnea, excessive snoring or daytime drowsiness?: no    Reviewed and updated as needed this visit by clinical staff   Tobacco  Allergies  Meds              Reviewed and updated as needed this visit by Provider                 Social History     Tobacco Use     Smoking status: Former     Packs/day: 2.00     Years: 12.00     Pack years: 24.00     Types: Cigarettes     Quit date: 1979     Years since quittin.1     Smokeless tobacco: Never   Substance Use Topics     Alcohol use: Yes     Comment: Alcoholic Drinks/day: rare     If you drink alcohol do you typically have >3 drinks per day or >7 drinks per week? No    Alcohol Use 2023   Prescreen: >3 drinks/day or >7 drinks/week? Not Applicable   Prescreen: >3 drinks/day or >7 drinks/week? -       Current providers sharing in care for this patient include:   Patient Care Team:  Nohemi Lezama NP as PCP - Rancho Lagunas MD as Assigned Surgical Provider  Ricky Madsen MD as Assigned PCP    The following health maintenance items are reviewed in Epic and correct as of today:  Health Maintenance   Topic Date Due     COVID-19 Vaccine (3 - Booster for Pfizer series) 2021     COLORECTAL CANCER SCREENING  2022     INFLUENZA VACCINE (1) 2022     EYE EXAM  2023     MEDICARE ANNUAL WELLNESS VISIT   "06/07/2023     MICROALBUMIN  06/07/2023     DIABETIC FOOT EXAM  06/07/2023     ANNUAL REVIEW OF HM ORDERS  06/07/2023     ASTHMA ACTION PLAN  06/07/2023     A1C  07/27/2023     ASTHMA CONTROL TEST  07/27/2023     BMP  01/27/2024     LIPID  01/27/2024     FALL RISK ASSESSMENT  01/27/2024     DTAP/TDAP/TD IMMUNIZATION (2 - Td or Tdap) 04/25/2027     ADVANCE CARE PLANNING  01/27/2028     HEPATITIS C SCREENING  Completed     PHQ-2 (once per calendar year)  Completed     Pneumococcal Vaccine: 65+ Years  Completed     ZOSTER IMMUNIZATION  Completed     AORTIC ANEURYSM SCREENING (SYSTEM ASSIGNED)  Completed     IPV IMMUNIZATION  Aged Out     MENINGITIS IMMUNIZATION  Aged Out     Review of Systems   Constitutional: Negative for chills and fever.   HENT: Negative for congestion, ear pain, hearing loss and sore throat.    Eyes: Positive for visual disturbance. Negative for pain.   Respiratory: Negative for cough and shortness of breath.    Cardiovascular: Negative for chest pain, palpitations and peripheral edema.   Gastrointestinal: Negative for abdominal pain, constipation, diarrhea, heartburn, hematochezia and nausea.   Genitourinary: Positive for frequency, impotence and urgency. Negative for dysuria, genital sores, hematuria and penile discharge.   Musculoskeletal: Positive for arthralgias. Negative for joint swelling and myalgias.   Skin: Negative for rash.   Neurological: Negative for dizziness, weakness, headaches and paresthesias.   Psychiatric/Behavioral: Negative for mood changes. The patient is not nervous/anxious.          OBJECTIVE:   /70   Pulse 71   Temp 98.2  F (36.8  C) (Oral)   Resp 14   Ht 1.778 m (5' 10\")   Wt 108.9 kg (240 lb)   SpO2 95%   BMI 34.44 kg/m   Estimated body mass index is 33.86 kg/m  as calculated from the following:    Height as of 12/8/22: 1.778 m (5' 10\").    Weight as of 12/8/22: 107 kg (236 lb).  Physical Exam  GENERAL: healthy, alert and no distress  RESP: lungs clear to " auscultation - no rales, rhonchi or wheezes  CV: regular rate and rhythm, normal S1 S2, no S3 or S4, no murmur, click or rub, no peripheral edema and peripheral pulses strong  ABDOMEN: soft, nontender, no hepatosplenomegaly, no masses and bowel sounds normal  MS: no gross musculoskeletal defects noted, no edema  NEURO: Normal strength and tone, mentation intact and speech normal    Diagnostic Test Results:  Labs reviewed in Epic    ASSESSMENT / PLAN:       ICD-10-CM    1. Medicare annual wellness visit, subsequent  Z00.00       2. Screen for colon cancer  Z12.11 Colonoscopy Screening  Referral      3. Type 2 diabetes mellitus with diabetic cataract, with long-term current use of insulin (H)  E11.36 Hemoglobin A1c    Z79.4 Med Therapy Management Referral     Echocardiogram Complete     CBC with platelets     Hemoglobin A1c     metFORMIN (GLUCOPHAGE XR) 500 MG 24 hr tablet      4. Essential Hypercholesterolemia  E78.00 Lipid Profile (Chol, Trig, HDL, LDL calc)     Med Therapy Management Referral     Lipid Profile (Chol, Trig, HDL, LDL calc)      5. Essential hypertension  I10 atenolol (TENORMIN) 50 MG tablet     Med Therapy Management Referral     Echocardiogram Complete     hydrochlorothiazide (HYDRODIURIL) 25 MG tablet     Basic metabolic panel     CBC with platelets     Basic metabolic panel     rosuvastatin (CRESTOR) 20 MG tablet      6. Localized edema  R60.0 Med Therapy Management Referral     Echocardiogram Complete     Basic metabolic panel     CBC with platelets     Basic metabolic panel      7. Class 1 obesity due to excess calories with serious comorbidity and body mass index (BMI) of 34.0 to 34.9 in adult  E66.09     Z68.34       8. Edema, unspecified type  R60.9 CBC with platelets      9. Hyperlipidemia, unspecified hyperlipidemia type  E78.5 rosuvastatin (CRESTOR) 20 MG tablet      10. Chronic pain of right knee  M25.561     G89.29         - continue to work with Ortho for knee pain.   - I  "called patient and reviewed his labs. I discussed returning MTM phone call so she/he can work with him regarding his DM and medications. I would like him to start an Injectable such as Ozempic and try to get off Actos. I discussed how beneficial this medication can be, but would like closer monitoring with MTM regarding his DM readings. I also would like him to get a CGM. He will continue Metformin.  - I am concerned about his weight and edema. ECHO in 2021 was normal. We spent time discussing decreasing his salt intake, increase exercise, and lose weight. BUN elevated with labs, so I discussed increasing fluids (water) on the phone.   - VSS; ideally would like his BP below 130/80. May be situational but it is lower than prior. Hopefully at his meeting with the MTM, BP will get rechecked. If elevated, we can adjust his medications. Not clear why Spirolactone was stopped in 2019. Headaches are stable, can either continue Cozaar BID or taking 100 mg at night.   - He is taking Crestor 10 mg, I will have him increase this dose to 20 mg. This will further aide in preventing a stroke or heart attack.   - The 10-year ASCVD risk score (Shad CROWLEY, et al., 2019) is: 45.6%    Values used to calculate the score:      Age: 72 years      Sex: Male      Is Non- : No      Diabetic: Yes      Tobacco smoker: No      Systolic Blood Pressure: 136 mmHg      Is BP treated: Yes      HDL Cholesterol: 56 mg/dL      Total Cholesterol: 174 mg/dL      Patient has been advised of split billing requirements and indicates understanding: Yes      COUNSELING:  Reviewed preventive health counseling, as reflected in patient instructions      BMI:   Estimated body mass index is 33.86 kg/m  as calculated from the following:    Height as of 12/8/22: 1.778 m (5' 10\").    Weight as of 12/8/22: 107 kg (236 lb).   Weight management plan: Discussed healthy diet and exercise guidelines      He reports that he quit smoking about 44 " years ago. His smoking use included cigarettes. He has a 24.00 pack-year smoking history. He has never used smokeless tobacco.      Appropriate preventive services were discussed with this patient, including applicable screening as appropriate for cardiovascular disease, diabetes, osteopenia/osteoporosis, and glaucoma.  As appropriate for age/gender, discussed screening for colorectal cancer, prostate cancer, breast cancer, and cervical cancer. Checklist reviewing preventive services available has been given to the patient.    Reviewed patients plan of care and provided an AVS. The Basic Care Plan (routine screening as documented in Health Maintenance) for Lester meets the Care Plan requirement. This Care Plan has been established and reviewed with the Patient.          REAL Garcia [facility]    Identified Health Risks:

## 2023-01-31 ENCOUNTER — TELEPHONE (OUTPATIENT)
Dept: FAMILY MEDICINE | Facility: CLINIC | Age: 73
End: 2023-01-31

## 2023-01-31 NOTE — TELEPHONE ENCOUNTER
MTM referral from: [place] clinic visit (referral by provider) for DM, HTN, leg edema. See about Ozempic and getting off Actos    MTM referral outreach attempt #2 on January 31, 2023 at 1:03 PM      Outcome: Patient not reachable after several attempts, will route to MTM Pharmacist/Provider as an FYI.  MT scheduling number is [phone].  Thank you for the referral.    Parrish Garcia, [facility]     Patient has vbc coverage

## 2023-02-03 NOTE — RESULT ENCOUNTER NOTE
Called patient and notified him about labs. He will call [facility] soon to go over options for his diabetes. He is drinking more water now and feels better.     REAL Garcia CNP

## 2023-02-05 RX ORDER — ROSUVASTATIN CALCIUM 20 MG/1
20 TABLET, COATED ORAL DAILY
Qty: 90 TABLET | Refills: 3 | Status: SHIPPED | OUTPATIENT
Start: 2023-02-05 | End: 2024-02-27

## 2023-02-05 RX ORDER — METFORMIN HCL 500 MG
1000 TABLET, EXTENDED RELEASE 24 HR ORAL 2 TIMES DAILY WITH MEALS
Qty: 360 TABLET | Refills: 3 | Status: SHIPPED | OUTPATIENT
Start: 2023-02-05 | End: 2023-03-28

## 2023-02-05 NOTE — TELEPHONE ENCOUNTER
[facility] message sent with MTM information and scheduling phone number.       Nora Campos, PharmD, BCACP  Medication Management (MTM) Pharmacist  [facility]

## 2023-02-09 DIAGNOSIS — J45.31 MILD PERSISTENT ASTHMA WITH EXACERBATION: ICD-10-CM

## 2023-02-10 NOTE — TELEPHONE ENCOUNTER
"Routing refill request to provider for review/approval because:   script- please review     Last Written Prescription Date:  21  Last Fill Quantity: 3,  # refills: 3   Last office visit provider:  23     Requested Prescriptions   Pending Prescriptions Disp Refills     albuterol (PROAIR HFA/PROVENTIL HFA/VENTOLIN HFA) 108 (90 Base) MCG/ACT inhaler       Sig: Inhale 2 puffs into the lungs every 6 hours as needed       Asthma Maintenance Inhalers - Anticholinergics Passed - 2023  9:49 AM        Passed - Patient is age 12 years or older        Passed - Asthma control assessment score within normal limits in last 6 months     Please review ACT score.           Passed - Medication is active on med list        Passed - Recent (6 mo) or future (30 days) visit within the authorizing provider's specialty     Patient had office visit in the last 6 months or has a visit in the next 30 days with authorizing provider or within the authorizing provider's specialty.  See \"Patient Info\" tab in inbasket, or \"Choose Columns\" in Meds & Orders section of the refill encounter.           Short-Acting Beta Agonist Inhalers Protocol  Passed - 2023  9:49 AM        Passed - Patient is age 12 or older        Passed - Asthma control assessment score within normal limits in last 6 months     Please review ACT score.           Passed - Medication is active on med list        Passed - Recent (6 mo) or future (30 days) visit within the authorizing provider's specialty     Patient had office visit in the last 6 months or has a visit in the next 30 days with authorizing provider or within the authorizing provider's specialty.  See \"Patient Info\" tab in inbasket, or \"Choose Columns\" in Meds & Orders section of the refill encounter.                 Tameka Mancilla RN 02/10/23 1:26 PM  "

## 2023-02-12 RX ORDER — ALBUTEROL SULFATE 90 UG/1
2 AEROSOL, METERED RESPIRATORY (INHALATION) EVERY 6 HOURS PRN
Qty: 18 G | Refills: 0 | Status: SHIPPED | OUTPATIENT
Start: 2023-02-12 | End: 2023-04-18

## 2023-02-16 ENCOUNTER — TRANSFERRED RECORDS (OUTPATIENT)
Dept: HEALTH INFORMATION MANAGEMENT | Facility: CLINIC | Age: 73
End: 2023-02-16

## 2023-02-21 ENCOUNTER — OFFICE VISIT (OUTPATIENT)
Dept: FAMILY MEDICINE | Facility: CLINIC | Age: 73
End: 2023-02-21
Payer: COMMERCIAL

## 2023-02-21 ENCOUNTER — NURSE TRIAGE (OUTPATIENT)
Dept: NURSING | Facility: CLINIC | Age: 73
End: 2023-02-21

## 2023-02-21 ENCOUNTER — ANCILLARY PROCEDURE (OUTPATIENT)
Dept: GENERAL RADIOLOGY | Facility: CLINIC | Age: 73
End: 2023-02-21
Attending: PHYSICIAN ASSISTANT
Payer: COMMERCIAL

## 2023-02-21 VITALS
DIASTOLIC BLOOD PRESSURE: 96 MMHG | HEART RATE: 90 BPM | RESPIRATION RATE: 16 BRPM | OXYGEN SATURATION: 96 % | SYSTOLIC BLOOD PRESSURE: 206 MMHG | WEIGHT: 240 LBS | BODY MASS INDEX: 34.44 KG/M2 | TEMPERATURE: 97.9 F

## 2023-02-21 DIAGNOSIS — J10.1 INFLUENZA B: ICD-10-CM

## 2023-02-21 DIAGNOSIS — J45.31 MILD PERSISTENT ASTHMA WITH EXACERBATION: Primary | ICD-10-CM

## 2023-02-21 LAB
DEPRECATED S PYO AG THROAT QL EIA: NEGATIVE
FLUAV AG SPEC QL IA: NEGATIVE
FLUBV AG SPEC QL IA: POSITIVE
GROUP A STREP BY PCR: NOT DETECTED

## 2023-02-21 PROCEDURE — U0003 INFECTIOUS AGENT DETECTION BY NUCLEIC ACID (DNA OR RNA); SEVERE ACUTE RESPIRATORY SYNDROME CORONAVIRUS 2 (SARS-COV-2) (CORONAVIRUS DISEASE [COVID-19]), AMPLIFIED PROBE TECHNIQUE, MAKING USE OF HIGH THROUGHPUT TECHNOLOGIES AS DESCRIBED BY CMS-2020-01-R: HCPCS | Performed by: PHYSICIAN ASSISTANT

## 2023-02-21 PROCEDURE — 71046 X-RAY EXAM CHEST 2 VIEWS: CPT | Mod: TC | Performed by: RADIOLOGY

## 2023-02-21 PROCEDURE — 87804 INFLUENZA ASSAY W/OPTIC: CPT | Performed by: PHYSICIAN ASSISTANT

## 2023-02-21 PROCEDURE — 99214 OFFICE O/P EST MOD 30 MIN: CPT | Mod: CS | Performed by: PHYSICIAN ASSISTANT

## 2023-02-21 PROCEDURE — U0005 INFEC AGEN DETEC AMPLI PROBE: HCPCS | Performed by: PHYSICIAN ASSISTANT

## 2023-02-21 PROCEDURE — 87651 STREP A DNA AMP PROBE: CPT | Performed by: PHYSICIAN ASSISTANT

## 2023-02-21 RX ORDER — ALBUTEROL SULFATE 0.83 MG/ML
2.5 SOLUTION RESPIRATORY (INHALATION) EVERY 6 HOURS PRN
Qty: 90 ML | Refills: 0 | Status: SHIPPED | OUTPATIENT
Start: 2023-02-21 | End: 2023-10-20

## 2023-02-21 RX ORDER — ALBUTEROL SULFATE 90 UG/1
2 AEROSOL, METERED RESPIRATORY (INHALATION) EVERY 6 HOURS PRN
Qty: 18 G | Refills: 0 | Status: SHIPPED | OUTPATIENT
Start: 2023-02-21 | End: 2023-03-26

## 2023-02-21 RX ORDER — OSELTAMIVIR PHOSPHATE 75 MG/1
75 CAPSULE ORAL 2 TIMES DAILY
Qty: 10 CAPSULE | Refills: 0 | Status: SHIPPED | OUTPATIENT
Start: 2023-02-21 | End: 2023-02-26

## 2023-02-21 RX ORDER — PREDNISONE 20 MG/1
40 TABLET ORAL DAILY
Qty: 10 TABLET | Refills: 0 | Status: SHIPPED | OUTPATIENT
Start: 2023-02-21 | End: 2023-02-26

## 2023-02-21 NOTE — LETTER
[facility] [place]  [address] [address]  Phone: [phone]  Fax: [phone]    February 21, 2023        Lester Shi  [address] [address]          To whom it may concern:    RE: Lester Shi    He is excused from work for 2/21/2023 - 2/24/2023.  He may return as long as no fever of 100.4 or higher.    Please contact me for questions or concerns.      Sincerely,        Kojo Mccollum PA-C

## 2023-02-21 NOTE — PROGRESS NOTES
Assessment & Plan:      Problem List Items Addressed This Visit        Respiratory    Mild persistent asthma with exacerbation - Primary    Relevant Medications    predniSONE (DELTASONE) 20 MG tablet    albuterol (PROAIR HFA/PROVENTIL HFA/VENTOLIN HFA) 108 (90 Base) MCG/ACT inhaler    albuterol (PROVENTIL) (2.5 MG/3ML) 0.083% neb solution    Other Relevant Orders    XR Chest 2 Views (Completed)    Symptomatic COVID-19 Virus (Coronavirus) by PCR Nose    Influenza A & B Antigen - Clinic Collect (Completed)    Streptococcus A Rapid Screen w/Reflex to PCR - Clinic Collect (Completed)    Group A Streptococcus PCR Throat Swab   Other Visit Diagnoses     Influenza B        Relevant Medications    oseltamivir (TAMIFLU) 75 MG capsule        Medical Decision Making  Patient with history of asthma and reduced lung volumes secondary to Klinefelter syndrome presents with cough, wheezing, shortness of breath for 2 days.  Patient is positive for influenza B.  Symptoms also concerning for asthma exacerbation.  Chest x-ray is negative for focal pneumonia.  Recommend short course of Tamiflu and oral steroids.  Represcribed albuterol inhaler and nebulizers to be used as needed for shortness of breath.  Discussed treatment and symptomatic care.  Allergies and medication interactions reviewed.  Discussed signs of worsening symptoms and when to follow-up with PCP if no symptom improvement.     Subjective:      Lester Shi is a 73 year old male with history of asthma, and reduced lung volumes secondary to Klinefelter syndrome, here for evaluation of cough, wheezing, and shortness of breath.  Onset of symptoms was 2 days ago.  Associated symptoms include dyspnea on exertion.  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  He has been using his albuterol nebulizers with good temporary relief of symptoms.  Patient does note slight increase swelling in the lower extremities, however he attributes this to having to  sleep sitting up due to right shoulder pains that recently were exacerbated.  Usually his swelling in the lower extremities is improved when he lays flat at nighttime.  No chest pains.     The following portions of the patient's history were reviewed and updated as appropriate: allergies, current medications, and problem list.     Review of Systems  Pertinent items are noted in HPI.    Allergies  Allergies   Allergen Reactions     Amoxicillin Anaphylaxis     Codeine Nausea and Vomiting     Sulfamethoxazole-Trimethoprim [Sulfamethoxazole W/Trimethoprim] Nausea and Vomiting     Tachycardia       Erythromycin Base [Erythromycin] Unknown     Simvastatin Unknown       Family History   Problem Relation Age of Onset     Cerebrovascular Disease Mother      Clotting Disorder Father      Heart Disease Brother        Social History     Tobacco Use     Smoking status: Former     Packs/day: 2.00     Years: 12.00     Pack years: 24.00     Types: Cigarettes     Quit date: 1979     Years since quittin.1     Smokeless tobacco: Never   Substance Use Topics     Alcohol use: Yes     Comment: Alcoholic Drinks/day: rare        Objective:      BP (!) 206/96 (BP Location: Other (Comment))   Pulse 90   Temp 97.9  F (36.6  C) (Oral)   Resp 16   Wt 108.9 kg (240 lb)   SpO2 96%   BMI 34.44 kg/m    General appearance - alert, well appearing, and in no distress and non-toxic  Ears - bilateral TM's and external ear canals normal  Nose - normal and patent, no erythema, discharge or polyps  Mouth - mucous membranes moist, pharynx normal without lesions  Neck - supple, no significant adenopathy  Chest - Expiratory wheezing heard throughout the lungs, no rhonchi or rales  Heart - normal rate, regular rhythm, normal S1, S2, no murmurs, rubs, clicks or gallops  Extremities - Bilateral lower extremities have mild pitting edema     Lab & Imaging Results    Results for orders placed or performed in visit on 23   XR Chest 2 Views      Status: None    Narrative    EXAM: XR CHEST 2 VIEWS  LOCATION: [facility]  DATE/TIME: 2/21/2023 3:12 PM    INDICATION: cough and wheezing; rule out pneumonia  COMPARISON: CT chest 11/19/2021 and older studies, chest x-ray 04/23/2020      Impression    IMPRESSION: Ill-defined small airspace opacity is noted in the right midlung on the PA view suggestive of a tiny area of pneumonitis. No consolidating pneumonia. No effusions. Calcified granuloma in the left upper lobe is unchanged.    Heart and pulmonary vascularity are normal.   Influenza A & B Antigen - Clinic Collect     Status: Abnormal    Specimen: Nose; Swab   Result Value Ref Range    Influenza A antigen Negative Negative    Influenza B antigen Positive (A) Negative    Narrative    Test results must be correlated with clinical data. If necessary, results should be confirmed by a molecular assay or viral culture.   Streptococcus A Rapid Screen w/Reflex to PCR - Clinic Collect     Status: Normal    Specimen: Throat; Swab   Result Value Ref Range    Group A Strep antigen Negative Negative       I personally reviewed these results and discussed findings with the patient.    The use of Dragon/Marqeta dictation services was used to construct the content of this note; any grammatical errors are non-intentional. Please contact the author directly if you are in need of any clarification.

## 2023-02-21 NOTE — TELEPHONE ENCOUNTER
Triage call  Patient calling to report  He has been coughing without relief he has been using Neb every 6 hours, rescue inhaler.Cough is  Strong he is wheezing but is not bringing anything up. He has shortness of breath with getting up and walking.  He has nasal congestion but it is clear.    Per protocol see HCP(Or PCP Triage) with in 4 hours. Care advice given.  Verbalizes understanding and agrees with plan.     Yasmin Arauz RN   [facility] Nurse Advisor  11:38 AM 2/21/2023      Reason for Disposition    [1] MILD difficulty breathing (e.g., minimal/no SOB at rest, SOB with walking, pulse <100) AND [2] still present when not coughing    Additional Information    Negative: SEVERE difficulty breathing (e.g., struggling for each breath, speaks in single words)    Negative: Bluish (or gray) lips or face now    Negative: [1] Rapid onset of cough AND [2] has hives    Negative: Coughing started suddenly after medicine, an allergic food or bee sting    Negative: [1] Difficulty breathing AND [2] exposure to flames, smoke, or fumes    Negative: [1] Stridor AND [2] difficulty breathing    Negative: Sounds like a life-threatening emergency to the triager    Negative: Choked on object of food that could be caught in the throat    Negative: Chest pain is main symptom    Negative: [1] Previous asthma attacks AND [2] this feels like asthma attack    Negative: Cough lasts > 3 weeks    Negative: Wet cough (productive; white-yellow, yellow, green, or bill colored sputum)    Negative: [1] Dry cough (non-productive;  no sputum or minimal clear sputum) AND [2] within 14 days of COVID-19 Exposure    Negative: [1] MODERATE difficulty breathing (e.g., speaks in phrases, SOB even at rest, pulse 100-120) AND [2] still present when not coughing    Negative: Chest pain  (Exception: MILD central chest pain, present only when coughing)    Negative: Patient sounds very sick or weak to the triager    Protocols used: COUGH - ACUTE  NON-PRODUCTIVE-A-AH

## 2023-02-22 LAB — SARS-COV-2 RNA RESP QL NAA+PROBE: NEGATIVE

## 2023-03-10 ENCOUNTER — HOSPITAL ENCOUNTER (OUTPATIENT)
Dept: CARDIOLOGY | Facility: CLINIC | Age: 73
Discharge: HOME | End: 2023-03-10
Attending: NURSE PRACTITIONER | Admitting: NURSE PRACTITIONER
Payer: COMMERCIAL

## 2023-03-10 DIAGNOSIS — E11.36 TYPE 2 DIABETES MELLITUS WITH DIABETIC CATARACT, WITH LONG-TERM CURRENT USE OF INSULIN (H): ICD-10-CM

## 2023-03-10 DIAGNOSIS — I10 ESSENTIAL HYPERTENSION: ICD-10-CM

## 2023-03-10 DIAGNOSIS — R60.0 LOCALIZED EDEMA: ICD-10-CM

## 2023-03-10 DIAGNOSIS — Z79.4 TYPE 2 DIABETES MELLITUS WITH DIABETIC CATARACT, WITH LONG-TERM CURRENT USE OF INSULIN (H): ICD-10-CM

## 2023-03-10 LAB — LVEF ECHO: NORMAL

## 2023-03-10 PROCEDURE — 93306 TTE W/DOPPLER COMPLETE: CPT | Mod: 26 | Performed by: INTERNAL MEDICINE

## 2023-03-10 PROCEDURE — 255N000002 HC RX 255 OP 636: Performed by: NURSE PRACTITIONER

## 2023-03-10 RX ADMIN — PERFLUTREN 3 ML: 6.52 INJECTION, SUSPENSION INTRAVENOUS at 08:38

## 2023-03-16 NOTE — RESULT ENCOUNTER NOTE
Navin Batista    Your ECHO is noting normal wall motion with an EF of 65-70%. It is showing the left ventricle is mild enlarged, so we want to prevent any further enlargement. This is done by weight loss, good blood pressure control and Diabetes management, and controlling sleep apnea if present.     How is your blood pressure?? Goal is ideally below 130/80 range.     If you have any questions, please let me know.     Windy

## 2023-03-20 ENCOUNTER — OFFICE VISIT (OUTPATIENT)
Dept: FAMILY MEDICINE | Facility: CLINIC | Age: 73
End: 2023-03-20
Payer: COMMERCIAL

## 2023-03-20 ENCOUNTER — NURSE TRIAGE (OUTPATIENT)
Dept: NURSING | Facility: CLINIC | Age: 73
End: 2023-03-20

## 2023-03-20 VITALS
TEMPERATURE: 98.1 F | OXYGEN SATURATION: 95 % | HEART RATE: 83 BPM | RESPIRATION RATE: 16 BRPM | BODY MASS INDEX: 34.44 KG/M2 | DIASTOLIC BLOOD PRESSURE: 74 MMHG | SYSTOLIC BLOOD PRESSURE: 172 MMHG | WEIGHT: 240 LBS

## 2023-03-20 DIAGNOSIS — J01.90 ACUTE BACTERIAL SINUSITIS: ICD-10-CM

## 2023-03-20 DIAGNOSIS — J45.31 MILD PERSISTENT ASTHMA WITH EXACERBATION: Primary | ICD-10-CM

## 2023-03-20 DIAGNOSIS — B96.89 ACUTE BACTERIAL SINUSITIS: ICD-10-CM

## 2023-03-20 DIAGNOSIS — H66.91 RIGHT ACUTE OTITIS MEDIA: ICD-10-CM

## 2023-03-20 DIAGNOSIS — I10 ESSENTIAL HYPERTENSION, BENIGN: ICD-10-CM

## 2023-03-20 PROCEDURE — 99213 OFFICE O/P EST LOW 20 MIN: CPT | Performed by: STUDENT IN AN ORGANIZED HEALTH CARE EDUCATION/TRAINING PROGRAM

## 2023-03-20 RX ORDER — AMLODIPINE BESYLATE 10 MG/1
TABLET ORAL
Qty: 90 TABLET | Refills: 2 | Status: SHIPPED | OUTPATIENT
Start: 2023-03-20 | End: 2023-08-14

## 2023-03-20 RX ORDER — PREDNISONE 20 MG/1
40 TABLET ORAL DAILY
Qty: 10 TABLET | Refills: 0 | Status: SHIPPED | OUTPATIENT
Start: 2023-03-20 | End: 2023-03-26

## 2023-03-20 RX ORDER — DOXYCYCLINE 100 MG/1
100 CAPSULE ORAL 2 TIMES DAILY
Qty: 14 CAPSULE | Refills: 0 | Status: SHIPPED | OUTPATIENT
Start: 2023-03-20 | End: 2023-03-28

## 2023-03-20 NOTE — TELEPHONE ENCOUNTER
Influenza B last month.  Tamiflu and prednisone  Improved    Hx asthma.    Trouble breathing since 3/16/23  Wheezing  Coughing   Right ear is plugged. And some pain.  Albuterol helping. Lasts only 2-3 hours.    Per the protocol, I instructed Lester to be seen now in clinic.  Questions answered.  Transferred to scheduling.      Reason for Disposition    Wheezing (high pitched whistling sound) and previous asthma attacks or use of asthma medicines    Quick-relief asthma medicine (e.g., albuterol /salbutamol, levalbuterol by inhaler or nebulizer) is needed more frequently than every 4 hours to keep you comfortable    Additional Information    Negative: SEVERE difficulty breathing (e.g., struggling for each breath, speaks in single words, pulse > 120)    Negative: Breathing stopped and hasn't returned    Negative: Choking on something    Negative: Bluish (or gray) lips or face    Negative: Difficult to awaken or acting confused (e.g., disoriented, slurred speech)    Negative: Passed out (i.e., fainted, collapsed and was not responding)    Negative: Wheezing started suddenly after medicine, an allergic food, or bee sting    Negative: Stridor    Negative: Slow, shallow and weak breathing    Negative: Sounds like a life-threatening emergency to the triager    Negative: Chest pain    Negative: SEVERE asthma attack (e.g., struggling for each breath, speaks in single words, loud wheezes, pulse >120) (RED Zone)    Negative: Bluish (or gray) lips or face    Negative: Difficult to awaken or acting confused (e.g., disoriented, slurred speech)    Negative: Passed out (i.e., fainted, collapsed and was not responding)    Negative: Wheezing started suddenly after medicine, an allergic food, or bee sting    Negative: Sounds like a life-threatening emergency to the triager    Negative: MODERATE asthma attack (e.g., SOB at rest, speaks in phrases, audible wheezes) and not resolved after 2 or 3 inhaler or nebulizer treatments given 20  minutes apart    Negative: MODERATE asthma attack (e.g., SOB at rest, speaks in phrases, audible wheezes) AND doesn't have neb or inhaler available    Negative: Hospitalized before with asthma; now feels same    Negative: Chest pain    Negative: Patient sounds very sick or weak to the triager    Protocols used: BREATHING DIFFICULTY-A-OH, ASTHMA ATTACK-A-OH    Nayely HERNANDEZ RN [facility]

## 2023-03-20 NOTE — PROGRESS NOTES
ASSESSMENT & PLAN:   Diagnoses and all orders for this visit:  Mild persistent asthma with exacerbation  -     predniSONE (DELTASONE) 20 MG tablet; Take 2 tablets (40 mg) by mouth daily for 5 days  Right acute otitis media  -     doxycycline hyclate (VIBRAMYCIN) 100 MG capsule; Take 1 capsule (100 mg) by mouth 2 times daily for 7 days  Acute bacterial sinusitis  -     doxycycline hyclate (VIBRAMYCIN) 100 MG capsule; Take 1 capsule (100 mg) by mouth 2 times daily for 7 days    Asthma exacerbation - worsening wheeze x 1 week, improved with albuterol. Will treat with prednisone burst, continue albuterol as needed.  Bacterial sinusitis, right AOM - symptoms x 1 week with symptom worsening. Will start doxycycline x 7 days. Symptomatic treatment with OTC analgesics, nasal spray.    Return in about 1 week (around 3/27/2023) for follow-up with PCP if symptoms persist.    Patient Instructions   Take doxycycline as directed for sinus infection and right ear infection.  Take prednisone for your asthma exacerbation.  Continue albuterol inhalers as needed.  Return for reevaluation if you develop any shortness of breath or difficulty breathing.      At the end of the encounter, I discussed results, diagnosis, medications. Discussed red flags for immediate return to clinic/ER, as well as indications for follow up if no improvement. Patient and/or caregiver understood and agreed to plan. Patient was stable for discharge.    ------------------------------------------------------------------------  SUBJECTIVE  Patient presents with:  Cough: Has had cough SOB for 1 week  colored nasal drainage and pressure rt ear hx of asthma doing nebs and using inhaler    HPI  Lester Shi is a(n) 73 year old male presenting to clinic today for wheezing x 1 week. Also has congestion, sinus pressure, right ear pain. Has asthma and has been using inhalers and nebulizer which provides temporary relief. He always has a slight wheeze in the winter but  seems worse x 1 week. No fever, chest pain, shortness of breath. Had influenza B 1 month ago - treated with tamiflu and prednisone burst and symptoms had resolved.     Review of Systems    Current Outpatient Medications   Medication Sig Dispense Refill     ACCU-CHEK FASTCLIX LANCET DRUM [ACCU-CHEK FASTCLIX LANCET DRUM] U TO TEST TID  0     ACCU-CHEK GUIDE test strip USE TO TEST TWICE DAILY 200 strip 2     albuterol (PROAIR HFA/PROVENTIL HFA/VENTOLIN HFA) 108 (90 Base) MCG/ACT inhaler Inhale 2 puffs into the lungs every 6 hours as needed for shortness of breath, wheezing or cough 18 g 0     albuterol (PROAIR HFA/PROVENTIL HFA/VENTOLIN HFA) 108 (90 Base) MCG/ACT inhaler Inhale 2 puffs into the lungs every 6 hours as needed for wheezing 18 g 0     albuterol (PROVENTIL) (2.5 MG/3ML) 0.083% neb solution Take 1 vial (2.5 mg) by nebulization every 6 hours as needed for shortness of breath, wheezing or cough 90 mL 0     albuterol (PROVENTIL) 2.5 mg /3 mL (0.083 %) nebulizer solution [ALBUTEROL (PROVENTIL) 2.5 MG /3 ML (0.083 %) NEBULIZER SOLUTION] NEBULIZE 1 VIAL EVERY 4 HOURS AS NEEDED FOR WHEEZING 90 mL 1     amLODIPine (NORVASC) 10 MG tablet TAKE 1 TABLET(10 MG) BY MOUTH DAILY 90 tablet 2     atenolol (TENORMIN) 50 MG tablet Take 1 tablet (50 mg) by mouth daily 90 tablet 3     azelastine (ASTELIN) 0.1 % nasal spray 2 sprays each nostril 1-2x daily as needed for nasal congestion (use nightly for first 2 week) 30 mL 11     cetirizine (ZYRTEC) 10 MG tablet Take 10 mg by mouth daily       doxycycline hyclate (VIBRAMYCIN) 100 MG capsule Take 1 capsule (100 mg) by mouth 2 times daily for 7 days 14 capsule 0     fexofenadine (ALLEGRA) 180 MG tablet [FEXOFENADINE (ALLEGRA) 180 MG TABLET] Take 180 mg by mouth daily.       FLOVENT  mcg/actuation inhaler [FLOVENT  MCG/ACTUATION INHALER] INHALE 2 PUFFS BY MOUTH TWICE DAILY 1 Inhaler 1     fluticasone (FLONASE) 50 mcg/actuation nasal spray [FLUTICASONE (FLONASE) 50  MCG/ACTUATION NASAL SPRAY] Apply 1 spray into each nostril daily as needed for rhinitis.       hydrochlorothiazide (HYDRODIURIL) 25 MG tablet Take 1 tablet (25 mg) by mouth daily 90 tablet 3     losartan (COZAAR) 100 MG tablet Take 1 tablet (100 mg) by mouth daily 90 tablet 3     metFORMIN (GLUCOPHAGE XR) 500 MG 24 hr tablet Take 2 tablets (1,000 mg) by mouth 2 times daily (with meals) 360 tablet 3     nebulizers Misc [NEBULIZERS MISC] Use four times a day for 5 day then as  needed 1 each 0     pioglitazone (ACTOS) 30 MG tablet Take 1 tablet (30 mg) by mouth daily 90 tablet 1     predniSONE (DELTASONE) 20 MG tablet Take 2 tablets (40 mg) by mouth daily for 5 days 10 tablet 0     rosuvastatin (CRESTOR) 20 MG tablet Take 1 tablet (20 mg) by mouth daily 90 tablet 3     triamcinolone (KENALOG) 0.1 % cream [TRIAMCINOLONE (KENALOG) 0.1 % CREAM] APPLY EXTERNALLY TO THE AFFECTED AREA TWICE DAILY FOR 14 DAYS 45 g 0     Problem List:  2022-10: Hyperlipidemia, unspecified hyperlipidemia type  2021-07: Lung consolidation (H)  2021-07: Lesion of right native kidney  2021-05: Nodule of right lung  2020-07: Squamous cell carcinoma of skin of face  2020-07: Status post total left knee replacement  2020-03: Mild persistent asthma with exacerbation  2020-03: Spinal stenosis of lumbar region with neurogenic claudication  2019-10: Sepsis (H)  2019-10: Abdominal pain, left upper quadrant  2019-03: Dizziness  2019-02: Type 2 diabetes mellitus with diabetic cataract, with long-  term current use of insulin (H)  2019-02: Pseudophakia of left eye  2019-02: Regular astigmatism of both eyes  2019-02: Encounter for examination following treatment at hospital  2019-02: Uncontrolled type 2 diabetes mellitus with hyperglycemia (H)  2019-02: Uncontrolled type 1 diabetes mellitus with hyperglycemia (H)  2019-02: Pneumonia of both lungs due to infectious organism  2018-12: Mild persistent asthma with acute exacerbation  2015-09: Varicose  vein  Eczema  Anemia  Otitis Media  Essential Hypercholesterolemia  Essential hypertension, benign  Joint Pain, Localized In The Knee  Obesity  Allergic Rhinitis  Klinefelter's Syndrome  Hyperglycemia  Acute kidney injury (H)  Acute respiratory failure, unspecified whether with hypoxia or   hypercapnia (H)  Morbid obesity (H)    Allergies   Allergen Reactions     Amoxicillin Anaphylaxis     Codeine Nausea and Vomiting     Sulfamethoxazole-Trimethoprim [Sulfamethoxazole W/Trimethoprim] Nausea and Vomiting     Tachycardia       Erythromycin Base [Erythromycin] Unknown     Simvastatin Unknown         OBJECTIVE  Vitals:    03/20/23 1017   BP: (!) 172/74   Pulse: 83   Resp: 16   Temp: 98.1  F (36.7  C)   TempSrc: Oral   SpO2: 95%   Weight: 108.9 kg (240 lb)     Physical Exam   GENERAL: healthy, alert, no acute distress.   HEAD: normocephalic, atraumatic.  EYE: PERRL. EOMs intact. No scleral injection bilaterally.   EAR: external ear normal. Bilateral ear canals normal and nonpainful. Right TM dull, bulging, erythematous. Left TM intact, pearly, translucent without bulging.  NOSE: external nose atraumatic without lesions.  OROPHARYNX: moist mucous membranes. Tonsils 1+ without erythema or exudate. Uvula midline. Patent airway.  LUNGS: no increased work of breathing. Slight end-expiratory wheeze diffuse. No rhonchi, crackles, rales.  CV: regular rate and rhythm. No clicks, murmurs, or rubs.    No results found for any visits on 03/20/23.

## 2023-03-20 NOTE — LETTER
March 20, 2023      Lester Shi  [address] [address]        To Whom It May Concern:    Lester Shi  was seen on 3/20/23.  Please excuse him on 3/20/23 due to illness.        Sincerely,        Lu Mcallister PA-C

## 2023-03-20 NOTE — TELEPHONE ENCOUNTER
"Routing refill request to provider for review/approval because:  Failed blood pressure and calcium channel blockers protocol    Last Written Prescription Date:  6/27/2022  Last Fill Quantity: 90,  # refills: 2   Last office visit provider:  1/27/2023     Requested Prescriptions   Pending Prescriptions Disp Refills     amLODIPine (NORVASC) 10 MG tablet [Pharmacy Med Name: AMLODIPINE BESYLATE 10MG TABLETS] 90 tablet 2     Sig: TAKE 1 TABLET(10 MG) BY MOUTH DAILY       Calcium Channel Blockers Protocol  Failed - 3/20/2023 12:55 PM        Failed - Blood pressure under 140/90 in past 12 months     BP Readings from Last 3 Encounters:   03/20/23 (!) 172/74   02/21/23 (!) 206/96   01/27/23 136/70                 Failed - Recent (12 mo) or future (30 days) visit within the authorizing provider's specialty     Patient has had an office visit with the authorizing provider or a provider within the authorizing providers department within the previous 12 mos or has a future within next 30 days. See \"Patient Info\" tab in inbasket, or \"Choose Columns\" in Meds & Orders section of the refill encounter.              Passed - Medication is active on med list        Passed - Patient is age 18 or older        Passed - Normal serum creatinine on file in past 12 months     Recent Labs   Lab Test 01/27/23  1228   CR 1.12       Ok to refill medication if creatinine is low               Evelyne Arauz RN 03/20/23 12:56 PM  "

## 2023-03-26 NOTE — PROGRESS NOTES
Medication Therapy Management (MTM) Encounter    ASSESSMENT:                            1. Type 2 diabetes mellitus   Uncontrolled as evidenced by most recent A1c of 8.6%, above goal. Reasonable goal would be <8% given age per ADA guidelines. Often missing evening dose of metformin. Explained to patient that because he is on extended release metformin he can take the full dose once daily in the morning with the rest of his medications. Would also recommend starting GLP-1 agonist for benefits of weight loss, CV risk reduction, and preservation of pancreatic beta cell function. These require prior authorization with his insurance so will submit PA for Ozempic. Medication education and counseling was provided. Will plan to meet in two weeks in person to review injection instructions with demo pen.   Reasonable to not take aspirin at this time given 2022 ADA guideline recommendations advising against aspirin use for primary prevention in those >70 years of age due to risk of bleeding outweighing CV benefit, even in those with diabetes.     2. Essential hypertension  Most recent blood pressures elevated above goal. Did not tolerate dose increase in losartan previously, but it was a relatively large increase in dose from 25 mg to 100 mg at the time. Tolerating 50 mg and patient is agreeable to now retry the 100 mg dose. Also recommended patient stop regular use of ibuprofen which can interact with is blood pressure medications and increase blood pressure. He was unaware of this and is agreeable to use acetaminophen instead.     3. Hyperlipidemia  Appropriately on a high intensity statin given age and diabetes diagnosis per ACC/AHA guidelines.     4. Mild persistent asthma   Recent respiratory infection, but typically well controlled asthma with most recent ACT score meeting goal >20.       PLAN:                            Submit prior authorization for Ozempic.     Take all 4 metformin ER tablets (2000 mg) once daily in  the morning.     Check blood sugars twice daily.    Blood Sugar Goals  Before meals:   2 hours after meal: less than 180     Increase losartan to 100 mg daily for blood pressure.     Stop ibuprofen as this may be increasing blood pressure. Start Tylenol extra strength 2 tablets up to three times daily as needed.       Follow-up: Return in about 2 weeks (around 2023).    SUBJECTIVE/OBJECTIVE:                          Lester Shi is a 73 year old male called for an initial visit. He was referred to me from Rhonda Moss CNP.     Reason for visit: diabetes, medication review    Allergies/ADRs: Reviewed in chart  Past Medical History: Reviewed in chart  Tobacco: He reports that he quit smoking about 44 years ago. His smoking use included cigarettes. He has a 24.00 pack-year smoking history. He has never used smokeless tobacco.  Alcohol: Less than 1 beverage / month    Medication Adherence/Access: Patient uses pill box(es) when traveling. He prefers to take all medications in the morning.     1. Type 2 diabetes mellitus   Currently taking metformin ER 1000 mg twice daily. He says he was told to stop pioglitazone and he did this a few weeks ago. Admits he often misses the evening dose of metformin. Patient is not experiencing side effects. Says he wonders if he should get off metformin. Daughter is a nutritionist and says metformin is not good for him. Works as a  and doesn't want to be on insulin.   Two courses of prednisone lately for respiratory illness related asthma exacerbations.   Blood sugar monitorin-2 time(s) daily. Ranges (patient reported): Fasting- 118-150, but has been close to 200 with being on prednisone. Sometimes checks after dinner but not recently.   Symptoms of low blood sugar? none  Symptoms of high blood sugar? none  Eye exam: up to date  Foot exam: up to date  Aspirin: No due to age, no IVD  Statin: Yes: rosvuastatin   ACEi/ARB: Yes: losartan     Hemoglobin A1C   Date  Value Ref Range Status   01/27/2023 8.6 (H) 0.0 - 5.6 % Final     Comment:     Normal <5.7%   Prediabetes 5.7-6.4%    Diabetes 6.5% or higher     Note: Adopted from ADA consensus guidelines.   10/07/2022 7.5 (H) 0.0 - 5.6 % Final     Comment:     Normal <5.7%   Prediabetes 5.7-6.4%    Diabetes 6.5% or higher     Note: Adopted from ADA consensus guidelines.   06/07/2022 8.7 (H) 0.0 - 5.6 % Final     Comment:     Normal <5.7%   Prediabetes 5.7-6.4%    Diabetes 6.5% or higher     Note: Adopted from ADA consensus guidelines.      Microalbumin Urine mg/dL   Date Value Ref Range Status   06/07/2022 14.64 (H) 0.00 - 1.99 mg/dL Final      Creatinine Urine mg/dL   Date Value Ref Range Status   06/07/2022 136 mg/dL Final     LDL Cholesterol Calculated   Date Value Ref Range Status   01/27/2023 82 <=100 mg/dL Final     LDL Cholesterol Direct   Date Value Ref Range Status   09/28/2015 140 (H) 0 - 129 mg/dl Final     Creatinine   Date Value Ref Range Status   01/27/2023 1.12 0.67 - 1.17 mg/dL Final       2. Essential hypertension  Current medications include atenolol 50 mg daily, amlodipine 10 mg daily, hydrochlorothiazide 25 mg daily, and losartan 50 mg daily. States his dose of losartan was increased from 25 mg to 100 mg several months ago but that increase made him not feel well with dizziness, so he went down to 50 mg and has felt better. Notes blood pressure tends to be high. Patient does not self-monitor blood pressure.  Patient reports no current medication side effects.   Notes he has been taking ibuprofen 400-600 mg twice daily for shoulder pain.     BP Readings from Last 3 Encounters:   03/20/23 (!) 172/74   02/21/23 (!) 206/96   01/27/23 136/70     Lab Results   Component Value Date    POTASSIUM 3.9 01/27/2023     01/27/2023    CR 1.12 01/27/2023     3. Hyperlipidemia  Current therapy includes rosvuastatin 20 mg daily. Dose was recently increased a couple months ago. Patient reports no significant myalgias or  other side effects. Intolerance to simvastatin in the past.     The 10-year ASCVD risk score (Shad CROWLEY, et al., 2019) is: 63.3%    Values used to calculate the score:      Age: 73 years      Sex: Male      Is Non- : No      Diabetic: Yes      Tobacco smoker: No      Systolic Blood Pressure: 172 mmHg      Is BP treated: Yes      HDL Cholesterol: 56 mg/dL      Total Cholesterol: 174 mg/dL  Recent Labs   Lab Test 01/27/23  1228 10/07/22  1221   CHOL 174 206*   HDL 56 42   LDL 82 110*   TRIG 181* 268*       4. Mild persistent asthma/Allergies   Recently diagnosed with asthma exacerbation last week - started on prednisone along with doxycycline for ear infection and sinusitis. Current medications: ICS - Flovent 1 puff(s) twice daily PRN only in summer as he is allergic to trees. Short-Acting Bronchodilator: Albuterol MDI, Nebs and pt reports using 1-2 times per day lately with having respiratory illness. Patient rinses their mouth after using steroid inhaler. Triggers include: pollens.  Patient reports the following symptoms: recent respiratory infection.  Also on Allegra for seasonal allergies - watery, itching eyes. Feels symptoms have gotten better over the past few years. Also uses Flonase and Astelin as needed.     ACT Total Scores 11/23/2021 6/7/2022 1/27/2023   ACT TOTAL SCORE (Goal Greater than or Equal to 20) 22 23 22   In the past 12 months, how many times did you visit the emergency room for your asthma without being admitted to the hospital? 0 0 0   In the past 12 months, how many times were you hospitalized overnight because of your asthma? 0 0 0       Vitals:   BP Readings from Last 3 Encounters:   03/20/23 (!) 172/74   02/21/23 (!) 206/96   01/27/23 136/70      Pulse Readings from Last 3 Encounters:   03/20/23 83   02/21/23 90   01/27/23 71     Wt Readings from Last 3 Encounters:   03/20/23 240 lb (108.9 kg)   02/21/23 240 lb (108.9 kg)   01/27/23 240 lb (108.9 kg)      ----------------    I spent 45 minutes with this patient today. All changes were made via collaborative practice agreement with ERAL Garcia CNP. A copy of the visit note was provided to the patient's provider(s).    A summary of these recommendations was given to the patient.    Nora Campos, PharmD, BCACP  Medication Management (MTM) Pharmacist  [facility]         Telemedicine Visit Details  Type of service:  Telephone visit  Start Time: 10:30 AM  End Time: 11:15 AM     Medication Therapy Recommendations  Essential hypertension, benign    Current Medication: losartan (COZAAR) 100 MG tablet   Rationale: Dose too low - Dosage too low - Effectiveness   Recommendation: Increase Dose   Status: Accepted per CPA          Rationale: Medication interaction - Dosage too low - Effectiveness   Recommendation: Change Medication - acetaminophen 500 MG tablet   Status: Accepted per CPA         Type 2 diabetes mellitus without complication, without long-term current use of insulin (H)    Current Medication: metFORMIN (GLUCOPHAGE XR) 500 MG 24 hr tablet   Rationale: Patient forgets to take - Adherence - Adherence   Recommendation: Change Administration Time   Status: Patient Agreed - Adherence/Education          Current Medication: semaglutide (OZEMPIC, 0.25 OR 0.5 MG/DOSE,) 2 MG/3ML SOPN pen   Rationale: Synergistic therapy - Needs additional medication therapy - Indication   Recommendation: Start Medication   Status: Accepted per CPA

## 2023-03-28 ENCOUNTER — TELEPHONE (OUTPATIENT)
Dept: FAMILY MEDICINE | Facility: CLINIC | Age: 73
End: 2023-03-28

## 2023-03-28 ENCOUNTER — VIRTUAL VISIT (OUTPATIENT)
Dept: PHARMACY | Facility: CLINIC | Age: 73
End: 2023-03-28
Attending: NURSE PRACTITIONER
Payer: COMMERCIAL

## 2023-03-28 DIAGNOSIS — Z79.4 TYPE 2 DIABETES MELLITUS WITH DIABETIC CATARACT, WITH LONG-TERM CURRENT USE OF INSULIN (H): Primary | ICD-10-CM

## 2023-03-28 DIAGNOSIS — E11.36 TYPE 2 DIABETES MELLITUS WITH DIABETIC CATARACT, WITH LONG-TERM CURRENT USE OF INSULIN (H): Primary | ICD-10-CM

## 2023-03-28 DIAGNOSIS — I10 ESSENTIAL HYPERTENSION, BENIGN: ICD-10-CM

## 2023-03-28 DIAGNOSIS — E11.9 TYPE 2 DIABETES MELLITUS WITHOUT COMPLICATION, WITHOUT LONG-TERM CURRENT USE OF INSULIN (H): ICD-10-CM

## 2023-03-28 DIAGNOSIS — J45.31 MILD PERSISTENT ASTHMA WITH EXACERBATION: ICD-10-CM

## 2023-03-28 DIAGNOSIS — E78.5 HYPERLIPIDEMIA, UNSPECIFIED HYPERLIPIDEMIA TYPE: ICD-10-CM

## 2023-03-28 PROCEDURE — 99207 PR NO CHARGE LOS: CPT | Performed by: PHARMACIST

## 2023-03-28 RX ORDER — METFORMIN HCL 500 MG
2000 TABLET, EXTENDED RELEASE 24 HR ORAL DAILY
Qty: 360 TABLET | Refills: 3 | Status: ON HOLD | OUTPATIENT
Start: 2023-03-28 | End: 2023-12-20

## 2023-03-28 RX ORDER — SEMAGLUTIDE 0.68 MG/ML
INJECTION, SOLUTION SUBCUTANEOUS
Qty: 3 ML | Refills: 1 | Status: SHIPPED | OUTPATIENT
Start: 2023-03-28 | End: 2023-05-05

## 2023-03-28 RX ORDER — LOSARTAN POTASSIUM 100 MG/1
100 TABLET ORAL DAILY
Qty: 90 TABLET | Refills: 3 | Status: SHIPPED | OUTPATIENT
Start: 2023-03-28 | End: 2024-06-03

## 2023-03-28 RX ORDER — BLOOD SUGAR DIAGNOSTIC
1 STRIP MISCELLANEOUS 2 TIMES DAILY
Qty: 200 STRIP | Refills: 3 | Status: SHIPPED | OUTPATIENT
Start: 2023-03-28

## 2023-03-28 NOTE — TELEPHONE ENCOUNTER
MALLIKA PA REQUEST  *Please forward response to Nora Campos, [place]*    Prior Authorization Retail Medication Request    Medication/Dose: Ozempic 0.5 mg pen  ICD code (if different than what is on RX):    Previously Tried and Failed:  metformin, pioglitazone  Rationale: A1c uncontrolled, obesity, would benefit from GLP-1 agonist    Insurance Name:  [facility]  Insurance ID:  [id number]      Pharmacy Information (if different than what is on RX)  Name:  [facility] [place]  Phone:  [phone]

## 2023-03-30 ENCOUNTER — NURSE TRIAGE (OUTPATIENT)
Dept: FAMILY MEDICINE | Facility: CLINIC | Age: 73
End: 2023-03-30

## 2023-03-30 NOTE — TELEPHONE ENCOUNTER
Central Prior Authorization Team  Phone: [phone]    Prior Authorization Not Needed per Insurance    Medication: semaglutide (OZEMPIC, 0.25 OR 0.5 MG/DOSE,) 2 MG/3ML SOPN pen  Insurance Company: [employer] - Phone [phone] Fax [phone]  Expected CoPay:      [facility] the [id number] [address] AT [facility] [address] [facility]  Pharmacy Notified: Yes  Patient Notified:

## 2023-03-30 NOTE — TELEPHONE ENCOUNTER
S-(situation):   Pt stating they have not felt well for three weeks. Pt would like to see a provider.    B-(background):   03/20/2023: [facility]- asthma exacerbation, right ear infection, and sinus infection. Pt completed prednisone and doxycycline.     Pt took one dose of nyquil on 03/29/2023 HS, very sedating to pt    A-(assessment):   Afebrile  SOB with activity, denies with rest  Pt using rescue inhaler and albuterol neb treatments more frequently daily.   Cough- productive, green sputum, moderate cough  Postnasal drip  Sore throat, mild    Denies sinus pain, ear pain, and chest pain    R-(recommendations):   Pt should be evaluated in office. Assisted in scheduling pt for appt on 03/31/2023. Care advise given, answered questions.    Lay Blanchard RN    Reason for Disposition    Cough has been present for > 3 weeks    Additional Information    Negative: Bluish (or gray) lips or face    Negative: SEVERE difficulty breathing (e.g., struggling for each breath, speaks in single words)    Negative: Rapid onset of cough and has hives    Negative: Coughing started suddenly after medicine, an allergic food or bee sting    Negative: Difficulty breathing after exposure to flames, smoke, or fumes    Negative: Sounds like a life-threatening emergency to the triager    Negative: Previous asthma attacks and this feels like asthma attack    Negative: Dry cough (non-productive; no sputum or minimal clear sputum) and within 14 days of COVID-19 Exposure    Negative: MODERATE difficulty breathing (e.g., speaks in phrases, SOB even at rest, pulse 100-120) and still present when not coughing    Negative: Chest pain present when not coughing    Negative: Passed out (i.e., fainted, collapsed and was not responding)    Negative: MILD difficulty breathing (e.g., minimal/no SOB at rest, SOB with walking, pulse <100) and still present when not coughing    Negative: Coughed up > 1 tablespoon (15 ml) blood (Exception:  "Blood-tinged sputum.)    Negative: Fever > 103 F (39.4 C)    Negative: Fever > 101 F (38.3 C) and over 60 years of age    Negative: Fever > 100.0 F (37.8 C) and has diabetes mellitus or a weak immune system (e.g., HIV positive, cancer chemotherapy, organ transplant, splenectomy, chronic steroids)    Negative: Fever > 100.0 F (37.8 C) and bedridden (e.g., nursing home patient, stroke, chronic illness, recovering from surgery)    Negative: Increasing ankle swelling    Negative: Wheezing is present    Negative: Continuous (nonstop) coughing interferes with work or school and no improvement using cough treatment per Care Advice    Negative: Patient wants to be seen    Negative: SEVERE coughing spells (e.g., whooping sound after coughing, vomiting after coughing)    Negative: Coughing up blil-colored (reddish-brown) or blood-tinged sputum    Negative: Fever present > 3 days (72 hours)    Negative: Fever returns after gone for over 24 hours and symptoms worse or not improved    Negative: Using nasal washes and pain medicine > 24 hours and sinus pain persists    Negative: Known COPD or other severe lung disease (i.e., bronchiectasis, cystic fibrosis, lung surgery) and worsening symptoms (i.e., increased sputum purulence or amount, increased breathing difficulty)    Answer Assessment - Initial Assessment Questions  1. ONSET: \"When did the cough begin?\"       3 weeks ago  2. SEVERITY: \"How bad is the cough today?\"       moderate  3. SPUTUM: \"Describe the color of your sputum\" (none, dry cough; clear, white, yellow, green)      green  4. HEMOPTYSIS: \"Are you coughing up any blood?\" If so ask: \"How much?\" (flecks, streaks, tablespoons, etc.)      denies  5. DIFFICULTY BREATHING: \"Are you having difficulty breathing?\" If Yes, ask: \"How bad is it?\" (e.g., mild, moderate, severe)     - MILD: No SOB at rest, mild SOB with walking, speaks normally in sentences, can lie down, no retractions, pulse < 100.     - MODERATE: SOB at " "rest, SOB with minimal exertion and prefers to sit, cannot lie down flat, speaks in phrases, mild retractions, audible wheezing, pulse 100-120.     - SEVERE: Very SOB at rest, speaks in single words, struggling to breathe, sitting hunched forward, retractions, pulse > 120       mild  6. FEVER: \"Do you have a fever?\" If Yes, ask: \"What is your temperature, how was it measured, and when did it start?\"      afebrile  7. CARDIAC HISTORY: \"Do you have any history of heart disease?\" (e.g., heart attack, congestive heart failure)       denies  8. LUNG HISTORY: \"Do you have any history of lung disease?\"  (e.g., pulmonary embolus, asthma, emphysema)      asthma  9. PE RISK FACTORS: \"Do you have a history of blood clots?\" (or: recent major surgery, recent prolonged travel, bedridden)      denies  10. OTHER SYMPTOMS: \"Do you have any other symptoms?\" (e.g., runny nose, wheezing, chest pain)        Runny nose  11. PREGNANCY: \"Is there any chance you are pregnant?\" \"When was your last menstrual period?\"        NA  12. TRAVEL: \"Have you traveled out of the country in the last month?\" (e.g., travel history, exposures)        Denies    Protocols used: COUGH-A-OH      "

## 2023-03-31 ENCOUNTER — OFFICE VISIT (OUTPATIENT)
Dept: INTERNAL MEDICINE | Facility: CLINIC | Age: 73
End: 2023-03-31
Payer: COMMERCIAL

## 2023-03-31 ENCOUNTER — ANCILLARY PROCEDURE (OUTPATIENT)
Dept: GENERAL RADIOLOGY | Facility: CLINIC | Age: 73
End: 2023-03-31
Attending: NURSE PRACTITIONER
Payer: COMMERCIAL

## 2023-03-31 VITALS
OXYGEN SATURATION: 95 % | HEART RATE: 87 BPM | HEIGHT: 70 IN | BODY MASS INDEX: 33.93 KG/M2 | SYSTOLIC BLOOD PRESSURE: 140 MMHG | TEMPERATURE: 97.8 F | DIASTOLIC BLOOD PRESSURE: 82 MMHG | RESPIRATION RATE: 16 BRPM | WEIGHT: 237 LBS

## 2023-03-31 DIAGNOSIS — J22 LOWER RESPIRATORY INFECTION: ICD-10-CM

## 2023-03-31 DIAGNOSIS — R05.1 ACUTE COUGH: ICD-10-CM

## 2023-03-31 PROCEDURE — 99214 OFFICE O/P EST MOD 30 MIN: CPT | Performed by: NURSE PRACTITIONER

## 2023-03-31 PROCEDURE — 71046 X-RAY EXAM CHEST 2 VIEWS: CPT | Mod: TC | Performed by: RADIOLOGY

## 2023-03-31 RX ORDER — LEVOFLOXACIN 500 MG/1
500 TABLET, FILM COATED ORAL DAILY
Qty: 7 TABLET | Refills: 0 | Status: SHIPPED | OUTPATIENT
Start: 2023-03-31 | End: 2023-04-10

## 2023-03-31 RX ORDER — BENZONATATE 200 MG/1
200 CAPSULE ORAL 3 TIMES DAILY PRN
Qty: 30 CAPSULE | Refills: 0 | Status: SHIPPED | OUTPATIENT
Start: 2023-03-31 | End: 2023-05-05

## 2023-03-31 ASSESSMENT — ENCOUNTER SYMPTOMS: COUGH: 1

## 2023-03-31 NOTE — PROGRESS NOTES
"  Assessment & Plan     Acute cough/Lower respiratory infection: Continues, cough is now productive. Has finished doxycycline and prednisone. X-ray read by provider shows a right upper lobe infiltrate. Will treat with Levaquin. Will recheck an x-ray at follow up, and discuss a possible CT if needed after. Rest, push fluids. Mucinex every 12 hours for the next week. Follow up as scheduled.   - XR Chest 2 Views  - levofloxacin (LEVAQUIN) 500 MG tablet  Dispense: 7 tablet; Refill: 0  - benzonatate (TESSALON) 200 MG capsule  Dispense: 30 capsule; Refill: 0    NANC[facility]    Salvador Batista is a 73 year old, presenting for the following health issues:  Cough    Additional Questions 3/31/2023   Roomed by Lissa   Accompanied by -     Cough    History of Present Illness       Reason for visit:  Cough    He eats 2-3 servings of fruits and vegetables daily.He consumes 0 sweetened beverage(s) daily.He exercises with enough effort to increase his heart rate 9 or less minutes per day.  He exercises with enough effort to increase his heart rate 3 or less days per week.   He is taking medications regularly.       The patient presents today with a continued cough.    He finished a course of oral prednisone and doxycycline with little to no relief.    He denies a fever or chills, but does report some shortness of breath.    He does have a history of asthma. Cough has become productive in nature.    No chest pain or chest pressure.    Review of Systems   Respiratory: Positive for cough.       Constitutional, HEENT, cardiovascular, pulmonary, GI, , musculoskeletal, neuro, skin, endocrine and psych systems are negative, except as otherwise noted.      Objective    BP (!) 140/82   Pulse 87   Temp 97.8  F (36.6  C)   Resp 16   Ht 1.778 m (5' 10\")   Wt 107.5 kg (237 lb)   SpO2 95%   BMI 34.01 kg/m    Body mass index is 34.01 kg/m .  Physical Exam   GENERAL: healthy, alert " and no distress  EYES: Eyes grossly normal to inspection, PERRLA and conjunctivae and sclerae normal  HENT: ear canals and TM's normal, nose and mouth without ulcers or lesions  NECK: no adenopathy, no asymmetry, masses, or scars and thyroid normal to palpation  RESP: Right upper lobe wheezes, right lower lobe crackles, decreased effort, left lung clear to ascultation.   CV: regular rate and rhythm, normal S1 S2, no S3 or S4, no murmur, click or rub, no peripheral edema and peripheral pulses strong  MS: no gross musculoskeletal defects noted, no edema  SKIN: no suspicious lesions or rashes  NEURO: Normal strength and tone, mentation intact and speech normal  PSYCH: mentation appears normal, affect normal/bright

## 2023-03-31 NOTE — PATIENT INSTRUCTIONS
Levofloxacin 500 mg daily x7 days.    Take the Tessalon Perles as needed for cough.    Continue the Mucinex, guaifenesin 1 tablet twice a day for 7 days.    Lets plan on a recheck in a month, we will do your establish care appointment at that time.    Please let me know if you are not feeling better or if you start to feel worse.

## 2023-04-05 ENCOUNTER — TELEPHONE (OUTPATIENT)
Dept: INTERNAL MEDICINE | Facility: CLINIC | Age: 73
End: 2023-04-05

## 2023-04-07 ENCOUNTER — MYC REFILL (OUTPATIENT)
Dept: INTERNAL MEDICINE | Facility: CLINIC | Age: 73
End: 2023-04-07

## 2023-04-07 DIAGNOSIS — J22 LOWER RESPIRATORY INFECTION: ICD-10-CM

## 2023-04-08 NOTE — TELEPHONE ENCOUNTER
Routing refill request to provider for review/approval because:  Drug not on the [facility] refill protocol     Last Written Prescription Date:  3/31/2023  Last Fill Quantity: 7,  # refills: 0   Last office visit provider:  3/31/2023     Requested Prescriptions   Pending Prescriptions Disp Refills     levofloxacin (LEVAQUIN) 500 MG tablet 7 tablet 0     Sig: Take 1 tablet (500 mg) by mouth daily       There is no refill protocol information for this order          Phoebe Rutherford RN 04/08/23 12:13 PM

## 2023-04-10 ENCOUNTER — OFFICE VISIT (OUTPATIENT)
Dept: PHARMACY | Facility: CLINIC | Age: 73
End: 2023-04-10
Payer: COMMERCIAL

## 2023-04-10 VITALS — SYSTOLIC BLOOD PRESSURE: 144 MMHG | DIASTOLIC BLOOD PRESSURE: 84 MMHG | HEART RATE: 88 BPM

## 2023-04-10 DIAGNOSIS — I10 ESSENTIAL HYPERTENSION: ICD-10-CM

## 2023-04-10 DIAGNOSIS — E78.5 HYPERLIPIDEMIA, UNSPECIFIED HYPERLIPIDEMIA TYPE: ICD-10-CM

## 2023-04-10 DIAGNOSIS — Z79.4 TYPE 2 DIABETES MELLITUS WITH DIABETIC CATARACT, WITH LONG-TERM CURRENT USE OF INSULIN (H): Primary | ICD-10-CM

## 2023-04-10 DIAGNOSIS — J45.20 MILD INTERMITTENT ASTHMA WITHOUT COMPLICATION: ICD-10-CM

## 2023-04-10 DIAGNOSIS — I10 ESSENTIAL HYPERTENSION, BENIGN: ICD-10-CM

## 2023-04-10 DIAGNOSIS — J45.31 MILD PERSISTENT ASTHMA WITH EXACERBATION: ICD-10-CM

## 2023-04-10 DIAGNOSIS — E11.36 TYPE 2 DIABETES MELLITUS WITH DIABETIC CATARACT, WITH LONG-TERM CURRENT USE OF INSULIN (H): Primary | ICD-10-CM

## 2023-04-10 PROCEDURE — 99207 PR NO CHARGE LOS: CPT | Performed by: PHARMACIST

## 2023-04-10 RX ORDER — FLUTICASONE PROPIONATE 110 UG/1
1 AEROSOL, METERED RESPIRATORY (INHALATION) 2 TIMES DAILY
Qty: 12 G | Refills: 11 | Status: ON HOLD | OUTPATIENT
Start: 2023-04-10 | End: 2023-12-20

## 2023-04-10 RX ORDER — ATENOLOL 100 MG/1
100 TABLET ORAL DAILY
Qty: 90 TABLET | Refills: 3 | Status: SHIPPED | OUTPATIENT
Start: 2023-04-10 | End: 2024-03-06

## 2023-04-10 NOTE — PROGRESS NOTES
Medication Therapy Management (MTM) Encounter    ASSESSMENT:                            1. Type 2 diabetes mellitus   Uncontrolled as evidenced by most recent A1c of 8.6%, above goal. Reasonable goal would be <8% given age per ADA guidelines. Recommend starting GLP-1 agonist for benefits of weight loss, CV risk reduction, and preservation of pancreatic beta cell function. No PA required and medication approved per pharmacy. Medication education and counseling was provided, including indication, expected effect, dosing instructions, and possible side effects. Patient demonstrated understanding of injection technique with use of demo pen. The patient's questions were answered.   Reasonable to not take aspirin at this time given 2022 ADA guideline recommendations advising against aspirin use for primary prevention in those >70 years of age due to risk of bleeding outweighing CV benefit, even in those with diabetes.     2. Essential hypertension  Blood pressure is above goal of <130/80 mm Hg per ACC/AHA hypertension guidelines. Some improvement since increasing losartan dose and stopping ibuprofen, although still above goal on multiple instances since these changes. Recommend increasing atenolol dose and patient is agreeable. Continue to monitor.     3. Hyperlipidemia  Appropriately on a high intensity statin given age and diabetes diagnosis per ACC/AHA guidelines.     4. Mild persistent asthma   Recent respiratory infection with some improvement after most recent antibiotic course. Asthma typically well controlled asthma with most recent ACT score meeting goal >20. Do recommend restarting maintenance ICS inhaler, especially going into allergy season when symptoms tend to worsen and with recent respiratory infection.     PLAN:                            Start Ozempic. Inject 0.25 mg once a week for 4 weeks, then increase to 0.5 mg injected once a week.     Restart Flovent 1 puff twice daily for asthma.     Increase  atenolol to 100 mg daily to help with blood pressure.       Follow-up: Return in about 2 months (around 6/10/2023).    SUBJECTIVE/OBJECTIVE:                          Lester Shi is a 73 year old male coming in for a follow up visit. This is a follow up from 3/28/23.     Reason for visit: Ozempic, blood pressure     Allergies/ADRs: Reviewed in chart  Past Medical History: Reviewed in chart  Tobacco: He reports that he quit smoking about 44 years ago. His smoking use included cigarettes. He has a 24.00 pack-year smoking history. He has never used smokeless tobacco.  Alcohol: Less than 1 beverage / month    Medication Adherence/Access: Patient uses pill box(es) when traveling. He prefers to take all medications in the morning.     1. Type 2 diabetes mellitus   Currently taking metformin ER 2000 mg daily. He says he was told to stop pioglitazone and he did this last month. Recent improved adherence with metformin now that taking all pills once daily. Patient is not experiencing side effects. Works as a  and doesn't want to be on insulin.     Blood sugar monitorin-2 time(s) daily. Ranges (patient reported): Fasting- 118-150, but has been close to 200 with being on prednisone. Sometimes checks after dinner but not recently.   Symptoms of low blood sugar? none  Symptoms of high blood sugar? none  Eye exam: up to date  Foot exam: up to date  Aspirin: No due to age, no IVD  Statin: Yes: rosvuastatin   ACEi/ARB: Yes: losartan     Hemoglobin A1C   Date Value Ref Range Status   2023 8.6 (H) 0.0 - 5.6 % Final     Comment:     Normal <5.7%   Prediabetes 5.7-6.4%    Diabetes 6.5% or higher     Note: Adopted from ADA consensus guidelines.   10/07/2022 7.5 (H) 0.0 - 5.6 % Final     Comment:     Normal <5.7%   Prediabetes 5.7-6.4%    Diabetes 6.5% or higher     Note: Adopted from ADA consensus guidelines.   2022 8.7 (H) 0.0 - 5.6 % Final     Comment:     Normal <5.7%   Prediabetes 5.7-6.4%    Diabetes 6.5%  or higher     Note: Adopted from ADA consensus guidelines.      Microalbumin Urine mg/dL   Date Value Ref Range Status   06/07/2022 14.64 (H) 0.00 - 1.99 mg/dL Final      Creatinine Urine mg/dL   Date Value Ref Range Status   06/07/2022 136 mg/dL Final     LDL Cholesterol Calculated   Date Value Ref Range Status   01/27/2023 82 <=100 mg/dL Final     LDL Cholesterol Direct   Date Value Ref Range Status   09/28/2015 140 (H) 0 - 129 mg/dl Final     Creatinine   Date Value Ref Range Status   01/27/2023 1.12 0.67 - 1.17 mg/dL Final       2. Essential hypertension  Current medications include atenolol 50 mg daily, amlodipine 10 mg daily, hydrochlorothiazide 25 mg daily, and losartan 100 mg daily. States his dose of losartan was increased from 25 mg to 100 mg several months ago but that increase made him not feel well with dizziness, so he went down to 50 mg and has felt better. Notes blood pressure tends to be high. Patient does self-monitor blood pressure 145/82.  Patient reports no current medication side effects.   Notes he has stopped ibuprofen as advised for shoulder pain. Now taking Tylenol ES. Getting steroid injection next week.     BP Readings from Last 3 Encounters:   04/10/23 (!) 144/84   03/31/23 (!) 140/82   03/20/23 (!) 172/74     Lab Results   Component Value Date    POTASSIUM 3.9 01/27/2023     01/27/2023    CR 1.12 01/27/2023     3. Hyperlipidemia  Current therapy includes rosvuastatin 20 mg daily. Dose was recently increased a couple months ago. Patient reports no significant myalgias or other side effects. Intolerance to simvastatin in the past.     The 10-year ASCVD risk score (Shad DK, et al., 2019) is: 51.8%    Values used to calculate the score:      Age: 73 years      Sex: Male      Is Non- : No      Diabetic: Yes      Tobacco smoker: No      Systolic Blood Pressure: 144 mmHg      Is BP treated: Yes      HDL Cholesterol: 56 mg/dL      Total Cholesterol: 174  mg/dL  Recent Labs   Lab Test 01/27/23  1228 10/07/22  1221   CHOL 174 206*   HDL 56 42   LDL 82 110*   TRIG 181* 268*       4. Mild persistent asthma/Allergies   Has been dealing with ongoing respiratory infection. Had course of prednisone and doxycycline, but symptoms persisted so then was given levofloxacin. He finished that and is starting to feel better. Current medications: ICS - Flovent 1 puff(s) twice daily PRN only in summer as he is allergic to trees. Usually uses a spacer. Short-Acting Bronchodilator: Albuterol MDI, Nebs and pt reports using 1-2 times per day lately with having respiratory illness. Uses neb a few times a day. Patient rinses their mouth after using steroid inhaler. Triggers include: pollens.  Patient reports the following symptoms: recent respiratory infection.  Also on Allegra for seasonal allergies - watery, itching eyes. Feels symptoms have gotten better over the past few years. Also uses Flonase and Astelin as needed.         11/23/2021    11:29 AM 6/7/2022    11:51 AM 1/27/2023    11:12 AM   ACT Total Scores   ACT TOTAL SCORE (Goal Greater than or Equal to 20) 22 23 22   In the past 12 months, how many times did you visit the emergency room for your asthma without being admitted to the hospital? 0 0 0   In the past 12 months, how many times were you hospitalized overnight because of your asthma? 0 0 0       Vitals:   BP Readings from Last 3 Encounters:   04/10/23 (!) 144/84   03/31/23 (!) 140/82   03/20/23 (!) 172/74      Pulse Readings from Last 3 Encounters:   04/10/23 88   03/31/23 87   03/20/23 83     Wt Readings from Last 3 Encounters:   03/31/23 237 lb (107.5 kg)   03/20/23 240 lb (108.9 kg)   02/21/23 240 lb (108.9 kg)     ----------------    I spent 30 minutes with this patient today. All changes were made via collaborative practice agreement with Ronda Hawk CNP. A copy of the visit note was provided to the patient's provider(s).    A summary of these recommendations  was given to the patient.    Nora Campos, PharmD, BCACP  Medication Management (MTM) Pharmacist  [facility]          Medication Therapy Recommendations  Essential hypertension    Current Medication: atenolol (TENORMIN) 50 MG tablet (Discontinued)   Rationale: Dose too low - Dosage too low - Effectiveness   Recommendation: Increase Dose   Status: Accepted per CPA         Mild intermittent asthma without complication    Current Medication: fluticasone (FLOVENT HFA) 110 MCG/ACT inhaler   Rationale: Synergistic therapy - Needs additional medication therapy - Indication   Recommendation: Start Medication   Status: Accepted per CPA

## 2023-04-10 NOTE — PATIENT INSTRUCTIONS
"Recommendations from today's Medication Management (MTM) visit:                                                      Start Ozempic. Inject 0.25 mg once a week for 4 weeks, then increase to 0.5 mg injected once a week.     Restart Flovent 1 puff twice daily for asthma.     Increase atenolol to 100 mg daily to help with blood pressure.       Next appointment: Monday, June 5th at 11 am at [facility] with Nora       To schedule another MTM appointment, please call the MTM scheduling line at [phone] or toll-free at [phone].     My MTM pharmacist's contact information:      Please feel free to contact me with any questions or concerns you have via [employer] or calling the clinic.       Nora Campos, PharmD, [facility]  Medication Management (MTM) Pharmacist  [facility]     It was great speaking with you today.  I value your experience and would be very thankful for your time in providing feedback in our clinic survey. In the next few days, you may receive an email or text message from [employer] with a link to a survey related to your  clinical pharmacist.\"     "

## 2023-04-10 NOTE — TELEPHONE ENCOUNTER
Pharmacy initiated PA for 3 mL/42 days. The correct quantity is 1.5 per 28 days or 3.0/56 days.    Central Prior Authorization Team   Phone: [phone]      PA Initiation    Medication: semaglutide (OZEMPIC, 0.25 OR 0.5 MG/DOSE,) 2 MG/3ML [facility] pen  Insurance Company: [employer] Part D - Phone [phone] Fax [phone]  Pharmacy Filling the Rx: [id number] [address] AT [facility] [address]  Filling Pharmacy Phone: [phone]  Filling Pharmacy Fax:    Start Date: 4/10/2023

## 2023-04-11 RX ORDER — LEVOFLOXACIN 500 MG/1
500 TABLET, FILM COATED ORAL DAILY
Qty: 7 TABLET | Refills: 0 | Status: SHIPPED | OUTPATIENT
Start: 2023-04-11 | End: 2023-05-05

## 2023-04-11 NOTE — TELEPHONE ENCOUNTER
Prior Authorization Not Needed per Insurance    Medication: semaglutide (OZEMPIC, 0.25 OR 0.5 MG/DOSE,) 2 MG/3ML SOPN pen-PA Not needed  Insurance Company: [employer] Part D - Phone [phone] Fax [phone]  Expected CoPay:      Pharmacy Filling the Rx: [facility] #[id number] [address] [place] [facility] [address]  Pharmacy Notified: Yes  Patient Notified: No

## 2023-04-14 ENCOUNTER — OFFICE VISIT (OUTPATIENT)
Dept: INTERNAL MEDICINE | Facility: CLINIC | Age: 73
End: 2023-04-14
Payer: COMMERCIAL

## 2023-04-14 ENCOUNTER — ANCILLARY PROCEDURE (OUTPATIENT)
Dept: GENERAL RADIOLOGY | Facility: CLINIC | Age: 73
End: 2023-04-14
Attending: INTERNAL MEDICINE
Payer: COMMERCIAL

## 2023-04-14 VITALS
OXYGEN SATURATION: 97 % | TEMPERATURE: 97.6 F | HEART RATE: 66 BPM | DIASTOLIC BLOOD PRESSURE: 72 MMHG | SYSTOLIC BLOOD PRESSURE: 136 MMHG | RESPIRATION RATE: 16 BRPM | BODY MASS INDEX: 33.64 KG/M2 | HEIGHT: 70 IN | WEIGHT: 235 LBS

## 2023-04-14 DIAGNOSIS — Z79.4 TYPE 2 DIABETES MELLITUS WITH DIABETIC CATARACT, WITH LONG-TERM CURRENT USE OF INSULIN (H): ICD-10-CM

## 2023-04-14 DIAGNOSIS — R91.8 LUNG INFILTRATE: ICD-10-CM

## 2023-04-14 DIAGNOSIS — R91.8 LUNG INFILTRATE: Primary | ICD-10-CM

## 2023-04-14 DIAGNOSIS — E11.36 TYPE 2 DIABETES MELLITUS WITH DIABETIC CATARACT, WITH LONG-TERM CURRENT USE OF INSULIN (H): ICD-10-CM

## 2023-04-14 PROCEDURE — 99214 OFFICE O/P EST MOD 30 MIN: CPT | Performed by: INTERNAL MEDICINE

## 2023-04-14 PROCEDURE — 71046 X-RAY EXAM CHEST 2 VIEWS: CPT | Mod: TC | Performed by: RADIOLOGY

## 2023-04-14 NOTE — PATIENT INSTRUCTIONS
Follow-up after recently being diagnosed with right-sided pneumonia, currently taking Levaquin as of April 14, 2023, which he thinks has helped his symptoms although he still coughing a bit    I am sending Lester for a chest x-ray recheck and also CT scan of the chest I will cyst as was suggested by the radiologist because of right upper lobe infiltrate.    Lester got started on antibiotics March 20, 2023 for symptoms of sinus infection, asthma exacerbation, and right sided middle ear infection (otitis media).  On March 20 he was prescribed doxycycline for 7 days for the sinuses and middle ear, and then 5 days of prednisone 40 mg daily for asthma exacerbation.    Because his symptoms were lingering, he came in to see Aleksandra on March 31 and Aleksandra appropriately intensified Lester's antibiotics by starting levofloxacin.  Lester took a few doses and noticed that it seemed to make him feel irritable, but then he has since restarted on levofloxacin and got a 7-day extension via prescription 4- transmitted this week.    Lester told me that the levofloxacin does seem to help.  His lungs sound mostly clear, although I do hear a bit of wheezing on the left side.    Of more concern is the report from the radiologist about an infiltrate in the right upper lobe.  This infiltrate persisted between February 21 and March 31.  The radiologist recommended following up with a CT scan which I am ordering for Lester today April 14.    I told Lester to finish out the course of levofloxacin that he was prescribed.  I will report to him the results of the chest x-ray and CT scan of the chest.    I reminded Lester to continue on his inhaled steroid, now that he is finished the course of oral prednisone.    Recovered from attack of shingles December 2022   shingles, uncomplicated, location was left anterior chest under the breast, with characteristic left-sided pain that radiates around his chest to the back, but does not cross the midline  He  finished the course of Valtrex, which did seem to help    Hypertension in the context of type 2 diabetes, obesity, and hyperlipidemia  BP Readings from Last 6 Encounters:   04/14/23 136/72   04/10/23 (!) 144/84   03/31/23 (!) 140/82   03/20/23 (!) 172/74   02/21/23 (!) 206/96   01/27/23 136/70     November 2, 2022, I increase Lester's losartan from 25 to 100 mg a day  continue his atenolol, hydrochlorothiazide, and amlodipine, and encouraged him to keep focused on lifestyle improvement measures and weight loss.     Lester was at [facility] 11-1-2022, hoping to get his cataract surgery, and they got an initial blood pressure reading of about 210/110.  Lester noticed that they were using too-small blood pressure cuff.      I reminded Lester that target blood pressure is around 120/80 at rest in the seated position, on the right upper arm with an appropriate sized cuff, after he is been sitting quietly for 3-5 minutes.  I showed Lester the markers on a blood pressure cuff, indicating whether it is the correct size been wrapped around the arm.    He is going to update his home blood pressure machine with a large cuff.  He might want to spend the extra money and buy his machine from a medical supply store such as [employer] or [employer].     Will have Lester continue his atenolol 50 mg a day, hydrochlorothiazide 25 mg a day, and amlodipine 10 mg a day.     Bilateral ankle edema, possible contribution from high-dose amlodipine, he is also known to have varicose veins which could also be a contributor, but if amlodipine is helping controlling blood pressure, that might be a reasonable trade off.    Type 2 diabetes, taking a combination of metformin and Ozempic (started March 28, 2023 when pioglitazone stopped) -- needs work in terms of dietary discipline, physical activity, weight control, Lester is focusing his attention on this.     Latest Reference Range & Units 01/27/23 12:28   Hemoglobin  "A1C 0.0 - 5.6 % 8.6 (H)     Current on eye exams at [facility].    Ozempic started March 28, 2023, pioglitazone stopped at that time.  Continues on metformin XR 2000 mg daily      He will continue to focus on careful eating, physical activity, weight loss.     Morbid obesity with body mass index 31.6, with complications of type 2 diabetes, hypertension, and hyperlipidemia.  He told me he does not have obstructive sleep apnea.  He told me that his all-time maximum weight was about 270 pounds, and nowadays he is around 230 pounds.  I asked him to set a goal to try to lose 30 pounds over the next 6 months.  He told me that alcohol consumption is practically 0.  I reminded him of the importance of eating slower, portion control, and identifying problem foods to curtail or eliminate such as starches, sweets, and fried foods.     Asthma, worse in the wintertime.  He takes Flovent steroid inhaler daily during his problematic asthma season.     Nasal allergies, for which he uses azelastine nasal spray and cetirizine tablets.     Hyperlipidemia context of diabetes and hypertension.  Takes rosuvastatin 10 mg a day.     Cataracts, hopefully he will be able to get his cataract replacement surgery reschedule soon.     He had COVID in 2021, no leftover symptoms.  I would encourage him to get the bivalent COVID-19 booster, and he gave me an emphatic \"maybe\"     Advanced osteoarthritis right knee, and Lester told me that he has had for knee replacement surgery sometime in the next year.  Dr Axel Henriquez at [facility]     I also encouraged him to get a seasonal flu shot, and his reaction was also \"maybe\"     Klinefelter syndrome, currently not on any testosterone replacement.  At some point he may be worth checking his testosterone levels, but if he is bothered by low libido symptoms.  He is , and has 2 adopted children.     History of squamous cell carcinoma the face     Varicose veins, which could be a " contributor to ankle swelling.     Screening colonoscopy April 18, 2012, it has been just over 10 years.   he told me he has another screening scheduled for December 19, 2022, which would be a 10-year interval.

## 2023-04-14 NOTE — PROGRESS NOTES
Office Visit - Follow Up   Lester Shi   73 year old male    Date of Visit: 4/14/2023    Chief Complaint   Patient presents with     URI     Asthma        -------------------------------------------------------------------------------------------------------------------------  Assessment and Plan    Follow-up after recently being diagnosed with right-sided pneumonia, currently taking Levaquin as of April 14, 2023, which he thinks has helped his symptoms although he still coughing a bit    I am sending Lester for a chest x-ray recheck and also CT scan of the chest I will cyst as was suggested by the radiologist because of right upper lobe infiltrate.    ADDENDUM: Chest x-ray reports that the right upper lobe infiltrate has resolved.  Therefore I am canceling the CT scan.  Message sent to Lester via [employer].  EXAM: XR CHEST 2 VIEWS  LOCATION: [facility]  DATE/TIME: 4/14/2023 12:04 PM CDT  INDICATION: Recheck right upper lobe infiltrate.  COMPARISON: 03/31/2023                                                   IMPRESSION: Strand of fibrosis or linear atelectasis in each lung base unchanged. Right upper lobe opacity has cleared. Benign calcified granulomas left upper lobe. Lungs otherwise clear. No pleural effusion. Heart size and pulmonary vascularity normal.      Lester got started on antibiotics March 20, 2023 for symptoms of sinus infection, asthma exacerbation, and right sided middle ear infection (otitis media).  On March 20 he was prescribed doxycycline for 7 days for the sinuses and middle ear, and then 5 days of prednisone 40 mg daily for asthma exacerbation.    Because his symptoms were lingering, he came in to see Aleksandra on March 31 and Aleksandra appropriately intensified Lester's antibiotics by starting levofloxacin.  Lester took a few doses and noticed that it seemed to make him feel irritable, but then he has since restarted on levofloxacin and got a 7-day extension via prescription  4- transmitted this week.    Lester told me that the levofloxacin does seem to help.  His lungs sound mostly clear, although I do hear a bit of wheezing on the left side.    Of more concern is the report from the radiologist about an infiltrate in the right upper lobe.  This infiltrate persisted between February 21 and March 31.  The radiologist recommended following up with a CT scan which I am ordering for Lester today April 14.    I told Lester to finish out the course of levofloxacin that he was prescribed.  I will report to him the results of the chest x-ray and CT scan of the chest.    I reminded Lester to continue on his inhaled steroid, now that he is finished the course of oral prednisone.    Recovered from attack of shingles December 2022   shingles, uncomplicated, location was left anterior chest under the breast, with characteristic left-sided pain that radiates around his chest to the back, but does not cross the midline  He finished the course of Valtrex, which did seem to help    Hypertension in the context of type 2 diabetes, obesity, and hyperlipidemia  BP Readings from Last 6 Encounters:   04/14/23 136/72   04/10/23 (!) 144/84   03/31/23 (!) 140/82   03/20/23 (!) 172/74   02/21/23 (!) 206/96   01/27/23 136/70     November 2, 2022, I increase Lester's losartan from 25 to 100 mg a day  continue his atenolol, hydrochlorothiazide, and amlodipine, and encouraged him to keep focused on lifestyle improvement measures and weight loss.     Lester was at [facility] 11-1-2022, hoping to get his cataract surgery, and they got an initial blood pressure reading of about 210/110.  Lester noticed that they were using too-small blood pressure cuff.      I reminded Lester that target blood pressure is around 120/80 at rest in the seated position, on the right upper arm with an appropriate sized cuff, after he is been sitting quietly for 3-5 minutes.  I showed Lester the markers on a blood pressure cuff,  indicating whether it is the correct size been wrapped around the arm.    He is going to update his home blood pressure machine with a large cuff.  He might want to spend the extra money and buy his machine from a medical supply store such as [employer] or [employer].     Will have Lester continue his atenolol 50 mg a day, hydrochlorothiazide 25 mg a day, and amlodipine 10 mg a day.     Bilateral ankle edema, possible contribution from high-dose amlodipine, he is also known to have varicose veins which could also be a contributor, but if amlodipine is helping controlling blood pressure, that might be a reasonable trade off.    Type 2 diabetes, taking a combination of metformin and Ozempic (started March 28, 2023 when pioglitazone stopped) -- needs work in terms of dietary discipline, physical activity, weight control, Lester is focusing his attention on this.     Latest Reference Range & Units 01/27/23 12:28   Hemoglobin A1C 0.0 - 5.6 % 8.6 (H)     Current on eye exams at [facility].    Ozempic started March 28, 2023, pioglitazone stopped at that time.  Continues on metformin XR 2000 mg daily      He will continue to focus on careful eating, physical activity, weight loss.     Morbid obesity with body mass index 31.6, with complications of type 2 diabetes, hypertension, and hyperlipidemia.  He told me he does not have obstructive sleep apnea.  He told me that his all-time maximum weight was about 270 pounds, and nowadays he is around 230 pounds.  I asked him to set a goal to try to lose 30 pounds over the next 6 months.  He told me that alcohol consumption is practically 0.  I reminded him of the importance of eating slower, portion control, and identifying problem foods to curtail or eliminate such as starches, sweets, and fried foods.     Asthma, worse in the wintertime.  He takes Flovent steroid inhaler daily during his problematic asthma season.     Nasal allergies, for  "which he uses azelastine nasal spray and cetirizine tablets.     Hyperlipidemia context of diabetes and hypertension.  Takes rosuvastatin 10 mg a day.     Cataracts, hopefully he will be able to get his cataract replacement surgery reschedule soon.     He had COVID in 2021, no leftover symptoms.  I would encourage him to get the bivalent COVID-19 booster, and he gave me an emphatic \"maybe\"     Advanced osteoarthritis right knee, and Lester told me that he has had for knee replacement surgery sometime in the next year.  Dr Axel Henriquez at [facility]     I also encouraged him to get a seasonal flu shot, and his reaction was also \"maybe\"     Klinefelter syndrome, currently not on any testosterone replacement.  At some point he may be worth checking his testosterone levels, but if he is bothered by low libido symptoms.  He is , and has 2 adopted children.     History of squamous cell carcinoma the face     Varicose veins, which could be a contributor to ankle swelling.     Screening colonoscopy April 18, 2012, it has been just over 10 years.   he told me he has another screening scheduled for December 19, 2022, which would be a 10-year interval.      --------------------------------------------------------------------------------------------------------------------------  History of Present Illness  This 73 year old old     Follow-up after recently being diagnosed with right-sided pneumonia, currently taking Levaquin as of April 14, 2023, which he thinks has helped his symptoms although he still coughing a bit       Lester got started on antibiotics March 20, 2023 for symptoms of sinus infection, asthma exacerbation, and right sided middle ear infection (otitis media).  On March 20 he was prescribed doxycycline for 7 days for the sinuses and middle ear, and then 5 days of prednisone 40 mg daily for asthma exacerbation.     Because his symptoms were lingering, he came in to see Aleksandra on March 31 and Aleksandra " appropriately intensified Lester's antibiotics by starting levofloxacin.  Lester took a few doses and noticed that it seemed to make him feel irritable, but then he has since restarted on levofloxacin and got a 7-day extension via prescription 4- transmitted this week.     Lester told me that the levofloxacin does seem to help.  His lungs sound mostly clear, although I do hear a bit of wheezing on the left side.     Of more concern is the report from the radiologist about an infiltrate in the right upper lobe.  This infiltrate persisted between February 21 and March 31.  The radiologist recommended following up with a CT scan which I am ordering for Lester today April 14.     I told Lester to finish out the course of levofloxacin that he was prescribed.  I will report to him the results of the chest x-ray and CT scan of the chest.     I reminded Lester to continue on his inhaled steroid, now that he is finished the course of oral prednisone.      Wt Readings from Last 3 Encounters:   04/14/23 106.6 kg (235 lb)   03/31/23 107.5 kg (237 lb)   03/20/23 108.9 kg (240 lb)     BP Readings from Last 3 Encounters:   04/14/23 136/72   04/10/23 (!) 144/84   03/31/23 (!) 140/82       ---------------------------------------------------------------------------------------------------------------------------    Medications, Allergies, Social, and Problem List   Current Outpatient Medications   Medication Sig Dispense Refill     albuterol (PROAIR HFA/PROVENTIL HFA/VENTOLIN HFA) 108 (90 Base) MCG/ACT inhaler Inhale 2 puffs into the lungs every 6 hours as needed for wheezing 18 g 0     albuterol (PROVENTIL) (2.5 MG/3ML) 0.083% neb solution Take 1 vial (2.5 mg) by nebulization every 6 hours as needed for shortness of breath, wheezing or cough 90 mL 0     amLODIPine (NORVASC) 10 MG tablet TAKE 1 TABLET(10 MG) BY MOUTH DAILY 90 tablet 2     atenolol (TENORMIN) 100 MG tablet Take 1 tablet (100 mg) by mouth daily 90 tablet 3     azelastine  (ASTELIN) 0.1 % nasal spray 2 sprays each nostril 1-2x daily as needed for nasal congestion (use nightly for first 2 week) 30 mL 11     benzonatate (TESSALON) 200 MG capsule Take 1 capsule (200 mg) by mouth 3 times daily as needed for cough 30 capsule 0     blood glucose (ACCU-CHEK GUIDE) test strip 1 strip by In Vitro route 2 times daily Use to test blood sugar 2 times daily or as directed. 200 strip 3     fexofenadine (ALLEGRA) 180 MG tablet [FEXOFENADINE (ALLEGRA) 180 MG TABLET] Take 180 mg by mouth daily.       fluticasone (FLONASE) 50 mcg/actuation nasal spray [FLUTICASONE (FLONASE) 50 MCG/ACTUATION NASAL SPRAY] Apply 1 spray into each nostril daily as needed for rhinitis.       fluticasone (FLOVENT HFA) 110 MCG/ACT inhaler Inhale 1 puff into the lungs 2 times daily 12 g 11     hydrochlorothiazide (HYDRODIURIL) 25 MG tablet Take 1 tablet (25 mg) by mouth daily 90 tablet 3     levofloxacin (LEVAQUIN) 500 MG tablet Take 1 tablet (500 mg) by mouth daily 7 tablet 0     losartan (COZAAR) 100 MG tablet Take 1 tablet (100 mg) by mouth daily 90 tablet 3     metFORMIN (GLUCOPHAGE XR) 500 MG 24 hr tablet Take 4 tablets (2,000 mg) by mouth daily 360 tablet 3     rosuvastatin (CRESTOR) 20 MG tablet Take 1 tablet (20 mg) by mouth daily 90 tablet 3     semaglutide (OZEMPIC, 0.25 OR 0.5 MG/DOSE,) 2 MG/3ML SOPN pen Inject 0.25 mg Subcutaneous once a week for 28 days, THEN 0.5 mg once a week. 3 mL 1     triamcinolone (KENALOG) 0.1 % cream [TRIAMCINOLONE (KENALOG) 0.1 % CREAM] APPLY EXTERNALLY TO THE AFFECTED AREA TWICE DAILY FOR 14 DAYS 45 g 0     Allergies   Allergen Reactions     Amoxicillin Anaphylaxis     Codeine Nausea and Vomiting     Sulfamethoxazole-Trimethoprim [Sulfamethoxazole W/Trimethoprim] Nausea and Vomiting     Tachycardia       Erythromycin Base [Erythromycin] Unknown     Simvastatin Unknown     Social History     Tobacco Use     Smoking status: Former     Packs/day: 2.00     Years: 12.00     Pack years: 24.00  "    Types: Cigarettes     Quit date: 1979     Years since quittin.3     Smokeless tobacco: Never   Substance Use Topics     Alcohol use: Yes     Comment: Alcoholic Drinks/day: rare     Drug use: No     Patient Active Problem List   Diagnosis     Eczema     Anemia     Otitis Media     Type 2 diabetes mellitus with diabetic cataract, with long-term current use of insulin (H)     Essential Hypercholesterolemia     Essential hypertension, benign     Joint Pain, Localized In The Knee     Obesity     Allergic Rhinitis     Klinefelter's Syndrome     Varicose vein     Mild persistent asthma with acute exacerbation     Pneumonia of both lungs due to infectious organism     Acute kidney injury (H)     Encounter for examination following treatment at hospital     Dizziness     Sepsis (H)     Abdominal pain, left upper quadrant     Mild persistent asthma with exacerbation     Spinal stenosis of lumbar region with neurogenic claudication     Squamous cell carcinoma of skin of face     Status post total left knee replacement     Nodule of right lung     Lung consolidation (H)     Lesion of right native kidney     Hyperlipidemia, unspecified hyperlipidemia type     Pseudophakia of left eye     Regular astigmatism of both eyes        Reviewed, reconciled and updated       Physical Exam   General Appearance:       /72 (BP Location: Right arm, Patient Position: Sitting, Cuff Size: Adult Large)   Pulse 66   Temp 97.6  F (36.4  C)   Resp 16   Ht 1.778 m (5' 10\")   Wt 106.6 kg (235 lb)   SpO2 97%   BMI 33.72 kg/m      Appears generally well, breathing unlabored.  Oxygen saturation seems okay at 97%  Lungs overall clear with a tiny bit of wheezing in the left lung field  Heart regular rate rhythm  Abdomen nontender  Extremities no edema     Additional Information   I spent 30 minutes on this encounter, including reviewing interval history since last visit, examining the patient, explaining and counseling the issues " enumerated in the Assessment and Plan (patient given a copy), ordering indicated tests     JAQUI GEE MD, MD

## 2023-04-17 DIAGNOSIS — J45.31 MILD PERSISTENT ASTHMA WITH EXACERBATION: ICD-10-CM

## 2023-04-18 RX ORDER — ALBUTEROL SULFATE 90 UG/1
2 AEROSOL, METERED RESPIRATORY (INHALATION) EVERY 6 HOURS PRN
Qty: 18 G | Refills: 0 | Status: SHIPPED | OUTPATIENT
Start: 2023-04-18 | End: 2023-10-20

## 2023-04-18 NOTE — TELEPHONE ENCOUNTER
"Last Written Prescription Date:  2/12/2023  Last Fill Quantity: 18g,  # refills: 0   Last office visit provider:  4/14/2023     Requested Prescriptions   Pending Prescriptions Disp Refills     albuterol (PROAIR HFA/PROVENTIL HFA/VENTOLIN HFA) 108 (90 Base) MCG/ACT inhaler 18 g 0     Sig: Inhale 2 puffs into the lungs every 6 hours as needed for wheezing       Asthma Maintenance Inhalers - Anticholinergics Passed - 4/17/2023  2:58 PM        Passed - Patient is age 12 years or older        Passed - Asthma control assessment score within normal limits in last 6 months     Please review ACT score.           Passed - Medication is active on med list        Passed - Recent (6 mo) or future (30 days) visit within the authorizing provider's specialty     Patient had office visit in the last 6 months or has a visit in the next 30 days with authorizing provider or within the authorizing provider's specialty.  See \"Patient Info\" tab in inbasket, or \"Choose Columns\" in Meds & Orders section of the refill encounter.           Short-Acting Beta Agonist Inhalers Protocol  Passed - 4/17/2023  2:58 PM        Passed - Patient is age 12 or older        Passed - Asthma control assessment score within normal limits in last 6 months     Please review ACT score.           Passed - Medication is active on med list        Passed - Recent (6 mo) or future (30 days) visit within the authorizing provider's specialty     Patient had office visit in the last 6 months or has a visit in the next 30 days with authorizing provider or within the authorizing provider's specialty.  See \"Patient Info\" tab in inbasket, or \"Choose Columns\" in Meds & Orders section of the refill encounter.                 Rhonda Castillo RN 04/18/23 12:17 AM  "
4 = No assist / stand by assistance

## 2023-05-01 ENCOUNTER — TRANSFERRED RECORDS (OUTPATIENT)
Dept: HEALTH INFORMATION MANAGEMENT | Facility: CLINIC | Age: 73
End: 2023-05-01

## 2023-05-05 ENCOUNTER — OFFICE VISIT (OUTPATIENT)
Dept: INTERNAL MEDICINE | Facility: CLINIC | Age: 73
End: 2023-05-05
Payer: COMMERCIAL

## 2023-05-05 VITALS
WEIGHT: 232 LBS | HEIGHT: 70 IN | OXYGEN SATURATION: 96 % | BODY MASS INDEX: 33.21 KG/M2 | HEART RATE: 91 BPM | TEMPERATURE: 97.9 F | SYSTOLIC BLOOD PRESSURE: 120 MMHG | DIASTOLIC BLOOD PRESSURE: 66 MMHG | RESPIRATION RATE: 16 BRPM

## 2023-05-05 DIAGNOSIS — Q98.4 KLINEFELTER'S SYNDROME: ICD-10-CM

## 2023-05-05 DIAGNOSIS — I10 ESSENTIAL HYPERTENSION: ICD-10-CM

## 2023-05-05 DIAGNOSIS — E66.811 CLASS 1 OBESITY DUE TO EXCESS CALORIES WITH SERIOUS COMORBIDITY AND BODY MASS INDEX (BMI) OF 34.0 TO 34.9 IN ADULT: ICD-10-CM

## 2023-05-05 DIAGNOSIS — E66.09 CLASS 1 OBESITY DUE TO EXCESS CALORIES WITH SERIOUS COMORBIDITY AND BODY MASS INDEX (BMI) OF 34.0 TO 34.9 IN ADULT: ICD-10-CM

## 2023-05-05 DIAGNOSIS — R53.83 FATIGUE, UNSPECIFIED TYPE: ICD-10-CM

## 2023-05-05 DIAGNOSIS — E11.65 TYPE 2 DIABETES MELLITUS WITH HYPERGLYCEMIA, WITHOUT LONG-TERM CURRENT USE OF INSULIN (H): ICD-10-CM

## 2023-05-05 DIAGNOSIS — I10 ESSENTIAL HYPERTENSION, BENIGN: ICD-10-CM

## 2023-05-05 DIAGNOSIS — R79.89 ELEVATED SERUM CREATININE: Primary | ICD-10-CM

## 2023-05-05 DIAGNOSIS — Z76.89 ENCOUNTER TO ESTABLISH CARE: ICD-10-CM

## 2023-05-05 LAB
ANION GAP SERPL CALCULATED.3IONS-SCNC: 14 MMOL/L (ref 7–15)
BASOPHILS # BLD AUTO: 0.1 10E3/UL (ref 0–0.2)
BASOPHILS NFR BLD AUTO: 1 %
BUN SERPL-MCNC: 44.2 MG/DL (ref 8–23)
CALCIUM SERPL-MCNC: 9.4 MG/DL (ref 8.8–10.2)
CHLORIDE SERPL-SCNC: 102 MMOL/L (ref 98–107)
CREAT SERPL-MCNC: 1.48 MG/DL (ref 0.67–1.17)
DEPRECATED HCO3 PLAS-SCNC: 24 MMOL/L (ref 22–29)
EOSINOPHIL # BLD AUTO: 0.4 10E3/UL (ref 0–0.7)
EOSINOPHIL NFR BLD AUTO: 6 %
ERYTHROCYTE [DISTWIDTH] IN BLOOD BY AUTOMATED COUNT: 14 % (ref 10–15)
GFR SERPL CREATININE-BSD FRML MDRD: 49 ML/MIN/1.73M2
GLUCOSE SERPL-MCNC: 228 MG/DL (ref 70–99)
HBA1C MFR BLD: 10.1 % (ref 0–5.6)
HCT VFR BLD AUTO: 32.7 % (ref 40–53)
HGB BLD-MCNC: 10.8 G/DL (ref 13.3–17.7)
IMM GRANULOCYTES # BLD: 0 10E3/UL
IMM GRANULOCYTES NFR BLD: 0 %
LYMPHOCYTES # BLD AUTO: 2.7 10E3/UL (ref 0.8–5.3)
LYMPHOCYTES NFR BLD AUTO: 40 %
MCH RBC QN AUTO: 29.4 PG (ref 26.5–33)
MCHC RBC AUTO-ENTMCNC: 33 G/DL (ref 31.5–36.5)
MCV RBC AUTO: 89 FL (ref 78–100)
MONOCYTES # BLD AUTO: 0.6 10E3/UL (ref 0–1.3)
MONOCYTES NFR BLD AUTO: 9 %
NEUTROPHILS # BLD AUTO: 3 10E3/UL (ref 1.6–8.3)
NEUTROPHILS NFR BLD AUTO: 44 %
PLATELET # BLD AUTO: 227 10E3/UL (ref 150–450)
POTASSIUM SERPL-SCNC: 3.4 MMOL/L (ref 3.4–5.3)
RBC # BLD AUTO: 3.67 10E6/UL (ref 4.4–5.9)
SODIUM SERPL-SCNC: 140 MMOL/L (ref 136–145)
TSH SERPL DL<=0.005 MIU/L-ACNC: 1.37 UIU/ML (ref 0.3–4.2)
WBC # BLD AUTO: 6.7 10E3/UL (ref 4–11)

## 2023-05-05 PROCEDURE — 84403 ASSAY OF TOTAL TESTOSTERONE: CPT | Performed by: NURSE PRACTITIONER

## 2023-05-05 PROCEDURE — 36415 COLL VENOUS BLD VENIPUNCTURE: CPT | Performed by: NURSE PRACTITIONER

## 2023-05-05 PROCEDURE — 99214 OFFICE O/P EST MOD 30 MIN: CPT | Performed by: NURSE PRACTITIONER

## 2023-05-05 PROCEDURE — 80048 BASIC METABOLIC PNL TOTAL CA: CPT | Performed by: NURSE PRACTITIONER

## 2023-05-05 PROCEDURE — 84443 ASSAY THYROID STIM HORMONE: CPT | Performed by: NURSE PRACTITIONER

## 2023-05-05 PROCEDURE — 84270 ASSAY OF SEX HORMONE GLOBUL: CPT | Performed by: NURSE PRACTITIONER

## 2023-05-05 PROCEDURE — 85025 COMPLETE CBC W/AUTO DIFF WBC: CPT | Performed by: NURSE PRACTITIONER

## 2023-05-05 PROCEDURE — 83036 HEMOGLOBIN GLYCOSYLATED A1C: CPT | Performed by: NURSE PRACTITIONER

## 2023-05-05 NOTE — PATIENT INSTRUCTIONS
Increase you Ozempic dose to 0.5mg weekly.    Keep working on diet and exercise.    Your labs are processing, I will update you on results on my chart once they are back.    Follow up in 3 months for a recheck, before then if anything comes up.

## 2023-05-05 NOTE — PROGRESS NOTES
Assessment & Plan     Encounter to establish care: Care established today.     Essential hypertension, benign: Blood pressure today in office was 120/66. Controlled with Atenolol and Losartan.     Type 2 diabetes mellitus with hyperglycemia, without long-term current use of insulin (H): A1c today was worse at 10.1%. He has started exercising again. Will increase Ozempic dose. Diabetic diet. Follow up in 3 months for a recheck.   - Basic metabolic panel  (Ca, Cl, CO2, Creat, Gluc, K, Na, BUN)  - Hemoglobin A1c  - semaglutide (OZEMPIC) 2 MG/3ML pen  Dispense: 3 mL; Refill: 3    Fatigue, unspecified type/Klinefelter's syndrome: Likely related to low testosterone with Klinefelter's. Will check a TSH and hemoglobin as well.   - CBC with platelets and differential  - TSH with free T4 reflex  - Testosterone Free and Total    Class 1 obesity due to excess calories with serious comorbidity and body mass index (BMI) of 34.0 to 34.9 in adult: BMI today was 33.29. Discussed diet and exercise, increased Ozempic dose.     Ronda Hawk CNP  [facility]    Salvador Batista is a 73 year old, presenting for the following health issues:  Follow Up (Follow up and establish care)        5/5/2023    10:43 AM   Additional Questions   Roomed by Madhuri MORROW     History of Present Illness     Asthma:  He presents for follow up of asthma.  He has no cough, no wheezing, and no shortness of breath. He is using a relief medication a few times a month. He typically misses taking his controller medication 1 time(s) per week.Patient is aware of the following triggers: pollens. The patient has not had a visit to the Emergency Room, Urgent Care or Hospital due to asthma since the last clinic visit.     Hypertension: He presents for follow up of hypertension.  He does check blood pressure  regularly outside of the clinic. Outpatient blood pressures have not been over 140/90. He follows a low salt diet.     He  "eats 2-3 servings of fruits and vegetables daily.He consumes 0 sweetened beverage(s) daily.He exercises with enough effort to increase his heart rate 9 or less minutes per day.  He exercises with enough effort to increase his heart rate 3 or less days per week.   He is taking medications regularly.     The patient presents today for follow up and to establish care.    He reports that his cough is much better than prior, he is feeling better overall.    His Mother passed at age 87; had a few strokes, hx of type 2 diabetes. Father passed at age 59; was inpatient, had a DVT that moved to his lungs, and passed of this.    His oldest brother had a stroke and passed from this, had blockage and stents in his legs.    His other brother is 83 and very healthy.    No cancers in the family.    He has Klinefelter's syndrome. He continues to deal with fatigue. Will check his testosterone levels today.    He is , has two adopted daughters who are doing well. Lexi is 44; girl, is a nutritionist. Ramírez, daughter is 38; is doing well as well.     He is a .    He reports that his A1c will likely be elevated today. Will increase his Ozempic dose, he has tolerated the 0.25mg weekly well.    Review of Systems   Constitutional, HEENT, cardiovascular, pulmonary, GI, , musculoskeletal, neuro, skin, endocrine and psych systems are negative, except as otherwise noted.      Objective    /66 (BP Location: Right arm, Patient Position: Sitting)   Pulse 91   Temp 97.9  F (36.6  C)   Resp 16   Ht 1.778 m (5' 10\")   Wt 105.2 kg (232 lb)   SpO2 96%   BMI 33.29 kg/m    Body mass index is 33.29 kg/m .  Physical Exam   GENERAL: healthy, alert and no distress  EYES: Eyes grossly normal to inspection, PERRL and conjunctivae and sclerae normal  HENT: ear canals and TM's normal, nose and mouth without ulcers or lesions  NECK: no adenopathy, no asymmetry, masses, or scars  RESP: lungs clear to auscultation - no " rales, rhonchi or wheezes  CV: regular rate and rhythm, normal S1 S2, no S3 or S4, no murmur, click or rub, no peripheral edema  MS: no gross musculoskeletal defects noted, no edema  SKIN: no suspicious lesions or rashes  NEURO: Normal strength and tone, mentation intact and speech normal  PSYCH: mentation appears normal, affect normal/bright

## 2023-05-08 LAB — SHBG SERPL-SCNC: 18 NMOL/L (ref 11–80)

## 2023-05-08 RX ORDER — HYDROCHLOROTHIAZIDE 25 MG/1
12.5 TABLET ORAL DAILY
Qty: 90 TABLET | Refills: 3
Start: 2023-05-08 | End: 2023-06-05

## 2023-05-11 LAB
TESTOST FREE SERPL-MCNC: 0.63 NG/DL
TESTOST SERPL-MCNC: 26 NG/DL (ref 240–950)

## 2023-05-12 ENCOUNTER — MYC MEDICAL ADVICE (OUTPATIENT)
Dept: INTERNAL MEDICINE | Facility: CLINIC | Age: 73
End: 2023-05-12

## 2023-05-12 DIAGNOSIS — Q98.4 KLINEFELTER'S SYNDROME: Primary | ICD-10-CM

## 2023-05-12 DIAGNOSIS — E29.1 MALE HYPOGONADISM: ICD-10-CM

## 2023-05-12 DIAGNOSIS — R79.89 LOW TESTOSTERONE: ICD-10-CM

## 2023-05-12 RX ORDER — TESTOSTERONE GEL, 1% 10 MG/G
50 GEL TRANSDERMAL DAILY
Qty: 90 PACKET | Refills: 3 | Status: SHIPPED | OUTPATIENT
Start: 2023-05-12 | End: 2024-05-24

## 2023-05-12 NOTE — TELEPHONE ENCOUNTER
Ronda - see Pt response to labs done 5/5/2023.  Testosterone came back low and Pt would like to start on replacement.     Thanks,   Gilma WEINER

## 2023-06-03 NOTE — PROGRESS NOTES
Medication Therapy Management (MTM) Encounter    ASSESSMENT:                            1. Type 2 diabetes mellitus   Most recent A1c elevated above goal less than 8%, given age per ADA guidelines.  Likely due to the fact that pioglitazone was discontinued and then started on low-dose Ozempic.  Recommend increasing Ozempic dose.  Explained GI side effects such as indigestion typically resolve with time, but especially with dose increase, advised using famotidine as needed.  Explained mood changes are not a possible side effect to Ozempic and may not be medication related although may improve with replacing testosterone as described below. Recommend patient increase blood sugar testing to twice daily and include postprandial readings to assess response to medication.  Reviewed blood sugar goals. Discussed optimal lifestyle interventions to help control blood sugars, including healthy diet and physical activity. Advised to eat a diet low in carbohydrates with increased vegetables, healthy fat, and protein.  Reasonable to not take aspirin at this time given 2022 ADA guideline recommendations advising against aspirin use for primary prevention in those >70 years of age due to risk of bleeding outweighing CV benefit, even in those with diabetes.     2. Essential hypertension  Blood pressure is well controlled and meeting goal of <130/80 mm Hg per ACC/AHA hypertension guidelines after dose increase in atenolol.  Due to increasing creatinine, PCP recommended reducing hydrochlorothiazide dose, but patient has not done this yet and believes he was dehydrated the day of the labs due to recent colonoscopy at the time.  Will check BMP today and patient is in need of a new prescription so based on those results we will send for either 12.5 mg or 25 mg hydrochlorothiazide.    3. Hyperlipidemia  Appropriately on a high intensity statin given age and diabetes diagnosis per ACC/AHA guidelines.     4. Mild persistent asthma   Stable  and adequately controlled with most recent ACT score meeting goal greater than 20.    5. Klinefelter's syndrome/Hypogonadism  Due to low testosterone level and associated symptoms, PCP has recommended starting testosterone replacement therapy.  Patient plans to start this week.  Medication education provided.    PLAN:                            Check BMP. Restart hydrochlorothiazide 12.5 mg or 25 mg pending results.     Increase Ozempic to 1 mg injected weekly.     Try famotidine (Pepcid) 20 mg twice daily as needed for indigestion or heartburn.       Follow-up: Return in about 6 weeks (around 2023).    SUBJECTIVE/OBJECTIVE:                          Lester Shi is a 73 year old male coming in for a follow up visit. This is a follow up from 3/28/23.     Reason for visit: Ozempic, blood pressure     Allergies/ADRs: Reviewed in chart  Past Medical History: Reviewed in chart  Tobacco: He reports that he quit smoking about 44 years ago. His smoking use included cigarettes. He has a 24.00 pack-year smoking history. He has never used smokeless tobacco.  Alcohol: Less than 1 beverage / month    Medication Adherence/Access: Patient uses pill box(es) when traveling. He prefers to take all medications in the morning.     1. Type 2 diabetes mellitus   Currently taking metformin ER 2000 mg daily and Ozempic 0.5 mg injected weekly. Now off pioglitazone. Recent improved adherence with metformin now that taking all pills once daily. He questions if Ozempic causes depression as he is noticing some mood swings.  He notices some indigestion from Ozempic after he eats in the evening so avoiding late night eating. Takes [place] twice a week. Noticing less of an appetite and has lost some weight.   Works as a  and doesn't want to be on insulin.  Blood sugar monitorin-2 time(s) daily. Ranges (patient reported): Fasting- 120-150.   Symptoms of low blood sugar? none  Symptoms of high blood sugar? none  Eye exam: up  to date  Foot exam: up to date  Aspirin: No due to age, no IVD  Statin: Yes: rosvuastatin   ACEi/ARB: Yes: losartan     Hemoglobin A1C   Date Value Ref Range Status   05/05/2023 10.1 (H) 0.0 - 5.6 % Final   01/27/2023 8.6 (H) 0.0 - 5.6 % Final     Comment:     Normal <5.7%   Prediabetes 5.7-6.4%    Diabetes 6.5% or higher     Note: Adopted from ADA consensus guidelines.   10/07/2022 7.5 (H) 0.0 - 5.6 % Final     Comment:     Normal <5.7%   Prediabetes 5.7-6.4%    Diabetes 6.5% or higher     Note: Adopted from ADA consensus guidelines.      Microalbumin Urine mg/dL   Date Value Ref Range Status   06/07/2022 14.64 (H) 0.00 - 1.99 mg/dL Final      Creatinine Urine mg/dL   Date Value Ref Range Status   06/07/2022 136 mg/dL Final     LDL Cholesterol Calculated   Date Value Ref Range Status   01/27/2023 82 <=100 mg/dL Final     LDL Cholesterol Direct   Date Value Ref Range Status   09/28/2015 140 (H) 0 - 129 mg/dl Final     Creatinine   Date Value Ref Range Status   05/05/2023 1.48 (H) 0.67 - 1.17 mg/dL Final       2. Essential hypertension  Current medications include atenolol 100 mg daily, amlodipine 10 mg daily, hydrochlorothiazide 25 mg daily, and losartan 100 mg daily. Dose of hydrochlorothiazide recently reduced to 12.5 mg due to worsening renal function per PCP, but patient hasn't made that change yet. Patient states he just had his colonoscopy at the time. He ran out of the medication a few days ago and needs a new prescription. Patient reports no current medication side effects.   Patient does self-monitor blood pressure and states it has been better lately.  Reports home reading of 125/72.    Notes he has stopped ibuprofen as advised for shoulder pain. Now taking Tylenol ES. Getting steroid injections.    BP Readings from Last 3 Encounters:   05/05/23 120/66   04/14/23 136/72   04/10/23 (!) 144/84     Lab Results   Component Value Date    POTASSIUM 3.4 05/05/2023     05/05/2023    CR 1.48 (H) 05/05/2023      3. Hyperlipidemia  Current therapy includes rosvuastatin 20 mg daily. Patient reports no significant myalgias or other side effects. Intolerance to simvastatin in the past.     The 10-year ASCVD risk score (Shad CROWLEY, et al., 2019) is: 40.9%    Values used to calculate the score:      Age: 73 years      Sex: Male      Is Non- : No      Diabetic: Yes      Tobacco smoker: No      Systolic Blood Pressure: 120 mmHg      Is BP treated: Yes      HDL Cholesterol: 56 mg/dL      Total Cholesterol: 174 mg/dL    Recent Labs   Lab Test 01/27/23  1228 10/07/22  1221   CHOL 174 206*   HDL 56 42   LDL 82 110*   TRIG 181* 268*     4. Mild persistent asthma/Allergies   Current medications: ICS - Flovent 1 puff(s) twice daily PRN only in summer as he is allergic to trees. Usually uses a spacer. Short-Acting Bronchodilator: Albuterol MDI, Nebs. Patient rinses their mouth after using steroid inhaler. Triggers include: pollens.  Patient reports the following symptoms: none  Also on Allegra for seasonal allergies - watery, itching eyes. Feels symptoms have gotten better over the past few years. Also uses Flonase and Astelin as needed.         11/23/2021    11:29 AM 6/7/2022    11:51 AM 1/27/2023    11:12 AM   ACT Total Scores   ACT TOTAL SCORE (Goal Greater than or Equal to 20) 22 23 22   In the past 12 months, how many times did you visit the emergency room for your asthma without being admitted to the hospital? 0 0 0   In the past 12 months, how many times were you hospitalized overnight because of your asthma? 0 0 0      5. Klinefelter's syndrome/Hypogonadism  History of Klinefelter's syndrome.  Noted fatigue so PCP check testosterone level which was low.  Prescribe testosterone 1% topical gel to apply 50 mg transdermally daily.  Patient plans to start this week.      Vitals:   BP Readings from Last 3 Encounters:   05/05/23 120/66   04/14/23 136/72   04/10/23 (!) 144/84      Pulse Readings from Last 3  Encounters:   05/05/23 91   04/14/23 66   04/10/23 88     Wt Readings from Last 3 Encounters:   05/05/23 232 lb (105.2 kg)   04/14/23 235 lb (106.6 kg)   03/31/23 237 lb (107.5 kg)     ----------------    I spent 30 minutes with this patient today. All changes were made via collaborative practice agreement with Ronda Hawk CNP. A copy of the visit note was provided to the patient's provider(s).    A summary of these recommendations was given to the patient.    Nora Campos, PharmD, BCACP  Medication Management (MTM) Pharmacist  [facility]          Medication Therapy Recommendations  Essential hypertension, benign    Current Medication: hydrochlorothiazide (HYDRODIURIL) 25 MG tablet (Discontinued)   Rationale: Medication requires monitoring - Needs additional monitoring - Safety   Recommendation: Order Lab   Status: Accepted per CPA         Heartburn    Current Medication: famotidine (PEPCID) 20 MG tablet   Rationale: Untreated condition - Needs additional medication therapy - Indication   Recommendation: Start Medication   Status: Accepted per CPA         Type 2 diabetes mellitus with diabetic cataract, with long-term current use of insulin (H)    Current Medication: semaglutide (OZEMPIC) 2 MG/3ML pen (Discontinued)   Rationale: Dose too low - Dosage too low - Effectiveness   Recommendation: Increase Dose   Status: Accepted per CPA

## 2023-06-05 ENCOUNTER — LAB (OUTPATIENT)
Dept: LAB | Facility: CLINIC | Age: 73
End: 2023-06-05
Payer: COMMERCIAL

## 2023-06-05 ENCOUNTER — OFFICE VISIT (OUTPATIENT)
Dept: PHARMACY | Facility: CLINIC | Age: 73
End: 2023-06-05
Payer: COMMERCIAL

## 2023-06-05 DIAGNOSIS — I10 ESSENTIAL HYPERTENSION, BENIGN: ICD-10-CM

## 2023-06-05 DIAGNOSIS — I10 ESSENTIAL HYPERTENSION, BENIGN: Primary | ICD-10-CM

## 2023-06-05 DIAGNOSIS — E11.36 TYPE 2 DIABETES MELLITUS WITH DIABETIC CATARACT, WITH LONG-TERM CURRENT USE OF INSULIN (H): Primary | ICD-10-CM

## 2023-06-05 DIAGNOSIS — E78.00 PURE HYPERCHOLESTEROLEMIA: ICD-10-CM

## 2023-06-05 DIAGNOSIS — Z79.4 TYPE 2 DIABETES MELLITUS WITH DIABETIC CATARACT, WITH LONG-TERM CURRENT USE OF INSULIN (H): Primary | ICD-10-CM

## 2023-06-05 DIAGNOSIS — R12 HEARTBURN: ICD-10-CM

## 2023-06-05 DIAGNOSIS — Q98.4 KLINEFELTER'S SYNDROME: ICD-10-CM

## 2023-06-05 DIAGNOSIS — J45.31 MILD PERSISTENT ASTHMA WITH EXACERBATION: ICD-10-CM

## 2023-06-05 LAB
ANION GAP SERPL CALCULATED.3IONS-SCNC: 14 MMOL/L (ref 7–15)
BUN SERPL-MCNC: 20 MG/DL (ref 8–23)
CALCIUM SERPL-MCNC: 9.2 MG/DL (ref 8.8–10.2)
CHLORIDE SERPL-SCNC: 104 MMOL/L (ref 98–107)
CREAT SERPL-MCNC: 0.99 MG/DL (ref 0.67–1.17)
DEPRECATED HCO3 PLAS-SCNC: 20 MMOL/L (ref 22–29)
GFR SERPL CREATININE-BSD FRML MDRD: 79 ML/MIN/1.73M2
GLUCOSE SERPL-MCNC: 132 MG/DL (ref 70–99)
POTASSIUM SERPL-SCNC: 4 MMOL/L (ref 3.4–5.3)
SODIUM SERPL-SCNC: 138 MMOL/L (ref 136–145)

## 2023-06-05 PROCEDURE — 80048 BASIC METABOLIC PNL TOTAL CA: CPT

## 2023-06-05 PROCEDURE — 36415 COLL VENOUS BLD VENIPUNCTURE: CPT

## 2023-06-05 PROCEDURE — 99207 PR NO CHARGE LOS: CPT | Performed by: PHARMACIST

## 2023-06-05 RX ORDER — HYDROCHLOROTHIAZIDE 25 MG/1
25 TABLET ORAL EVERY MORNING
Qty: 90 TABLET | Refills: 3 | Status: SHIPPED | OUTPATIENT
Start: 2023-06-05 | End: 2024-07-01

## 2023-06-05 RX ORDER — FAMOTIDINE 20 MG/1
20 TABLET, FILM COATED ORAL 2 TIMES DAILY PRN
Qty: 90 TABLET | Refills: 3 | Status: SHIPPED | OUTPATIENT
Start: 2023-06-05 | End: 2023-07-17

## 2023-06-05 NOTE — PATIENT INSTRUCTIONS
"Recommendations from today's Medication Management (MTM) visit:                                                      Checking kidney function today. Based on the result, I will send in a prescription for hydrochlorothiazide. Make sure to stay well hydrated.     Increase Ozempic to 1 mg injected weekly.     Try famotidine (Pepcid) 20 mg twice daily as needed for indigestion or heartburn.     Check blood sugars twice a day.    Blood Sugar Goals  Before meals:   2 hours after meal: less than 200      Next appointment with Nora Campos PharmD: Monday, July 17th at 8:30 am       To schedule another MTM appointment, please call the MTM scheduling line at [phone] or toll-free at [phone].     My MTM pharmacist's contact information:      Please feel free to contact me with any questions or concerns you have via [employer] or calling the clinic.       Nora Campos, PharmD, [facility]  Medication Management (MTM) Pharmacist  [facility]     It was great speaking with you today.  I value your experience and would be very thankful for your time in providing feedback in our clinic survey. In the next few days, you may receive an email or text message from [facility] [employer] with a link to a survey related to your  clinical pharmacist.\"     "

## 2023-06-29 ENCOUNTER — TRANSFERRED RECORDS (OUTPATIENT)
Dept: HEALTH INFORMATION MANAGEMENT | Facility: CLINIC | Age: 73
End: 2023-06-29
Payer: COMMERCIAL

## 2023-07-10 ENCOUNTER — TRANSFERRED RECORDS (OUTPATIENT)
Dept: MULTI SPECIALTY CLINIC | Facility: CLINIC | Age: 73
End: 2023-07-10
Payer: COMMERCIAL

## 2023-07-10 LAB — RETINOPATHY: POSITIVE

## 2023-07-16 NOTE — PROGRESS NOTES
Medication Therapy Management (MTM) Encounter    ASSESSMENT:                            1. Type 2 diabetes mellitus   Most recent A1c elevated above goal less than 8%, given age per ADA guidelines. Now on Ozempic with dose titrated up to 1 mg weekly. Reported blood sugars improved. Will recheck A1c at PCP visit next month. Room to increase Ozempic dose if needed. Explained mood changes are not a possible side effect to Ozempic and may not be medication related. Continue to monitor.     2. Essential hypertension  Blood pressure is well controlled and meeting goal of <130/80 mm Hg per ACC/AHA hypertension guidelines.    3. Hyperlipidemia  Appropriately on a high intensity statin given age and diabetes diagnosis per ACC/AHA guidelines.     4. Mild persistent asthma   Stable and adequately controlled with most recent ACT score meeting goal greater than 20.    5. Klinefelter's syndrome/Hypogonadism  Due to low testosterone level and associated symptoms, now on testosterone replacement therapy with noted improvement in fatigue.     PLAN:                            No medication changes today.       Follow-up: Return in about 3 months (around 10/17/2023).    SUBJECTIVE/OBJECTIVE:                          Lester Shi is a 73 year old male called for a follow up visit. This is a follow up from 6/5/23.     Reason for visit: Ozempic    Allergies/ADRs: Reviewed in chart  Past Medical History: Reviewed in chart  Tobacco: He reports that he quit smoking about 44 years ago. His smoking use included cigarettes. He has a 24.00 pack-year smoking history. He has never used smokeless tobacco.  Alcohol: Less than 1 beverage / month    Medication Adherence/Access: Patient uses pill box(es) when traveling. He prefers to take all medications in the morning.     1. Type 2 diabetes mellitus   Currently taking metformin ER 2000 mg daily and Ozempic 1 mg injected weekly. Now off pioglitazone. Improved adherence with metformin now that taking  all pills once daily. He questions if Ozempic causes depression as he is noticing some mood swings.  He notices some indigestion from Ozempic initially but that has improved. Noticing less of an appetite and has lost some weight. Works as a  and doesn't want to be on insulin.  Blood sugar monitorin-2 time(s) daily. Ranges (patient reported): Fasting- 118-150  Symptoms of low blood sugar? none  Symptoms of high blood sugar? none  Eye exam: up to date  Foot exam: up to date  Aspirin: No due to age, no IVD  Statin: Yes: rosvuastatin   ACEi/ARB: Yes: losartan     Hemoglobin A1C   Date Value Ref Range Status   2023 10.1 (H) 0.0 - 5.6 % Final   2023 8.6 (H) 0.0 - 5.6 % Final     Comment:     Normal <5.7%   Prediabetes 5.7-6.4%    Diabetes 6.5% or higher     Note: Adopted from ADA consensus guidelines.   10/07/2022 7.5 (H) 0.0 - 5.6 % Final     Comment:     Normal <5.7%   Prediabetes 5.7-6.4%    Diabetes 6.5% or higher     Note: Adopted from ADA consensus guidelines.      Microalbumin Urine mg/dL   Date Value Ref Range Status   2022 14.64 (H) 0.00 - 1.99 mg/dL Final      Creatinine Urine mg/dL   Date Value Ref Range Status   2022 136 mg/dL Final     LDL Cholesterol Calculated   Date Value Ref Range Status   2023 82 <=100 mg/dL Final     LDL Cholesterol Direct   Date Value Ref Range Status   2015 140 (H) 0 - 129 mg/dl Final     Creatinine   Date Value Ref Range Status   2023 0.99 0.67 - 1.17 mg/dL Final       2. Essential hypertension  Current medications include atenolol 100 mg daily, amlodipine 10 mg daily, hydrochlorothiazide 25 mg daily, and losartan 100 mg daily. Patient reports no current medication side effects.   Patient does self-monitor blood pressure and states it has been better lately.  Reports home reading of 123/72.    BP Readings from Last 3 Encounters:   23 120/66   23 136/72   04/10/23 (!) 144/84     Lab Results   Component Value Date     POTASSIUM 4.0 06/05/2023     06/05/2023    CR 0.99 06/05/2023     3. Hyperlipidemia  Current therapy includes rosvuastatin 20 mg daily. Patient reports no significant myalgias or other side effects. Intolerance to simvastatin in the past.     The 10-year ASCVD risk score (Shad CROWLEY, et al., 2019) is: 40.9%    Values used to calculate the score:      Age: 73 years      Sex: Male      Is Non- : No      Diabetic: Yes      Tobacco smoker: No      Systolic Blood Pressure: 120 mmHg      Is BP treated: Yes      HDL Cholesterol: 56 mg/dL      Total Cholesterol: 174 mg/dL    Recent Labs   Lab Test 01/27/23  1228 10/07/22  1221   CHOL 174 206*   HDL 56 42   LDL 82 110*   TRIG 181* 268*     4. Mild persistent asthma/Allergies   Current medications: ICS - Flovent 1 puff(s) twice daily PRN only in summer as he is allergic to trees. Usually uses a spacer. Short-Acting Bronchodilator: Albuterol MDI, Nebs. Patient rinses their mouth after using steroid inhaler. Triggers include: pollens.  Patient reports the following symptoms: none  Also on Allegra for seasonal allergies - watery, itching eyes. Feels symptoms have gotten better over the past few years. Also uses Flonase and Astelin as needed.         11/23/2021    11:29 AM 6/7/2022    11:51 AM 1/27/2023    11:12 AM   ACT Total Scores   ACT TOTAL SCORE (Goal Greater than or Equal to 20) 22 23 22   In the past 12 months, how many times did you visit the emergency room for your asthma without being admitted to the hospital? 0 0 0   In the past 12 months, how many times were you hospitalized overnight because of your asthma? 0 0 0      5. Klinefelter's syndrome/Hypogonadism  History of Klinefelter's syndrome.  Noted fatigue so PCP check testosterone level which was low. Started testosterone 1% topical gel to apply 50 mg transdermally daily.  Notes less fatigue.       Vitals:   BP Readings from Last 3 Encounters:   05/05/23 120/66   04/14/23 136/72    04/10/23 (!) 144/84      Pulse Readings from Last 3 Encounters:   05/05/23 91   04/14/23 66   04/10/23 88     Wt Readings from Last 3 Encounters:   05/05/23 232 lb (105.2 kg)   04/14/23 235 lb (106.6 kg)   03/31/23 237 lb (107.5 kg)     ----------------    I spent 20 minutes with this patient today. All changes were made via collaborative practice agreement with Ronda Hawk CNP. A copy of the visit note was provided to the patient's provider(s).    A summary of these recommendations was given to the patient via [employer].     Nora Campos, PharmD, BCACP  Medication Management (MTM) Pharmacist  [facility]       Telemedicine Visit Details  Type of service:  Telephone visit  Start Time: 8:35 AM  End Time: 8:55 AM     Medication Therapy Recommendations  No medication therapy recommendations to display

## 2023-07-17 ENCOUNTER — VIRTUAL VISIT (OUTPATIENT)
Dept: PHARMACY | Facility: CLINIC | Age: 73
End: 2023-07-17
Payer: COMMERCIAL

## 2023-07-17 DIAGNOSIS — Z79.4 TYPE 2 DIABETES MELLITUS WITH DIABETIC CATARACT, WITH LONG-TERM CURRENT USE OF INSULIN (H): Primary | ICD-10-CM

## 2023-07-17 DIAGNOSIS — J45.31 MILD PERSISTENT ASTHMA WITH ACUTE EXACERBATION: ICD-10-CM

## 2023-07-17 DIAGNOSIS — E78.5 HYPERLIPIDEMIA, UNSPECIFIED HYPERLIPIDEMIA TYPE: ICD-10-CM

## 2023-07-17 DIAGNOSIS — I10 PRIMARY HYPERTENSION: ICD-10-CM

## 2023-07-17 DIAGNOSIS — E11.36 TYPE 2 DIABETES MELLITUS WITH DIABETIC CATARACT, WITH LONG-TERM CURRENT USE OF INSULIN (H): Primary | ICD-10-CM

## 2023-07-17 DIAGNOSIS — Q98.4 KLINEFELTER'S SYNDROME: ICD-10-CM

## 2023-07-17 PROCEDURE — 99207 PR NO CHARGE LOS: CPT | Performed by: PHARMACIST

## 2023-07-17 NOTE — PATIENT INSTRUCTIONS
"Recommendations from today's Medication Management (MTM) visit:                                                      No medication changes today.     We can check your A1c again when you see Ronda next month.       To schedule another MTM appointment, please call the MTM scheduling line at [phone] or toll-free at [phone].     My MTM pharmacist's contact information:      Please feel free to contact me with any questions or concerns you have via [employer] or calling the clinic.       Nora Campos, PharmD, [facility]  Medication Management (MTM) Pharmacist  [facility]     It was great speaking with you today.  I value your experience and would be very thankful for your time in providing feedback in our clinic survey. In the next few days, you may receive an email or text message from [employer] with a link to a survey related to your  clinical pharmacist.\"     "

## 2023-07-20 ENCOUNTER — TRANSFERRED RECORDS (OUTPATIENT)
Dept: HEALTH INFORMATION MANAGEMENT | Facility: CLINIC | Age: 73
End: 2023-07-20
Payer: COMMERCIAL

## 2023-07-27 ENCOUNTER — TELEPHONE (OUTPATIENT)
Dept: INTERNAL MEDICINE | Facility: CLINIC | Age: 73
End: 2023-07-27
Payer: COMMERCIAL

## 2023-07-27 NOTE — TELEPHONE ENCOUNTER
Semaglutide, 1 MG/DOSE, (OZEMPIC) 4 MG/3ML pen   Is on back order until further notice. Please consider a drug change or send this to a pharmacy that has this in stock.

## 2023-08-07 ENCOUNTER — OFFICE VISIT (OUTPATIENT)
Dept: INTERNAL MEDICINE | Facility: CLINIC | Age: 73
End: 2023-08-07
Payer: COMMERCIAL

## 2023-08-07 VITALS
HEIGHT: 70 IN | BODY MASS INDEX: 32.35 KG/M2 | TEMPERATURE: 97.9 F | DIASTOLIC BLOOD PRESSURE: 76 MMHG | WEIGHT: 226 LBS | SYSTOLIC BLOOD PRESSURE: 120 MMHG

## 2023-08-07 DIAGNOSIS — Z13.0 SCREENING FOR ENDOCRINE, NUTRITIONAL, METABOLIC AND IMMUNITY DISORDER: ICD-10-CM

## 2023-08-07 DIAGNOSIS — E29.1 MALE HYPOGONADISM: ICD-10-CM

## 2023-08-07 DIAGNOSIS — R79.89 LOW TESTOSTERONE: ICD-10-CM

## 2023-08-07 DIAGNOSIS — Z13.228 SCREENING FOR ENDOCRINE, NUTRITIONAL, METABOLIC AND IMMUNITY DISORDER: ICD-10-CM

## 2023-08-07 DIAGNOSIS — Z13.29 SCREENING FOR ENDOCRINE, NUTRITIONAL, METABOLIC AND IMMUNITY DISORDER: ICD-10-CM

## 2023-08-07 DIAGNOSIS — E11.65 TYPE 2 DIABETES MELLITUS WITH HYPERGLYCEMIA, WITHOUT LONG-TERM CURRENT USE OF INSULIN (H): ICD-10-CM

## 2023-08-07 DIAGNOSIS — Z13.21 SCREENING FOR ENDOCRINE, NUTRITIONAL, METABOLIC AND IMMUNITY DISORDER: ICD-10-CM

## 2023-08-07 DIAGNOSIS — Q98.4 KLINEFELTER'S SYNDROME: ICD-10-CM

## 2023-08-07 DIAGNOSIS — I10 ESSENTIAL HYPERTENSION, BENIGN: ICD-10-CM

## 2023-08-07 DIAGNOSIS — R79.89 ELEVATED SERUM CREATININE: ICD-10-CM

## 2023-08-07 LAB
ANION GAP SERPL CALCULATED.3IONS-SCNC: 15 MMOL/L (ref 7–15)
BASOPHILS # BLD AUTO: 0 10E3/UL (ref 0–0.2)
BASOPHILS NFR BLD AUTO: 0 %
BUN SERPL-MCNC: 27.8 MG/DL (ref 8–23)
CALCIUM SERPL-MCNC: 9.6 MG/DL (ref 8.8–10.2)
CHLORIDE SERPL-SCNC: 99 MMOL/L (ref 98–107)
CREAT SERPL-MCNC: 1.21 MG/DL (ref 0.67–1.17)
DEPRECATED HCO3 PLAS-SCNC: 26 MMOL/L (ref 22–29)
EOSINOPHIL # BLD AUTO: 0.1 10E3/UL (ref 0–0.7)
EOSINOPHIL NFR BLD AUTO: 2 %
ERYTHROCYTE [DISTWIDTH] IN BLOOD BY AUTOMATED COUNT: 14.3 % (ref 10–15)
GFR SERPL CREATININE-BSD FRML MDRD: 62 ML/MIN/1.73M2
GLUCOSE SERPL-MCNC: 156 MG/DL (ref 70–99)
HBA1C MFR BLD: 7.3 % (ref 0–5.6)
HCT VFR BLD AUTO: 35.3 % (ref 40–53)
HGB BLD-MCNC: 11.8 G/DL (ref 13.3–17.7)
IMM GRANULOCYTES # BLD: 0 10E3/UL
IMM GRANULOCYTES NFR BLD: 0 %
LYMPHOCYTES # BLD AUTO: 1.8 10E3/UL (ref 0.8–5.3)
LYMPHOCYTES NFR BLD AUTO: 23 %
MCH RBC QN AUTO: 29.8 PG (ref 26.5–33)
MCHC RBC AUTO-ENTMCNC: 33.4 G/DL (ref 31.5–36.5)
MCV RBC AUTO: 89 FL (ref 78–100)
MONOCYTES # BLD AUTO: 0.7 10E3/UL (ref 0–1.3)
MONOCYTES NFR BLD AUTO: 9 %
NEUTROPHILS # BLD AUTO: 5 10E3/UL (ref 1.6–8.3)
NEUTROPHILS NFR BLD AUTO: 66 %
PLATELET # BLD AUTO: 280 10E3/UL (ref 150–450)
POTASSIUM SERPL-SCNC: 3.3 MMOL/L (ref 3.4–5.3)
RBC # BLD AUTO: 3.96 10E6/UL (ref 4.4–5.9)
SODIUM SERPL-SCNC: 140 MMOL/L (ref 136–145)
WBC # BLD AUTO: 7.6 10E3/UL (ref 4–11)

## 2023-08-07 PROCEDURE — 36415 COLL VENOUS BLD VENIPUNCTURE: CPT | Performed by: NURSE PRACTITIONER

## 2023-08-07 PROCEDURE — 85025 COMPLETE CBC W/AUTO DIFF WBC: CPT | Performed by: NURSE PRACTITIONER

## 2023-08-07 PROCEDURE — 99214 OFFICE O/P EST MOD 30 MIN: CPT | Performed by: NURSE PRACTITIONER

## 2023-08-07 PROCEDURE — 80048 BASIC METABOLIC PNL TOTAL CA: CPT | Performed by: NURSE PRACTITIONER

## 2023-08-07 PROCEDURE — 84270 ASSAY OF SEX HORMONE GLOBUL: CPT | Performed by: NURSE PRACTITIONER

## 2023-08-07 PROCEDURE — 84403 ASSAY OF TOTAL TESTOSTERONE: CPT | Performed by: NURSE PRACTITIONER

## 2023-08-07 PROCEDURE — 83036 HEMOGLOBIN GLYCOSYLATED A1C: CPT | Performed by: NURSE PRACTITIONER

## 2023-08-07 RX ORDER — LIRAGLUTIDE 6 MG/ML
0.6 INJECTION SUBCUTANEOUS DAILY
Qty: 3 ML | Refills: 3 | Status: SHIPPED | OUTPATIENT
Start: 2023-08-07 | End: 2023-11-27

## 2023-08-07 RX ORDER — FLURBIPROFEN SODIUM 0.3 MG/ML
SOLUTION/ DROPS OPHTHALMIC
Qty: 100 EACH | Refills: 3 | Status: SHIPPED | OUTPATIENT
Start: 2023-08-07

## 2023-08-07 ASSESSMENT — ASTHMA QUESTIONNAIRES
QUESTION_5 LAST FOUR WEEKS HOW WOULD YOU RATE YOUR ASTHMA CONTROL: COMPLETELY CONTROLLED
ACT_TOTALSCORE: 25
QUESTION_1 LAST FOUR WEEKS HOW MUCH OF THE TIME DID YOUR ASTHMA KEEP YOU FROM GETTING AS MUCH DONE AT WORK, SCHOOL OR AT HOME: NONE OF THE TIME
QUESTION_4 LAST FOUR WEEKS HOW OFTEN HAVE YOU USED YOUR RESCUE INHALER OR NEBULIZER MEDICATION (SUCH AS ALBUTEROL): NOT AT ALL
QUESTION_2 LAST FOUR WEEKS HOW OFTEN HAVE YOU HAD SHORTNESS OF BREATH: NOT AT ALL
QUESTION_3 LAST FOUR WEEKS HOW OFTEN DID YOUR ASTHMA SYMPTOMS (WHEEZING, COUGHING, SHORTNESS OF BREATH, CHEST TIGHTNESS OR PAIN) WAKE YOU UP AT NIGHT OR EARLIER THAN USUAL IN THE MORNING: NOT AT ALL
ACT_TOTALSCORE: 25

## 2023-08-07 NOTE — PATIENT INSTRUCTIONS
Finish your Ozempic this week.    Next week start on Victoza 0.6 mg daily if tolerating this dose okay jump up in 1 week to 1.2 mg daily.    Your labs are processing, I will release results to [facility] once they are back.    I will see you back for your preop.  Please me know before then if anything comes up.

## 2023-08-07 NOTE — PROGRESS NOTES
"  Assessment & Plan     Type 2 diabetes mellitus with hyperglycemia, without long-term current use of insulin (H): His last A1c was 10.1%. He is on his last dose of Ozempic tomorrow. Will switch over to Victoza. Continue working on diet and exercise. Will check labs today.   - Albumin Random Urine Quantitative with Creat Ratio  - Basic metabolic panel  (Ca, Cl, CO2, Creat, Gluc, K, Na, BUN)  - Hemoglobin A1c  - liraglutide (VICTOZA) 18 MG/3ML solution  Dispense: 3 mL; Refill: 3  - insulin pen needle ([employer]I) 31G X 6 MM miscellaneous  Dispense: 100 each; Refill: 3    Essential hypertension, benign: Blood pressure today was 120/76. He continues on Losartan. Stable.   - Basic metabolic panel  (Ca, Cl, CO2, Creat, Gluc, K, Na, BUN)    Low testosterone/Male hypogonadism/Klinefelter's syndrome: Fatigue has improved greatly, no aggression symptoms, will recheck his testosterone.   - Testosterone Free and Total    Elevated serum creatinine: Will recheck his labs today.   - Albumin Random Urine Quantitative with Creat Ratio  - Basic metabolic panel  (Ca, Cl, CO2, Creat, Gluc, K, Na, BUN)    Screening for endocrine, nutritional, metabolic and immunity disorder:  - CBC with platelets and differential       BMI:   Estimated body mass index is 32.43 kg/m  as calculated from the following:    Height as of this encounter: 1.778 m (5' 10\").    Weight as of this encounter: 102.5 kg (226 lb).   Weight management plan: Discussed healthy diet and exercise guidelines    Ronda Hawk CNP  [facility]    Salvador Batista is a 73 year old, presenting for the following health issues:  Follow Up (3 month follow up)        5/5/2023    10:43 AM   Additional Questions   Roomed by Madhuri MORROW       History of Present Illness       Diabetes:   He presents for follow up of diabetes.  He is checking home blood glucose one time daily.   He checks blood glucose before meals.  Blood glucose is never over 200 " "and never under 70. He is aware of hypoglycemia symptoms including none.    He has no concerns regarding his diabetes at this time.  He is having numbness in feet.  The patient has had a diabetic eye exam in the last 12 months. Eye exam performed on July 2023. [place] of [place].        Hypertension: He presents for follow up of hypertension.  He does check blood pressure  regularly outside of the clinic. Outpatient blood pressures have not been over 140/90. He does not follow a low salt diet.     He eats 0-1 servings of fruits and vegetables daily.He consumes 1 sweetened beverage(s) daily.He exercises with enough effort to increase his heart rate 10 to 19 minutes per day.  He exercises with enough effort to increase his heart rate 3 or less days per week.   He is taking medications regularly.     The patient presents today for a three month follow up.    He has one dose of Ozempic left, but a refill of this medication will cost almost $300.00. Will see if Victoza is cheaper for him.    He reports that yesterday after eating at [employer], he thinks he may have gotten some food poisoning. He reports nausea and vomiting, diarrhea, all of which have subsided.    He reports that he has had less fatigue since starting on the testosterone. He has been tolerating this well. Will recheck his levels today.    He will be having cataract surgery in the next couple of weeks, preop appointment is already set up.      Review of Systems   Constitutional, HEENT, cardiovascular, pulmonary, GI, , musculoskeletal, neuro, skin, endocrine and psych systems are negative, except as otherwise noted.      Objective    /76 (BP Location: Right arm, Patient Position: Sitting)   Temp 97.9  F (36.6  C)   Ht 1.778 m (5' 10\")   Wt 102.5 kg (226 lb)   BMI 32.43 kg/m    Body mass index is 32.43 kg/m .  Physical Exam   GENERAL: healthy, alert and no distress  EYES: Eyes grossly normal to inspection  RESP: lungs clear to " auscultation - no rales, rhonchi or wheezes  CV: regular rate and rhythm, normal S1 S2, no S3 or S4, no murmur, click or rub, no peripheral edema  MS: no gross musculoskeletal defects noted  SKIN: no suspicious lesions or rashes  NEURO: Normal strength and tone, mentation intact and speech normal  PSYCH: mentation appears normal, affect normal/bright

## 2023-08-08 LAB — SHBG SERPL-SCNC: 17 NMOL/L (ref 11–80)

## 2023-08-10 LAB
TESTOST FREE SERPL-MCNC: 8.72 NG/DL
TESTOST SERPL-MCNC: 318 NG/DL (ref 240–950)

## 2023-08-11 ENCOUNTER — TRANSFERRED RECORDS (OUTPATIENT)
Dept: HEALTH INFORMATION MANAGEMENT | Facility: CLINIC | Age: 73
End: 2023-08-11
Payer: COMMERCIAL

## 2023-08-13 ASSESSMENT — ASTHMA QUESTIONNAIRES: ACT_TOTALSCORE: 25

## 2023-08-14 ENCOUNTER — OFFICE VISIT (OUTPATIENT)
Dept: INTERNAL MEDICINE | Facility: CLINIC | Age: 73
End: 2023-08-14
Payer: COMMERCIAL

## 2023-08-14 VITALS
RESPIRATION RATE: 16 BRPM | HEART RATE: 105 BPM | BODY MASS INDEX: 32.5 KG/M2 | DIASTOLIC BLOOD PRESSURE: 62 MMHG | HEIGHT: 70 IN | OXYGEN SATURATION: 94 % | WEIGHT: 227 LBS | TEMPERATURE: 97.6 F | SYSTOLIC BLOOD PRESSURE: 100 MMHG

## 2023-08-14 DIAGNOSIS — E87.6 HYPOKALEMIA: ICD-10-CM

## 2023-08-14 DIAGNOSIS — H25.9 AGE-RELATED CATARACT OF BOTH EYES, UNSPECIFIED AGE-RELATED CATARACT TYPE: ICD-10-CM

## 2023-08-14 DIAGNOSIS — E11.65 TYPE 2 DIABETES MELLITUS WITH HYPERGLYCEMIA, WITHOUT LONG-TERM CURRENT USE OF INSULIN (H): ICD-10-CM

## 2023-08-14 DIAGNOSIS — J45.30 MILD PERSISTENT ASTHMA WITHOUT COMPLICATION: ICD-10-CM

## 2023-08-14 DIAGNOSIS — Q98.4 KLINEFELTER'S SYNDROME: ICD-10-CM

## 2023-08-14 DIAGNOSIS — D64.9 ANEMIA, UNSPECIFIED TYPE: ICD-10-CM

## 2023-08-14 DIAGNOSIS — E78.00 PURE HYPERCHOLESTEROLEMIA: ICD-10-CM

## 2023-08-14 DIAGNOSIS — I10 ESSENTIAL HYPERTENSION, BENIGN: ICD-10-CM

## 2023-08-14 DIAGNOSIS — Z01.818 PREOPERATIVE EXAMINATION: Primary | ICD-10-CM

## 2023-08-14 LAB
ANION GAP SERPL CALCULATED.3IONS-SCNC: 14 MMOL/L (ref 7–15)
BASOPHILS # BLD AUTO: 0 10E3/UL (ref 0–0.2)
BASOPHILS NFR BLD AUTO: 0 %
BUN SERPL-MCNC: 15.9 MG/DL (ref 8–23)
CALCIUM SERPL-MCNC: 9.3 MG/DL (ref 8.8–10.2)
CHLORIDE SERPL-SCNC: 101 MMOL/L (ref 98–107)
CREAT SERPL-MCNC: 1.25 MG/DL (ref 0.67–1.17)
DEPRECATED HCO3 PLAS-SCNC: 24 MMOL/L (ref 22–29)
EOSINOPHIL # BLD AUTO: 0.9 10E3/UL (ref 0–0.7)
EOSINOPHIL NFR BLD AUTO: 8 %
ERYTHROCYTE [DISTWIDTH] IN BLOOD BY AUTOMATED COUNT: 14.4 % (ref 10–15)
FERRITIN SERPL-MCNC: 374 NG/ML (ref 31–409)
GFR SERPL CREATININE-BSD FRML MDRD: 60 ML/MIN/1.73M2
GLUCOSE SERPL-MCNC: 187 MG/DL (ref 70–99)
HCT VFR BLD AUTO: 33.9 % (ref 40–53)
HGB BLD-MCNC: 11 G/DL (ref 13.3–17.7)
IMM GRANULOCYTES # BLD: 0 10E3/UL
IMM GRANULOCYTES NFR BLD: 0 %
IRON BINDING CAPACITY (ROCHE): 215 UG/DL (ref 240–430)
IRON SATN MFR SERPL: 16 % (ref 15–46)
IRON SERPL-MCNC: 34 UG/DL (ref 61–157)
LYMPHOCYTES # BLD AUTO: 2.5 10E3/UL (ref 0.8–5.3)
LYMPHOCYTES NFR BLD AUTO: 22 %
MCH RBC QN AUTO: 29.8 PG (ref 26.5–33)
MCHC RBC AUTO-ENTMCNC: 32.4 G/DL (ref 31.5–36.5)
MCV RBC AUTO: 92 FL (ref 78–100)
MONOCYTES # BLD AUTO: 1 10E3/UL (ref 0–1.3)
MONOCYTES NFR BLD AUTO: 8 %
NEUTROPHILS # BLD AUTO: 7.1 10E3/UL (ref 1.6–8.3)
NEUTROPHILS NFR BLD AUTO: 62 %
PLATELET # BLD AUTO: 275 10E3/UL (ref 150–450)
POTASSIUM SERPL-SCNC: 3.7 MMOL/L (ref 3.4–5.3)
RBC # BLD AUTO: 3.69 10E6/UL (ref 4.4–5.9)
SODIUM SERPL-SCNC: 139 MMOL/L (ref 136–145)
VIT B12 SERPL-MCNC: 516 PG/ML (ref 232–1245)
WBC # BLD AUTO: 11.5 10E3/UL (ref 4–11)

## 2023-08-14 PROCEDURE — 83540 ASSAY OF IRON: CPT | Performed by: NURSE PRACTITIONER

## 2023-08-14 PROCEDURE — 99214 OFFICE O/P EST MOD 30 MIN: CPT | Performed by: NURSE PRACTITIONER

## 2023-08-14 PROCEDURE — 36415 COLL VENOUS BLD VENIPUNCTURE: CPT | Performed by: NURSE PRACTITIONER

## 2023-08-14 PROCEDURE — 80048 BASIC METABOLIC PNL TOTAL CA: CPT | Performed by: NURSE PRACTITIONER

## 2023-08-14 PROCEDURE — 83550 IRON BINDING TEST: CPT | Performed by: NURSE PRACTITIONER

## 2023-08-14 PROCEDURE — 82728 ASSAY OF FERRITIN: CPT | Performed by: NURSE PRACTITIONER

## 2023-08-14 PROCEDURE — 85025 COMPLETE CBC W/AUTO DIFF WBC: CPT | Performed by: NURSE PRACTITIONER

## 2023-08-14 PROCEDURE — 82607 VITAMIN B-12: CPT | Performed by: NURSE PRACTITIONER

## 2023-08-14 NOTE — PROGRESS NOTES
[facility]  [address] [address]  Phone: [phone]  Fax: [phone]  Primary Provider: Vinod Yang  Pre-op Performing Provider: VINOD YANG    PREOPERATIVE EVALUATION:  Today's date: 8/14/2023    Lester Shi is a 73 year old male who presents for a preoperative evaluation.      8/14/2023    12:43 PM   Additional Questions   Roomed by Madhuri MORROW       Surgical Information:  Surgery/Procedure: Cataract surgery   Surgery Location: [facility]  Surgeon: Dr Pringle  Surgery Date: 8/24/23  Time of Surgery:   Where patient plans to recover: At home with family  Fax number for surgical facility: [facility]    Assessment & Plan     The proposed surgical procedure is considered LOW risk.    Preoperative examination: Completed today. Labs drawn, WBC is elevated mildly at 11.5; with no shifting in immature cells, likely related to recent gastroenteritis, asymptomatic currently. Not concerning. Hemoglobin is stable at 11. Iron and B12 levels are processing. He will hold his metformin the night before surgery, AM of. EKG is not needed.    Age-related cataract of both eyes, unspecified age-related cataract type: Bilateral cataracts to be removed.     Essential hypertension, benign:  Blood pressure continues to run on the lower side, today in office was 100/62. Discontinued his Norvasc. He will check blood pressures at home, goals are less than 140/90; greater than 100/60. Follow up in 8 weeks. He continues on hydrochlorothiazide and Losartan.     Anemia, unspecified type: Hemoglobin was 11.0. Iron and B12 is processing.   - CBC with platelets and differential  - Iron and iron binding capacity  - Ferritin  - Vitamin B12    Hypokalemia: Low potassium while in the ER, will recheck levels.   - Basic metabolic panel  (Ca, Cl, CO2, Creat, Gluc, K, Na, BUN)    Type 2 diabetes mellitus with hyperglycemia, without long-term current use of insulin (H): Most recent A1c was  7.3%. He will start the Victoza after surgery. Metformin will be held the night before, AM of surgery. Continue working on diet and exercise.     Klinefelter's syndrome: Is on testosterone replacement.     Mild persistent asthma without complication: Managed with inhalers and nebs. Is on allegra. Eosinophils were elevated on CBC, related to allergies.     Essential Hypercholesterolemia: Continues on Rosuvastatin. Stable.              Risks and Recommendations:  The patient has the following additional risks and recommendations for perioperative complications:  Diabetes:  - Patient is not on insulin therapy: regular NPO guidelines can be followed.     Antiplatelet or Anticoagulation Medication Instructions:   - Patient is on no antiplatelet or anticoagulation medications.    Additional Medication Instructions:   - metformin: HOLD day of surgery.    RECOMMENDATION:  APPROVAL GIVEN to proceed with proposed procedure, without further diagnostic evaluation.      Subjective       HPI related to upcoming procedure: The patient presents today for a preoperative examination.    He reports being up at a friend's cabin, and was seen at the [place] ER for gastroenteritis, which has resolved. He had low potassium while there, will recheck this. His stools are becoming more formed, no nausea or vomiting.    He will be having cataract surgery done.    He would like to be a full code.    He does have a history of a left total knee replacement in 2013.    Blood pressures have been on the lower side, will stop norvasc. He will monitor his pressures at home and update provider if above or below goals.    He denies any dizziness, lightheadedness.     He denies an other concerns today.    He does have a chronic history of anemia, B12 and Iron deficient, will recheck hemoglobin today. He did not tolerate oral iron well.    He has not started the victoza yet, will once surgery is over with.        8/13/2023     6:16 PM   Preop  Questions   1. Have you ever had a heart attack or stroke? No   2. Have you ever had surgery on your heart or blood vessels, such as a stent placement, a coronary artery bypass, or surgery on an artery in your head, neck, heart, or legs? No   3. Do you have chest pain with activity? No   4. Do you have a history of  heart failure? No   5. Do you currently have a cold, bronchitis or symptoms of other infection? No   6. Do you have a cough, shortness of breath, or wheezing? No   7. Do you or anyone in your family have previous history of blood clots? No   8. Do you or does anyone in your family have a serious bleeding problem such as prolonged bleeding following surgeries or cuts? No   9. Have you ever had problems with anemia or been told to take iron pills? YES - Years ago   10. Have you had any abnormal blood loss such as black, tarry or bloody stools? No   11. Have you ever had a blood transfusion? No   12. Are you willing to have a blood transfusion if it is medically needed before, during, or after your surgery? Yes   13. Have you or any of your relatives ever had problems with anesthesia? No   14. Do you have sleep apnea, excessive snoring or daytime drowsiness? No   15. Do you have any artifical heart valves or other implanted medical devices like a pacemaker, defibrillator, or continuous glucose monitor? No   16. Do you have artificial joints? YES - Left total knee 2013   17. Are you allergic to latex? No       Health Care Directive:  Patient does not have a Health Care Directive or Living Will: Full Code    Preoperative Review of :   reviewed - no record of controlled substances prescribed.    Review of Systems  CONSTITUTIONAL: NEGATIVE for fever, chills, change in weight  INTEGUMENTARY/SKIN: NEGATIVE for worrisome rashes, moles or lesions  EYES: NEGATIVE for vision changes or irritation  ENT/MOUTH: NEGATIVE for ear, mouth and throat problems  RESP: NEGATIVE for significant cough or SOB  CV: NEGATIVE  for chest pain, palpitations or peripheral edema  GI: NEGATIVE for nausea, abdominal pain, heartburn, or change in bowel habits  : NEGATIVE for frequency, dysuria, or hematuria  MUSCULOSKELETAL: NEGATIVE for significant arthralgias or myalgia  NEURO: NEGATIVE for weakness, dizziness or paresthesias  ENDOCRINE: NEGATIVE for temperature intolerance, skin/hair changes  HEME: NEGATIVE for bleeding problems  PSYCHIATRIC: NEGATIVE for changes in mood or affect    Patient Active Problem List    Diagnosis Date Noted    Hyperlipidemia, unspecified hyperlipidemia type 10/07/2022     Priority: Medium    Lung consolidation (H) 07/26/2021     Priority: Medium    Lesion of right native kidney 07/26/2021     Priority: Medium    Nodule of right lung 05/18/2021     Priority: Medium    Squamous cell carcinoma of skin of face 07/20/2020     Priority: Medium     Right ear        Status post total left knee replacement 07/20/2020     Priority: Medium     2013 replacement        Mild persistent asthma with exacerbation 03/27/2020     Priority: Medium    Spinal stenosis of lumbar region with neurogenic claudication 03/27/2020     Priority: Medium    Sepsis (H) 10/29/2019     Priority: Medium    Abdominal pain, left upper quadrant 10/29/2019     Priority: Medium    Essential hypertension, benign      Priority: Medium     Created by Conversion  Replacement Utility updated for latest IMO load      Formatting of this note might be different from the original.  Created by Conversion    Replacement Utility updated for latest IMO load      Dizziness 03/29/2019     Priority: Medium    Type 2 diabetes mellitus with diabetic cataract, with long-term current use of insulin (H) 02/27/2019     Priority: Medium     Created by Conversion  IMO SNOMED LOAD SPRING 2020 [.6/.18/.2020 9:04 PM]  Joss Chawla:        Formatting of this note might be different from the original.  Created by Conversion    IMO SNOMED LOAD SPRING 2020 [.6/.18/.2020 9:04 PM]   Joss Chawla:      Pseudophakia of left eye 02/27/2019     Priority: Medium    Regular astigmatism of both eyes 02/27/2019     Priority: Medium    Encounter for examination following treatment at hospital 02/18/2019     Priority: Medium    Pneumonia of both lungs due to infectious organism 02/09/2019     Priority: Medium    Acute kidney injury (H)      Priority: Medium    Mild persistent asthma with acute exacerbation 12/04/2018     Priority: Medium    Anemia      Priority: Medium     Created by Conversion        Otitis Media      Priority: Medium     Created by Conversion  Replacement Utility updated for latest IMO load        Allergic Rhinitis      Priority: Medium     Created by Conversion  Replacement Utility updated for latest IMO load        Varicose vein 09/28/2015     Priority: Medium    Essential Hypercholesterolemia      Priority: Medium     Created by Conversion        Eczema      Priority: Medium     Created by Conversion        Joint Pain, Localized In The Knee      Priority: Medium     Created by Conversion        Obesity      Priority: Medium     Created by Conversion        Klinefelter's Syndrome      Priority: Medium     Created by Conversion          Past Medical History:   Diagnosis Date    Anemia, unspecified     Created by Conversion     Cataract     left eye removed, present in right eye    Chronic osteoarthritis     right knee    Colon polyps     Contact dermatitis and other eczema, due to unspecified cause     Created by Conversion     Diabetes mellitus, type 2 (H)     Klinefelter's syndrome     Created by Conversion     Obesity, unspecified     Created by Conversion     Pure hypercholesterolemia     Created by Conversion     Type II or unspecified type diabetes mellitus without mention of complication, not stated as uncontrolled     Created by Conversion     Uncomplicated asthma     adult asthma- wheezing    Unspecified essential hypertension     Created by Conversion     Varicose vein  9/28/2015     Past Surgical History:   Procedure Laterality Date    CATARACT EXTRACTION Left 2000    DENTAL SURGERY  1985    JOINT REPLACEMENT      left knee TKA 11/2013     Current Outpatient Medications   Medication Sig Dispense Refill    albuterol (PROAIR HFA/PROVENTIL HFA/VENTOLIN HFA) 108 (90 Base) MCG/ACT inhaler Inhale 2 puffs into the lungs every 6 hours as needed for wheezing 18 g 0    albuterol (PROVENTIL) (2.5 MG/3ML) 0.083% neb solution Take 1 vial (2.5 mg) by nebulization every 6 hours as needed for shortness of breath, wheezing or cough 90 mL 0    atenolol (TENORMIN) 100 MG tablet Take 1 tablet (100 mg) by mouth daily 90 tablet 3    azelastine (ASTELIN) 0.1 % nasal spray 2 sprays each nostril 1-2x daily as needed for nasal congestion (use nightly for first 2 week) 30 mL 11    blood glucose (ACCU-CHEK GUIDE) test strip 1 strip by In Vitro route 2 times daily Use to test blood sugar 2 times daily or as directed. 200 strip 3    fexofenadine (ALLEGRA) 180 MG tablet [FEXOFENADINE (ALLEGRA) 180 MG TABLET] Take 180 mg by mouth daily.      fluticasone (FLONASE) 50 mcg/actuation nasal spray [FLUTICASONE (FLONASE) 50 MCG/ACTUATION NASAL SPRAY] Apply 1 spray into each nostril daily as needed for rhinitis.      fluticasone (FLOVENT HFA) 110 MCG/ACT inhaler Inhale 1 puff into the lungs 2 times daily 12 g 11    hydrochlorothiazide (HYDRODIURIL) 25 MG tablet Take 1 tablet (25 mg) by mouth every morning 90 tablet 3    insulin pen needle (ULTICARE MINI) 31G X 6 MM miscellaneous Use 1 pen needles daily or as directed. 100 each 3    losartan (COZAAR) 100 MG tablet Take 1 tablet (100 mg) by mouth daily 90 tablet 3    metFORMIN (GLUCOPHAGE XR) 500 MG 24 hr tablet Take 4 tablets (2,000 mg) by mouth daily 360 tablet 3    rosuvastatin (CRESTOR) 20 MG tablet Take 1 tablet (20 mg) by mouth daily 90 tablet 3    testosterone (ANDROGEL/TESTIM) 50 MG/5GM (1%) topical gel Place 1 packet (50 mg of testosterone) onto the skin daily  "Apply to the clean, dry intact skin of the shoulders, upper arms or abdomen. 90 packet 3    triamcinolone (KENALOG) 0.1 % cream [TRIAMCINOLONE (KENALOG) 0.1 % CREAM] APPLY EXTERNALLY TO THE AFFECTED AREA TWICE DAILY FOR 14 DAYS 45 g 0    liraglutide (VICTOZA) 18 MG/3ML solution Inject 0.6 mg Subcutaneous daily (Patient not taking: Reported on 2023) 3 mL 3       Allergies   Allergen Reactions    Amoxicillin Anaphylaxis    Codeine Nausea and Vomiting    Sulfamethoxazole-Trimethoprim [Sulfamethoxazole-Trimethoprim] Nausea and Vomiting     Tachycardia      Erythromycin Base [Erythromycin] Unknown    Simvastatin Unknown        Social History     Tobacco Use    Smoking status: Former     Packs/day: 2.00     Years: 12.00     Pack years: 24.00     Types: Cigarettes     Quit date: 1979     Years since quittin.6    Smokeless tobacco: Never   Substance Use Topics    Alcohol use: Yes     Comment: Alcoholic Drinks/day: rare     Family History   Problem Relation Age of Onset    Cerebrovascular Disease Mother     Clotting Disorder Father     Heart Disease Brother      History   Drug Use No         Objective     /62 (BP Location: Right arm, Patient Position: Sitting)   Pulse 105   Temp 97.6  F (36.4  C)   Resp 16   Ht 1.778 m (5' 10\")   Wt 103 kg (227 lb)   SpO2 94%   BMI 32.57 kg/m      Physical Exam    GENERAL APPEARANCE: healthy, alert and no distress     EYES: EOMI,  PERRL     HENT: ear canals and TM's normal and nose and mouth without ulcers or lesions     NECK: no adenopathy, no asymmetry, masses, or scars and thyroid normal to palpation     RESP: lungs clear to auscultation - no rales, rhonchi or wheezes     CV: regular rates and rhythm, normal S1 S2, no S3 or S4 and no murmur, click or rub     ABDOMEN:  soft, nontender, no HSM or masses and bowel sounds normal     MS: extremities normal- no gross deformities noted, no evidence of inflammation in joints, FROM in all extremities.     SKIN: no " suspicious lesions or rashes     NEURO: Normal strength and tone, sensory exam grossly normal, mentation intact and speech normal     PSYCH: mentation appears normal. and affect normal/bright     LYMPHATICS: No cervical adenopathy    Recent Labs   Lab Test 08/07/23  0951 06/05/23  1131 05/05/23  1124   HGB 11.8*  --  10.8*     --  227    138 140   POTASSIUM 3.3* 4.0 3.4   CR 1.21* 0.99 1.48*   A1C 7.3*  --  10.1*        Diagnostics:  Recent Results (from the past 24 hour(s))   CBC with platelets and differential    Collection Time: 08/14/23  1:17 PM   Result Value Ref Range    WBC Count 11.5 (H) 4.0 - 11.0 10e3/uL    RBC Count 3.69 (L) 4.40 - 5.90 10e6/uL    Hemoglobin 11.0 (L) 13.3 - 17.7 g/dL    Hematocrit 33.9 (L) 40.0 - 53.0 %    MCV 92 78 - 100 fL    MCH 29.8 26.5 - 33.0 pg    MCHC 32.4 31.5 - 36.5 g/dL    RDW 14.4 10.0 - 15.0 %    Platelet Count 275 150 - 450 10e3/uL    % Neutrophils 62 %    % Lymphocytes 22 %    % Monocytes 8 %    % Eosinophils 8 %    % Basophils 0 %    % Immature Granulocytes 0 %    Absolute Neutrophils 7.1 1.6 - 8.3 10e3/uL    Absolute Lymphocytes 2.5 0.8 - 5.3 10e3/uL    Absolute Monocytes 1.0 0.0 - 1.3 10e3/uL    Absolute Eosinophils 0.9 (H) 0.0 - 0.7 10e3/uL    Absolute Basophils 0.0 0.0 - 0.2 10e3/uL    Absolute Immature Granulocytes 0.0 <=0.4 10e3/uL      No EKG required for low risk surgery (cataract, skin procedure, breast biopsy, etc).    Revised Cardiac Risk Index (RCRI):  The patient has the following serious cardiovascular risks for perioperative complications:   - No serious cardiac risks = 0 points     RCRI Interpretation: 0 points: Class I (very low risk - 0.4% complication rate)         Signed Electronically by: Ronda Hawk CNP  Copy of this evaluation report is provided to requesting physician.

## 2023-08-14 NOTE — PATIENT INSTRUCTIONS
Hold the Metformin the AM and night before surgery.    Stop the Norvasc or amlodipine.    Daily blood pressures 1 hour after morning blood pressure medications.  Goals for your blood pressure are less than 140/90, greater than 100/60.  Please update me if out of range.    We will plan on seeing you back in about 2 months for recheck of your blood pressure, bring readings with you.    Start the Victoza after your cataract surgeries.    Please let me know before then if anything comes up.

## 2023-08-18 ENCOUNTER — MYC MEDICAL ADVICE (OUTPATIENT)
Dept: INTERNAL MEDICINE | Facility: CLINIC | Age: 73
End: 2023-08-18
Payer: COMMERCIAL

## 2023-09-29 ENCOUNTER — TRANSFERRED RECORDS (OUTPATIENT)
Dept: HEALTH INFORMATION MANAGEMENT | Facility: CLINIC | Age: 73
End: 2023-09-29
Payer: COMMERCIAL

## 2023-10-18 ENCOUNTER — HOSPITAL ENCOUNTER (OUTPATIENT)
Facility: CLINIC | Age: 73
End: 2023-10-18
Attending: ORTHOPAEDIC SURGERY | Admitting: ORTHOPAEDIC SURGERY
Payer: COMMERCIAL

## 2023-10-20 ENCOUNTER — ANCILLARY PROCEDURE (OUTPATIENT)
Dept: GENERAL RADIOLOGY | Facility: CLINIC | Age: 73
End: 2023-10-20
Attending: NURSE PRACTITIONER
Payer: COMMERCIAL

## 2023-10-20 ENCOUNTER — OFFICE VISIT (OUTPATIENT)
Dept: INTERNAL MEDICINE | Facility: CLINIC | Age: 73
End: 2023-10-20
Payer: COMMERCIAL

## 2023-10-20 VITALS
TEMPERATURE: 97.8 F | WEIGHT: 224 LBS | HEIGHT: 70 IN | BODY MASS INDEX: 32.07 KG/M2 | OXYGEN SATURATION: 92 % | DIASTOLIC BLOOD PRESSURE: 78 MMHG | SYSTOLIC BLOOD PRESSURE: 124 MMHG | HEART RATE: 98 BPM | RESPIRATION RATE: 16 BRPM

## 2023-10-20 DIAGNOSIS — E11.65 TYPE 2 DIABETES MELLITUS WITH HYPERGLYCEMIA, WITHOUT LONG-TERM CURRENT USE OF INSULIN (H): ICD-10-CM

## 2023-10-20 DIAGNOSIS — R07.9 CHEST PAIN, UNSPECIFIED TYPE: ICD-10-CM

## 2023-10-20 DIAGNOSIS — J45.31 MILD PERSISTENT ASTHMA WITH EXACERBATION: ICD-10-CM

## 2023-10-20 DIAGNOSIS — R06.02 SOB (SHORTNESS OF BREATH): ICD-10-CM

## 2023-10-20 DIAGNOSIS — J45.30 MILD PERSISTENT ASTHMA WITHOUT COMPLICATION: ICD-10-CM

## 2023-10-20 DIAGNOSIS — H61.23 BILATERAL IMPACTED CERUMEN: ICD-10-CM

## 2023-10-20 DIAGNOSIS — I10 ESSENTIAL HYPERTENSION, BENIGN: ICD-10-CM

## 2023-10-20 DIAGNOSIS — J22 LOWER RESPIRATORY INFECTION: ICD-10-CM

## 2023-10-20 PROCEDURE — 93010 ELECTROCARDIOGRAM REPORT: CPT | Performed by: INTERNAL MEDICINE

## 2023-10-20 PROCEDURE — 93005 ELECTROCARDIOGRAM TRACING: CPT | Mod: 59 | Performed by: NURSE PRACTITIONER

## 2023-10-20 PROCEDURE — 69209 REMOVE IMPACTED EAR WAX UNI: CPT | Mod: 50 | Performed by: NURSE PRACTITIONER

## 2023-10-20 PROCEDURE — 71046 X-RAY EXAM CHEST 2 VIEWS: CPT | Mod: TC | Performed by: RADIOLOGY

## 2023-10-20 PROCEDURE — 99214 OFFICE O/P EST MOD 30 MIN: CPT | Mod: 25 | Performed by: NURSE PRACTITIONER

## 2023-10-20 RX ORDER — DOXYCYCLINE 100 MG/1
100 CAPSULE ORAL 2 TIMES DAILY
Qty: 20 CAPSULE | Refills: 0 | Status: SHIPPED | OUTPATIENT
Start: 2023-10-20 | End: 2023-11-11

## 2023-10-20 RX ORDER — CETIRIZINE HYDROCHLORIDE 10 MG/1
10 TABLET ORAL DAILY PRN
COMMUNITY
End: 2024-04-04

## 2023-10-20 RX ORDER — FLUTICASONE PROPIONATE 50 MCG
1 SPRAY, SUSPENSION (ML) NASAL DAILY PRN
Qty: 16 G | Refills: 3 | Status: ON HOLD | OUTPATIENT
Start: 2023-10-20 | End: 2023-12-20

## 2023-10-20 RX ORDER — ALBUTEROL SULFATE 90 UG/1
2 AEROSOL, METERED RESPIRATORY (INHALATION) EVERY 6 HOURS PRN
Qty: 18 G | Refills: 0 | Status: SHIPPED | OUTPATIENT
Start: 2023-10-20 | End: 2023-11-10

## 2023-10-20 RX ORDER — FAMOTIDINE 20 MG/1
20 TABLET, FILM COATED ORAL 2 TIMES DAILY PRN
COMMUNITY
Start: 2023-06-05

## 2023-10-20 RX ORDER — PREDNISONE 20 MG/1
40 TABLET ORAL DAILY
Qty: 10 TABLET | Refills: 0 | Status: SHIPPED | OUTPATIENT
Start: 2023-10-20 | End: 2023-11-11

## 2023-10-20 RX ORDER — ALBUTEROL SULFATE 0.83 MG/ML
2.5 SOLUTION RESPIRATORY (INHALATION) EVERY 6 HOURS PRN
Qty: 90 ML | Refills: 0 | Status: ON HOLD | OUTPATIENT
Start: 2023-10-20 | End: 2023-12-18

## 2023-10-20 NOTE — PROGRESS NOTES
Patient identified using two patient identifiers.  Ear exam showing wax occlusion completed by provider.  Solution: warm water was placed in the bilateral ear(s) via irrigation tool: elephant ear.   Assessment & Plan     Lower respiratory infection: Started 2-3 weeks ago, is now short of breath and very wheezy. Decreased effort with breathing. X-ray read by provider shows bilateral, right worse than left inflammation, possible start of pneumonia. Will treat with prednisone and doxycycline. Rest, push fluids. Nebs four times per day. Follow up if symptoms persist or worsen.   - predniSONE (DELTASONE) 20 MG tablet  Dispense: 10 tablet; Refill: 0  - doxycycline hyclate (VIBRAMYCIN) 100 MG capsule  Dispense: 20 capsule; Refill: 0    Mild persistent asthma without complication: Refilled medications today.   - albuterol (PROAIR HFA/PROVENTIL HFA/VENTOLIN HFA) 108 (90 Base) MCG/ACT inhaler  Dispense: 18 g; Refill: 0  - fluticasone (FLONASE) 50 MCG/ACT nasal spray  Dispense: 16 g; Refill: 3  - albuterol (PROVENTIL) (2.5 MG/3ML) 0.083% neb solution  Dispense: 90 mL; Refill: 0  - XR Chest 2 Views  - EKG 12-lead, tracing only    Chest pain, unspecified type/SOB (shortness of breath): One episode of chest pain lasting around 10 seconds. EKG read by provider showed a normal sinus rhythm, normal EKG. Chest x-ray as above. Likely was musculoskeletal in nature.   - XR Chest 2 Views  - EKG 12-lead, tracing only    Bilateral impacted cerumen: Removed via CA irrigation. Recheck of TM's was normal.   - REMOVE IMPACTED CERUMEN    Essential hypertension, benign: Blood pressure in office today was 124/78. He continues on losartan and hydrochlorothiazide. Stable.     Type 2 diabetes mellitus with hyperglycemia, without long-term current use of insulin (H): He restarted his Victoza one week ago. He keeps working on diet and exercise.       Ronda Hawk, NANCY  [facility]    Salvador Batista is a 73  year old, presenting for the following health issues:  Follow Up (Shortness, wheezing for 1 week )        10/20/2023     1:13 PM   Additional Questions   Roomed by Madhuri MORROW       History of Present Illness     Asthma:  He presents for follow up of asthma.  He has some cough, some wheezing, and some shortness of breath.  He is using a relief medication 2-3 times per day. He typically misses taking his controller medication 1 time(s) per week. Patient is aware of the following triggers: cold air. The patient has not had a visit to the Emergency Room, Urgent Care or Hospital due to asthma since the last clinic visit.     Diabetes:   He presents for follow up of diabetes.  He is checking home blood glucose one time daily.   He checks blood glucose before meals.  Blood glucose is sometimes over 200 and never under 70. He is aware of hypoglycemia symptoms including none.    He has no concerns regarding his diabetes at this time.   He is not experiencing numbness or burning in feet, excessive thirst, blurry vision, weight changes or redness, sores or blisters on feet.           Hypertension: He presents for follow up of hypertension.  He does not check blood pressure  regularly outside of the clinic. Outpatient blood pressures have not been over 140/90. He follows a low salt diet.     He eats 2-3 servings of fruits and vegetables daily.He consumes 1 sweetened beverage(s) daily.He exercises with enough effort to increase his heart rate 9 or less minutes per day.  He exercises with enough effort to increase his heart rate 3 or less days per week.   He is taking medications regularly.     The patient presents today with his wife for follow up.    He reports a 2-3 week course of cough and wheezing. He reports some shortness of breath that started over the past couple of days. He has been taking his nebulizer every 3 hours or so.    He also reports during his Ortho visit last week, he had a stabbing chest pain on the left side  "of his chest, that lasted about 10 seconds then it went away.    He will be having his left knee replaced 12/04/23. He will need a preop prior.    He reports that he started his Victoza last week.    Friday of last week, he did feel like he was running a fever, no issues since.    He denies any further chest pain, fever, chills, nausea, vomiting. His cough has been dry.     Review of Systems   Constitutional, HEENT, cardiovascular, pulmonary, GI, , musculoskeletal, neuro, skin, endocrine and psych systems are negative, except as otherwise noted.      Objective    /78 (BP Location: Right arm, Patient Position: Sitting)   Pulse 98   Temp 97.8  F (36.6  C)   Resp 16   Ht 1.778 m (5' 10\")   Wt 101.6 kg (224 lb)   SpO2 92%   BMI 32.14 kg/m    Body mass index is 32.14 kg/m .  Physical Exam   GENERAL: healthy, alert and no distress  EYES: Eyes grossly normal to inspection, PERRL and conjunctivae and sclerae normal  HENT: Bilateral ear canals occluded with cerumen, on recheck after irrigation, ear canals and TM's normal, nose and mouth without ulcers or lesions  NECK: no adenopathy, no asymmetry, masses, or scars and thyroid normal to palpation  RESP: Wheezes throughout all fields, decreased effort.   CV: regular rate and rhythm, normal S1 S2, no S3 or S4, no murmur, click or rub, no peripheral edema  MS: no gross musculoskeletal defects noted, no edema  SKIN: no suspicious lesions or rashes  NEURO: Normal strength and tone, mentation intact and speech normal  PSYCH: mentation appears normal, affect normal/bright        "

## 2023-10-20 NOTE — PATIENT INSTRUCTIONS
Doxycycline 100mg twice daily x 10 days. Prednisone 40mg daily x 5 days.    EKG was normal.    Rest, push fluids. Follow up if symptoms persist or worsen.    Robitussin DM cough syrup as needed.

## 2023-10-24 LAB
ATRIAL RATE - MUSE: 81 BPM
DIASTOLIC BLOOD PRESSURE - MUSE: NORMAL MMHG
INTERPRETATION ECG - MUSE: NORMAL
P AXIS - MUSE: 84 DEGREES
PR INTERVAL - MUSE: 194 MS
QRS DURATION - MUSE: 98 MS
QT - MUSE: 390 MS
QTC - MUSE: 453 MS
R AXIS - MUSE: 54 DEGREES
SYSTOLIC BLOOD PRESSURE - MUSE: NORMAL MMHG
T AXIS - MUSE: 54 DEGREES
VENTRICULAR RATE- MUSE: 81 BPM

## 2023-11-09 DIAGNOSIS — J45.30 MILD PERSISTENT ASTHMA WITHOUT COMPLICATION: ICD-10-CM

## 2023-11-10 RX ORDER — ALBUTEROL SULFATE 90 UG/1
2 AEROSOL, METERED RESPIRATORY (INHALATION) EVERY 6 HOURS PRN
Qty: 8.5 G | Refills: 1 | Status: SHIPPED | OUTPATIENT
Start: 2023-11-10 | End: 2024-04-29

## 2023-11-11 ENCOUNTER — ANCILLARY PROCEDURE (OUTPATIENT)
Dept: GENERAL RADIOLOGY | Facility: CLINIC | Age: 73
End: 2023-11-11
Attending: FAMILY MEDICINE
Payer: COMMERCIAL

## 2023-11-11 ENCOUNTER — OFFICE VISIT (OUTPATIENT)
Dept: FAMILY MEDICINE | Facility: CLINIC | Age: 73
End: 2023-11-11
Payer: COMMERCIAL

## 2023-11-11 VITALS
DIASTOLIC BLOOD PRESSURE: 100 MMHG | HEART RATE: 90 BPM | RESPIRATION RATE: 16 BRPM | SYSTOLIC BLOOD PRESSURE: 180 MMHG | TEMPERATURE: 97.7 F | OXYGEN SATURATION: 95 % | WEIGHT: 232.5 LBS | BODY MASS INDEX: 33.36 KG/M2

## 2023-11-11 DIAGNOSIS — J45.31 MILD PERSISTENT ASTHMA WITH ACUTE EXACERBATION: ICD-10-CM

## 2023-11-11 DIAGNOSIS — R05.1 ACUTE COUGH: ICD-10-CM

## 2023-11-11 DIAGNOSIS — R06.2 WHEEZING: ICD-10-CM

## 2023-11-11 DIAGNOSIS — J45.31 MILD PERSISTENT ASTHMA WITH ACUTE EXACERBATION: Primary | ICD-10-CM

## 2023-11-11 DIAGNOSIS — J22 LOWER RESPIRATORY INFECTION: ICD-10-CM

## 2023-11-11 PROCEDURE — 99214 OFFICE O/P EST MOD 30 MIN: CPT | Performed by: FAMILY MEDICINE

## 2023-11-11 PROCEDURE — 71046 X-RAY EXAM CHEST 2 VIEWS: CPT | Mod: TC | Performed by: RADIOLOGY

## 2023-11-11 RX ORDER — PREDNISONE 10 MG/1
TABLET ORAL
Qty: 40 TABLET | Refills: 0 | Status: SHIPPED | OUTPATIENT
Start: 2023-11-11 | End: 2023-11-27

## 2023-11-11 NOTE — PROGRESS NOTES
Clinical Decision Making:    At the end of the encounter, I discussed results, diagnosis, medications. Discussed red flags for immediate return to clinic/ER, as well as indications for follow up if no improvement. Patient understood and agreed to plan. Patient was stable for discharge.      ICD-10-CM    1. Mild persistent asthma with acute exacerbation  J45.31 XR Chest 2 Views     predniSONE (DELTASONE) 10 MG tablet      2. Acute cough  R05.1 XR Chest 2 Views     predniSONE (DELTASONE) 10 MG tablet      3. Wheezing  R06.2 XR Chest 2 Views     predniSONE (DELTASONE) 10 MG tablet        We will treat with a prednisone taper at this time  Continue flovent twice daily and albuterol nebs and inhaler as needed  Follow up if not improving as anticipated.        There are no Patient Instructions on file for this visit.   Return in about 1 week (around 11/18/2023), or if symptoms worsen or fail to improve.      chief complaint    HPI:  Lester Shi is a 73 year old male who presents today complaining of ongoing cough and shortness of breath.  He is got a chest cough and is needing to use his albuterol neb every 4-6 hours as well as increased use of his albuterol inhaler.  He is still using his Flovent twice daily.  On October 20 he was treated with prednisone 40 mg daily for 5 days as well as doxycycline treatment and he had a mild improvement but never got completely better.  Now the coughing and wheezing seems to be worse.  He did forget to take his blood pressure medication yesterday but took it today.  Also his blood pressure tends to go up after taking a neb which he did before coming to the clinic.  He has a surgery planned December 4  He is celebrating his 50th wedding anniversary this evening    History obtained from chart review and the patient.    Problem List:  2022-10: Hyperlipidemia, unspecified hyperlipidemia type  2021-07: Lung consolidation (H24)  2021-07: Lesion of right native kidney  2021-05: Nodule of  right lung  2020-07: Squamous cell carcinoma of skin of face  2020-07: Status post total left knee replacement  2020-03: Mild persistent asthma with exacerbation  2020-03: Spinal stenosis of lumbar region with neurogenic claudication  2019-10: Sepsis (H)  2019-10: Abdominal pain, left upper quadrant  2019-03: Dizziness  2019-02: Type 2 diabetes mellitus with diabetic cataract, with long-  term current use of insulin (H)  2019-02: Pseudophakia of left eye  2019-02: Regular astigmatism of both eyes  2019-02: Encounter for examination following treatment at hospital  2019-02: Uncontrolled type 2 diabetes mellitus with hyperglycemia (H)  2019-02: Uncontrolled type 1 diabetes mellitus with hyperglycemia (H)  2019-02: Pneumonia of both lungs due to infectious organism  2018-12: Mild persistent asthma with acute exacerbation  2015-09: Varicose vein  Eczema  Anemia  Otitis Media  Essential Hypercholesterolemia  Essential hypertension, benign  Joint Pain, Localized In The Knee  Obesity  Allergic Rhinitis  Klinefelter's Syndrome  Hyperglycemia  Acute kidney injury (H24)  Acute respiratory failure, unspecified whether with hypoxia or   hypercapnia (H)  Morbid obesity (H)      Past Medical History:   Diagnosis Date    Anemia, unspecified     Created by Conversion     Cataract     left eye removed, present in right eye    Chronic osteoarthritis     right knee    Colon polyps     Contact dermatitis and other eczema, due to unspecified cause     Created by Conversion     Diabetes mellitus, type 2 (H)     Klinefelter's syndrome     Created by Conversion     Obesity, unspecified     Created by Conversion     Pure hypercholesterolemia     Created by Conversion     Type II or unspecified type diabetes mellitus without mention of complication, not stated as uncontrolled     Created by Conversion     Uncomplicated asthma     adult asthma- wheezing    Unspecified essential hypertension     Created by Conversion     Varicose vein  2015       Social History     Tobacco Use    Smoking status: Former     Packs/day: 2.00     Years: 12.00     Additional pack years: 0.00     Total pack years: 24.00     Types: Cigarettes     Quit date: 1979     Years since quittin.8     Passive exposure: Never    Smokeless tobacco: Never   Substance Use Topics    Alcohol use: Yes     Comment: Alcoholic Drinks/day: rare       Review of systems  negative except listed in HPI    Vitals:    23 1020   BP: (!) 180/100   Pulse: 90   Resp: 16   Temp: 97.7  F (36.5  C)   TempSrc: Oral   SpO2: 95%   Weight: 105.5 kg (232 lb 8 oz)       Physical Exam  Vitals noted and within normal limits.  Patient is alert, oriented, and in no acute distress.  Eyes: Conjunctive not injected.  Ears: Canals patent, TMs intact, no erythema and no bulging.  Mouth: Mucous membranes pink and moist.  Pharynx is not erythematous.  Neck supple with no cervical lymphadenopathy.  Heart has a regular rate and rhythm   Lungs have mildly decreased air movement and positive wheezing throughout bilateral lung fields  Chest x-ray:no infiltrate  Results for orders placed or performed in visit on 23   XR Chest 2 Views     Status: None    Narrative    EXAM: XR CHEST 2 VIEWS  LOCATION: [facility]  DATE: 2023    INDICATION:  Acute cough, Wheezing, Mild persistent asthma with acute exacerbation  COMPARISON: 10/20/2023      Impression    IMPRESSION: No acute cardiopulmonary abnormality.

## 2023-11-14 RX ORDER — DOXYCYCLINE 100 MG/1
100 CAPSULE ORAL 2 TIMES DAILY
Qty: 20 CAPSULE | Refills: 0 | OUTPATIENT
Start: 2023-11-14

## 2023-11-14 RX ORDER — PREDNISONE 20 MG/1
40 TABLET ORAL DAILY
Qty: 10 TABLET | Refills: 0 | OUTPATIENT
Start: 2023-11-14

## 2023-11-27 ENCOUNTER — MYC MEDICAL ADVICE (OUTPATIENT)
Dept: INTERNAL MEDICINE | Facility: CLINIC | Age: 73
End: 2023-11-27

## 2023-11-27 ENCOUNTER — OFFICE VISIT (OUTPATIENT)
Dept: INTERNAL MEDICINE | Facility: CLINIC | Age: 73
End: 2023-11-27
Payer: COMMERCIAL

## 2023-11-27 ENCOUNTER — APPOINTMENT (OUTPATIENT)
Dept: LAB | Facility: CLINIC | Age: 73
End: 2023-11-27
Payer: COMMERCIAL

## 2023-11-27 VITALS
DIASTOLIC BLOOD PRESSURE: 76 MMHG | SYSTOLIC BLOOD PRESSURE: 140 MMHG | HEIGHT: 70 IN | WEIGHT: 230 LBS | TEMPERATURE: 97.8 F | OXYGEN SATURATION: 96 % | RESPIRATION RATE: 16 BRPM | HEART RATE: 79 BPM | BODY MASS INDEX: 32.93 KG/M2

## 2023-11-27 DIAGNOSIS — G89.29 CHRONIC PAIN OF RIGHT KNEE: ICD-10-CM

## 2023-11-27 DIAGNOSIS — E87.6 HYPOKALEMIA: ICD-10-CM

## 2023-11-27 DIAGNOSIS — J45.30 MILD PERSISTENT ASTHMA WITHOUT COMPLICATION: ICD-10-CM

## 2023-11-27 DIAGNOSIS — Z01.818 PREOPERATIVE EXAMINATION: Primary | ICD-10-CM

## 2023-11-27 DIAGNOSIS — E66.811 CLASS 1 OBESITY DUE TO EXCESS CALORIES WITH SERIOUS COMORBIDITY AND BODY MASS INDEX (BMI) OF 33.0 TO 33.9 IN ADULT: ICD-10-CM

## 2023-11-27 DIAGNOSIS — E66.09 CLASS 1 OBESITY DUE TO EXCESS CALORIES WITH SERIOUS COMORBIDITY AND BODY MASS INDEX (BMI) OF 33.0 TO 33.9 IN ADULT: ICD-10-CM

## 2023-11-27 DIAGNOSIS — M25.561 CHRONIC PAIN OF RIGHT KNEE: ICD-10-CM

## 2023-11-27 DIAGNOSIS — D64.9 LOW HEMOGLOBIN: ICD-10-CM

## 2023-11-27 DIAGNOSIS — I10 ESSENTIAL HYPERTENSION, BENIGN: ICD-10-CM

## 2023-11-27 DIAGNOSIS — E11.65 TYPE 2 DIABETES MELLITUS WITH HYPERGLYCEMIA, WITHOUT LONG-TERM CURRENT USE OF INSULIN (H): Primary | ICD-10-CM

## 2023-11-27 DIAGNOSIS — E78.00 PURE HYPERCHOLESTEROLEMIA: ICD-10-CM

## 2023-11-27 DIAGNOSIS — E11.65 TYPE 2 DIABETES MELLITUS WITH HYPERGLYCEMIA, WITHOUT LONG-TERM CURRENT USE OF INSULIN (H): ICD-10-CM

## 2023-11-27 DIAGNOSIS — K21.9 GASTROESOPHAGEAL REFLUX DISEASE WITHOUT ESOPHAGITIS: ICD-10-CM

## 2023-11-27 DIAGNOSIS — Q98.4 KLINEFELTER'S SYNDROME: ICD-10-CM

## 2023-11-27 LAB
ALBUMIN SERPL BCG-MCNC: 4 G/DL (ref 3.5–5.2)
ALP SERPL-CCNC: 68 U/L (ref 40–150)
ALT SERPL W P-5'-P-CCNC: 20 U/L (ref 0–70)
ANION GAP SERPL CALCULATED.3IONS-SCNC: 14 MMOL/L (ref 7–15)
AST SERPL W P-5'-P-CCNC: 16 U/L (ref 0–45)
BASOPHILS # BLD AUTO: 0 10E3/UL (ref 0–0.2)
BASOPHILS NFR BLD AUTO: 1 %
BILIRUB SERPL-MCNC: 0.4 MG/DL
BUN SERPL-MCNC: 30.7 MG/DL (ref 8–23)
CALCIUM SERPL-MCNC: 9.7 MG/DL (ref 8.8–10.2)
CHLORIDE SERPL-SCNC: 102 MMOL/L (ref 98–107)
CREAT SERPL-MCNC: 1 MG/DL (ref 0.67–1.17)
CREAT UR-MCNC: 90.1 MG/DL
DEPRECATED HCO3 PLAS-SCNC: 23 MMOL/L (ref 22–29)
EGFRCR SERPLBLD CKD-EPI 2021: 78 ML/MIN/1.73M2
EOSINOPHIL # BLD AUTO: 0.3 10E3/UL (ref 0–0.7)
EOSINOPHIL NFR BLD AUTO: 3 %
ERYTHROCYTE [DISTWIDTH] IN BLOOD BY AUTOMATED COUNT: 14.8 % (ref 10–15)
GLUCOSE SERPL-MCNC: 190 MG/DL (ref 70–99)
HBA1C MFR BLD: 9.4 % (ref 0–5.6)
HCT VFR BLD AUTO: 33.8 % (ref 40–53)
HGB BLD-MCNC: 10.9 G/DL (ref 13.3–17.7)
IMM GRANULOCYTES # BLD: 0 10E3/UL
IMM GRANULOCYTES NFR BLD: 0 %
LYMPHOCYTES # BLD AUTO: 2.5 10E3/UL (ref 0.8–5.3)
LYMPHOCYTES NFR BLD AUTO: 30 %
MCH RBC QN AUTO: 28.8 PG (ref 26.5–33)
MCHC RBC AUTO-ENTMCNC: 32.2 G/DL (ref 31.5–36.5)
MCV RBC AUTO: 89 FL (ref 78–100)
MICROALBUMIN UR-MCNC: 26 MG/L
MICROALBUMIN/CREAT UR: 28.86 MG/G CR (ref 0–17)
MONOCYTES # BLD AUTO: 0.6 10E3/UL (ref 0–1.3)
MONOCYTES NFR BLD AUTO: 8 %
NEUTROPHILS # BLD AUTO: 4.9 10E3/UL (ref 1.6–8.3)
NEUTROPHILS NFR BLD AUTO: 59 %
PLATELET # BLD AUTO: 264 10E3/UL (ref 150–450)
POTASSIUM SERPL-SCNC: 4.3 MMOL/L (ref 3.4–5.3)
PROT SERPL-MCNC: 7.6 G/DL (ref 6.4–8.3)
RBC # BLD AUTO: 3.78 10E6/UL (ref 4.4–5.9)
SODIUM SERPL-SCNC: 139 MMOL/L (ref 135–145)
WBC # BLD AUTO: 8.4 10E3/UL (ref 4–11)

## 2023-11-27 PROCEDURE — 83036 HEMOGLOBIN GLYCOSYLATED A1C: CPT | Performed by: NURSE PRACTITIONER

## 2023-11-27 PROCEDURE — 82728 ASSAY OF FERRITIN: CPT | Performed by: NURSE PRACTITIONER

## 2023-11-27 PROCEDURE — 36415 COLL VENOUS BLD VENIPUNCTURE: CPT | Performed by: NURSE PRACTITIONER

## 2023-11-27 PROCEDURE — 83540 ASSAY OF IRON: CPT | Performed by: NURSE PRACTITIONER

## 2023-11-27 PROCEDURE — 83550 IRON BINDING TEST: CPT | Performed by: NURSE PRACTITIONER

## 2023-11-27 PROCEDURE — 85025 COMPLETE CBC W/AUTO DIFF WBC: CPT | Performed by: NURSE PRACTITIONER

## 2023-11-27 PROCEDURE — 80053 COMPREHEN METABOLIC PANEL: CPT | Performed by: NURSE PRACTITIONER

## 2023-11-27 PROCEDURE — 82043 UR ALBUMIN QUANTITATIVE: CPT | Performed by: NURSE PRACTITIONER

## 2023-11-27 PROCEDURE — 82570 ASSAY OF URINE CREATININE: CPT | Performed by: NURSE PRACTITIONER

## 2023-11-27 PROCEDURE — 99214 OFFICE O/P EST MOD 30 MIN: CPT | Performed by: NURSE PRACTITIONER

## 2023-11-27 RX ORDER — LIRAGLUTIDE 6 MG/ML
INJECTION SUBCUTANEOUS DAILY
COMMUNITY
End: 2023-11-29

## 2023-11-27 RX ORDER — ASPIRIN 81 MG/1
81 TABLET ORAL DAILY
COMMUNITY
End: 2024-03-13 | Stop reason: HOSPADM

## 2023-11-27 RX ORDER — AMLODIPINE BESYLATE 10 MG/1
10 TABLET ORAL DAILY
COMMUNITY
End: 2024-02-16

## 2023-11-27 NOTE — PROGRESS NOTES
Discharge plan according to [facility]:       10/23/23 0842   Discharge Planning   Patient/Family Anticipates Transition to home   Concerns to be Addressed all concerns addressed in this encounter   Living Arrangements   People in Home other relative(s)  (Rosana)   Type of Residence Private Residence   Is your private residence a single family home or apartment? Single family home   Stair Railings, Within Home, Primary railings safe and in good condition   Once home, are you able to live on one level? Yes   Which level? Main Level   Bathroom Shower/Tub Tub/Shower unit   Equipment Currently Used at Home none   Support System   Support Systems Family Members   Do you have someone available to stay with you one or two nights once you are home? Yes

## 2023-11-27 NOTE — PROGRESS NOTES
[facility]  [address] [address]  Phone: [phone]  Fax: [phone]  Primary Provider: Vinod Yang  Pre-op Performing Provider: VINOD YANG    PREOPERATIVE EVALUATION:  Today's date: 11/27/2023    Lester is a 73 year old, presenting for the following:  Pre-Op Exam (Right knee replacement on 12/4 at [facility] by Dr Nam)        11/27/2023    10:01 AM   Additional Questions   Roomed by Madhuri MORROW       Surgical Information:  Surgery/Procedure: Right knee replacement  Surgery Location: [facility]  Surgeon: Dr. Axel Henriquez  Surgery Date: 12/4/23  Time of Surgery:   Where patient plans to recover: At home with family  Fax number for surgical facility: Note does not need to be faxed, will be available electronically in Epic.    Assessment & Plan     The proposed surgical procedure is considered INTERMEDIATE risk.    Preoperative examination: Completed today. Will check labs, EKG was normal on 10/20/23. Discussed no NSAIDS 7 days prior to surgery. Tylenol is okay to take as needed. Follow up prior to surgery if having new symptoms.   - CBC with platelets and differential    Chronic pain of right knee: Chronic arthritis, to have the right knee replaced.     Essential hypertension, benign: Blood pressure today was 148/86; 140/76. Home blood pressures have been under 140/90. He continues on Norvasc, Losartan, hydrochlorothiazide, stable.     Type 2 diabetes mellitus with hyperglycemia, without long-term current use of insulin (H): Will recheck his A1c today. He will hold the Victoza and Metformin the AM of surgery. To start on Trulicity after surgery.   - Comprehensive metabolic panel (BMP + Alb, Alk Phos, ALT, AST, Total. Bili, TP)  - Hemoglobin A1c  - dulaglutide (TRULICITY) 0.75 MG/0.5ML pen  Dispense: 2 mL    Hypokalemia: History of this, will recheck levels.     Mild persistent asthma without complication: He continues on inhalers. Stable.      Essential Hypercholesterolemia: he continues on Crestor. Stable.     Klinefelter's Syndrome: he continues on testosterone replacement. Stable.     Gastroesophageal reflux disease without esophagitis: he continues on Pepcid. Stable.     Class 1 obesity due to excess calories with serious comorbidity and body mass index (BMI) of 33.0 to 33.9 in adult: BMI today was 33.00. He continues with diet and exercise.     Risks and Recommendations:  The patient has the following additional risks and recommendations for perioperative complications:  Diabetes:  - Patient is not on insulin therapy: regular NPO guidelines can be followed.     Antiplatelet or Anticoagulation Medication Instructions:   - Patient is on no antiplatelet or anticoagulation medications.    Additional Medication Instructions:  He will hold his metformin and Victoza the AM of surgery. Discussed holding all NSAIDS 7 days prior to surgery.     RECOMMENDATION:  APPROVAL GIVEN to proceed with proposed procedure, without further diagnostic evaluation.    Subjective       HPI related to upcoming procedure: The patient presents today for a preoperative examination.    He will be having a right total knee arthroplasty done.    He denies issues with anesthesia in the past. He would like to be a full code.    He is due for labs today, will check these.    Discussed holding his Metformin and Victoza the AM of surgery. His insurance no longer covers the Victoza in the new year, but they will cover the Trulicity, will order this going forward. He will start this after surgery.    He reports that his blood pressures were on the higher end at home, so he restarted his Norvasc about a month ago.         11/26/2023    10:49 AM   Preop Questions   1. Have you ever had a heart attack or stroke? No   2. Have you ever had surgery on your heart or blood vessels, such as a stent placement, a coronary artery bypass, or surgery on an artery in your head, neck, heart, or legs?  No   3. Do you have chest pain with activity? No   4. Do you have a history of  heart failure? No   5. Do you currently have a cold, bronchitis or symptoms of other infection? No   6. Do you have a cough, shortness of breath, or wheezing? No   7. Do you or anyone in your family have previous history of blood clots? No   8. Do you or does anyone in your family have a serious bleeding problem such as prolonged bleeding following surgeries or cuts? No   9. Have you ever had problems with anemia or been told to take iron pills? YES - Hx in the past.    10. Have you had any abnormal blood loss such as black, tarry or bloody stools? No   11. Have you ever had a blood transfusion? No   12. Are you willing to have a blood transfusion if it is medically needed before, during, or after your surgery? Yes   13. Have you or any of your relatives ever had problems with anesthesia? No   14. Do you have sleep apnea, excessive snoring or daytime drowsiness? No   15. Do you have any artifical heart valves or other implanted medical devices like a pacemaker, defibrillator, or continuous glucose monitor? No   16. Do you have artificial joints? YES - Left total knee   17. Are you allergic to latex? No       Health Care Directive:  Patient does not have a Health Care Directive or Living Will: Full Code    Preoperative Review of :   reviewed - no record of controlled substances prescribed.      Review of Systems  CONSTITUTIONAL: NEGATIVE for fever, chills, change in weight  INTEGUMENTARY/SKIN: NEGATIVE for worrisome rashes, moles or lesions  EYES: NEGATIVE for vision changes or irritation  ENT/MOUTH: NEGATIVE for ear, mouth and throat problems  RESP: NEGATIVE for significant cough or SOB  CV: NEGATIVE for chest pain, palpitations or peripheral edema  GI: NEGATIVE for nausea, abdominal pain, heartburn, or change in bowel habits  : NEGATIVE for frequency, dysuria, or hematuria  MUSCULOSKELETAL: NEGATIVE for significant arthralgias  or myalgia  NEURO: NEGATIVE for weakness, dizziness or paresthesias  ENDOCRINE: NEGATIVE for temperature intolerance, skin/hair changes  HEME: NEGATIVE for bleeding problems  PSYCHIATRIC: NEGATIVE for changes in mood or affect    Patient Active Problem List    Diagnosis Date Noted    Hyperlipidemia, unspecified hyperlipidemia type 10/07/2022     Priority: Medium    Lung consolidation (H24) 07/26/2021     Priority: Medium    Lesion of right native kidney 07/26/2021     Priority: Medium    Nodule of right lung 05/18/2021     Priority: Medium    Squamous cell carcinoma of skin of face 07/20/2020     Priority: Medium     Right ear        Status post total left knee replacement 07/20/2020     Priority: Medium     2013 replacement        Mild persistent asthma with exacerbation 03/27/2020     Priority: Medium    Spinal stenosis of lumbar region with neurogenic claudication 03/27/2020     Priority: Medium    Sepsis (H) 10/29/2019     Priority: Medium    Abdominal pain, left upper quadrant 10/29/2019     Priority: Medium    Essential hypertension, benign      Priority: Medium     Created by Conversion  Replacement Utility updated for latest IMO load      Formatting of this note might be different from the original.  Created by Conversion    Replacement Utility updated for latest IMO load      Dizziness 03/29/2019     Priority: Medium    Type 2 diabetes mellitus with diabetic cataract, with long-term current use of insulin (H) 02/27/2019     Priority: Medium     Created by Conversion  IMO SNOMED LOAD SPRING 2020 [.6/.18/.2020 9:04 PM]  Joss Chawla:        Formatting of this note might be different from the original.  Created by Conversion    IMO SNOMED LOAD SPRING 2020 [.6/.18/.2020 9:04 PM]  Joss Chawla:      Pseudophakia of left eye 02/27/2019     Priority: Medium    Regular astigmatism of both eyes 02/27/2019     Priority: Medium    Encounter for examination following treatment at hospital 02/18/2019     Priority:  Medium    Pneumonia of both lungs due to infectious organism 02/09/2019     Priority: Medium    Acute kidney injury (H24)      Priority: Medium    Mild persistent asthma with acute exacerbation 12/04/2018     Priority: Medium    Anemia      Priority: Medium     Created by Conversion        Otitis Media      Priority: Medium     Created by Conversion  Replacement Utility updated for latest IMO load        Allergic Rhinitis      Priority: Medium     Created by Conversion  Replacement Utility updated for latest IMO load        Varicose vein 09/28/2015     Priority: Medium    Essential Hypercholesterolemia      Priority: Medium     Created by Conversion        Eczema      Priority: Medium     Created by Conversion        Joint Pain, Localized In The Knee      Priority: Medium     Created by Conversion        Obesity      Priority: Medium     Created by Conversion        Klinefelter's Syndrome      Priority: Medium     Created by Conversion          Past Medical History:   Diagnosis Date    Anemia, unspecified     Created by Conversion     Cataract     left eye removed, present in right eye    Chronic osteoarthritis     right knee    Colon polyps     Contact dermatitis and other eczema, due to unspecified cause     Created by Conversion     Diabetes mellitus, type 2 (H)     Klinefelter's syndrome     Created by Conversion     Obesity, unspecified     Created by Conversion     Pure hypercholesterolemia     Created by Conversion     Type II or unspecified type diabetes mellitus without mention of complication, not stated as uncontrolled     Created by Conversion     Uncomplicated asthma     adult asthma- wheezing    Unspecified essential hypertension     Created by Conversion     Varicose vein 9/28/2015     Past Surgical History:   Procedure Laterality Date    CATARACT EXTRACTION Left 2000    DENTAL SURGERY  1985    JOINT REPLACEMENT      left knee TKA 11/2013    ORTHOPEDIC SURGERY       Current Outpatient Medications    Medication Sig Dispense Refill    albuterol (PROAIR HFA/PROVENTIL HFA/VENTOLIN HFA) 108 (90 Base) MCG/ACT inhaler INHALE 2 PUFFS INTO THE LUNGS EVERY 6 HOURS AS NEEDED FOR WHEEZING 8.5 g 1    albuterol (PROVENTIL) (2.5 MG/3ML) 0.083% neb solution Take 1 vial (2.5 mg) by nebulization every 6 hours as needed for shortness of breath, wheezing or cough 90 mL 0    amLODIPine (NORVASC) 10 MG tablet Take 10 mg by mouth daily      atenolol (TENORMIN) 100 MG tablet Take 1 tablet (100 mg) by mouth daily 90 tablet 3    azelastine (ASTELIN) 0.1 % nasal spray 2 sprays each nostril 1-2x daily as needed for nasal congestion (use nightly for first 2 week) 30 mL 11    blood glucose (ACCU-CHEK GUIDE) test strip 1 strip by In Vitro route 2 times daily Use to test blood sugar 2 times daily or as directed. 200 strip 3    cetirizine (ZYRTEC) 10 MG tablet Take 10 mg by mouth      dulaglutide (TRULICITY) 0.75 MG/0.5ML pen Inject 0.75 mg Subcutaneous every 7 days 2 mL 3    famotidine (PEPCID) 20 MG tablet       fexofenadine (ALLEGRA) 180 MG tablet Take 180 mg by mouth daily      fluticasone (FLONASE) 50 MCG/ACT nasal spray Spray 1 spray into both nostrils daily as needed for rhinitis or allergies 16 g 3    fluticasone (FLOVENT HFA) 110 MCG/ACT inhaler Inhale 1 puff into the lungs 2 times daily 12 g 11    hydrochlorothiazide (HYDRODIURIL) 25 MG tablet Take 1 tablet (25 mg) by mouth every morning 90 tablet 3    insulin pen needle (ULTICARE MINI) 31G X 6 MM miscellaneous Use 1 pen needles daily or as directed. 100 each 3    losartan (COZAAR) 100 MG tablet Take 1 tablet (100 mg) by mouth daily 90 tablet 3    metFORMIN (GLUCOPHAGE XR) 500 MG 24 hr tablet Take 4 tablets (2,000 mg) by mouth daily 360 tablet 3    rosuvastatin (CRESTOR) 20 MG tablet Take 1 tablet (20 mg) by mouth daily 90 tablet 3    testosterone (ANDROGEL/TESTIM) 50 MG/5GM (1%) topical gel Place 1 packet (50 mg of testosterone) onto the skin daily Apply to the clean, dry intact  "skin of the shoulders, upper arms or abdomen. 90 packet 3    triamcinolone (KENALOG) 0.1 % cream [TRIAMCINOLONE (KENALOG) 0.1 % CREAM] APPLY EXTERNALLY TO THE AFFECTED AREA TWICE DAILY FOR 14 DAYS 45 g 0       Allergies   Allergen Reactions    Amoxicillin Anaphylaxis    Codeine Nausea and Vomiting    Sulfamethoxazole-Trimethoprim [Sulfamethoxazole-Trimethoprim] Nausea and Vomiting     Tachycardia      Erythromycin Base [Erythromycin] Unknown    Simvastatin Unknown        Social History     Tobacco Use    Smoking status: Former     Packs/day: 2.00     Years: 12.00     Additional pack years: 0.00     Total pack years: 24.00     Types: Cigarettes     Quit date: 1979     Years since quittin.9     Passive exposure: Never    Smokeless tobacco: Never   Substance Use Topics    Alcohol use: Yes     Comment: Alcoholic Drinks/day: rare     Family History   Problem Relation Age of Onset    Cerebrovascular Disease Mother     Clotting Disorder Father     Heart Disease Brother      History   Drug Use No         Objective     BP (!) 140/76   Pulse 79   Temp 97.8  F (36.6  C)   Resp 16   Ht 1.778 m (5' 10\")   Wt 104.3 kg (230 lb)   SpO2 96%   BMI 33.00 kg/m      Physical Exam    GENERAL APPEARANCE: healthy, alert and no distress     EYES: EOMI,  PERRL     HENT: ear canals and TM's normal and nose and mouth without ulcers or lesions     NECK: no adenopathy, no asymmetry, masses, or scars and thyroid normal to palpation     RESP: lungs clear to auscultation - no rales, rhonchi or wheezes     CV: regular rates and rhythm, normal S1 S2, no S3 or S4 and no murmur, click or rub     ABDOMEN:  soft, nontender, no HSM or masses and bowel sounds normal     MS: extremities normal- no gross deformities noted, no evidence of inflammation in joints, FROM in all extremities.     SKIN: no suspicious lesions or rashes     NEURO: Normal strength and tone, sensory exam grossly normal, mentation intact and speech normal     PSYCH: " mentation appears normal. and affect normal/bright     LYMPHATICS: No cervical adenopathy    Recent Labs   Lab Test 08/14/23  1317 08/07/23  0951 06/05/23  1131 05/05/23  1124   HGB 11.0* 11.8*  --  10.8*    280  --  227    140   < > 140   POTASSIUM 3.7 3.3*   < > 3.4   CR 1.25* 1.21*   < > 1.48*   A1C  --  7.3*  --  10.1*    < > = values in this interval not displayed.        Diagnostics:  No results found for this or any previous visit (from the past 48 hour(s)).   No EKG this visit, completed in the last 90 days.  10/20/23: Normal sinus rhythm, normal ekg    Revised Cardiac Risk Index (RCRI):  The patient has the following serious cardiovascular risks for perioperative complications:   - No serious cardiac risks = 0 points     RCRI Interpretation: 0 points: Class I (very low risk - 0.4% complication rate)         Signed Electronically by: Ronda Hawk CNP  Copy of this evaluation report is provided to requesting physician.

## 2023-11-27 NOTE — PATIENT INSTRUCTIONS
Hold your Metformin and Victoza the AM of surgery.    Start the weekly Trulicity after surgery.    No Aleve, Advil, or Ibuprofen 7 days prior to surgery, Tylenol is okay as needed.    Labs are processing, I will release results on my chart once they are back.     Blood pressure medication with sips of water the AM of surgery.    Follow up before surgery if having new symptoms.

## 2023-11-29 DIAGNOSIS — E78.5 HYPERLIPIDEMIA, UNSPECIFIED HYPERLIPIDEMIA TYPE: ICD-10-CM

## 2023-11-29 LAB
FERRITIN SERPL-MCNC: 167 NG/ML (ref 31–409)
IRON BINDING CAPACITY (ROCHE): 238 UG/DL (ref 240–430)
IRON SATN MFR SERPL: 18 % (ref 15–46)
IRON SERPL-MCNC: 44 UG/DL (ref 61–157)

## 2023-11-29 RX ORDER — ROSUVASTATIN CALCIUM 10 MG/1
10 TABLET, COATED ORAL DAILY
Qty: 90 TABLET | Refills: 1 | OUTPATIENT
Start: 2023-11-29

## 2023-11-29 RX ORDER — LIRAGLUTIDE 6 MG/ML
1.2 INJECTION SUBCUTANEOUS DAILY
Qty: 6 ML | Refills: 1 | Status: SHIPPED | OUTPATIENT
Start: 2023-11-29 | End: 2024-01-29

## 2023-11-29 RX ORDER — POTASSIUM CHLORIDE 1500 MG/1
20 TABLET, EXTENDED RELEASE ORAL 2 TIMES DAILY
Qty: 60 TABLET | Refills: 0 | Status: SHIPPED | OUTPATIENT
Start: 2023-11-29 | End: 2024-04-04

## 2023-12-05 DIAGNOSIS — J45.30 MILD PERSISTENT ASTHMA WITHOUT COMPLICATION: ICD-10-CM

## 2023-12-05 RX ORDER — ALBUTEROL SULFATE 90 UG/1
2 AEROSOL, METERED RESPIRATORY (INHALATION) EVERY 6 HOURS PRN
Qty: 8.5 G | Refills: 1 | OUTPATIENT
Start: 2023-12-05

## 2023-12-09 ENCOUNTER — ANCILLARY PROCEDURE (OUTPATIENT)
Dept: GENERAL RADIOLOGY | Facility: CLINIC | Age: 73
End: 2023-12-09
Attending: PHYSICIAN ASSISTANT
Payer: COMMERCIAL

## 2023-12-09 ENCOUNTER — OFFICE VISIT (OUTPATIENT)
Dept: FAMILY MEDICINE | Facility: CLINIC | Age: 73
End: 2023-12-09
Payer: COMMERCIAL

## 2023-12-09 VITALS
SYSTOLIC BLOOD PRESSURE: 151 MMHG | WEIGHT: 223.3 LBS | OXYGEN SATURATION: 94 % | HEART RATE: 94 BPM | BODY MASS INDEX: 32.04 KG/M2 | RESPIRATION RATE: 16 BRPM | DIASTOLIC BLOOD PRESSURE: 80 MMHG | TEMPERATURE: 98 F

## 2023-12-09 DIAGNOSIS — J10.1 INFLUENZA B: ICD-10-CM

## 2023-12-09 DIAGNOSIS — J45.21 MILD INTERMITTENT ASTHMA WITH EXACERBATION: Primary | ICD-10-CM

## 2023-12-09 LAB
FLUAV AG SPEC QL IA: NEGATIVE
FLUBV AG SPEC QL IA: POSITIVE

## 2023-12-09 PROCEDURE — 87804 INFLUENZA ASSAY W/OPTIC: CPT | Performed by: PHYSICIAN ASSISTANT

## 2023-12-09 PROCEDURE — 99214 OFFICE O/P EST MOD 30 MIN: CPT | Performed by: PHYSICIAN ASSISTANT

## 2023-12-09 PROCEDURE — 87635 SARS-COV-2 COVID-19 AMP PRB: CPT | Performed by: PHYSICIAN ASSISTANT

## 2023-12-09 PROCEDURE — 71046 X-RAY EXAM CHEST 2 VIEWS: CPT | Mod: TC | Performed by: RADIOLOGY

## 2023-12-09 RX ORDER — ALBUTEROL SULFATE 0.83 MG/ML
2.5 SOLUTION RESPIRATORY (INHALATION) EVERY 6 HOURS PRN
Qty: 90 ML | Refills: 0 | Status: SHIPPED | OUTPATIENT
Start: 2023-12-09 | End: 2023-12-27

## 2023-12-09 RX ORDER — PREDNISONE 20 MG/1
40 TABLET ORAL DAILY
Qty: 10 TABLET | Refills: 0 | Status: SHIPPED | OUTPATIENT
Start: 2023-12-09 | End: 2023-12-13

## 2023-12-09 RX ORDER — OSELTAMIVIR PHOSPHATE 75 MG/1
75 CAPSULE ORAL 2 TIMES DAILY
Qty: 10 CAPSULE | Refills: 0 | Status: SHIPPED | OUTPATIENT
Start: 2023-12-09 | End: 2023-12-14

## 2023-12-09 NOTE — PROGRESS NOTES
Assessment & Plan:      Problem List Items Addressed This Visit    None  Visit Diagnoses       Mild intermittent asthma with exacerbation    -  Primary    Relevant Medications    predniSONE (DELTASONE) 20 MG tablet    albuterol (PROVENTIL) (2.5 MG/3ML) 0.083% neb solution    Other Relevant Orders    XR Chest 2 Views (Completed)    Influenza A & B Antigen - Clinic Collect (Completed)    Symptomatic COVID-19 Virus (Coronavirus) by PCR Nose    Influenza B              Medical Decision Making  With history of asthma presents with wheezing, chest tightness, cough, and rhinorrhea for 1 week.  Chest x-ray is negative for focal pneumonia.  Influenza B is positive.  Recommend treatment with Tamiflu.  Also recommend short course of oral steroids and sent additional prescription for albuterol nebulizer solution to be used for chest tightness and wheezing as needed.  Patient appears to have an asthma exacerbation at this time.  No significant signs of respiratory distress during the exam.  X-ray did note a suspected 2 cm nodule on the lateral view of the thoracic spine.  Was able to see similar nodule like appearance on x-ray on 10/22, thus do not feel this is a new finding.  Did inform patient of the seen nodule and recommend a follow-up with primary care in 1 to 2 weeks for symptom recheck and possible further evaluation and CT if needed.     Subjective:      Lester Shi is a 73 year old male with history of asthma, here for evaluation of wheezing, chest tightness, cough, and rhinorrhea.  Onset of symptoms was 1 week ago with gradual worsening since then.  No fevers.  Patient has been using his at home inhalers with mild temporary relief of chest tightness.     The following portions of the patient's history were reviewed and updated as appropriate: allergies, current medications, and problem list.     Review of Systems  Pertinent items are noted in HPI.    Allergies  Allergies   Allergen Reactions    Amoxicillin  Anaphylaxis    Codeine Nausea and Vomiting    Sulfamethoxazole-Trimethoprim [Sulfamethoxazole-Trimethoprim] Nausea and Vomiting     Tachycardia      Erythromycin Base [Erythromycin] Unknown    Simvastatin Unknown       Family History   Problem Relation Age of Onset    Cerebrovascular Disease Mother     Clotting Disorder Father     Heart Disease Brother        Social History     Tobacco Use    Smoking status: Former     Packs/day: 2.00     Years: 12.00     Additional pack years: 0.00     Total pack years: 24.00     Types: Cigarettes     Quit date: 1979     Years since quittin.9     Passive exposure: Never    Smokeless tobacco: Never   Substance Use Topics    Alcohol use: Yes     Comment: Alcoholic Drinks/day: rare        Objective:      BP (!) 151/80   Pulse 94   Temp 98  F (36.7  C) (Oral)   Resp 16   Wt 101.3 kg (223 lb 4.8 oz)   SpO2 94%   BMI 32.04 kg/m    General appearance - alert, well appearing, and in no distress and non-toxic  Ears - TMs intact with moderate mucoid fluid and bulging bilaterally, no erythema  Nose - normal and patent, no erythema, discharge or polyps  Mouth - mucous membranes moist, pharynx normal without lesions  Neck - supple, no significant adenopathy  Chest - upper airway congestion heard throughout with mild expiratory wheezing, no obvious rhonchi or rales  Heart - normal rate, regular rhythm, normal S1, S2, no murmurs, rubs, clicks or gallops     Lab & Imaging Results    Results for orders placed or performed in visit on 23   XR Chest 2 Views     Status: None    Narrative    EXAM: XR CHEST 2 VIEWS  LOCATION: [facility]  DATE: 2023    INDICATION:  Mild intermittent asthma with exacerbation  COMPARISON: 2023       Impression    IMPRESSION: No infiltrate, effusion or thorax. A 2 cm nodular opacity projecting over the midthoracic spine only seen on the lateral view was not previously visualized , may be superimposed soft tissue  shadows. Consider a follow-up chest CT for better   evaluation. Few small calcified granuloma in the left upper lobe.   Influenza A & B Antigen - Clinic Collect     Status: Abnormal    Specimen: Nose; Swab   Result Value Ref Range    Influenza A antigen Negative Negative    Influenza B antigen Positive (A) Negative    Narrative    Test results must be correlated with clinical data. If necessary, results should be confirmed by a molecular assay or viral culture.       I personally reviewed these results and discussed findings with the patient.    The use of Dragon/PowerMic dictation services was used to construct the content of this note; any grammatical errors are non-intentional. Please contact the author directly if you are in need of any clarification.

## 2023-12-10 LAB — SARS-COV-2 RNA RESP QL NAA+PROBE: NEGATIVE

## 2023-12-12 ENCOUNTER — MYC MEDICAL ADVICE (OUTPATIENT)
Dept: INTERNAL MEDICINE | Facility: CLINIC | Age: 73
End: 2023-12-12
Payer: COMMERCIAL

## 2023-12-12 NOTE — LETTER
December 12, 2023      Lester Shi  [address] [id number]        To Whom It May Concern:    Lester Shi  was seen on 12/09/23, he was positive with Influenza B. Please excuse him from work until he is fever free.         Sincerely,        Ronda Hawk, CNP

## 2023-12-12 NOTE — TELEPHONE ENCOUNTER
Routing message to PCP for review and advise if in agreement with work note.   OV 12/9/23 URI, Influenza B +

## 2023-12-13 ENCOUNTER — E-VISIT (OUTPATIENT)
Dept: INTERNAL MEDICINE | Facility: CLINIC | Age: 73
End: 2023-12-13
Payer: COMMERCIAL

## 2023-12-13 ENCOUNTER — OFFICE VISIT (OUTPATIENT)
Dept: INTERNAL MEDICINE | Facility: CLINIC | Age: 73
End: 2023-12-13
Payer: COMMERCIAL

## 2023-12-13 VITALS
DIASTOLIC BLOOD PRESSURE: 80 MMHG | HEART RATE: 96 BPM | OXYGEN SATURATION: 91 % | HEIGHT: 70 IN | SYSTOLIC BLOOD PRESSURE: 140 MMHG | RESPIRATION RATE: 16 BRPM | TEMPERATURE: 97.4 F | WEIGHT: 221 LBS | BODY MASS INDEX: 31.64 KG/M2

## 2023-12-13 DIAGNOSIS — J10.1 INFLUENZA B: Primary | ICD-10-CM

## 2023-12-13 DIAGNOSIS — J45.31 MILD PERSISTENT ASTHMA WITH ACUTE EXACERBATION: ICD-10-CM

## 2023-12-13 PROCEDURE — 99214 OFFICE O/P EST MOD 30 MIN: CPT | Performed by: NURSE PRACTITIONER

## 2023-12-13 PROCEDURE — 99207 PR NON-BILLABLE SERV PER CHARTING: CPT | Performed by: NURSE PRACTITIONER

## 2023-12-13 RX ORDER — IPRATROPIUM BROMIDE AND ALBUTEROL SULFATE 2.5; .5 MG/3ML; MG/3ML
1 SOLUTION RESPIRATORY (INHALATION) EVERY 6 HOURS PRN
Qty: 90 ML | Refills: 3 | Status: SHIPPED | OUTPATIENT
Start: 2023-12-13 | End: 2024-03-11

## 2023-12-13 RX ORDER — GUAIFENESIN 600 MG/1
1200 TABLET, EXTENDED RELEASE ORAL 2 TIMES DAILY
Qty: 56 TABLET | Refills: 0 | Status: SHIPPED | OUTPATIENT
Start: 2023-12-13 | End: 2024-01-29

## 2023-12-13 RX ORDER — PREDNISONE 20 MG/1
TABLET ORAL
Qty: 20 TABLET | Refills: 0 | Status: ON HOLD | OUTPATIENT
Start: 2023-12-13 | End: 2023-12-20

## 2023-12-13 NOTE — PROGRESS NOTES
"  Assessment & Plan     Influenza B/Mild persistent asthma with acute exacerbation: Finish out Tamiflu. Rest, push fluids. Restart a longer Prednisone taper. Duo nebs, and mucinex. Alternate Tylenol and Ibuprofen for pain or fever. ER If oxygen saturations are under 90.  - guaiFENesin (MUCINEX) 600 MG 12 hr tablet  Dispense: 56 tablet; Refill: 0  - predniSONE (DELTASONE) 20 MG tablet  Dispense: 20 tablet; Refill: 0  - ipratropium - albuterol 0.5 mg/2.5 mg/3 mL (DUONEB) 0.5-2.5 (3) MG/3ML neb solution  Dispense: 90 mL; Refill: 3    Ronda Hawk CNP  [facility]    Salvador Batista is a 73 year old, presenting for the following health issues:  Follow Up (Influenza B- Shortness of breath)        12/13/2023     3:47 PM   Additional Questions   Roomed by Madhuri MORROW       History of Present Illness     Asthma:  He presents for follow up of asthma.  He has some cough, some wheezing, and some shortness of breath.  He is using a relief medication 2-3 times per day. He typically misses taking his controller medication 1 time(s) per week. Patient is aware of the following triggers: upper respiratory infections. The patient has had a visit to the Emergency Room, Urgent Care or Hospital due to asthma since the last clinic visit. He has been to the Emergency Room or Urgent Care 1 time.He has had a Hospitalization 0 times.    Headaches:   Since the patient's last clinic visit, headaches are: worsened  The patient is getting headaches:  All day  He is able to do normal daily activities when he has a migraine.  The patient is taking the following rescue/relief medications:  Ibuprofen (Advil, Motrin) and Tylenol   Patient states \"I get some relief\" from the rescue/relief medications.   The patient is taking the following medications to prevent migraines:  No medications to prevent migraines  In the past 4 weeks, the patient has gone to an Urgent Care or Emergency Room 0 times times due to " "headaches.    Reason for visit:  Influenza and not breathing well    He eats 2-3 servings of fruits and vegetables daily.He consumes 1 sweetened beverage(s) daily.He exercises with enough effort to increase his heart rate 10 to 19 minutes per day.  He exercises with enough effort to increase his heart rate 3 or less days per week.   He is taking medications regularly.     The patient presents today for follow up of his breathing.    He tested positive for Influenza B on 12/09/23; he was started on Tamiflu, and given a prednisone burst to help with asthma, and lungs.    He finished the prednisone yesterday, and his breathing has gotten worse since then. His oxygen saturation today in office was 91%. He is having trouble clearing any phlegm out, he is wheezing, and somewhat short of breath.    He is eating and drinking okay at home. He has been running fevers.     Review of Systems   Constitutional, HEENT, cardiovascular, pulmonary, GI, , musculoskeletal, neuro, skin, endocrine and psych systems are negative, except as otherwise noted.      Objective    BP (!) 140/80   Pulse 96   Temp 97.4  F (36.3  C)   Resp 16   Ht 1.778 m (5' 10\")   Wt 100.2 kg (221 lb)   SpO2 91%   BMI 31.71 kg/m    Body mass index is 31.71 kg/m .  Physical Exam   GENERAL: healthy, alert and no distress  EYES: Eyes grossly normal to inspection  RESP: inspiratory wheezes R upper posterior, R mid posterior, L upper posterior, L mid posterior, no crackles  CV: regular rate and rhythm, normal S1 S2, no S3 or S4, no murmur, click or rub, no peripheral edema  MS: no gross musculoskeletal defects noted  SKIN: no suspicious lesions or rashes  NEURO: Normal strength and tone, mentation intact and speech normal  PSYCH: mentation appears normal, affect normal/bright            "

## 2023-12-13 NOTE — PATIENT INSTRUCTIONS
Rest, push fluids.    Duonebs as needed, I would take these four times per day for the next three days.    Mucinex twice daily.    Prednisone as prescribed.    ER if oxygen is at 89 or below!

## 2023-12-13 NOTE — PATIENT INSTRUCTIONS
Thank you for choosing us for your care. I think an in-clinic visit would be best next steps based on your symptoms. Please schedule a clinic appointment; you won t be charged for this eVisit.      You can schedule an appointment right here in [place], or call [phone]    Thank you for choosing us for your care. Based on the information provided, I believe you need to be seen immediately.  Please go urgent care if you need to.     You will not be charged for this eVisit.      Thank you for choosing us for your care. Based on your symptoms and length of illness, I do not think that you need a prescription at this time.  Please follow the care advise I've provided and use the over the counter medications to help relieve your symptoms.   View your full visit summary for details by clicking on the link below.     If you're not feeling better within 2-3 days, please respond to this message and we can consider if a prescription is needed.  You can schedule an appointment right here in [place], or call [phone]  If the visit is for the same symptoms as your eVisit, we'll refund the cost of your eVisit if seen within seven days.

## 2023-12-17 ENCOUNTER — NURSE TRIAGE (OUTPATIENT)
Dept: NURSING | Facility: CLINIC | Age: 73
End: 2023-12-17
Payer: COMMERCIAL

## 2023-12-18 ENCOUNTER — HOSPITAL ENCOUNTER (INPATIENT)
Facility: CLINIC | Age: 73
LOS: 2 days | Discharge: HOME | DRG: 871 | End: 2023-12-20
Attending: INTERNAL MEDICINE | Admitting: INTERNAL MEDICINE
Payer: COMMERCIAL

## 2023-12-18 ENCOUNTER — APPOINTMENT (OUTPATIENT)
Dept: CT IMAGING | Facility: CLINIC | Age: 73
End: 2023-12-18
Attending: FAMILY MEDICINE
Payer: COMMERCIAL

## 2023-12-18 ENCOUNTER — APPOINTMENT (OUTPATIENT)
Dept: RADIOLOGY | Facility: CLINIC | Age: 73
End: 2023-12-18
Attending: FAMILY MEDICINE
Payer: COMMERCIAL

## 2023-12-18 ENCOUNTER — HOSPITAL ENCOUNTER (EMERGENCY)
Facility: CLINIC | Age: 73
Discharge: ANOTHER HOSPITAL | End: 2023-12-18
Attending: FAMILY MEDICINE | Admitting: FAMILY MEDICINE
Payer: COMMERCIAL

## 2023-12-18 VITALS
RESPIRATION RATE: 38 BRPM | OXYGEN SATURATION: 93 % | WEIGHT: 215 LBS | DIASTOLIC BLOOD PRESSURE: 78 MMHG | HEART RATE: 90 BPM | BODY MASS INDEX: 30.1 KG/M2 | TEMPERATURE: 97.8 F | SYSTOLIC BLOOD PRESSURE: 156 MMHG | HEIGHT: 71 IN

## 2023-12-18 DIAGNOSIS — J96.01 ACUTE RESPIRATORY FAILURE WITH HYPOXIA (H): ICD-10-CM

## 2023-12-18 DIAGNOSIS — J15.9 COMMUNITY ACQUIRED BACTERIAL PNEUMONIA: ICD-10-CM

## 2023-12-18 DIAGNOSIS — E11.36 TYPE 2 DIABETES MELLITUS WITH DIABETIC CATARACT, WITH LONG-TERM CURRENT USE OF INSULIN (H): ICD-10-CM

## 2023-12-18 DIAGNOSIS — J10.1 INFLUENZA B: ICD-10-CM

## 2023-12-18 DIAGNOSIS — J45.31 MILD PERSISTENT ASTHMA WITH ACUTE EXACERBATION: Primary | ICD-10-CM

## 2023-12-18 DIAGNOSIS — E83.42 HYPOMAGNESEMIA: ICD-10-CM

## 2023-12-18 DIAGNOSIS — Z79.4 TYPE 2 DIABETES MELLITUS WITH DIABETIC CATARACT, WITH LONG-TERM CURRENT USE OF INSULIN (H): ICD-10-CM

## 2023-12-18 DIAGNOSIS — J18.9 PNEUMONIA OF BOTH LUNGS DUE TO INFECTIOUS ORGANISM, UNSPECIFIED PART OF LUNG: ICD-10-CM

## 2023-12-18 PROBLEM — J96.90 RESPIRATORY FAILURE (H): Status: ACTIVE | Noted: 2023-12-18

## 2023-12-18 LAB
ANION GAP SERPL CALCULATED.3IONS-SCNC: 12 MMOL/L (ref 7–15)
ATRIAL RATE - MUSE: 100 BPM
BASE EXCESS BLDV CALC-SCNC: 0.4 MMOL/L
BASOPHILS # BLD AUTO: 0 10E3/UL (ref 0–0.2)
BASOPHILS NFR BLD AUTO: 0 %
BUN SERPL-MCNC: 25.4 MG/DL (ref 8–23)
CALCIUM SERPL-MCNC: 9.9 MG/DL (ref 8.8–10.2)
CHLORIDE SERPL-SCNC: 94 MMOL/L (ref 98–107)
CREAT SERPL-MCNC: 0.9 MG/DL (ref 0.67–1.17)
D DIMER PPP FEU-MCNC: >20 UG/ML FEU (ref 0–0.5)
DEPRECATED HCO3 PLAS-SCNC: 23 MMOL/L (ref 22–29)
DIASTOLIC BLOOD PRESSURE - MUSE: 79 MMHG
EGFRCR SERPLBLD CKD-EPI 2021: 89 ML/MIN/1.73M2
EOSINOPHIL # BLD AUTO: 0 10E3/UL (ref 0–0.7)
EOSINOPHIL NFR BLD AUTO: 0 %
ERYTHROCYTE [DISTWIDTH] IN BLOOD BY AUTOMATED COUNT: 14.7 % (ref 10–15)
GLUCOSE BLDC GLUCOMTR-MCNC: 492 MG/DL (ref 70–99)
GLUCOSE BLDC GLUCOMTR-MCNC: 521 MG/DL (ref 70–99)
GLUCOSE SERPL-MCNC: 365 MG/DL (ref 70–99)
HCO3 BLDV-SCNC: 25 MMOL/L (ref 24–30)
HCT VFR BLD AUTO: 30.2 % (ref 40–53)
HGB BLD-MCNC: 10.3 G/DL (ref 13.3–17.7)
HOLD SPECIMEN: NORMAL
HOLD SPECIMEN: NORMAL
IMM GRANULOCYTES # BLD: 0.4 10E3/UL
IMM GRANULOCYTES NFR BLD: 2 %
INTERPRETATION ECG - MUSE: NORMAL
LACTATE SERPL-SCNC: 1.7 MMOL/L (ref 0.7–2)
LACTATE SERPL-SCNC: 2.1 MMOL/L (ref 0.7–2)
LYMPHOCYTES # BLD AUTO: 1.5 10E3/UL (ref 0.8–5.3)
LYMPHOCYTES NFR BLD AUTO: 7 %
MAGNESIUM SERPL-MCNC: 1.6 MG/DL (ref 1.7–2.3)
MAGNESIUM SERPL-MCNC: 2.1 MG/DL (ref 1.7–2.3)
MCH RBC QN AUTO: 29.9 PG (ref 26.5–33)
MCHC RBC AUTO-ENTMCNC: 34.1 G/DL (ref 31.5–36.5)
MCV RBC AUTO: 88 FL (ref 78–100)
MONOCYTES # BLD AUTO: 1.1 10E3/UL (ref 0–1.3)
MONOCYTES NFR BLD AUTO: 5 %
NEUTROPHILS # BLD AUTO: 18.4 10E3/UL (ref 1.6–8.3)
NEUTROPHILS NFR BLD AUTO: 86 %
NRBC # BLD AUTO: 0 10E3/UL
NRBC BLD AUTO-RTO: 0 /100
NT-PROBNP SERPL-MCNC: 310 PG/ML (ref 0–900)
OXYHGB MFR BLDV: 79.3 % (ref 70–75)
P AXIS - MUSE: 82 DEGREES
PCO2 BLDV: 38 MM HG (ref 35–50)
PH BLDV: 7.42 [PH] (ref 7.35–7.45)
PLATELET # BLD AUTO: 363 10E3/UL (ref 150–450)
PO2 BLDV: 46 MM HG (ref 25–47)
POTASSIUM SERPL-SCNC: 3.4 MMOL/L (ref 3.4–5.3)
PR INTERVAL - MUSE: 162 MS
PROCALCITONIN SERPL IA-MCNC: 0.14 NG/ML
QRS DURATION - MUSE: 98 MS
QT - MUSE: 348 MS
QTC - MUSE: 448 MS
R AXIS - MUSE: 40 DEGREES
RADIOLOGIST FLAGS: NORMAL
RBC # BLD AUTO: 3.45 10E6/UL (ref 4.4–5.9)
SAO2 % BLDV: 80.8 % (ref 70–75)
SARS-COV-2 RNA RESP QL NAA+PROBE: NEGATIVE
SODIUM SERPL-SCNC: 129 MMOL/L (ref 135–145)
SYSTOLIC BLOOD PRESSURE - MUSE: 180 MMHG
T AXIS - MUSE: 53 DEGREES
VENTRICULAR RATE- MUSE: 100 BPM
WBC # BLD AUTO: 21.5 10E3/UL (ref 4–11)

## 2023-12-18 PROCEDURE — 85379 FIBRIN DEGRADATION QUANT: CPT | Performed by: FAMILY MEDICINE

## 2023-12-18 PROCEDURE — 85025 COMPLETE CBC W/AUTO DIFF WBC: CPT | Performed by: FAMILY MEDICINE

## 2023-12-18 PROCEDURE — 87635 SARS-COV-2 COVID-19 AMP PRB: CPT

## 2023-12-18 PROCEDURE — 250N000011 HC RX IP 250 OP 636: Mod: JZ | Performed by: FAMILY MEDICINE

## 2023-12-18 PROCEDURE — 250N000009 HC RX 250: Performed by: FAMILY MEDICINE

## 2023-12-18 PROCEDURE — 250N000009 HC RX 250

## 2023-12-18 PROCEDURE — 96361 HYDRATE IV INFUSION ADD-ON: CPT

## 2023-12-18 PROCEDURE — 99223 1ST HOSP IP/OBS HIGH 75: CPT

## 2023-12-18 PROCEDURE — 94640 AIRWAY INHALATION TREATMENT: CPT | Mod: 76

## 2023-12-18 PROCEDURE — 80048 BASIC METABOLIC PNL TOTAL CA: CPT | Performed by: FAMILY MEDICINE

## 2023-12-18 PROCEDURE — 999N000157 HC STATISTIC RCP TIME EA 10 MIN

## 2023-12-18 PROCEDURE — 99285 EMERGENCY DEPT VISIT HI MDM: CPT | Mod: 25

## 2023-12-18 PROCEDURE — 96375 TX/PRO/DX INJ NEW DRUG ADDON: CPT

## 2023-12-18 PROCEDURE — 71275 CT ANGIOGRAPHY CHEST: CPT

## 2023-12-18 PROCEDURE — 258N000003 HC RX IP 258 OP 636: Performed by: FAMILY MEDICINE

## 2023-12-18 PROCEDURE — 82805 BLOOD GASES W/O2 SATURATION: CPT | Performed by: FAMILY MEDICINE

## 2023-12-18 PROCEDURE — 258N000003 HC RX IP 258 OP 636

## 2023-12-18 PROCEDURE — 250N000013 HC RX MED GY IP 250 OP 250 PS 637: Performed by: FAMILY MEDICINE

## 2023-12-18 PROCEDURE — 36415 COLL VENOUS BLD VENIPUNCTURE: CPT | Performed by: FAMILY MEDICINE

## 2023-12-18 PROCEDURE — 96365 THER/PROPH/DIAG IV INF INIT: CPT | Mod: 59

## 2023-12-18 PROCEDURE — 83880 ASSAY OF NATRIURETIC PEPTIDE: CPT | Performed by: FAMILY MEDICINE

## 2023-12-18 PROCEDURE — 93005 ELECTROCARDIOGRAM TRACING: CPT

## 2023-12-18 PROCEDURE — 36415 COLL VENOUS BLD VENIPUNCTURE: CPT

## 2023-12-18 PROCEDURE — 94640 AIRWAY INHALATION TREATMENT: CPT

## 2023-12-18 PROCEDURE — 83605 ASSAY OF LACTIC ACID: CPT | Performed by: FAMILY MEDICINE

## 2023-12-18 PROCEDURE — 250N000013 HC RX MED GY IP 250 OP 250 PS 637

## 2023-12-18 PROCEDURE — 120N000001 HC R&B MED SURG/OB

## 2023-12-18 PROCEDURE — 250N000012 HC RX MED GY IP 250 OP 636 PS 637

## 2023-12-18 PROCEDURE — 83735 ASSAY OF MAGNESIUM: CPT

## 2023-12-18 PROCEDURE — 84145 PROCALCITONIN (PCT): CPT | Performed by: FAMILY MEDICINE

## 2023-12-18 PROCEDURE — 93005 ELECTROCARDIOGRAM TRACING: CPT | Performed by: FAMILY MEDICINE

## 2023-12-18 PROCEDURE — 250N000012 HC RX MED GY IP 250 OP 636 PS 637: Performed by: HOSPITALIST

## 2023-12-18 PROCEDURE — 83735 ASSAY OF MAGNESIUM: CPT | Performed by: FAMILY MEDICINE

## 2023-12-18 PROCEDURE — 71045 X-RAY EXAM CHEST 1 VIEW: CPT

## 2023-12-18 PROCEDURE — 250N000011 HC RX IP 250 OP 636: Performed by: FAMILY MEDICINE

## 2023-12-18 RX ORDER — IPRATROPIUM BROMIDE AND ALBUTEROL SULFATE 2.5; .5 MG/3ML; MG/3ML
3 SOLUTION RESPIRATORY (INHALATION)
Status: DISCONTINUED | OUTPATIENT
Start: 2023-12-18 | End: 2023-12-20 | Stop reason: HOSPADM

## 2023-12-18 RX ORDER — MAGNESIUM SULFATE HEPTAHYDRATE 40 MG/ML
2 INJECTION, SOLUTION INTRAVENOUS ONCE
Status: DISCONTINUED | OUTPATIENT
Start: 2023-12-18 | End: 2023-12-18

## 2023-12-18 RX ORDER — ALBUTEROL SULFATE 0.83 MG/ML
2.5 SOLUTION RESPIRATORY (INHALATION)
Status: DISCONTINUED | OUTPATIENT
Start: 2023-12-18 | End: 2023-12-20 | Stop reason: HOSPADM

## 2023-12-18 RX ORDER — ASPIRIN 81 MG/1
81 TABLET ORAL DAILY
Status: DISCONTINUED | OUTPATIENT
Start: 2023-12-19 | End: 2023-12-20 | Stop reason: HOSPADM

## 2023-12-18 RX ORDER — LOSARTAN POTASSIUM 50 MG/1
100 TABLET ORAL DAILY
Status: DISCONTINUED | OUTPATIENT
Start: 2023-12-19 | End: 2023-12-20 | Stop reason: HOSPADM

## 2023-12-18 RX ORDER — METHYLPREDNISOLONE SODIUM SUCCINATE 125 MG/2ML
125 INJECTION, POWDER, LYOPHILIZED, FOR SOLUTION INTRAMUSCULAR; INTRAVENOUS ONCE
Status: COMPLETED | OUTPATIENT
Start: 2023-12-18 | End: 2023-12-18

## 2023-12-18 RX ORDER — ROSUVASTATIN CALCIUM 20 MG/1
20 TABLET, COATED ORAL DAILY
Status: DISCONTINUED | OUTPATIENT
Start: 2023-12-19 | End: 2023-12-20 | Stop reason: HOSPADM

## 2023-12-18 RX ORDER — LEVOFLOXACIN 750 MG/1
750 TABLET, FILM COATED ORAL ONCE
Status: COMPLETED | OUTPATIENT
Start: 2023-12-18 | End: 2023-12-18

## 2023-12-18 RX ORDER — ATENOLOL 100 MG/1
100 TABLET ORAL DAILY
Status: DISCONTINUED | OUTPATIENT
Start: 2023-12-19 | End: 2023-12-20 | Stop reason: HOSPADM

## 2023-12-18 RX ORDER — MAGNESIUM SULFATE HEPTAHYDRATE 40 MG/ML
2 INJECTION, SOLUTION INTRAVENOUS ONCE
Status: COMPLETED | OUTPATIENT
Start: 2023-12-18 | End: 2023-12-18

## 2023-12-18 RX ORDER — AMLODIPINE BESYLATE 10 MG/1
10 TABLET ORAL DAILY
Status: DISCONTINUED | OUTPATIENT
Start: 2023-12-19 | End: 2023-12-20 | Stop reason: HOSPADM

## 2023-12-18 RX ORDER — IOPAMIDOL 755 MG/ML
75 INJECTION, SOLUTION INTRAVASCULAR ONCE
Status: COMPLETED | OUTPATIENT
Start: 2023-12-18 | End: 2023-12-18

## 2023-12-18 RX ORDER — NICOTINE POLACRILEX 4 MG
15-30 LOZENGE BUCCAL
Status: DISCONTINUED | OUTPATIENT
Start: 2023-12-18 | End: 2023-12-20 | Stop reason: HOSPADM

## 2023-12-18 RX ORDER — PREDNISONE 10 MG/1
10 TABLET ORAL DAILY
Status: DISCONTINUED | OUTPATIENT
Start: 2023-12-24 | End: 2023-12-19

## 2023-12-18 RX ORDER — DEXTROSE MONOHYDRATE 25 G/50ML
25-50 INJECTION, SOLUTION INTRAVENOUS
Status: DISCONTINUED | OUTPATIENT
Start: 2023-12-18 | End: 2023-12-20 | Stop reason: HOSPADM

## 2023-12-18 RX ORDER — PREDNISONE 20 MG/1
20 TABLET ORAL DAILY
Status: DISCONTINUED | OUTPATIENT
Start: 2023-12-21 | End: 2023-12-19

## 2023-12-18 RX ORDER — METFORMIN HCL 500 MG
1000 TABLET, EXTENDED RELEASE 24 HR ORAL 2 TIMES DAILY WITH MEALS
Status: DISCONTINUED | OUTPATIENT
Start: 2023-12-18 | End: 2023-12-20 | Stop reason: HOSPADM

## 2023-12-18 RX ORDER — HYDROCHLOROTHIAZIDE 25 MG/1
25 TABLET ORAL EVERY MORNING
Status: DISCONTINUED | OUTPATIENT
Start: 2023-12-19 | End: 2023-12-20 | Stop reason: HOSPADM

## 2023-12-18 RX ORDER — IPRATROPIUM BROMIDE AND ALBUTEROL SULFATE 2.5; .5 MG/3ML; MG/3ML
3 SOLUTION RESPIRATORY (INHALATION) ONCE
Status: COMPLETED | OUTPATIENT
Start: 2023-12-18 | End: 2023-12-18

## 2023-12-18 RX ORDER — PREDNISONE 20 MG/1
40 TABLET ORAL DAILY
Status: DISCONTINUED | OUTPATIENT
Start: 2023-12-19 | End: 2023-12-19

## 2023-12-18 RX ADMIN — INSULIN GLARGINE 10 UNITS: 100 INJECTION, SOLUTION SUBCUTANEOUS at 23:05

## 2023-12-18 RX ADMIN — IPRATROPIUM BROMIDE AND ALBUTEROL SULFATE 3 ML: 2.5; .5 SOLUTION RESPIRATORY (INHALATION) at 20:24

## 2023-12-18 RX ADMIN — INSULIN ASPART 7 UNITS: 100 INJECTION, SOLUTION INTRAVENOUS; SUBCUTANEOUS at 17:07

## 2023-12-18 RX ADMIN — METHYLPREDNISOLONE SODIUM SUCCINATE 125 MG: 125 INJECTION, POWDER, FOR SOLUTION INTRAMUSCULAR; INTRAVENOUS at 10:55

## 2023-12-18 RX ADMIN — IPRATROPIUM BROMIDE AND ALBUTEROL SULFATE 3 ML: 2.5; .5 SOLUTION RESPIRATORY (INHALATION) at 16:40

## 2023-12-18 RX ADMIN — SODIUM CHLORIDE 1000 ML: 9 INJECTION, SOLUTION INTRAVENOUS at 10:54

## 2023-12-18 RX ADMIN — LEVOFLOXACIN 750 MG: 750 TABLET, FILM COATED ORAL at 11:22

## 2023-12-18 RX ADMIN — IOPAMIDOL 75 ML: 755 INJECTION, SOLUTION INTRAVENOUS at 10:42

## 2023-12-18 RX ADMIN — METFORMIN HYDROCHLORIDE 1000 MG: 500 TABLET, EXTENDED RELEASE ORAL at 16:47

## 2023-12-18 RX ADMIN — SODIUM CHLORIDE, POTASSIUM CHLORIDE, SODIUM LACTATE AND CALCIUM CHLORIDE 1000 ML: 600; 310; 30; 20 INJECTION, SOLUTION INTRAVENOUS at 16:30

## 2023-12-18 RX ADMIN — IPRATROPIUM BROMIDE AND ALBUTEROL SULFATE 3 ML: .5; 3 SOLUTION RESPIRATORY (INHALATION) at 09:31

## 2023-12-18 RX ADMIN — MAGNESIUM SULFATE HEPTAHYDRATE 2 G: 40 INJECTION, SOLUTION INTRAVENOUS at 10:55

## 2023-12-18 ASSESSMENT — ACTIVITIES OF DAILY LIVING (ADL)
ADLS_ACUITY_SCORE: 20
ADLS_ACUITY_SCORE: 35
CONCENTRATING,_REMEMBERING_OR_MAKING_DECISIONS_DIFFICULTY: NO
CHANGE_IN_FUNCTIONAL_STATUS_SINCE_ONSET_OF_CURRENT_ILLNESS/INJURY: NO

## 2023-12-18 ASSESSMENT — ENCOUNTER SYMPTOMS
FEVER: 1
COUGH: 1
SHORTNESS OF BREATH: 1

## 2023-12-18 NOTE — PROGRESS NOTES
Skin affirmation note    Admitting nurse completed full skin assessment, Kris score and Kris interventions. This writer agrees with the initial skin assessment findings.

## 2023-12-18 NOTE — H&P
[facility]    History and Physical  Hospital Medicine       Date of Admission:  (Not on file)  Date of Service: 12/18/2023     Assessment & Plan   Lester Shi is a 73 year old male who presents on 12/18/23 with ongoing dyspnea & hypoxemia noted on home pulse oximeter.     Acute on chronic respiratory failure with hypoxemia  Community acquired pneumonia  Sepsis  Seen in clinic for dyspnea 12/9, diagnosed with influenza B & asthma exacerbation: initiated on Tamiflu (completed course) & prednisone. Presented again 12/13 and prescribed restart & taper of prednisone for asthma exacerbation.   Presents 12/18 for hypoxemia on home O2 reading & nebs not improving symptoms; SPO2 87% on presentation so initiated on 2L O2 NC. Septic based on tachypnea (38/min), tachycardia (106bpm), and leukocytosis (21.5). Lactate improving (1.7 <-- 2.1). D-dimer elevated (>20), but CT PE negative for PE. CXR clear, but CT significant for left lower lobe consolidation plus ground-glass, tree-in-bud nodules bilaterally consistent with multifocal infection / aspiration. Initiated on levofloxacin at outside hospital.   -Continue levofloxacin 750mg daily  -Continue oxygen via NC to maintain SPO2 89-94%, wean as able  -Management of asthma, below  -CBC in AM  -Lactated ringer 1L over 10hrs to ensure adequate volume status  -Covid test pending (was negative 12/9, but symptoms have persisted / changed since then)    Asthma with recent exacerbation  History of asthma with frequent exacerbations. Seen by PCP 12/9 & prescribed prednisone 40mg daily burst. Seen again 12/13 for persistent symptoms & prescribed taper of 60mg x3 days, 40mg x3d, 20mg x3d, 10mg x3d. Currently on day 2 of 40mg.   -Continue prednisone taper with 40mg x2 days, followed by 20mg x3 days, then 10mg x3days  -Albuterol neb Q2hrs PRN  -Ipratropium-albuterol four times daily  -O2 as per respiratory failure, above    Recent influenza B  infection  Influenza B positive 12/9/23. Initiated on Tamiflu 12/9, course completed. Post viral syndrome could be contributing to ongoing symptoms.     Type 2 diabetes mellitus  Poor control (most recent hemoglobin A1c 9.4 on 11/27/23). Hyperglycemic on presentation (365) probably due to steroids & acute illness. Managed PTA with metformin 1000mg BID, liraglutide 1.2mg subcutaneous daily.  -Continue PTA metformin  -Medium sliding scale correction insulin during admission  -Hypoglycemia monitoring & treatment protocols in place    Hypomagnesemia  Mg 1.6 on presentation. Received 2g IV Mg sulfate at outside ER.   -Recheck Mg on admission  -RN managed repletion protocol ordered in case patient remains low on recheck.     Hyponatremia  Mild-moderate, acute. Na 129 on presentation. Anticipate improvement with adequate PO intake & resolution of acute illness.   -BMP in AM    Anemia  Stable, normocytic. Baseline hemoglobin around 10.5-11.0, hemoglobin on presentation 10.3. No signs of acute bleeding.   -CBC in AM    Hypertension  Hypertensive in ER (-173), probably in part due to acute hypoxemia. Managed PTA with amlodipine 10mg daily, atenolol 100mg daily, hydrochlorothiazide 25mg daily, losartan 100mg daily.   -Continue PTA atenolol, amlodipine, hydrochlorothiazide, & losartan with hold parameters    Pulmonary nodules  Small bilateral pulmonary nodules measuring up to 5mm noted on CT PE study 12/18. Consider outpatient follow up with PCP for surveillance.     Hypercholesterolemia  Managed PTA with rosuvastatin, continue.     Clinically Significant Risk Factors Present on Admission         # Hyponatremia: Lowest Na = 129 mmol/L in last 2 days, will monitor as appropriate    # Hypomagnesemia: Lowest Mg = 1.6 mg/dL in last 2 days, will replace as needed       # Hypertension: Noted on problem list     # DMII: A1C = 9.4 % (Ref range: 0.0 - 5.6 %) within past 6 months    # Overweight: Estimated body mass index is 29.99  "kg/m  as calculated from the following:    Height as of an earlier encounter on 12/18/23: 1.803 m (5' 11\").    Weight as of an earlier encounter on 12/18/23: 97.5 kg (215 lb).       # Asthma: noted on problem list         Diet:  moderate consistent carb  DVT Prophylaxis: Pneumatic Compression Devices  Hutchinson Catheter: Not present  Code Status:  full code  Lines: PIV    Disposition Plan   Awaiting care coordination huddle, likely back home once improving, probably 2-3 days.     Entered: Tonya Valdivia PA-C 12/18/2023, 12:52 PM     Status: Patient is appropriate for inpatient  Tonya Valdivia PA-C        The patient's care was discussed with the Attending Physician, Dr. Lakshmi Gonzalez, bedside RN, and the patient .    Primary Care Physician   Ronda Hawk [phone]    History is obtained from the patient, who is an ok historian, handoff from ER provider, and review of old records via the EMR.    History of Present Illness   Lester Shi is a 73 year old male with past medical history of type 2 diabetes, hypertension, hypercholesterolemia, Klinefelter's syndrome, right kidney lesion, asthma now presents on 12/18/23 with hypoxemia.     Patient has a history of asthma requiring frequent prednisone bursts for control. He says he usually reacts well to these and symptoms resolve quickly.     Patient was seen in primary care clinic 12/9, diagnosed with influenza B & asthma exacerbation. Prescribed Tamiflu for influenza & prednisone for asthma exacerbation. He was advised to increase the frequency of nebs.     Presented back to PCP 12/13 for persistent symptoms. He was restarted on a prolonged prednsione taper, plus advised to continue increased frequency nebs and continue Tamiflu to completion.     Patient has a home oximeter and noticed 12/18 that his SPO2 was ~88%. He has continued to have worsening dyspnea and a dry, hacking cough. Has had associated fever (101, 100.6, 102 over the past few days at " home). He denies associated chest pain, dizziness, lightheadedness. Denies hemoptysis. He does get nauseous with emesis after taking pills & using nebulizers which is unusual and new for him. Denies dysphagia or choking episodes. Says his nebulizers are not helping as much as they used to. Normally he has very positive response to steroids & nebs. Says these symptoms feel different from previous asthma exacerbations.     Upon arrival to ER patient received lab and imaging workup. Initiated on levofloxacin. He is requiring 2L oxygen at time of admission. Patient is being transferred to [facility] from outside ER due to lack of bed availability at outside hospital.     Review of Systems   Constitutional: denies weight loss  Eyes: denies changes in vision  HENT: denies changes in hearing  Respiratory: see HPI  Cardiovascular: denies heart palpitations, lightheadedness, syncope  Gastroenterology: denies constipation, diarrhea, GERD symptoms  Genitourinary: denies dysuria  Integumentary: no new rashes or skin changes  Musculoskeletal: denies new muscle pain or joint trauma  Neuro: denies numbness, tingling, headaches, tremor  Psychiatric: denies significant changes to mood    Past Medical History     Hyperlipidemia, unspecified hyperlipidemia type 10/07/2022     Priority: Medium    Lung consolidation (H24) 07/26/2021     Priority: Medium    Lesion of right native kidney 07/26/2021     Priority: Medium    Nodule of right lung 05/18/2021     Priority: Medium    Squamous cell carcinoma of skin of face 07/20/2020     Priority: Medium    Mild persistent asthma with exacerbation 03/27/2020     Priority: Medium    Spinal stenosis of lumbar region with neurogenic claudication 03/27/2020     Priority: Medium    Sepsis (H) 10/29/2019     Priority: Medium    Abdominal pain, left upper quadrant 10/29/2019     Priority: Medium    Dizziness 03/29/2019     Priority: Medium    Type 2 diabetes mellitus with diabetic cataract, with long-term  current use of insulin (H) 02/27/2019     Priority: Medium    Pseudophakia of left eye 02/27/2019     Priority: Medium    Regular astigmatism of both eyes 02/27/2019     Priority: Medium    Mild persistent asthma with acute exacerbation 12/04/2018     Priority: Medium    Anemia      Priority: Medium    Allergic Rhinitis      Priority: Medium    Varicose vein 09/28/2015     Priority: Medium    Essential Hypercholesterolemia      Priority: Medium    Eczema      Priority: Medium    Klinefelter's Syndrome      Priority: Medium      Past Surgical History   Past Surgical History:   Procedure Laterality Date    CATARACT EXTRACTION Left 2000    DENTAL SURGERY  1985    JOINT REPLACEMENT      left knee TKA 11/2013    ORTHOPEDIC SURGERY        Prior to Admission Medications   Cannot display prior to admission medications because the patient has not been admitted in this contact.     Allergies   Allergies   Allergen Reactions    Amoxicillin Anaphylaxis    Codeine Nausea and Vomiting    Sulfamethoxazole-Trimethoprim [Sulfamethoxazole-Trimethoprim] Nausea and Vomiting     Tachycardia      Erythromycin Base [Erythromycin] Unknown    Simvastatin Unknown     Family History    Family History   Problem Relation Age of Onset    Cerebrovascular Disease Mother     Clotting Disorder Father     Heart Disease Brother      Social History   Social History     Socioeconomic History    Marital status:      Physical Exam   There were no vitals taken for this visit.     Weight: 0 lbs 0 oz There is no height or weight on file to calculate BMI.     Constitutional: Alert, oriented, cooperative, no apparent distress, appears nontoxic  Eyes: Eyes are clear  HENT: Oropharynx is clear. Tongue midline & dry. No evidence of cranial trauma.  Cardiovascular: Irregularly irregular rhythm, controlled rate, and no murmur noted. Good peripheral pulses in wrists bilaterally. No pitting lower extremity edema. Sebas sign negative  Respiratory: Mildly  labored on 2L NC. Some wheezing in central lung fields with mild crackles in left base and expiratory rhonchi in bases bilaterally.   GI: Soft, non-tender, normal bowel sounds  Genitourinary: Deferred  Musculoskeletal: Normal muscle bulk, no obvious joint deformities.   Skin: Warm and dry, no rashes.   Neurologic: Neck supple. Cranial nerves are grossly intact.  is symmetric.     Data   Data reviewed today:   Recent Labs   Lab 12/18/23  0914   WBC 21.5*   HGB 10.3*   MCV 88      *   POTASSIUM 3.4   CHLORIDE 94*   CO2 23   BUN 25.4*   CR 0.90   ANIONGAP 12   MADELEINE 9.9   *     Recent Results (from the past 24 hour(s))   XR Chest Port 1 View    Narrative    EXAM: XR CHEST PORT 1 VIEW  LOCATION: [facility]  DATE: 12/18/2023  INDICATION: Cough, asthma, concern for secondary infection  COMPARISON: X-ray 12/09/2023    Impression    IMPRESSION: Mild retrocardiac opacities likely reflect atelectasis. The right lung is clear. No pleural effusion. Normal heart size. Spinal degenerative changes.   CT Chest Pulmonary Embolism w Contrast   Result Value    Radiologist flags Left lower lobe masslike consolidation    Narrative    EXAM: CT CHEST PULMONARY EMBOLISM W CONTRAST  LOCATION: [facility]  DATE: 12/18/2023  INDICATION: Cough, dyspnea, hypoxia  COMPARISON: CT chest 07/26/2021; PET/CT 08/13/2021  TECHNIQUE: CT chest pulmonary angiogram during arterial phase injection of IV contrast. Multiplanar reformats and MIP reconstructions were performed. Dose reduction techniques were used.   CONTRAST: Isovue 370 75ml  FINDINGS:  ANGIOGRAM CHEST: Main pulmonary trunk is nonenlarged. No pulmonary embolism to the subsegmental level.  LUNGS AND PLEURA: Mild expiratory appearance of the trachea. There is diffuse bronchial wall thickening, worst in the lower lobes. There is a rounded area of consolidation within the medial left lower lobe with some associated air  bronchograms (series 7   image 152). Additional airway centric groundglass, tree-in-bud nodules, and reticulation are seen elsewhere throughout the left upper (series 7 image 120, 141), right lower (series 7 image 189, 221), right middle (series 7 image 193), and left lower   lobes (series 7 image 201). Evaluation for pulmonary nodules is limited given extensive parenchymal disease. Within this limitation, there are multiple small bilateral solid nodules measuring up to 6 mm (series 7 image 96). Calcified benign nodules. No   pleural effusion or pneumothorax.  MEDIASTINUM/AXILLAE: No pathologically enlarged thoracic lymph nodes. Unremarkable thyroid. Normal caliber thoracic aorta. Normal heart size. No pericardial effusion.  CORONARY ARTERY CALCIFICATION: Moderate.  UPPER ABDOMEN: Small hiatal hernia. Multiple high attenuation exophytic foci are seen within both kidneys, likely a proteinaceous or hemorrhagic cysts.  MUSCULOSKELETAL: No acute osseous abnormalities.    Impression    IMPRESSION:  1.  Left lower lobe consolidation, as well as groundglass, tree-in-bud nodules, and reticulation elsewhere throughout both lungs compatible with multifocal infection and/or aspiration. Follow-up chest CT is recommended in 1-2 months to evaluate for   resolution of the dominant area of consolidation in the left lower lobe.  2.  Small bilateral pulmonary nodules measuring up to 5 mm. These can also be further evaluated on future follow-up CT.  3.  No pulmonary embolism to the subsegmental level.  4.  Chronic and ancillary findings as described.  [Recommend Follow Up: Left lower lobe masslike consolidation]  This report will be copied to the [facility] to ensure a provider acknowledges the finding.

## 2023-12-18 NOTE — TELEPHONE ENCOUNTER
Patient gave verbal consent to communicate to wife.  Patient recuperating from Influenza B and  an hour ago.  Off work for a week with influenza. 02 sats 93 %.  Denies chest pain, dyspnea.  Normally 95-97%.  Hx asthma, on daily prednisone.  Neb every 6 hours is helping wheezing.  Patient requesting writer to call him back on his phone.  BGM now 293.  Patient on oral medication but not insulin, and is drinking water.    Disposition to call PCP within 24 hours but call back for any dyspnea, worsening symptoms, BGM issues.  Patient verbalizes understanding of care plan and agrees with plan.    Elaine Buckner RN  [facility]         Reason for Disposition   [1] HIGH RISK (e.g., age > 64 years, pregnant, HIV+, or chronic medical condition) AND [2]  > 72 hours (3 days) since evaluated by doctor (or NP/PA) AND [3] symptoms not improved    Additional Information   Negative: SEVERE difficulty breathing (e.g., struggling for each breath, speaks in single words)   Negative: Bluish (or gray) lips or face now   Negative: Shock suspected (e.g., cold/pale/clammy skin, too weak to stand, low BP, rapid pulse)   Negative: Sounds like a life-threatening emergency to the triager   Negative: Chest pain  (Exception: MILD central chest pain, present only when coughing.)   Negative: Headache and stiff neck (can't touch chin to chest)   Negative: [1] Difficulty breathing AND [2] not severe AND [3] not from stuffy nose (e.g., not relieved by cleaning out the nose)   Negative: Fever > 104 F (40 C)   Negative: Patient sounds very sick or weak to the triager   Negative: Unconscious or difficult to awaken   Negative: Acting confused (e.g., disoriented, slurred speech)   Negative: Very weak (e.g., can't stand)   Negative: Sounds like a life-threatening emergency to the triager   Negative: [1] Vomiting AND [2] signs of dehydration (e.g., very dry mouth, lightheaded, dark urine)   Negative: [1] Blood glucose > 240 mg/dL (13.3  mmol/L) AND [2] rapid breathing   Negative: Blood glucose > 500 mg/dL (27.8 mmol/L)   Negative: [1] Blood glucose > 240 mg/dL (13.3 mmol/L) AND [2] urine ketones moderate-large (or more than 1+)   Negative: [1] Blood glucose > 240 mg/dL (13.3 mmol/L) AND [2] blood ketones > 1.4 mmol/L   Negative: [1] Blood glucose > 240 mg/dL (13.3 mmol/L) AND [2] vomiting AND [3] unable to check for ketones (in blood or urine)   Negative: [1] New-onset diabetes suspected (e.g., frequent urination, weak, weight loss) AND [2] vomiting or rapid breathing   Negative: Vomiting lasts > 4 hours   Negative: Patient sounds very sick or weak to the triager   Negative: Fever > 100.4 F (38.0 C)   Negative: Blood glucose > 400 mg/dL (22.2 mmol/L)   Negative: [1] Blood glucose > 300 mg/dL (16.7 mmol/L) AND [2] two or more times in a row   Negative: Urine ketones moderate - large (or blood ketones > 1.4 mmol/L)   Negative: [1] Symptoms of high blood sugar (e.g., abnormally thirsty, frequent urination, weight loss) AND [2] not able to test blood glucose AND [3] pregnant   Negative: [1] Caller has URGENT medication or insulin pump question AND [2] triager unable to answer question   Negative: [1] Blood glucose > 240 mg/dL (13.3 mmol/L) AND [2] pregnant   Negative: [1] Symptoms of high blood sugar (e.g., abnormally thirsty, frequent urination, weight loss) AND [2] not able to test blood glucose   Negative: New-onset diabetes suspected (e.g., abnormally thirsty, frequent urination, weight loss)   Negative: [1] Caller has NON-URGENT medication or insulin pump question AND [2] triager unable to answer question   Negative: [1] Blood glucose > 300 mg/dL (16.7 mmol/L) AND [2] uses insulin (e.g., insulin-dependent, all people with type 1 diabetes)   Negative: [1] Blood glucose 240 - 300 mg/dL (13.3 - 16.7 mmol/L) AND [2] uses insulin (e.g., insulin-dependent, all people with type 1 diabetes)   Negative: Fever present > 3 days (72 hours)   Negative: [1]  Fever returns after gone for over 24 hours AND [2] symptoms worse or not improved   Negative: [1] Using nasal washes and pain medicine > 24 hours AND [2] sinus pain (around cheekbone or eye) persists   Negative: Earache    Protocols used: Influenza (Flu) Follow-up Call-A-, Diabetes - High Blood Sugar-A-

## 2023-12-18 NOTE — MEDICATION SCRIBE - ADMISSION MEDICATION HISTORY
Medication Scribe Admission Medication History    Admission medication history is complete. The information provided in this note is only as accurate as the sources available at the time of the update.    Information Source(s): Patient and CareEverywhere/SureScripts via phone    Pertinent Information: direct admit    Changes made to PTA medication list:  Added: None  Deleted: None  Changed: zyrtec and allegra are prn, metformin is BID    Medication Affordability:  Not including over the counter (OTC) medications, was there a time in the past 3 months when you did not take your medications as prescribed because of cost?: Yes    Allergies reviewed with patient and updates made in EHR: yes    Medication History Completed By: VICKI DAVID 12/18/2023 4:26 PM    PTA Med List   Medication Sig Last Dose    albuterol (PROAIR HFA/PROVENTIL HFA/VENTOLIN HFA) 108 (90 Base) MCG/ACT inhaler INHALE 2 PUFFS INTO THE LUNGS EVERY 6 HOURS AS NEEDED FOR WHEEZING 12/18/2023 at am    albuterol (PROVENTIL) (2.5 MG/3ML) 0.083% neb solution Take 1 vial (2.5 mg) by nebulization every 6 hours as needed for shortness of breath, wheezing or cough 12/18/2023 at am    amLODIPine (NORVASC) 10 MG tablet Take 10 mg by mouth daily 12/18/2023 at am    aspirin 81 MG EC tablet Take 81 mg by mouth daily 12/18/2023 at am    atenolol (TENORMIN) 100 MG tablet Take 1 tablet (100 mg) by mouth daily 12/18/2023 at am    azelastine (ASTELIN) 0.1 % nasal spray 2 sprays each nostril 1-2x daily as needed for nasal congestion (use nightly for first 2 week) 12/17/2023 at pm    blood glucose (ACCU-CHEK GUIDE) test strip 1 strip by In Vitro route 2 times daily Use to test blood sugar 2 times daily or as directed. 12/18/2023 at am #193    cetirizine (ZYRTEC) 10 MG tablet Take 10 mg by mouth daily as needed for allergies More than a month at am    famotidine (PEPCID) 20 MG tablet Take 20 mg by mouth 2 times daily Unknown at unkn    fexofenadine (ALLEGRA) 180 MG tablet  Take 180 mg by mouth daily as needed for allergies More than a month at am    fluticasone (FLONASE) 50 MCG/ACT nasal spray Spray 1 spray into both nostrils daily as needed for rhinitis or allergies More than a month at unkn    fluticasone (FLOVENT HFA) 110 MCG/ACT inhaler Inhale 1 puff into the lungs 2 times daily More than a month at unkn    guaiFENesin (MUCINEX) 600 MG 12 hr tablet Take 2 tablets (1,200 mg) by mouth 2 times daily 12/17/2023 at hs    hydrochlorothiazide (HYDRODIURIL) 25 MG tablet Take 1 tablet (25 mg) by mouth every morning 12/18/2023 at am    insulin pen needle (ULTICARE MINI) 31G X 6 MM miscellaneous Use 1 pen needles daily or as directed. 12/18/2023 at am    ipratropium - albuterol 0.5 mg/2.5 mg/3 mL (DUONEB) 0.5-2.5 (3) MG/3ML neb solution Take 1 vial (3 mLs) by nebulization every 6 hours as needed for shortness of breath, wheezing or cough 12/18/2023 at 0500    liraglutide (VICTOZA) 18 MG/3ML solution Inject 1.2 mg Subcutaneous daily 12/18/2023 at am    losartan (COZAAR) 100 MG tablet Take 1 tablet (100 mg) by mouth daily 12/18/2023 at am    metFORMIN (GLUCOPHAGE XR) 500 MG 24 hr tablet Take 4 tablets (2,000 mg) by mouth daily (Patient taking differently: Take 1,000 mg by mouth 2 times daily (with meals)) 12/18/2023 at am    potassium chloride ER (K-TAB) 20 MEQ CR tablet Take 1 tablet (20 mEq) by mouth 2 times daily 12/15/2023 at am    predniSONE (DELTASONE) 20 MG tablet Take 3 tabs by mouth daily x 3 days, then 2 tabs daily x 3 days, then 1 tab daily x 3 days, then 1/2 tab daily x 3 days. (Patient taking differently: Take 40 mg by mouth daily  x 3 days, then 1 tab daily x 3 days, then 1/2 tab daily x 3 days.) 12/18/2023 at am    rosuvastatin (CRESTOR) 20 MG tablet Take 1 tablet (20 mg) by mouth daily 12/18/2023 at am    triamcinolone (KENALOG) 0.1 % cream [TRIAMCINOLONE (KENALOG) 0.1 % CREAM] APPLY EXTERNALLY TO THE AFFECTED AREA TWICE DAILY FOR 14 DAYS More than a month at unkn

## 2023-12-18 NOTE — ED TRIAGE NOTES
Pt presents with shortness of breath and cough for over a week. History of asthma. SpO2 87% on room air. Improved to 92% with 2L via NC. Denies chest pain     Triage Assessment (Adult)       Row Name 12/18/23 0853          Triage Assessment    Airway WDL WDL        Respiratory WDL    Respiratory WDL WDL        Skin Circulation/Temperature WDL    Skin Circulation/Temperature WDL WDL        Cardiac WDL    Cardiac WDL WDL        Peripheral/Neurovascular WDL    Peripheral Neurovascular WDL WDL        Cognitive/Neuro/Behavioral WDL    Cognitive/Neuro/Behavioral WDL WDL

## 2023-12-18 NOTE — PROGRESS NOTES
[facility] ADMISSION NOTE    Patient admitted to room 2209 at approximately 1415 via wheel chair from [facility]. Patient was accompanied by transport tech.     Verbal SBAR report received from Ismael prior to patient arrival.     Patient ambulated to bed independently. Patient alert and oriented X 3. The patient is not having any pain.  . Admission vital signs: There were no vitals taken for this visit. Patient was oriented to plan of care, call light, bed controls, tv, telephone, bathroom, and visiting hours.     Risk Assessment    The following safety risks were identified during admission: none. Yellow risk band applied: NO.     Skin Initial Assessment    This writer admitted this patient and completed a full skin assessment and Kris score in the Adult PCS flowsheet. Appropriate interventions initiated as needed.     Secondary skin check completed by Adriel.         Education    Patient has a [place] to Observation order: Yes  Observation education completed and documented: Yes      Jaden Vázquez RN

## 2023-12-18 NOTE — ED PROVIDER NOTES
EMERGENCY DEPARTMENT ENCOUNTER      NAME: Lester Shi  AGE: 73 year old male  YOB: 1950  MRN: [id number]  EVALUATION DATE & TIME: 12/18/2023  8:52 AM    PCP: Ronda Hawk    ED PROVIDER: Nestor Armenta M.D.    Chief Complaint   Patient presents with    Shortness of Breath       FINAL IMPRESSION:  1. Acute respiratory failure with hypoxia (H)    2. Influenza B    3. Hypomagnesemia    4. Community acquired bacterial pneumonia        ED COURSE & MEDICAL DECISION MAKING:    Pertinent Labs & Imaging studies independently interpreted by me. (See chart for details)  8:59 AM Patient seen and examined reviewed most recent primary care office visits from December 13 December 9.  Patient was diagnosed with influenza B on December 9 and started on Tamiflu, prednisone, and increased frequency of nebulizer treatments.  At follow-up on December 13 he still was not feeling well, was started on a prednisone taper and continued nebulizer treatments.  Patient comes in today with continued shortness of breath and low oxygen saturations.  On my initial exam here, he is in mild distress with increased work of breathing and speaking in short sentences, expiratory wheezes but generally good movement.  Oxygen saturation 88% on initial arrival and patient is on nasal cannula at 3 L now.  No lower extremity swelling.  Denies chest pain.  Labs are ordered along with chest x-ray, DuoNeb.  Given failure to improve as expected with prednisone, consider other source of dyspnea including pneumonia, pulmonary edema, pulmonary embolism, anemia.  9:55 AM labs ordered and independently interpreted by me with reassuring venous blood gas, no hypercarbia or respiratory acidosis.  Lactate slightly elevated at 2.1, IV fluids will be initiated.  CBC demonstrates white blood cell count of 21.5 which may represent demargination from prednisone but is somewhat higher than would be expected from this, hemoglobin is 10.3 which is  stable for patient.  Chest x-ray independently interpreted by me at bedside with possible left sided infiltrate.  10:09 AM labs ordered and independently interpreted by me with reassuring basic metabolic panel other than glucose which is 365 consistent with patient's diabetes and being on prednisone,  and procalcitonin is negative.  Concern for pulmonary embolism given hypoxia and so for normal evaluation, CTA ordered.  10:47 AM D-dimer greater than 20, CTA is pending.  11:00 AM CT of the chest independently interpreted by me does not demonstrate any acute pulmonary embolism but does demonstrate multilobar infiltrates consistent with pneumonia.  12:16 PM I spoke on the phone with Dr. Lira, Hospitalist who accepts patient for admission.  1:13 PM patient remained stable in the emergency department on 2 L of oxygen, respiratory rate was recorded at 38 but I checked on the patient respiratory rate 20.  Patient remains mildly tachycardic when he gets up and moves around    At the conclusion of the encounter I discussed the results of all of the tests and the disposition. The questions were answered. The patient or family acknowledged understanding and was agreeable with the care plan.     Medical Decision Making    History:  Supplemental history from: Documented in chart, if applicable and Family Member/Significant Other  External Record(s) reviewed: Documented in chart, if applicable.    Work Up:  Chart documentation includes differential considered and any EKGs or imaging independently interpreted by provider, where specified.  In additional to work up documented, I considered the following work up: Documented in chart, if applicable.    External consultation:  Discussion of management with another provider: Documented in chart, if applicable    Complicating factors:  Care impacted by chronic illness: Diabetes, Hyperlipidemia, and Hypertension  Care affected by social determinants of health:  N/A    Disposition considerations: Admit.    EKG:    Performed at: 9:27 AM  Impression: Nonspecific ST changes, no acute ischemic changes  Rate: 100  Rhythm: Sinus  Axis: Normal  VT Interval: 162  QRS Interval: 98  QTc Interval: 448  ST Changes: Nonspecific ST changes, no acute ischemic changes  Comparison: October 2023, no changes    I have independently reviewed and interpreted the EKG(s) documented above.    PROCEDURES:       MEDICATIONS GIVEN IN THE EMERGENCY:  Medications   ipratropium - albuterol 0.5 mg/2.5 mg/3 mL (DUONEB) neb solution 3 mL (3 mLs Nebulization $Given 12/18/23 0931)   methylPREDNISolone sodium succinate (solu-MEDROL) injection 125 mg (125 mg Intravenous $Given 12/18/23 1055)   magnesium sulfate 2 g in 50 mL sterile water intermittent infusion (0 g Intravenous Stopped 12/18/23 1211)   sodium chloride 0.9% BOLUS 1,000 mL (1,000 mLs Intravenous $New Bag 12/18/23 1054)   iopamidol (ISOVUE-370) solution 75 mL (75 mLs Intravenous $Given 12/18/23 1042)   levofloxacin (LEVAQUIN) tablet 750 mg (750 mg Oral $Given 12/18/23 1122)       NEW PRESCRIPTIONS STARTED AT TODAY'S ER VISIT  New Prescriptions    No medications on file       =================================================================    HPI    Patient information was obtained from: Patient and wife.      Lester Shi is a 73 year old male with a pertinent history of T2DM, hypertension, hypercholesterolemia, and asthma who presents to this ED via private car for evaluation of shortness of breath.    The patient reports that about 1 week ago, he was diagnosed with Influenza B, and has been experiencing associated shortness of breath and cough. His wife notes that his symptoms started to subside, but then worsened recently. He says that his O2 was consistently around 93-94, but then drastically lowered to <90 this morning, prompting his ED visit today. Patient does not wear O2 at baseline. He notes that he takes baby aspirin but is not on any  other blood thinners. He has been using his nebulizer every 6 hours for his shortness of breath and cough.    Patient notes that he has experienced intermittent leg swelling that he associates with the prednisone he has been taking since his Influenza diagnosis. He also endorses a fever of 101 F a few days ago, and recorded his temperature at 100.6 F this morning.    Denies chest pain, history of kidney disease, history of heart disease, or any other concerns at this time.       REVIEW OF SYSTEMS   Review of Systems   Constitutional:  Positive for fever (100.6 F this AM).   Respiratory:  Positive for cough and shortness of breath.    Cardiovascular:  Positive for leg swelling. Negative for chest pain.   All other systems reviewed and are negative.     PAST MEDICAL HISTORY:  Past Medical History:   Diagnosis Date    Anemia, unspecified     Created by Conversion     Cataract     left eye removed, present in right eye    Chronic osteoarthritis     right knee    Colon polyps     Contact dermatitis and other eczema, due to unspecified cause     Created by Conversion     Diabetes mellitus, type 2 (H)     Klinefelter's syndrome     Created by Conversion     Obesity, unspecified     Created by Conversion     Pure hypercholesterolemia     Created by Conversion     Type II or unspecified type diabetes mellitus without mention of complication, not stated as uncontrolled     Created by Conversion     Uncomplicated asthma     adult asthma- wheezing    Unspecified essential hypertension     Created by Conversion     Varicose vein 9/28/2015       PAST SURGICAL HISTORY:  Past Surgical History:   Procedure Laterality Date    CATARACT EXTRACTION Left 2000    DENTAL SURGERY  1985    JOINT REPLACEMENT      left knee TKA 11/2013    ORTHOPEDIC SURGERY         CURRENT MEDICATIONS:    No current facility-administered medications for this encounter.     Current Outpatient Medications   Medication    albuterol (PROAIR HFA/PROVENTIL  HFA/VENTOLIN HFA) 108 (90 Base) MCG/ACT inhaler    albuterol (PROVENTIL) (2.5 MG/3ML) 0.083% neb solution    albuterol (PROVENTIL) (2.5 MG/3ML) 0.083% neb solution    amLODIPine (NORVASC) 10 MG tablet    aspirin 81 MG EC tablet    atenolol (TENORMIN) 100 MG tablet    azelastine (ASTELIN) 0.1 % nasal spray    blood glucose (ACCU-CHEK GUIDE) test strip    cetirizine (ZYRTEC) 10 MG tablet    famotidine (PEPCID) 20 MG tablet    fexofenadine (ALLEGRA) 180 MG tablet    fluticasone (FLONASE) 50 MCG/ACT nasal spray    fluticasone (FLOVENT HFA) 110 MCG/ACT inhaler    guaiFENesin (MUCINEX) 600 MG 12 hr tablet    hydrochlorothiazide (HYDRODIURIL) 25 MG tablet    insulin pen needle (ULTICARE MINI) 31G X 6 MM miscellaneous    ipratropium - albuterol 0.5 mg/2.5 mg/3 mL (DUONEB) 0.5-2.5 (3) MG/3ML neb solution    liraglutide (VICTOZA) 18 MG/3ML solution    losartan (COZAAR) 100 MG tablet    metFORMIN (GLUCOPHAGE XR) 500 MG 24 hr tablet    potassium chloride ER (K-TAB) 20 MEQ CR tablet    predniSONE (DELTASONE) 20 MG tablet    rosuvastatin (CRESTOR) 20 MG tablet    testosterone (ANDROGEL/TESTIM) 50 MG/5GM (1%) topical gel    triamcinolone (KENALOG) 0.1 % cream       ALLERGIES:  Allergies   Allergen Reactions    Amoxicillin Anaphylaxis    Codeine Nausea and Vomiting    Sulfamethoxazole-Trimethoprim [Sulfamethoxazole-Trimethoprim] Nausea and Vomiting     Tachycardia      Erythromycin Base [Erythromycin] Unknown    Simvastatin Unknown       FAMILY HISTORY:  Family History   Problem Relation Age of Onset    Cerebrovascular Disease Mother     Clotting Disorder Father     Heart Disease Brother        SOCIAL HISTORY:   Social History     Socioeconomic History    Marital status:    Tobacco Use    Smoking status: Former     Packs/day: 2.00     Years: 12.00     Additional pack years: 0.00     Total pack years: 24.00     Types: Cigarettes     Quit date: 1979     Years since quittin.9     Passive exposure: Never    Smokeless  "tobacco: Never   Vaping Use    Vaping Use: Never used   Substance and Sexual Activity    Alcohol use: Yes     Comment: Alcoholic Drinks/day: rare    Drug use: No    Sexual activity: Yes     Partners: Female     Social Determinants of Health     Financial Resource Strain: Low Risk  (11/26/2023)    Financial Resource Strain     Within the past 12 months, have you or your family members you live with been unable to get utilities (heat, electricity) when it was really needed?: No   Food Insecurity: Low Risk  (11/26/2023)    Food Insecurity     Within the past 12 months, did you worry that your food would run out before you got money to buy more?: No     Within the past 12 months, did the food you bought just not last and you didn t have money to get more?: No   Transportation Needs: Low Risk  (11/26/2023)    Transportation Needs     Within the past 12 months, has lack of transportation kept you from medical appointments, getting your medicines, non-medical meetings or appointments, work, or from getting things that you need?: No   Interpersonal Safety: Low Risk  (10/20/2023)    Interpersonal Safety     Do you feel physically and emotionally safe where you currently live?: Yes     Within the past 12 months, have you been hit, slapped, kicked or otherwise physically hurt by someone?: No     Within the past 12 months, have you been humiliated or emotionally abused in other ways by your partner or ex-partner?: No   Housing Stability: Low Risk  (11/26/2023)    Housing Stability     Do you have housing? : Yes     Are you worried about losing your housing?: No       VITALS:  BP (!) 156/78   Pulse 90   Temp 97.8  F (36.6  C) (Temporal)   Resp (!) 38   Ht 1.803 m (5' 11\")   Wt 97.5 kg (215 lb)   SpO2 93%   BMI 29.99 kg/m      PHYSICAL EXAM:  Physical Exam  Vitals and nursing note reviewed.   Constitutional:       Appearance: Normal appearance.   HENT:      Head: Normocephalic and atraumatic.      Right Ear: External ear " normal.      Left Ear: External ear normal.      Nose: Nose normal.      Mouth/Throat:      Mouth: Mucous membranes are moist.   Eyes:      Extraocular Movements: Extraocular movements intact.      Conjunctiva/sclera: Conjunctivae normal.      Pupils: Pupils are equal, round, and reactive to light.   Cardiovascular:      Rate and Rhythm: Regular rhythm. Tachycardia present.   Pulmonary:      Effort: Pulmonary effort is normal.      Breath sounds: Normal breath sounds. No rales.      Comments: Bilateral expiratory wheezes.  Abdominal:      General: Abdomen is flat. There is no distension.      Palpations: Abdomen is soft.      Tenderness: There is no abdominal tenderness. There is no guarding.   Musculoskeletal:         General: Normal range of motion.      Cervical back: Normal range of motion and neck supple.      Right lower leg: No edema.      Left lower leg: No edema.   Lymphadenopathy:      Cervical: No cervical adenopathy.   Skin:     General: Skin is warm and dry.   Neurological:      General: No focal deficit present.      Mental Status: He is alert and oriented to person, place, and time. Mental status is at baseline.      Comments: No gross focal neurologic deficits   Psychiatric:         Mood and Affect: Mood normal.         Behavior: Behavior normal.         Thought Content: Thought content normal.          LAB:  All pertinent labs reviewed and interpreted.  Results for orders placed or performed during the hospital encounter of 12/18/23   XR Chest Port 1 View    Impression    IMPRESSION: Mild retrocardiac opacities likely reflect atelectasis. The right lung is clear. No pleural effusion. Normal heart size. Spinal degenerative changes.   CT Chest Pulmonary Embolism w Contrast   Result Value Ref Range    Radiologist flags Left lower lobe masslike consolidation     Impression    IMPRESSION:  1.  Left lower lobe consolidation, as well as groundglass, tree-in-bud nodules, and reticulation elsewhere  throughout both lungs compatible with multifocal infection and/or aspiration. Follow-up chest CT is recommended in 1-2 months to evaluate for   resolution of the dominant area of consolidation in the left lower lobe.  2.  Small bilateral pulmonary nodules measuring up to 5 mm. These can also be further evaluated on future follow-up CT.  3.  No pulmonary embolism to the subsegmental level.  4.  Chronic and ancillary findings as described.    [Recommend Follow Up: Left lower lobe masslike consolidation]    This report will be copied to the [facility] to ensure a provider acknowledges the finding.      Extra Red Top Tube   Result Value Ref Range    Hold Specimen [facility]    Basic metabolic panel   Result Value Ref Range    Sodium 129 (L) 135 - 145 mmol/L    Potassium 3.4 3.4 - 5.3 mmol/L    Chloride 94 (L) 98 - 107 mmol/L    Carbon Dioxide (CO2) 23 22 - 29 mmol/L    Anion Gap 12 7 - 15 mmol/L    Urea Nitrogen 25.4 (H) 8.0 - 23.0 mg/dL    Creatinine 0.90 0.67 - 1.17 mg/dL    GFR Estimate 89 >60 mL/min/1.73m2    Calcium 9.9 8.8 - 10.2 mg/dL    Glucose 365 (H) 70 - 99 mg/dL   Lactic acid whole blood   Result Value Ref Range    Lactic Acid 2.1 (H) 0.7 - 2.0 mmol/L   Result Value Ref Range    Procalcitonin 0.14 <0.50 ng/mL   Result Value Ref Range    Magnesium 1.6 (L) 1.7 - 2.3 mg/dL   Blood gas venous   Result Value Ref Range    pH Venous 7.42 7.35 - 7.45    pCO2 Venous 38 35 - 50 mm Hg    pO2 Venous 46 25 - 47 mm Hg    Bicarbonate Venous 25 24 - 30 mmol/L    Base Excess/Deficit 0.4   mmol/L    Oxyhemoglobin Venous 79.3 (H) 70.0 - 75.0 %    O2 Sat, Venous 80.8 (H) 70.0 - 75.0 %   Nt probnp inpatient (BNP)   Result Value Ref Range    N terminal Pro BNP Inpatient 310 0 - 900 pg/mL   D dimer quantitative   Result Value Ref Range    D-Dimer Quantitative >20.00 (HH) 0.00 - 0.50 ug/mL FEU   CBC with platelets and differential   Result Value Ref Range    WBC Count 21.5 (H) 4.0 - 11.0 10e3/uL    RBC Count 3.45 (L) 4.40 -  5.90 10e6/uL    Hemoglobin 10.3 (L) 13.3 - 17.7 g/dL    Hematocrit 30.2 (L) 40.0 - 53.0 %    MCV 88 78 - 100 fL    MCH 29.9 26.5 - 33.0 pg    MCHC 34.1 31.5 - 36.5 g/dL    RDW 14.7 10.0 - 15.0 %    Platelet Count 363 150 - 450 10e3/uL    % Neutrophils 86 %    % Lymphocytes 7 %    % Monocytes 5 %    % Eosinophils 0 %    % Basophils 0 %    % Immature Granulocytes 2 %    NRBCs per 100 WBC 0 <1 /100    Absolute Neutrophils 18.4 (H) 1.6 - 8.3 10e3/uL    Absolute Lymphocytes 1.5 0.8 - 5.3 10e3/uL    Absolute Monocytes 1.1 0.0 - 1.3 10e3/uL    Absolute Eosinophils 0.0 0.0 - 0.7 10e3/uL    Absolute Basophils 0.0 0.0 - 0.2 10e3/uL    Absolute Immature Granulocytes 0.4 <=0.4 10e3/uL    Absolute NRBCs 0.0 10e3/uL   Lactic acid whole blood   Result Value Ref Range    Lactic Acid 1.7 0.7 - 2.0 mmol/L       RADIOLOGY:  Reviewed all pertinent imaging. Please see official radiology report.  CT Chest Pulmonary Embolism w Contrast   Final Result   IMPRESSION:   1.  Left lower lobe consolidation, as well as groundglass, tree-in-bud nodules, and reticulation elsewhere throughout both lungs compatible with multifocal infection and/or aspiration. Follow-up chest CT is recommended in 1-2 months to evaluate for    resolution of the dominant area of consolidation in the left lower lobe.   2.  Small bilateral pulmonary nodules measuring up to 5 mm. These can also be further evaluated on future follow-up CT.   3.  No pulmonary embolism to the subsegmental level.   4.  Chronic and ancillary findings as described.      [Recommend Follow Up: Left lower lobe masslike consolidation]      This report will be copied to the [facility] to ensure a provider acknowledges the finding.          XR Chest Port 1 View   Final Result   IMPRESSION: Mild retrocardiac opacities likely reflect atelectasis. The right lung is clear. No pleural effusion. Normal heart size. Spinal degenerative changes.          IYane, am serving as a scribe  to document services personally performed by Dr. Armenta based on my observation and the provider's statements to me. I, Nestor Armenta MD attest that Yane Tj is acting in a scribe capacity, has observed my performance of the services and has documented them in accordance with my direction.    Nestor Armenta M.D.  Emergency Medicine  [facility] EMERGENCY ROOM  [address] [id number]  [phone]  Dept: [phone]       Nestor Armenta MD  12/18/23 [id number]

## 2023-12-18 NOTE — LETTER
71 YOmale  with PMH HTN, HLD, type 2 DM, moderate axonal sensory motor neuropathy, left MCA CVA, BPH, gout who presented to [facility] 7/20/23 with acute/subacute lacunar infarct IC.    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  - MRI brain +acute/subacute lacunar infarct posterior limbic R IC with chronic infarct R cerebellum and L centrum semiovale  - c/w ASA, Lipitor  - s/p Plavix  x 3 weeks (completed 8/10_  - Continue Comprehensive Rehab Program: PT/OT/ST, 3hours daily and 5 days weekly  - neurology, PMR folow up on dc  - Precautions: cardiac, DM, sensory deficits, fall    # Adjustment disorder, stable  - Sertraline 25mg daily  - Neuropsychology support    # HTN  - monitor BP, prednisone taper in progress  - BP (118/77 - 156/84) 8/15    # HLD  - Lipitor decreased to 40 mg 2/2 mild transaminitis  - AST  19  /  ALT  24  /  AlkPhos  170<H>  08-14  - PCP f/u on dc    # DM II  - A1c 6.6 on 7/23  - ISS and FS  - lantus 18 u q PM, 10 u qAM   - admelog 5u qAC 8/9  - hospitalist f/u re: medications for dc home (previously on metformin)    # CKD 3  - Monitor, GFR (07/27) 47, Cr 1.54  - Avoid nephrotoxic meds, encourage fluids  - BUN 32/1.37 8/14 improved    # Gout left 1st MTP  - Zyloprim 300mg daily  - dc DARCO shoe left foot  - WBAT LLE  -  prednisone reduced to 10 mg daily (first dose 8/15) x 3 days then off    # Left achilles tendonitis  - lidocaine patch daily 8/2    # neuropathic pain  - gabapentin 200 qhs 8/4    # Pain management  - Tylenol PRN  - lidocaine patch  - gabapentin     # GI ppx  - Protonix 40mgn  - Senna, miralax daily  - dulcolax suppository PRN    # FEN   - Diet: DASH. No red meat/shellfish    # BPH  - Flomax 0.4mg nightly    # DVT ppx  - Heparin, SCD, TEDs    # Case discussed in IDT rounds 8/14 (follow up):  - performance varies depending on fatigue; CS/CG tranfsers, ambulates 150 feet with RW and CS/CG, set up oral care/UB dressing, CG/CS LB dressing, toileting, mild-mod cognitive deficits  - adjusted goals: supervision iADLs, CS transfers and bADLs  - dc home 8/16 with caregiver support and home Pt OT SLP  - caregiver training completed at CG level  - Pharmacy: [address]    # LABS  monitor FS and BP with reduced steroids      #GOC  CODE STATUS: FULL CODE     Outpatient Follow-up (Specialty/Name of physician):    Roberta Sinclair  Neurology  [address], [address]  [place] [address]  Phone: [phone]  Fax: [phone]  Follow Up Time: 2 weeks    Kimi Hill  Internal Medicine  [address]  [place] [address]  Phone: [phone]  Fax: [phone]  Follow Up Time: 2 weeks    Courtney Euceda  Cardiac Electrophysiology  [address] [place] [address], [address]  [place] [address]  Phone: [phone]  Fax: [phone]  Follow Up Time: 2 weeks   [facility] MEDICAL SURGICAL  [address] [address]  Phone: [phone]  Fax: [phone]    December 23, 2023        Lester Shi  [address] [address]          To whom it may concern:    RE: Lester Shi    To whom it may concern, Lester Shi was a patient under my care at [facility] from 12/18/2023  2:12 PM to 12/21/2023. Please excuse Lester Shi from work during this time,       Please contact me for questions or concerns.      Sincerely,      Dr. Mg Casillas  Internal Medicine  [facility] Medicine Services       71 YOmale  with PMH HTN, HLD, type 2 DM, moderate axonal sensory motor neuropathy, left MCA CVA, BPH, gout who presented to [facility] 7/20/23 with acute/subacute lacunar infarct IC.    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  - MRI brain +acute/subacute lacunar infarct posterior limbic R IC with chronic infarct R cerebellum and L centrum semiovale  - c/w ASA, Lipitor  - s/p Plavix  x 3 weeks (completed 8/10_  - Continue Comprehensive Rehab Program: PT/OT/ST, 3hours daily and 5 days weekly  - neurology, PMR folow up on dc  - Precautions: cardiac, DM, sensory deficits, fall    # Adjustment disorder, stable  - Sertraline 25mg daily  - Neuropsychology support    # HTN  - monitor BP, prednisone taper in progress  - BP (118/77 - 156/84) 8/15    # HLD  - Lipitor decreased to 40 mg 2/2 mild transaminitis  - AST  19  /  ALT  24  /  AlkPhos  170<H>  08-14  - PCP f/u on dc    # DM II  - A1c 6.6 on 7/23  - ISS and FS  - lantus 18 u q PM, 10 u qAM   - admelog 5u qAC 8/9  - hospitalist f/u re: medications for dc home appreciated. Start Farxiga 10mg daily  - hospitalist recommends to continue  basal/bolus insulin with close follow up PCP to reduce/adjust especially with completion of prednisone. Discussed with patient and team; caregiver education to be performed as patient defers injection to wife    # CKD 3  - Monitor, GFR (07/27) 47, Cr 1.54  - Avoid nephrotoxic meds, encourage fluids  - BUN 32/1.37 8/14 improved  - hold metformin. started on farxiga, hospitalist appreciated 8/15    # Gout left 1st MTP  - Zyloprim 300mg daily  - dc DARCO shoe left foot  - WBAT LLE  -  prednisone reduced to 10 mg daily (first dose 8/15) x 3 days then off    # Left achilles tendonitis  - lidocaine patch daily 8/2    # neuropathic pain  - gabapentin 200 qhs 8/4    # Pain management  - Tylenol PRN  - lidocaine patch--> dc patient declines, no further pain  - gabapentin     # GI ppx  - Protonix 40mgn  - Senna, miralax daily  - dulcolax suppository PRN    # FEN   - Diet: DASH. No red meat/shellfish    # BPH  - Flomax 0.4mg nightly    # DVT ppx  - Heparin, SCD, TEDs    # Case discussed in IDT rounds 8/14 (follow up):  - performance varies depending on fatigue; CS/CG tranfsers, ambulates 150 feet with RW and CS/CG, set up oral care/UB dressing, CG/CS LB dressing, toileting, mild-mod cognitive deficits  - adjusted goals: supervision iADLs, CS transfers and bADLs  - dc home 8/16 with caregiver support and home Pt OT SLP  - caregiver training completed at CG level  - Pharmacy: [facility] [address] [facility]    # LABS  monitor FS and BP with reduced steroids  patient and caregiver education insulin administration  close glucose monitoring and PCP f/u on dc      #GOC  CODE STATUS: FULL CODE     Outpatient Follow-up (Specialty/Name of physician):    Roberta Sinclair  Neurology  [address]  [place] [address]  Phone: [phone]  Fax: [phone]  Follow Up Time: 2 weeks    Kimi Hill  Internal Medicine  [address], [address]  [place] [address]  Phone: [phone]  Fax: [phone]  Follow Up Time: 2 weeks    Courtney Euceda  Cardiac Electrophysiology  [address] [place] [address], [address]  [place] [address]  Phone: [phone]  Fax: [phone]  Follow Up Time: 2 weeks

## 2023-12-19 LAB
ANION GAP SERPL CALCULATED.3IONS-SCNC: 16 MMOL/L (ref 7–15)
B-OH-BUTYR SERPL-SCNC: <0.18 MMOL/L
BASE EXCESS BLDV CALC-SCNC: 1.5 MMOL/L (ref -7.7–1.9)
BUN SERPL-MCNC: 34 MG/DL (ref 8–23)
CALCIUM SERPL-MCNC: 9.3 MG/DL (ref 8.8–10.2)
CHLORIDE SERPL-SCNC: 97 MMOL/L (ref 98–107)
CREAT SERPL-MCNC: 0.89 MG/DL (ref 0.67–1.17)
DEPRECATED HCO3 PLAS-SCNC: 21 MMOL/L (ref 22–29)
EGFRCR SERPLBLD CKD-EPI 2021: 89 ML/MIN/1.73M2
ERYTHROCYTE [DISTWIDTH] IN BLOOD BY AUTOMATED COUNT: 14.5 % (ref 10–15)
GLUCOSE BLDC GLUCOMTR-MCNC: 334 MG/DL (ref 70–99)
GLUCOSE BLDC GLUCOMTR-MCNC: 371 MG/DL (ref 70–99)
GLUCOSE BLDC GLUCOMTR-MCNC: 380 MG/DL (ref 70–99)
GLUCOSE BLDC GLUCOMTR-MCNC: 381 MG/DL (ref 70–99)
GLUCOSE BLDC GLUCOMTR-MCNC: 382 MG/DL (ref 70–99)
GLUCOSE BLDC GLUCOMTR-MCNC: 413 MG/DL (ref 70–99)
GLUCOSE SERPL-MCNC: 368 MG/DL (ref 70–99)
HCO3 BLDV-SCNC: 26 MMOL/L (ref 21–28)
HCT VFR BLD AUTO: 29.1 % (ref 40–53)
HGB BLD-MCNC: 9.8 G/DL (ref 13.3–17.7)
MAGNESIUM SERPL-MCNC: 2.3 MG/DL (ref 1.7–2.3)
MCH RBC QN AUTO: 29.5 PG (ref 26.5–33)
MCHC RBC AUTO-ENTMCNC: 33.7 G/DL (ref 31.5–36.5)
MCV RBC AUTO: 88 FL (ref 78–100)
O2/TOTAL GAS SETTING VFR VENT: 21 %
PCO2 BLDV: 41 MM HG (ref 40–50)
PH BLDV: 7.42 [PH] (ref 7.32–7.43)
PLATELET # BLD AUTO: 326 10E3/UL (ref 150–450)
PO2 BLDV: 44 MM HG (ref 25–47)
POTASSIUM SERPL-SCNC: 4 MMOL/L (ref 3.4–5.3)
RBC # BLD AUTO: 3.32 10E6/UL (ref 4.4–5.9)
SODIUM SERPL-SCNC: 134 MMOL/L (ref 135–145)
WBC # BLD AUTO: 15.6 10E3/UL (ref 4–11)

## 2023-12-19 PROCEDURE — 82374 ASSAY BLOOD CARBON DIOXIDE: CPT

## 2023-12-19 PROCEDURE — 94640 AIRWAY INHALATION TREATMENT: CPT | Mod: 76

## 2023-12-19 PROCEDURE — 85027 COMPLETE CBC AUTOMATED: CPT

## 2023-12-19 PROCEDURE — 36415 COLL VENOUS BLD VENIPUNCTURE: CPT

## 2023-12-19 PROCEDURE — 250N000012 HC RX MED GY IP 250 OP 636 PS 637

## 2023-12-19 PROCEDURE — 82010 KETONE BODYS QUAN: CPT

## 2023-12-19 PROCEDURE — 999N000157 HC STATISTIC RCP TIME EA 10 MIN

## 2023-12-19 PROCEDURE — 94640 AIRWAY INHALATION TREATMENT: CPT

## 2023-12-19 PROCEDURE — 82803 BLOOD GASES ANY COMBINATION: CPT

## 2023-12-19 PROCEDURE — 250N000013 HC RX MED GY IP 250 OP 250 PS 637: Performed by: INTERNAL MEDICINE

## 2023-12-19 PROCEDURE — 83735 ASSAY OF MAGNESIUM: CPT

## 2023-12-19 PROCEDURE — 99233 SBSQ HOSP IP/OBS HIGH 50: CPT

## 2023-12-19 PROCEDURE — 250N000013 HC RX MED GY IP 250 OP 250 PS 637

## 2023-12-19 PROCEDURE — 250N000009 HC RX 250

## 2023-12-19 PROCEDURE — 120N000001 HC R&B MED SURG/OB

## 2023-12-19 RX ORDER — INSULIN LISPRO 100 [IU]/ML
5 INJECTION, SOLUTION INTRAVENOUS; SUBCUTANEOUS ONCE
Status: DISCONTINUED | OUTPATIENT
Start: 2023-12-19 | End: 2023-12-19

## 2023-12-19 RX ADMIN — IPRATROPIUM BROMIDE AND ALBUTEROL SULFATE 3 ML: 2.5; .5 SOLUTION RESPIRATORY (INHALATION) at 15:27

## 2023-12-19 RX ADMIN — ATENOLOL 100 MG: 100 TABLET ORAL at 07:37

## 2023-12-19 RX ADMIN — METFORMIN HYDROCHLORIDE 1000 MG: 500 TABLET, EXTENDED RELEASE ORAL at 07:37

## 2023-12-19 RX ADMIN — AMLODIPINE BESYLATE 10 MG: 10 TABLET ORAL at 07:37

## 2023-12-19 RX ADMIN — IPRATROPIUM BROMIDE AND ALBUTEROL SULFATE 3 ML: 2.5; .5 SOLUTION RESPIRATORY (INHALATION) at 19:41

## 2023-12-19 RX ADMIN — LEVOFLOXACIN 750 MG: 500 TABLET, FILM COATED ORAL at 11:03

## 2023-12-19 RX ADMIN — HYDROCHLOROTHIAZIDE 25 MG: 25 TABLET ORAL at 07:37

## 2023-12-19 RX ADMIN — Medication 3 MG: at 23:58

## 2023-12-19 RX ADMIN — PREDNISONE 40 MG: 20 TABLET ORAL at 07:36

## 2023-12-19 RX ADMIN — INSULIN ASPART 6 UNITS: 100 INJECTION, SOLUTION INTRAVENOUS; SUBCUTANEOUS at 07:37

## 2023-12-19 RX ADMIN — LOSARTAN POTASSIUM 100 MG: 50 TABLET, FILM COATED ORAL at 07:37

## 2023-12-19 RX ADMIN — IPRATROPIUM BROMIDE AND ALBUTEROL SULFATE 3 ML: 2.5; .5 SOLUTION RESPIRATORY (INHALATION) at 12:07

## 2023-12-19 RX ADMIN — IPRATROPIUM BROMIDE AND ALBUTEROL SULFATE 3 ML: 2.5; .5 SOLUTION RESPIRATORY (INHALATION) at 08:12

## 2023-12-19 RX ADMIN — ROSUVASTATIN CALCIUM 20 MG: 20 TABLET, FILM COATED ORAL at 07:37

## 2023-12-19 RX ADMIN — ASPIRIN 81 MG: 81 TABLET, COATED ORAL at 07:37

## 2023-12-19 RX ADMIN — METFORMIN HYDROCHLORIDE 1000 MG: 500 TABLET, EXTENDED RELEASE ORAL at 17:00

## 2023-12-19 ASSESSMENT — ACTIVITIES OF DAILY LIVING (ADL)
ADLS_ACUITY_SCORE: 20

## 2023-12-19 NOTE — PLAN OF CARE
Problem: Gas Exchange Impaired  Goal: Optimal Gas Exchange  Outcome: Progressing   Goal Outcome Evaluation:         Pt states he feels it is easier to breathe today compared to yesterday. Has been on room air since this morning with )2 sats in the lower to mid 90s. Cough has been less than yesterday. Pt states he is using is spirometer. Denies any pain Moving about in room. Lungs sound much less wheezy than yesterday as he continues to receive scheduled Duoneb treatments. Steroids have been discharge. No other new concerns at this time.

## 2023-12-19 NOTE — PLAN OF CARE
. Patient received 5 units novolog scheduled. Updated Dr. Amin and received order for 10 units lantus x1.

## 2023-12-19 NOTE — PROGRESS NOTES
[facility] Medicine Progress Note  Date of Service: 12/19/2023    Assessment & Plan   Lester Shi is a 73 year old male who presents on 12/18/23 with ongoing dyspnea & hypoxemia noted on home pulse oximeter.     Acute respiratory failure with hypoxemia  Community acquired pneumonia  Sepsis  Seen in clinic for dyspnea 12/9, diagnosed with influenza B & asthma exacerbation: initiated on Tamiflu (completed course) & prednisone. Presented again 12/13 and prescribed restart & taper of prednisone for asthma exacerbation.   Presents 12/18 for hypoxemia on home oximeter & nebs not improving symptoms; SPO2 87% on presentation so initiated on 2L O2 NC. Improving 12/19 but remains septic based on tachypnea (22 <-- 38/min), tachycardia (90 <-- 106bpm), and leukocytosis (15.6 <-- 21.5). D-dimer elevated (>20), but CT PE negative for PE. CXR clear, but CT significant for left lower lobe consolidation plus ground-glass, tree-in-bud nodules bilaterally consistent with multifocal infection / aspiration. Initiated on levofloxacin at outside hospital. Covid recheck remains negative.   -Continue levofloxacin 750mg daily  -Weaned off O2 AM 12/19. Remains available for SPO2 <89%  -Management of asthma, below  -CBC in AM    Asthma with recent exacerbation  History of asthma with frequent exacerbations. Seen by PCP 12/9 & prescribed prednisone 40mg daily burst. Seen again 12/13 for persistent symptoms & prescribed taper of 60mg x3 days, 40mg x3d, 20mg x3d, 10mg x3d. Prednisone taper re-ordered on admission, but stopped 12/19 due to resolution of wheezing & progressively worsening hyperglycemia.   -Discontinue prednisone taper 12/19 given resolution of wheezing & significant hyperglycemia which steroids are probably contributing to.  -Albuterol neb Q2hrs PRN  -Ipratropium-albuterol four times daily  -O2 as per respiratory failure, above    Recent influenza B infection  Influenza B positive 12/9/23.  Initiated on Tamiflu 12/9, course completed. Post viral syndrome could be contributing to ongoing symptoms.     Type 2 diabetes mellitus with severe hyperglycemia  Elevated anion gap     Poor control (most recent hemoglobin A1c 9.4 on 11/27/23). Severe hyperglycemia since presentation (up to 521), probably due to steroids & acute illness.     Evening 12/18 received 10 units lantus x1 in addition to sliding scale. AM 12/19 developed anion gap on BMP (16). Patient denies symptoms.     Managed PTA with metformin 1000mg BID, liraglutide 1.2mg subcutaneous daily.  -Continue PTA metformin  -Increase ssi from medium to high correction scale for persistent hyperglycemia  -VBG & ketones ordered to further assess anion gap & rule out developing ketoacidosis   -Hypoglycemia monitoring & treatment protocols in place    Addendum: VBG WNL, ketones negative (0.18). Continue subcutaneous insulin with increased ssi dosing.    Hypomagnesemia, resolved  Mg 1.6 on presentation, now 2.1 after 2g IV Mg sulfate at outside ER.   -Repeat Mg in AM  -RN managed repletion protocol     Hyponatremia, improving  Mild-moderate, acute. Na since presentation 134 <-- 129. Anticipate improvement with adequate PO intake & resolution of acute illness.   -BMP in AM    Irregular heart beat  Auscultated to be in irregular heart rate. EKG shows sinus with frequent PAC's. Patient denies symptoms. Monitor.     Anemia  Stable, normocytic. Baseline hemoglobin around 10.5-11.0, hemoglobin since presentation 9.8 <-- 10.3. No signs of acute bleeding.   -CBC in AM    Hypertension  Hypertensive in ER (-173), probably in part due to acute hypoxemia. Managed PTA with amlodipine 10mg daily, atenolol 100mg daily, hydrochlorothiazide 25mg daily, losartan 100mg daily.   -Continue PTA atenolol, amlodipine, hydrochlorothiazide, & losartan with hold parameters    Pulmonary nodules  Small bilateral pulmonary nodules measuring up to 5mm noted on CT PE study 12/18.  Consider outpatient follow up with PCP for surveillance.     Hypercholesterolemia  Managed PTA with rosuvastatin, continue.          Diet: Combination Diet Moderate Consistent Carb (60 g CHO per Meal) Diet    DVT Prophylaxis: Pneumatic Compression Devices  Hutchinson Catheter: Not present  Lines: None     Code Status: Full Code       Discussion: improving from infectious standpoint (WBC improving, weaned off O2). Glucose remains elevated & worsening. Wheezing resolved so stopped steroids & increased ssi to hopefully help with hyperglycemia. Continue abx. Hopefully home on 12/20 if continuing to improve.     Disposition: Anticipate discharge hopefully 12/20 if continuing to improve     Attestation:  I have reviewed today's vital signs, notes, medications, labs and imaging.    I have discussed, or will be discussing, the patient with hospitalist physician, Dr. Gonzalez.     Tonya Valdivia PA-C     Interval History   Doing well this AM, breathing feels improved. Cough has become slightly more productive of yellow-green sputum. No hemoptysis. Weaned off O2 and does not feel any dyspnea. No chest pain or pleuritic pain. Denies lightheadedness or dizziness. Denies weakness. Denies fatigue or confusion. Says it is common for his glucose to go up when he is on steroids, into 300's usually. Has never been in DKA/HHS before.     Physical Exam   Temp:  [97.2  F (36.2  C)-97.9  F (36.6  C)] 97.4  F (36.3  C)  Pulse:  [] 90  Resp:  [18-38] 22  BP: (137-173)/(69-89) 173/87  SpO2:  [87 %-95 %] 95 %    Weights: There were no vitals filed for this visit. There is no height or weight on file to calculate BMI.    Constitutional: alert and oriented x3, laying in bed, appears comfortable, nontoxic  CV: irregular rhythm, normal rate, no murmurs, rubs, or gallops. Radial  pulses 2+  Respiratory: unlabored with conversation on room air. Mild expiratory rhonchi bilaterally & some crackles in left base. No wheezing this AM.   GI: Bowel  sounds present throughout. Abdomen soft, nontender to palpation  Skin: Warm and dry  Musculoskeletal: muscle bulk as expected  Neuro: distal sensation intact to lower extremities bilaterally. Interacting appropriately, no facial droop.     Data   Recent Labs   Lab 12/19/23  0722 12/19/23  0410 12/19/23  0212 12/18/23  1703 12/18/23  0914   WBC  --  15.6*  --   --  21.5*   HGB  --  9.8*  --   --  10.3*   MCV  --  88  --   --  88   PLT  --  326  --   --  363   NA  --  134*  --   --  129*   POTASSIUM  --  4.0  --   --  3.4   CHLORIDE  --  97*  --   --  94*   CO2  --  21*  --   --  23   BUN  --  34.0*  --   --  25.4*   CR  --  0.89  --   --  0.90   ANIONGAP  --  16*  --   --  12   MADELEINE  --  9.3  --   --  9.9   * 368* 371*   < > 365*    < > = values in this interval not displayed.     Imaging  Recent Results (from the past 24 hour(s))   XR Chest Port 1 View    Narrative    EXAM: XR CHEST PORT 1 VIEW  LOCATION: [facility]  DATE: 12/18/2023    INDICATION: Cough, asthma, concern for secondary infection  COMPARISON: X-ray 12/09/2023      Impression    IMPRESSION: Mild retrocardiac opacities likely reflect atelectasis. The right lung is clear. No pleural effusion. Normal heart size. Spinal degenerative changes.   CT Chest Pulmonary Embolism w Contrast   Result Value    Radiologist flags Left lower lobe masslike consolidation    Narrative    EXAM: CT CHEST PULMONARY EMBOLISM W CONTRAST  LOCATION: [facility]  DATE: 12/18/2023    INDICATION: Cough, dyspnea, hypoxia  COMPARISON: CT chest 07/26/2021; PET/CT 08/13/2021  TECHNIQUE: CT chest pulmonary angiogram during arterial phase injection of IV contrast. Multiplanar reformats and MIP reconstructions were performed. Dose reduction techniques were used.   CONTRAST: Isovue 370 75ml    FINDINGS:  ANGIOGRAM CHEST: Main pulmonary trunk is nonenlarged. No pulmonary embolism to the subsegmental level.    LUNGS AND PLEURA: Mild  expiratory appearance of the trachea. There is diffuse bronchial wall thickening, worst in the lower lobes. There is a rounded area of consolidation within the medial left lower lobe with some associated air bronchograms (series 7   image 152). Additional airway centric groundglass, tree-in-bud nodules, and reticulation are seen elsewhere throughout the left upper (series 7 image 120, 141), right lower (series 7 image 189, 221), right middle (series 7 image 193), and left lower   lobes (series 7 image 201). Evaluation for pulmonary nodules is limited given extensive parenchymal disease. Within this limitation, there are multiple small bilateral solid nodules measuring up to 6 mm (series 7 image 96). Calcified benign nodules. No   pleural effusion or pneumothorax.    MEDIASTINUM/AXILLAE: No pathologically enlarged thoracic lymph nodes. Unremarkable thyroid. Normal caliber thoracic aorta. Normal heart size. No pericardial effusion.    CORONARY ARTERY CALCIFICATION: Moderate.    UPPER ABDOMEN: Small hiatal hernia. Multiple high attenuation exophytic foci are seen within both kidneys, likely a proteinaceous or hemorrhagic cysts.    MUSCULOSKELETAL: No acute osseous abnormalities.      Impression    IMPRESSION:  1.  Left lower lobe consolidation, as well as groundglass, tree-in-bud nodules, and reticulation elsewhere throughout both lungs compatible with multifocal infection and/or aspiration. Follow-up chest CT is recommended in 1-2 months to evaluate for   resolution of the dominant area of consolidation in the left lower lobe.  2.  Small bilateral pulmonary nodules measuring up to 5 mm. These can also be further evaluated on future follow-up CT.  3.  No pulmonary embolism to the subsegmental level.  4.  Chronic and ancillary findings as described.    [Recommend Follow Up: Left lower lobe masslike consolidation]    This report will be copied to the [facility] to ensure a provider acknowledges the finding.          I reviewed all new labs and imaging results over the last 24 hours.    Medications      amLODIPine  10 mg Oral Daily    aspirin  81 mg Oral Daily    atenolol  100 mg Oral Daily    hydrochlorothiazide  25 mg Oral QAM    insulin aspart  1-7 Units Subcutaneous TID AC    insulin aspart  1-5 Units Subcutaneous At Bedtime    ipratropium - albuterol 0.5 mg/2.5 mg/3 mL  3 mL Nebulization 4x daily    levofloxacin  750 mg Oral Daily    losartan  100 mg Oral Daily    metFORMIN  1,000 mg Oral BID w/meals    predniSONE  40 mg Oral Daily    Followed by    [START ON 12/21/2023] predniSONE  20 mg Oral Daily    Followed by    [START ON 12/24/2023] predniSONE  10 mg Oral Daily    rosuvastatin  20 mg Oral Daily     Tonya Valdivia PA-C

## 2023-12-20 VITALS
HEART RATE: 80 BPM | DIASTOLIC BLOOD PRESSURE: 71 MMHG | SYSTOLIC BLOOD PRESSURE: 146 MMHG | OXYGEN SATURATION: 92 % | TEMPERATURE: 98 F | RESPIRATION RATE: 18 BRPM

## 2023-12-20 LAB
ANION GAP SERPL CALCULATED.3IONS-SCNC: 11 MMOL/L (ref 7–15)
BUN SERPL-MCNC: 33.1 MG/DL (ref 8–23)
CALCIUM SERPL-MCNC: 9.3 MG/DL (ref 8.8–10.2)
CHLORIDE SERPL-SCNC: 99 MMOL/L (ref 98–107)
CREAT SERPL-MCNC: 1.02 MG/DL (ref 0.67–1.17)
DEPRECATED HCO3 PLAS-SCNC: 24 MMOL/L (ref 22–29)
EGFRCR SERPLBLD CKD-EPI 2021: 77 ML/MIN/1.73M2
ERYTHROCYTE [DISTWIDTH] IN BLOOD BY AUTOMATED COUNT: 14.4 % (ref 10–15)
FERRITIN SERPL-MCNC: 512 NG/ML (ref 31–409)
FOLATE SERPL-MCNC: 8.6 NG/ML (ref 4.6–34.8)
GLUCOSE BLDC GLUCOMTR-MCNC: 195 MG/DL (ref 70–99)
GLUCOSE BLDC GLUCOMTR-MCNC: 233 MG/DL (ref 70–99)
GLUCOSE SERPL-MCNC: 248 MG/DL (ref 70–99)
HCT VFR BLD AUTO: 29.1 % (ref 40–53)
HGB BLD-MCNC: 9.6 G/DL (ref 13.3–17.7)
IRON BINDING CAPACITY (ROCHE): 168 UG/DL (ref 240–430)
IRON SATN MFR SERPL: 25 % (ref 15–46)
IRON SERPL-MCNC: 42 UG/DL (ref 61–157)
MAGNESIUM SERPL-MCNC: 1.9 MG/DL (ref 1.7–2.3)
MCH RBC QN AUTO: 29.3 PG (ref 26.5–33)
MCHC RBC AUTO-ENTMCNC: 33 G/DL (ref 31.5–36.5)
MCV RBC AUTO: 89 FL (ref 78–100)
PLATELET # BLD AUTO: 360 10E3/UL (ref 150–450)
POTASSIUM SERPL-SCNC: 3.9 MMOL/L (ref 3.4–5.3)
RBC # BLD AUTO: 3.28 10E6/UL (ref 4.4–5.9)
RETICS # AUTO: 0.05 10E6/UL (ref 0.03–0.1)
RETICS/RBC NFR AUTO: 1.8 % (ref 0.5–2)
SODIUM SERPL-SCNC: 134 MMOL/L (ref 135–145)
VIT B12 SERPL-MCNC: 727 PG/ML (ref 232–1245)
WBC # BLD AUTO: 14 10E3/UL (ref 4–11)

## 2023-12-20 PROCEDURE — 250N000013 HC RX MED GY IP 250 OP 250 PS 637: Performed by: STUDENT IN AN ORGANIZED HEALTH CARE EDUCATION/TRAINING PROGRAM

## 2023-12-20 PROCEDURE — 83550 IRON BINDING TEST: CPT | Performed by: STUDENT IN AN ORGANIZED HEALTH CARE EDUCATION/TRAINING PROGRAM

## 2023-12-20 PROCEDURE — 82607 VITAMIN B-12: CPT | Performed by: STUDENT IN AN ORGANIZED HEALTH CARE EDUCATION/TRAINING PROGRAM

## 2023-12-20 PROCEDURE — 250N000009 HC RX 250

## 2023-12-20 PROCEDURE — 80048 BASIC METABOLIC PNL TOTAL CA: CPT

## 2023-12-20 PROCEDURE — 94640 AIRWAY INHALATION TREATMENT: CPT | Mod: 76

## 2023-12-20 PROCEDURE — 82728 ASSAY OF FERRITIN: CPT | Performed by: STUDENT IN AN ORGANIZED HEALTH CARE EDUCATION/TRAINING PROGRAM

## 2023-12-20 PROCEDURE — 85027 COMPLETE CBC AUTOMATED: CPT

## 2023-12-20 PROCEDURE — 999N000157 HC STATISTIC RCP TIME EA 10 MIN

## 2023-12-20 PROCEDURE — 82746 ASSAY OF FOLIC ACID SERUM: CPT | Performed by: STUDENT IN AN ORGANIZED HEALTH CARE EDUCATION/TRAINING PROGRAM

## 2023-12-20 PROCEDURE — 250N000013 HC RX MED GY IP 250 OP 250 PS 637

## 2023-12-20 PROCEDURE — 85045 AUTOMATED RETICULOCYTE COUNT: CPT | Performed by: INTERNAL MEDICINE

## 2023-12-20 PROCEDURE — 36415 COLL VENOUS BLD VENIPUNCTURE: CPT

## 2023-12-20 PROCEDURE — 99239 HOSP IP/OBS DSCHRG MGMT >30: CPT | Performed by: STUDENT IN AN ORGANIZED HEALTH CARE EDUCATION/TRAINING PROGRAM

## 2023-12-20 PROCEDURE — 258N000003 HC RX IP 258 OP 636: Performed by: STUDENT IN AN ORGANIZED HEALTH CARE EDUCATION/TRAINING PROGRAM

## 2023-12-20 PROCEDURE — 36415 COLL VENOUS BLD VENIPUNCTURE: CPT | Performed by: STUDENT IN AN ORGANIZED HEALTH CARE EDUCATION/TRAINING PROGRAM

## 2023-12-20 PROCEDURE — 94640 AIRWAY INHALATION TREATMENT: CPT

## 2023-12-20 PROCEDURE — 83735 ASSAY OF MAGNESIUM: CPT

## 2023-12-20 RX ORDER — BENZONATATE 100 MG/1
100 CAPSULE ORAL 3 TIMES DAILY PRN
Qty: 9 CAPSULE | Refills: 0 | Status: SHIPPED | OUTPATIENT
Start: 2023-12-20 | End: 2023-12-23

## 2023-12-20 RX ORDER — GUAIFENESIN 600 MG/1
1200 TABLET, EXTENDED RELEASE ORAL 2 TIMES DAILY
Status: DISCONTINUED | OUTPATIENT
Start: 2023-12-20 | End: 2023-12-20 | Stop reason: HOSPADM

## 2023-12-20 RX ORDER — SODIUM CHLORIDE, SODIUM LACTATE, POTASSIUM CHLORIDE, CALCIUM CHLORIDE 600; 310; 30; 20 MG/100ML; MG/100ML; MG/100ML; MG/100ML
INJECTION, SOLUTION INTRAVENOUS CONTINUOUS
Status: DISCONTINUED | OUTPATIENT
Start: 2023-12-20 | End: 2023-12-20 | Stop reason: HOSPADM

## 2023-12-20 RX ORDER — LEVOFLOXACIN 750 MG/1
750 TABLET, FILM COATED ORAL DAILY
Qty: 3 TABLET | Refills: 0 | Status: SHIPPED | OUTPATIENT
Start: 2023-12-21 | End: 2023-12-24

## 2023-12-20 RX ORDER — FLUTICASONE PROPIONATE AND SALMETEROL 100; 50 UG/1; UG/1
1 POWDER RESPIRATORY (INHALATION) EVERY 12 HOURS
Qty: 14 EACH | Refills: 0 | Status: SHIPPED | OUTPATIENT
Start: 2023-12-20 | End: 2024-04-04

## 2023-12-20 RX ORDER — FLUTICASONE FUROATE AND VILANTEROL 100; 25 UG/1; UG/1
1 POWDER RESPIRATORY (INHALATION) DAILY
Status: DISCONTINUED | OUTPATIENT
Start: 2023-12-20 | End: 2023-12-20 | Stop reason: HOSPADM

## 2023-12-20 RX ORDER — METFORMIN HCL 500 MG
1000 TABLET, EXTENDED RELEASE 24 HR ORAL 2 TIMES DAILY WITH MEALS
Qty: 120 TABLET | Refills: 0 | Status: SHIPPED | OUTPATIENT
Start: 2023-12-20 | End: 2024-02-28

## 2023-12-20 RX ADMIN — METFORMIN HYDROCHLORIDE 1000 MG: 500 TABLET, EXTENDED RELEASE ORAL at 07:52

## 2023-12-20 RX ADMIN — HYDROCHLOROTHIAZIDE 25 MG: 25 TABLET ORAL at 07:51

## 2023-12-20 RX ADMIN — LOSARTAN POTASSIUM 100 MG: 50 TABLET, FILM COATED ORAL at 07:52

## 2023-12-20 RX ADMIN — ATENOLOL 100 MG: 100 TABLET ORAL at 07:52

## 2023-12-20 RX ADMIN — IPRATROPIUM BROMIDE AND ALBUTEROL SULFATE 3 ML: 2.5; .5 SOLUTION RESPIRATORY (INHALATION) at 15:44

## 2023-12-20 RX ADMIN — GUAIFENESIN 1200 MG: 600 TABLET ORAL at 10:04

## 2023-12-20 RX ADMIN — AMLODIPINE BESYLATE 10 MG: 10 TABLET ORAL at 07:51

## 2023-12-20 RX ADMIN — ROSUVASTATIN CALCIUM 20 MG: 20 TABLET, FILM COATED ORAL at 07:52

## 2023-12-20 RX ADMIN — FLUTICASONE FUROATE AND VILANTEROL TRIFENATATE 1 PUFF: 100; 25 POWDER RESPIRATORY (INHALATION) at 14:33

## 2023-12-20 RX ADMIN — IPRATROPIUM BROMIDE AND ALBUTEROL SULFATE 3 ML: 2.5; .5 SOLUTION RESPIRATORY (INHALATION) at 11:23

## 2023-12-20 RX ADMIN — LEVOFLOXACIN 750 MG: 500 TABLET, FILM COATED ORAL at 12:13

## 2023-12-20 RX ADMIN — IPRATROPIUM BROMIDE AND ALBUTEROL SULFATE 3 ML: 2.5; .5 SOLUTION RESPIRATORY (INHALATION) at 08:30

## 2023-12-20 RX ADMIN — ASPIRIN 81 MG: 81 TABLET, COATED ORAL at 07:52

## 2023-12-20 RX ADMIN — SODIUM CHLORIDE, POTASSIUM CHLORIDE, SODIUM LACTATE AND CALCIUM CHLORIDE: 600; 310; 30; 20 INJECTION, SOLUTION INTRAVENOUS at 09:23

## 2023-12-20 ASSESSMENT — ACTIVITIES OF DAILY LIVING (ADL)
ADLS_ACUITY_SCORE: 20

## 2023-12-20 NOTE — DISCHARGE SUMMARY
[facility]  Hospitalist Discharge Summary      Date of Admission:  12/18/2023  Date of Discharge:  12/20/2023  Discharging Provider: Mg Casillas DO  Discharge Service: Hospitalist Service    Discharge Diagnoses   Lester Shi is a 73 year old male with mild persistent asthma, recent influenza, and 2 recent presentations for dyspnea related to respiratory infection who presents on 12/18/23 with ongoing dyspnea & hypoxemia noted on home pulse oximeter. Plan to discharge in AM 12/21 after optimizing medications as this is patient's 3rd visit to a physician for this.     Acute respiratory failure with hypoxemia  Community acquired pneumonia  Sepsis  Seen in clinic for dyspnea 12/9, diagnosed with influenza B & asthma exacerbation: initiated on Tamiflu (completed course) & prednisone. Presented again 12/13 and prescribed restart & taper of prednisone for asthma exacerbation.   Presents 12/18 for hypoxemia on home oximeter & nebs not improving symptoms; SPO2 87% on presentation so initiated on 2L O2 NC. Improving 12/19 but remains septic based on tachypnea (22 <-- 38/min), tachycardia (90 <-- 106bpm), and leukocytosis (15.6 <-- 21.5). D-dimer elevated (>20), but CT PE negative for PE. CXR clear, but CT significant for left lower lobe consolidation plus ground-glass, tree-in-bud nodules bilaterally consistent with multifocal infection / aspiration. Initiated on levofloxacin at outside hospital. Covid recheck remains negative.   Pneumonia Discharge Criteria:  HR <100 YES  RR <24 YES  SpO2 on RA >90% YES  Afebrile YES  SBP>90 YES   Can tolerate PO YES  Normal mentation  YES    -Continue levofloxacin 750mg daily for 5 days, last dose 12/23      Asthma with recent exacerbation    - Pharmacy consulted for ICS/LABA coverage as this is preferred maintenance rather than repeating steroid tapers   - Advair Diskus ~$60/month and patient prescribed this on discharge  -Albuterol neb Q2hrs  "PRN  -Ipratropium-albuterol four times daily  -O2 as per respiratory failure, above    Recent influenza B infection  Influenza B positive 12/9/23. Initiated on Tamiflu 12/9, course completed. Post viral syndrome could be contributing to ongoing symptoms.     Type 2 diabetes mellitus with severe hyperglycemia  Elevated anion gap       Hypomagnesemia      Hyponatremia,      Irregular heart beat      Anemia      Hypertension      Pulmonary nodules  Small bilateral pulmonary nodules measuring up to 5mm noted on CT PE study 12/18.     Consider outpatient follow up with PCP for surveillance. \"Follow-up chest CT is recommended in 1-2 months to evaluate for resolution of the dominant area of consolidation in the left lower lobe\" and pulm nodules can be re-evaluated at that time    Hypercholesterolemia  Managed PTA with rosuvastatin, continue.    Clinically Significant Risk Factors     # DMII: A1C = 9.4 % (Ref range: 0.0 - 5.6 %) within past 6 months  # Overweight: Estimated body mass index is 29.99 kg/m  as calculated from the following:    Height as of an earlier encounter on 12/18/23: 1.803 m (5' 11\").    Weight as of an earlier encounter on 12/18/23: 97.5 kg (215 lb).       Follow-ups Needed After Discharge   Follow-up Appointments     Follow-up and recommended labs and tests       Follow up with primary care provider, Ronda Hawk, within 7 days   for hospital follow- up.  No follow up labs or test are needed. Started   patient on new combo inhaler ICS/LABA to reduce asthma exacerbations and   hopefully reduce need for steroids.        Consider outpatient follow up with PCP for surveillance. \"Follow-up chest CT is recommended in 1-2 months to evaluate for resolution of the dominant area of consolidation in the left lower lobe\" and pulm nodules can be re-evaluated at that time    Unresulted Labs Ordered in the Past 30 Days of this Admission       No orders found from 11/18/2023 to 12/19/2023.        These results " will be followed up by PCP    Discharge Disposition   Discharged to home  Condition at discharge: Fair    Hospital Course   Lester Shi is a 73 year old male who presents on 12/18/23 with ongoing dyspnea & hypoxemia noted on home pulse oximeter.     Acute respiratory failure with hypoxemia  Community acquired pneumonia  Sepsis  Seen in clinic for dyspnea 12/9, diagnosed with influenza B & asthma exacerbation: initiated on Tamiflu (completed course) & prednisone. Presented again 12/13 and prescribed restart & taper of prednisone for asthma exacerbation.   Presents 12/18 for hypoxemia on home oximeter & nebs not improving symptoms; SPO2 87% on presentation so initiated on 2L O2 NC. Improving 12/19 but remains septic based on tachypnea (22 <-- 38/min), tachycardia (90 <-- 106bpm), and leukocytosis (15.6 <-- 21.5). D-dimer elevated (>20), but CT PE negative for PE. CXR clear, but CT significant for left lower lobe consolidation plus ground-glass, tree-in-bud nodules bilaterally consistent with multifocal infection / aspiration. Initiated on levofloxacin at outside hospital. Covid recheck remains negative.   -Continue levofloxacin 750mg daily  -Weaned off O2 AM 12/19. Remains available for SPO2 <89%  -Management of asthma, below  -CBC in AM    Asthma with recent exacerbation  History of asthma with frequent exacerbations. Seen by PCP 12/9 & prescribed prednisone 40mg daily burst. Seen again 12/13 for persistent symptoms & prescribed taper of 60mg x3 days, 40mg x3d, 20mg x3d, 10mg x3d. Prednisone taper re-ordered on admission, but stopped 12/19 due to resolution of wheezing & progressively worsening hyperglycemia.   -Discontinue prednisone taper 12/19 given resolution of wheezing & significant hyperglycemia which steroids are probably contributing to.  -Albuterol neb Q2hrs PRN  -Ipratropium-albuterol four times daily  -O2 as per respiratory failure, above    Recent influenza B infection  Influenza B positive 12/9/23.  Initiated on Tamiflu 12/9, course completed. Post viral syndrome could be contributing to ongoing symptoms.     Type 2 diabetes mellitus with severe hyperglycemia  Elevated anion gap     Poor control (most recent hemoglobin A1c 9.4 on 11/27/23). Severe hyperglycemia since presentation (up to 521), probably due to steroids & acute illness.     Evening 12/18 received 10 units lantus x1 in addition to sliding scale. AM 12/19 developed anion gap on BMP (16). Patient denies symptoms.     Managed PTA with metformin 1000mg BID, liraglutide 1.2mg subcutaneous daily.  -Continue PTA metformin  -Increase ssi from medium to high correction scale for persistent hyperglycemia  -VBG & ketones ordered to further assess anion gap & rule out developing ketoacidosis   -Hypoglycemia monitoring & treatment protocols in place    Addendum: VBG WNL, ketones negative (0.18). Continue subcutaneous insulin with increased ssi dosing.    Hypomagnesemia, resolved  Mg 1.6 on presentation, now 2.1 after 2g IV Mg sulfate at outside ER.   -Repeat Mg in AM  -RN managed repletion protocol     Hyponatremia, improving  Mild-moderate, acute. Na since presentation 134 <-- 129. Anticipate improvement with adequate PO intake & resolution of acute illness.   -BMP in AM    Irregular heart beat  Auscultated to be in irregular heart rate. EKG shows sinus with frequent PAC's. Patient denies symptoms. Monitor.     Anemia  Stable, normocytic. Baseline hemoglobin around 10.5-11.0, hemoglobin since presentation 9.8 <-- 10.3. No signs of acute bleeding.   -CBC in AM    Hypertension  Hypertensive in ER (-173), probably in part due to acute hypoxemia. Managed PTA with amlodipine 10mg daily, atenolol 100mg daily, hydrochlorothiazide 25mg daily, losartan 100mg daily.   -Continue PTA atenolol, amlodipine, hydrochlorothiazide, & losartan with hold parameters    Pulmonary nodules  Small bilateral pulmonary nodules measuring up to 5mm noted on CT PE study 12/18.  Consider outpatient follow up with PCP for surveillance.     Hypercholesterolemia  Managed PTA with rosuvastatin, continue.          Diet: Combination Diet Moderate Consistent Carb (60 g CHO per Meal) Diet    DVT Prophylaxis: Pneumatic Compression Devices  Hutchinson Catheter: Not present  Lines: None     Code Status: Full Code      Consultations This Hospital Stay   PHARMACY IP CONSULT    Code Status   Full Code    Time Spent on this Encounter   I, Mg Casillas DO, personally saw the patient today and spent greater than 30 minutes discharging this patient.       Mg Casillas DO  [facility]  [address] [address]  Phone: [phone]  Fax: [phone]  ______________________________________________________________________    Physical Exam   Vital Signs: Temp: 98  F (36.7  C) Temp src: Oral BP: (!) 146/71 Pulse: 80   Resp: 18 SpO2: 92 % O2 Device: None (Room air)    Weight: 0 lbs 0 oz  Physical Exam  Constitutional:       General: Pt is not in acute distress.  HENT:      Head: Normocephalic and atraumatic.      Nose: Nose normal.   Eyes:      Conjunctiva/sclera: Conjunctivae normal.   Pulmonary:      Effort: Pulmonary effort is normal.   Abdominal:      General: Abdomen is flat.   Skin:     Findings: No rash.   Neurological:      General: No focal deficit present.      Mental Status: He is alert.   Psychiatric:         Mood and Affect: Mood normal.          Primary Care Physician   Ronda Hawk    Discharge Orders      Reason for your hospital stay    You were hospitalized for an asthma exacerbation and pneumonia. You were discharged home with a new inhaler for you asthma and the recommendation to follow up with your PCP or pulmonologist for further treatment.     Follow-up and recommended labs and tests     Follow up with primary care provider, Ronda Hawk, within 7 days for hospital follow- up.  No follow up labs or test are needed. Started patient on new  combo inhaler ICS/LABA to reduce asthma exacerbations and hopefully reduce need for steroids.     Activity    Your activity upon discharge: activity as tolerated     Diet    Follow this diet upon discharge: Orders Placed This Encounter      Combination Diet Low Consistent Carb (45 g CHO per Meal) Diet       Significant Results and Procedures   Results for orders placed or performed during the hospital encounter of 12/18/23   XR Chest Port 1 View    Narrative    EXAM: XR CHEST PORT 1 VIEW  LOCATION: [facility]  DATE: 12/18/2023    INDICATION: Cough, asthma, concern for secondary infection  COMPARISON: X-ray 12/09/2023      Impression    IMPRESSION: Mild retrocardiac opacities likely reflect atelectasis. The right lung is clear. No pleural effusion. Normal heart size. Spinal degenerative changes.   CT Chest Pulmonary Embolism w Contrast     Value    Radiologist flags Left lower lobe masslike consolidation    Narrative    EXAM: CT CHEST PULMONARY EMBOLISM W CONTRAST  LOCATION: [facility]  DATE: 12/18/2023    INDICATION: Cough, dyspnea, hypoxia  COMPARISON: CT chest 07/26/2021; PET/CT 08/13/2021  TECHNIQUE: CT chest pulmonary angiogram during arterial phase injection of IV contrast. Multiplanar reformats and MIP reconstructions were performed. Dose reduction techniques were used.   CONTRAST: Isovue 370 75ml    FINDINGS:  ANGIOGRAM CHEST: Main pulmonary trunk is nonenlarged. No pulmonary embolism to the subsegmental level.    LUNGS AND PLEURA: Mild expiratory appearance of the trachea. There is diffuse bronchial wall thickening, worst in the lower lobes. There is a rounded area of consolidation within the medial left lower lobe with some associated air bronchograms (series 7   image 152). Additional airway centric groundglass, tree-in-bud nodules, and reticulation are seen elsewhere throughout the left upper (series 7 image 120, 141), right lower (series 7 image 189,  221), right middle (series 7 image 193), and left lower   lobes (series 7 image 201). Evaluation for pulmonary nodules is limited given extensive parenchymal disease. Within this limitation, there are multiple small bilateral solid nodules measuring up to 6 mm (series 7 image 96). Calcified benign nodules. No   pleural effusion or pneumothorax.    MEDIASTINUM/AXILLAE: No pathologically enlarged thoracic lymph nodes. Unremarkable thyroid. Normal caliber thoracic aorta. Normal heart size. No pericardial effusion.    CORONARY ARTERY CALCIFICATION: Moderate.    UPPER ABDOMEN: Small hiatal hernia. Multiple high attenuation exophytic foci are seen within both kidneys, likely a proteinaceous or hemorrhagic cysts.    MUSCULOSKELETAL: No acute osseous abnormalities.      Impression    IMPRESSION:  1.  Left lower lobe consolidation, as well as groundglass, tree-in-bud nodules, and reticulation elsewhere throughout both lungs compatible with multifocal infection and/or aspiration. Follow-up chest CT is recommended in 1-2 months to evaluate for   resolution of the dominant area of consolidation in the left lower lobe.  2.  Small bilateral pulmonary nodules measuring up to 5 mm. These can also be further evaluated on future follow-up CT.  3.  No pulmonary embolism to the subsegmental level.  4.  Chronic and ancillary findings as described.    [Recommend Follow Up: Left lower lobe masslike consolidation]    This report will be copied to the [facility] to ensure a provider acknowledges the finding.          Discharge Medications   Current Discharge Medication List        START taking these medications    Details   benzonatate (TESSALON) 100 MG capsule Take 1 capsule (100 mg) by mouth 3 times daily as needed for cough  Qty: 9 capsule, Refills: 0    Associated Diagnoses: Pneumonia of both lungs due to infectious organism, unspecified part of lung      fluticasone-salmeterol (ADVAIR) 100-50 MCG/ACT inhaler Inhale 1 puff  into the lungs every 12 hours  Qty: 14 each, Refills: 0    Associated Diagnoses: Mild persistent asthma with acute exacerbation      levofloxacin (LEVAQUIN) 750 MG tablet Take 1 tablet (750 mg) by mouth daily for 3 days  Qty: 3 tablet, Refills: 0    Associated Diagnoses: Pneumonia of both lungs due to infectious organism, unspecified part of lung           CONTINUE these medications which have CHANGED    Details   metFORMIN (GLUCOPHAGE XR) 500 MG 24 hr tablet Take 2 tablets (1,000 mg) by mouth 2 times daily (with meals) for 30 days  Qty: 120 tablet, Refills: 0    Associated Diagnoses: Type 2 diabetes mellitus with diabetic cataract, with long-term current use of insulin (H)           CONTINUE these medications which have NOT CHANGED    Details   albuterol (PROAIR HFA/PROVENTIL HFA/VENTOLIN HFA) 108 (90 Base) MCG/ACT inhaler INHALE 2 PUFFS INTO THE LUNGS EVERY 6 HOURS AS NEEDED FOR WHEEZING  Qty: 8.5 g, Refills: 1    Comments: Pharmacy may dispense brand covered by insurance (Proair, or proventil or ventolin or generic albuterol inhaler)  Associated Diagnoses: Mild persistent asthma without complication      albuterol (PROVENTIL) (2.5 MG/3ML) 0.083% neb solution Take 1 vial (2.5 mg) by nebulization every 6 hours as needed for shortness of breath, wheezing or cough  Qty: 90 mL, Refills: 0    Associated Diagnoses: Mild intermittent asthma with exacerbation      amLODIPine (NORVASC) 10 MG tablet Take 10 mg by mouth daily      aspirin 81 MG EC tablet Take 81 mg by mouth daily      atenolol (TENORMIN) 100 MG tablet Take 1 tablet (100 mg) by mouth daily  Qty: 90 tablet, Refills: 3    Associated Diagnoses: Essential hypertension      azelastine (ASTELIN) 0.1 % nasal spray 2 sprays each nostril 1-2x daily as needed for nasal congestion (use nightly for first 2 week)  Qty: 30 mL, Refills: 11    Associated Diagnoses: Epistaxis      blood glucose (ACCU-CHEK GUIDE) test strip 1 strip by In Vitro route 2 times daily Use to test  blood sugar 2 times daily or as directed.  Qty: 200 strip, Refills: 3    Associated Diagnoses: Type 2 diabetes mellitus without complication, without long-term current use of insulin (H)      cetirizine (ZYRTEC) 10 MG tablet Take 10 mg by mouth daily as needed for allergies      famotidine (PEPCID) 20 MG tablet Take 20 mg by mouth 2 times daily      fexofenadine (ALLEGRA) 180 MG tablet Take 180 mg by mouth daily as needed for allergies      guaiFENesin (MUCINEX) 600 MG 12 hr tablet Take 2 tablets (1,200 mg) by mouth 2 times daily  Qty: 56 tablet, Refills: 0    Associated Diagnoses: Influenza B      hydrochlorothiazide (HYDRODIURIL) 25 MG tablet Take 1 tablet (25 mg) by mouth every morning  Qty: 90 tablet, Refills: 3    Associated Diagnoses: Essential hypertension, benign      insulin pen needle (ULTICARE MINI) 31G X 6 MM miscellaneous Use 1 pen needles daily or as directed.  Qty: 100 each, Refills: 3    Associated Diagnoses: Type 2 diabetes mellitus with hyperglycemia, without long-term current use of insulin (H)      ipratropium - albuterol 0.5 mg/2.5 mg/3 mL (DUONEB) 0.5-2.5 (3) MG/3ML neb solution Take 1 vial (3 mLs) by nebulization every 6 hours as needed for shortness of breath, wheezing or cough  Qty: 90 mL, Refills: 3    Associated Diagnoses: Mild persistent asthma with acute exacerbation      liraglutide (VICTOZA) 18 MG/3ML solution Inject 1.2 mg Subcutaneous daily  Qty: 6 mL, Refills: 1    Associated Diagnoses: Type 2 diabetes mellitus with hyperglycemia, without long-term current use of insulin (H)      losartan (COZAAR) 100 MG tablet Take 1 tablet (100 mg) by mouth daily  Qty: 90 tablet, Refills: 3    Associated Diagnoses: Essential hypertension, benign      potassium chloride ER (K-TAB) 20 MEQ CR tablet Take 1 tablet (20 mEq) by mouth 2 times daily  Qty: 60 tablet, Refills: 0    Associated Diagnoses: Hypokalemia      rosuvastatin (CRESTOR) 20 MG tablet Take 1 tablet (20 mg) by mouth daily  Qty: 90  tablet, Refills: 3    Comments: I increased to 20 mg related to LDL above 70, and ascvd score is still high at 45%. Please let me know, thanks  Associated Diagnoses: Hyperlipidemia, unspecified hyperlipidemia type; Essential hypertension      triamcinolone (KENALOG) 0.1 % cream [TRIAMCINOLONE (KENALOG) 0.1 % CREAM] APPLY EXTERNALLY TO THE AFFECTED AREA TWICE DAILY FOR 14 DAYS  Qty: 45 g, Refills: 0    Associated Diagnoses: Dermatitis      testosterone (ANDROGEL/TESTIM) 50 MG/5GM (1%) topical gel Place 1 packet (50 mg of testosterone) onto the skin daily Apply to the clean, dry intact skin of the shoulders, upper arms or abdomen.  Qty: 90 packet, Refills: 3    Comments: 1 packet = 5 gm gel = 50 mg testosterone  Associated Diagnoses: Klinefelter's syndrome; Low testosterone; Male hypogonadism           STOP taking these medications       fluticasone (FLONASE) 50 MCG/ACT nasal spray Comments:   Reason for Stopping:         fluticasone (FLOVENT HFA) 110 MCG/ACT inhaler Comments:   Reason for Stopping:         predniSONE (DELTASONE) 20 MG tablet Comments:   Reason for Stopping:             Allergies   Allergies   Allergen Reactions    Amoxicillin Anaphylaxis    Codeine Nausea and Vomiting    Sulfamethoxazole-Trimethoprim [Sulfamethoxazole-Trimethoprim] Nausea and Vomiting     Tachycardia      Erythromycin Base [Erythromycin] GI Disturbance    Seasonal Allergies Other (See Comments)     SPRINGTIME Nasal, bronchial    Simvastatin Muscle Pain (Myalgia)

## 2023-12-20 NOTE — PLAN OF CARE
Goal Outcome Evaluation:      Plan of Care Reviewed With: patient    Overall Patient Progress: improvingOverall Patient Progress: improving    Cared for patient [id number]. Alert and oriented. Standing at bedside independently to void. Room air. Infrequent non productive cough. On room air, O2 sats mid 90s.

## 2023-12-20 NOTE — PROGRESS NOTES
LYUDMILA JAMESG DISCHARGE NOTE    Patient discharged to home at 4:19 PM via wheel chair. Accompanied by daughter and staff. Discharge instructions reviewed with patient, opportunity offered to ask questions. Prescriptions sent to patients preferred pharmacy. All belongings sent with patient.    Jaden Vázquez RN

## 2023-12-20 NOTE — PLAN OF CARE
Goal Outcome Evaluation:      Plan of Care Reviewed With: patient    Overall Patient Progress: improving    Patient has a productive cough but having no shortness of breath.     Currently stable on room air.     Pulmonary hygiene promoted.     Continue to assess per plan of care.

## 2023-12-22 ENCOUNTER — TELEPHONE (OUTPATIENT)
Dept: INTERNAL MEDICINE | Facility: CLINIC | Age: 73
End: 2023-12-22
Payer: COMMERCIAL

## 2023-12-22 ENCOUNTER — PATIENT OUTREACH (OUTPATIENT)
Dept: CARE COORDINATION | Facility: CLINIC | Age: 73
End: 2023-12-22
Payer: COMMERCIAL

## 2023-12-22 NOTE — TELEPHONE ENCOUNTER
Reason for Call:  Appointment Request    Patient requesting this type of appt:  Hospital/ED Follow-Up     Requested provider: Ronda Hawk    Reason patient unable to be scheduled: Not within requested timeframe    When does patient want to be seen/preferred time: 3-7 days    Comments: Hospital F/U    [facility] or would you prefer to receive a phone call?:   Patient would prefer a phone call   Okay to leave a detailed message?: Yes at Home number on file [phone] (home)    Call taken on 12/22/2023 at 10:08 AM by Pamela Matos

## 2023-12-22 NOTE — TELEPHONE ENCOUNTER
Got patient scheduled at the [facility] for this follow up due to [facility] not having any openings.

## 2023-12-22 NOTE — PROGRESS NOTES
Clinic Care Coordination Contact  [facility]/Voicemail    Clinical Data: Care Coordinator Outreach    Outreach Documentation Number of Outreach Attempt   12/22/2023   8:58 AM 2       Left message on patient's voicemail with call back information and requested return call.    Care Coordinator will do no further outreaches at this time.    Kenia Lemus  [phone]  Care

## 2023-12-25 NOTE — COMMUNITY RESOURCES LIST (ENGLISH)
12/25/2023   [facility] [employer]  N/A  For questions about this resource list or additional care needs, please contact your primary care clinic or care manager.  Phone: [phone]   Email: N/A   Address: [address] [place] [address]   Hours: N/A        Financial Stability       Utility payment assistance  1  [employer] [facility] ([facility] - Energy Assistance Distance: 2.69 miles      Phone/Virtual   [address] [place] [address]  Language: English, Hmong, Liberian, Sierra Leonean  Hours: Mon - Fri 8:00 AM - 12:00 PM , Mon - Fri 1:00 PM - 4:30 PM  Fees: Free   Phone: (261) 261-9359 Email: sandra@[employer].org Website: http://www.capr.org     2  [employer] [facility] Emergency [facility] [place] - Utility payment assistance Distance: 3.21 miles      In-Person, Phone/Virtual   [address] [place] [address]  Language: English, Sierra Leonean  Hours: Mon - Fri 9:00 AM - 4:00 PM  Fees: Free   Phone: (877) 631-6066 Email: info@[employer].org Website: http://www.[facility].org          Important Numbers & Websites       Emergency Services   911  City Services   311  Poison Control   (888) 810-6018  Suicide Prevention Lifeline   (240) 522-6413 (TALK)  Child Abuse Hotline   (122) 106-1773 (4-A-Child)  Sexual Assault Hotline   (208) 594-6334 (HOPE)  National Runaway Safeline   (860) 163-4285 (RUNAWAY)  All-Options Talkline   (992) 257-1276  Substance Abuse Referral   (736) 214-8848 (HELP)

## 2023-12-27 ENCOUNTER — OFFICE VISIT (OUTPATIENT)
Dept: INTERNAL MEDICINE | Facility: CLINIC | Age: 73
End: 2023-12-27
Payer: COMMERCIAL

## 2023-12-27 VITALS
BODY MASS INDEX: 30.77 KG/M2 | SYSTOLIC BLOOD PRESSURE: 154 MMHG | WEIGHT: 219.8 LBS | TEMPERATURE: 97.9 F | HEIGHT: 71 IN | HEART RATE: 90 BPM | RESPIRATION RATE: 20 BRPM | OXYGEN SATURATION: 97 % | DIASTOLIC BLOOD PRESSURE: 72 MMHG

## 2023-12-27 DIAGNOSIS — T14.8XXA SKIN ABRASION: ICD-10-CM

## 2023-12-27 DIAGNOSIS — E11.36 TYPE 2 DIABETES MELLITUS WITH DIABETIC CATARACT, WITH LONG-TERM CURRENT USE OF INSULIN (H): ICD-10-CM

## 2023-12-27 DIAGNOSIS — I10 ESSENTIAL HYPERTENSION, BENIGN: ICD-10-CM

## 2023-12-27 DIAGNOSIS — Z79.4 TYPE 2 DIABETES MELLITUS WITH DIABETIC CATARACT, WITH LONG-TERM CURRENT USE OF INSULIN (H): ICD-10-CM

## 2023-12-27 DIAGNOSIS — J18.9 PNEUMONIA OF LEFT LOWER LOBE DUE TO INFECTIOUS ORGANISM: Primary | ICD-10-CM

## 2023-12-27 DIAGNOSIS — R91.1 NODULE OF RIGHT LUNG: ICD-10-CM

## 2023-12-27 DIAGNOSIS — J45.21 MILD INTERMITTENT ASTHMA WITH EXACERBATION: ICD-10-CM

## 2023-12-27 PROBLEM — J96.90 RESPIRATORY FAILURE (H): Status: RESOLVED | Noted: 2023-12-18 | Resolved: 2023-12-27

## 2023-12-27 PROCEDURE — 99214 OFFICE O/P EST MOD 30 MIN: CPT | Performed by: NURSE PRACTITIONER

## 2023-12-27 RX ORDER — ALBUTEROL SULFATE 0.83 MG/ML
2.5 SOLUTION RESPIRATORY (INHALATION) EVERY 6 HOURS PRN
Qty: 90 ML | Refills: 1 | Status: SHIPPED | OUTPATIENT
Start: 2023-12-27

## 2023-12-27 RX ORDER — RESPIRATORY SYNCYTIAL VIRUS VACCINE 120MCG/0.5
0.5 KIT INTRAMUSCULAR ONCE
Qty: 1 EACH | Refills: 0 | Status: CANCELLED | OUTPATIENT
Start: 2023-12-27 | End: 2023-12-27

## 2023-12-27 ASSESSMENT — PAIN SCALES - GENERAL: PAINLEVEL: NO PAIN (0)

## 2023-12-27 NOTE — ASSESSMENT & PLAN NOTE
Blood sugar was 160 today.  His last A1c indicated poorly controlled type 2 diabetes but he admits that he sometimes misses his evening dose of metformin.  We discussed taking the 4 tablets together in the morning.  He will follow-up with a new primary care provider next month for a wellness visit and diabetes check.

## 2023-12-27 NOTE — LETTER
December 27, 2023      Lester Shi  [address] [address]        To Whom It May Concern:    Lester Shi was seen in our clinic. He may return to work without restrictions on 1/3/2023.      Sincerely,            Karlee Vann, DNP, APRN, CNP   12/27/23

## 2023-12-27 NOTE — PATIENT INSTRUCTIONS
Repeat a CT in about 2 months  I recommend an RSV vaccine after you feel better  Continue iron, take it every other day  Miralax as needed if you have constipation

## 2023-12-27 NOTE — ASSESSMENT & PLAN NOTE
Clinically improved.  Repeat lung CT in 2 months.  May return to work in 1 week as scheduled (1/3/2023).  He is also advised that if his energy levels have not improved by next Monday he may send a [employer] message and I would extend his return to work to 1/10/2023.

## 2023-12-27 NOTE — ASSESSMENT & PLAN NOTE
Reviewed incidental finding of pulmonary nodules.  Repeat lung CT in 2 months.  Further recommendations for monitoring based on the follow-up CT.

## 2023-12-27 NOTE — PROGRESS NOTES
Assessment & Plan   Problem List Items Addressed This Visit       Type 2 diabetes mellitus with diabetic cataract, with long-term current use of insulin (H)     Blood sugar was 160 today.  His last A1c indicated poorly controlled type 2 diabetes but he admits that he sometimes misses his evening dose of metformin.  We discussed taking the 4 tablets together in the morning.  He will follow-up with a new primary care provider next month for a wellness visit and diabetes check.         Essential hypertension, benign     Elevated today. Follow up is scheduled in 1 month with new PCP          Pneumonia of both lungs due to infectious organism - Primary     Clinically improved.  Repeat lung CT in 2 months.  May return to work in 1 week as scheduled (1/3/2023).  He is also advised that if his energy levels have not improved by next Monday he may send a [employer] message and I would extend his return to work to 1/10/2023.         Relevant Medications    albuterol (PROVENTIL) (2.5 MG/3ML) 0.083% neb solution    Nodule of right lung     Reviewed incidental finding of pulmonary nodules.  Repeat lung CT in 2 months.  Further recommendations for monitoring based on the follow-up CT.         Mild intermittent asthma with exacerbation     Improved.  Continue Advair         Relevant Medications    albuterol (PROVENTIL) (2.5 MG/3ML) 0.083% neb solution     Other Visit Diagnoses       Skin abrasion        Stop using steroid cream on the area. Keep covered x 3 days then open to air             Karlee Vann NP  [facility] MADDIE Batista is a 73 year old, presenting for the following health issues:  Hospital F/U (Respiratory Failure)        12/27/2023     3:25 PM   Additional Questions   Roomed by TODD Stallworth   Accompanied by Wife, Sara GARCIA     Hospital Follow-up Visit:  Hospital/Nursing Home/IP Rehab Facility: [facility]  Date of Admission: 12/18/2023  Date of  "Discharge: 12/20/2023  Reason(s) for Admission: Respiratory Failure  Was your hospitalization related to COVID-19? No   Problems taking medications regularly:  None  Medication changes since discharge: START: benzonatate (TESSALON) 100 MG capsule,  fluticasone-salmeterol (ADVAIR) 100-50 MCG/ACT inhaler,  levofloxacin (LEVAQUIN) 750 MG tablet   STOP:   fluticasone (FLONASE) 50 MCG/ACT nasal spray, fluticasone (FLOVENT HFA) 110 MCG/ACT inhaler,    predniSONE (DELTASONE) 20 MG tablet    Lester Shi is a 73-year-old male with a past medical history of asthma and recent diagnosis of influenza and community acquired pneumonia presents to the office today for follow-up after recent hospitalization for dyspnea and hypoxia. He was treated with Levaquin, he had nausea when taking the medication But this has resolved since he completed the antibiotic. He is using the new inhaler, Advair.    He notes that he has low energy. He goes through his morning routine and then feels like he needs a nap.  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    DM-fasting blood sugar today was 160.            Objective    BP (!) 154/72 (BP Location: Right arm, Patient Position: Sitting, Cuff Size: Adult Regular)   Pulse 90   Temp 97.9  F (36.6  C) (Oral)   Resp 20   Ht 1.803 m (5' 11\")   Wt 99.7 kg (219 lb 12.8 oz)   SpO2 97%   BMI 30.66 kg/m    Body mass index is 30.66 kg/m .    Wt Readings from Last 5 Encounters:   12/27/23 99.7 kg (219 lb 12.8 oz)   12/18/23 97.5 kg (215 lb)   12/13/23 100.2 kg (221 lb)   12/09/23 101.3 kg (223 lb 4.8 oz)   11/27/23 104.3 kg (230 lb)       Physical Exam   GENERAL: Alert and no distress  RESP: lungs clear to auscultation - no rales or wheezes.   CV: regular rate and rhythm, normal S1 S2  Skin: skin abrasion left chest just below the breast                      "

## 2024-01-08 ENCOUNTER — OFFICE VISIT (OUTPATIENT)
Dept: PEDIATRICS | Facility: CLINIC | Age: 74
End: 2024-01-08
Payer: COMMERCIAL

## 2024-01-08 ENCOUNTER — OFFICE VISIT (OUTPATIENT)
Dept: FAMILY MEDICINE | Facility: CLINIC | Age: 74
End: 2024-01-08
Payer: COMMERCIAL

## 2024-01-08 ENCOUNTER — HOSPITAL ENCOUNTER (OUTPATIENT)
Dept: CT IMAGING | Facility: HOSPITAL | Age: 74
Discharge: HOME | End: 2024-01-08
Attending: FAMILY MEDICINE | Admitting: FAMILY MEDICINE
Payer: COMMERCIAL

## 2024-01-08 VITALS
SYSTOLIC BLOOD PRESSURE: 161 MMHG | HEART RATE: 69 BPM | RESPIRATION RATE: 18 BRPM | TEMPERATURE: 98.1 F | DIASTOLIC BLOOD PRESSURE: 66 MMHG | OXYGEN SATURATION: 96 %

## 2024-01-08 VITALS
DIASTOLIC BLOOD PRESSURE: 87 MMHG | TEMPERATURE: 98 F | OXYGEN SATURATION: 97 % | HEART RATE: 78 BPM | SYSTOLIC BLOOD PRESSURE: 155 MMHG | RESPIRATION RATE: 19 BRPM

## 2024-01-08 DIAGNOSIS — E11.36 TYPE 2 DIABETES MELLITUS WITH DIABETIC CATARACT, WITH LONG-TERM CURRENT USE OF INSULIN (H): ICD-10-CM

## 2024-01-08 DIAGNOSIS — Z79.4 TYPE 2 DIABETES MELLITUS WITH DIABETIC CATARACT, WITH LONG-TERM CURRENT USE OF INSULIN (H): ICD-10-CM

## 2024-01-08 DIAGNOSIS — R10.13 ABDOMINAL PAIN, EPIGASTRIC: Primary | ICD-10-CM

## 2024-01-08 DIAGNOSIS — L98.9 SKIN SORE: ICD-10-CM

## 2024-01-08 DIAGNOSIS — R10.13 ABDOMINAL PAIN, EPIGASTRIC: ICD-10-CM

## 2024-01-08 DIAGNOSIS — R10.31 RLQ ABDOMINAL PAIN: ICD-10-CM

## 2024-01-08 DIAGNOSIS — D64.9 ANEMIA, UNSPECIFIED TYPE: ICD-10-CM

## 2024-01-08 DIAGNOSIS — I71.40 ABDOMINAL AORTIC ANEURYSM (AAA) 3.0 CM TO 5.5 CM IN DIAMETER IN MALE (H): ICD-10-CM

## 2024-01-08 DIAGNOSIS — Q98.4 KLINEFELTER'S SYNDROME: ICD-10-CM

## 2024-01-08 DIAGNOSIS — L30.4 INTERTRIGO: ICD-10-CM

## 2024-01-08 LAB
ALBUMIN SERPL-MCNC: 2.8 G/DL (ref 3.4–5)
ALP SERPL-CCNC: 56 U/L (ref 40–150)
ALT SERPL W P-5'-P-CCNC: 20 U/L (ref 0–70)
AMYLASE SERPL-CCNC: 38 U/L (ref 28–100)
ANION GAP SERPL CALCULATED.3IONS-SCNC: 4 MMOL/L (ref 3–14)
AST SERPL W P-5'-P-CCNC: 18 U/L (ref 0–45)
BASOPHILS # BLD AUTO: 0 10E3/UL (ref 0–0.2)
BASOPHILS NFR BLD AUTO: 1 %
BILIRUB SERPL-MCNC: 0.5 MG/DL (ref 0.2–1.3)
BUN SERPL-MCNC: 19 MG/DL (ref 7–30)
CALCIUM SERPL-MCNC: 7.9 MG/DL (ref 8.5–10.1)
CHLORIDE BLD-SCNC: 110 MMOL/L (ref 94–109)
CO2 SERPL-SCNC: 25 MMOL/L (ref 20–32)
CREAT SERPL-MCNC: 0.7 MG/DL (ref 0.66–1.25)
EGFRCR SERPLBLD CKD-EPI 2021: >90 ML/MIN/1.73M2
EOSINOPHIL # BLD AUTO: 0.4 10E3/UL (ref 0–0.7)
EOSINOPHIL NFR BLD AUTO: 8 %
ERYTHROCYTE [DISTWIDTH] IN BLOOD BY AUTOMATED COUNT: 15.9 % (ref 10–15)
GLUCOSE BLD-MCNC: 160 MG/DL (ref 70–99)
HCT VFR BLD AUTO: 29.4 % (ref 40–53)
HGB BLD-MCNC: 9.4 G/DL (ref 13.3–17.7)
IMM GRANULOCYTES # BLD: 0.1 10E3/UL
IMM GRANULOCYTES NFR BLD: 1 %
LIPASE SERPL-CCNC: 58 U/L (ref 13–60)
LYMPHOCYTES # BLD AUTO: 1.8 10E3/UL (ref 0.8–5.3)
LYMPHOCYTES NFR BLD AUTO: 34 %
MCH RBC QN AUTO: 30 PG (ref 26.5–33)
MCHC RBC AUTO-ENTMCNC: 32 G/DL (ref 31.5–36.5)
MCV RBC AUTO: 94 FL (ref 78–100)
MONOCYTES # BLD AUTO: 0.5 10E3/UL (ref 0–1.3)
MONOCYTES NFR BLD AUTO: 9 %
NEUTROPHILS # BLD AUTO: 2.5 10E3/UL (ref 1.6–8.3)
NEUTROPHILS NFR BLD AUTO: 48 %
PLATELET # BLD AUTO: 211 10E3/UL (ref 150–450)
POTASSIUM BLD-SCNC: 3.8 MMOL/L (ref 3.4–5.3)
PROT SERPL-MCNC: 5.4 G/DL (ref 6.8–8.8)
RBC # BLD AUTO: 3.13 10E6/UL (ref 4.4–5.9)
SODIUM SERPL-SCNC: 139 MMOL/L (ref 135–145)
TROPONIN T SERPL HS-MCNC: <6 NG/L
WBC # BLD AUTO: 5.2 10E3/UL (ref 4–11)

## 2024-01-08 PROCEDURE — 85025 COMPLETE CBC W/AUTO DIFF WBC: CPT | Performed by: FAMILY MEDICINE

## 2024-01-08 PROCEDURE — 99207 REFERRAL TO ACUTE AND DIAGNOSTIC SERVICES: CPT | Performed by: PHYSICIAN ASSISTANT

## 2024-01-08 PROCEDURE — 74177 CT ABD & PELVIS W/CONTRAST: CPT

## 2024-01-08 PROCEDURE — 82150 ASSAY OF AMYLASE: CPT | Performed by: FAMILY MEDICINE

## 2024-01-08 PROCEDURE — 93005 ELECTROCARDIOGRAM TRACING: CPT | Performed by: FAMILY MEDICINE

## 2024-01-08 PROCEDURE — 80053 COMPREHEN METABOLIC PANEL: CPT | Performed by: FAMILY MEDICINE

## 2024-01-08 PROCEDURE — 250N000011 HC RX IP 250 OP 636: Performed by: FAMILY MEDICINE

## 2024-01-08 PROCEDURE — 93010 ELECTROCARDIOGRAM REPORT: CPT | Performed by: GENERAL ACUTE CARE HOSPITAL

## 2024-01-08 PROCEDURE — 83690 ASSAY OF LIPASE: CPT | Performed by: FAMILY MEDICINE

## 2024-01-08 PROCEDURE — 36415 COLL VENOUS BLD VENIPUNCTURE: CPT | Performed by: FAMILY MEDICINE

## 2024-01-08 PROCEDURE — 84484 ASSAY OF TROPONIN QUANT: CPT | Performed by: FAMILY MEDICINE

## 2024-01-08 PROCEDURE — 99214 OFFICE O/P EST MOD 30 MIN: CPT | Mod: 25 | Performed by: FAMILY MEDICINE

## 2024-01-08 RX ORDER — SUCRALFATE 1 G/1
1 TABLET ORAL 4 TIMES DAILY
Qty: 120 TABLET | Refills: 0 | Status: SHIPPED | OUTPATIENT
Start: 2024-01-08 | End: 2024-01-29

## 2024-01-08 RX ORDER — OMEPRAZOLE 40 MG/1
40 CAPSULE, DELAYED RELEASE ORAL DAILY
Qty: 30 CAPSULE | Refills: 1 | Status: SHIPPED | OUTPATIENT
Start: 2024-01-08 | End: 2024-01-29

## 2024-01-08 RX ORDER — NYSTATIN 100000 [USP'U]/G
POWDER TOPICAL 2 TIMES DAILY
Qty: 60 G | Refills: 0 | Status: SHIPPED | OUTPATIENT
Start: 2024-01-08

## 2024-01-08 RX ORDER — IOPAMIDOL 755 MG/ML
90 INJECTION, SOLUTION INTRAVASCULAR ONCE
Status: COMPLETED | OUTPATIENT
Start: 2024-01-08 | End: 2024-01-08

## 2024-01-08 RX ADMIN — IOPAMIDOL 90 ML: 755 INJECTION, SOLUTION INTRAVENOUS at 13:05

## 2024-01-08 ASSESSMENT — ENCOUNTER SYMPTOMS
ABDOMINAL DISTENTION: 0
BLOOD IN STOOL: 0
VOMITING: 0
NAUSEA: 1
CONSTIPATION: 1
FEVER: 0
ABDOMINAL PAIN: 1

## 2024-01-08 ASSESSMENT — PAIN SCALES - GENERAL: PAINLEVEL: MODERATE PAIN (5)

## 2024-01-08 NOTE — PROGRESS NOTES
Assessment & Plan     Abdominal pain, epigastric  Gastritis versus ulcer  His hemoglobin is slowly declining but he has a history of chronic anemia  Declines GI referral at this time.  Will treat with omeprazole 40 mg daily plus Carafate 4 times daily as needed.  He has a follow-up in 3 weeks with his primary and if his symptoms are not getting better with the medication treatment that I would recommend a GI referral for consult and/or procedure (upper endoscopy)  - lidocaine (viscous) (XYLOCAINE) 2 % 15 mL, alum & mag hydroxide-simethicone (MAALOX) 15 mL GI Cocktail  - Amylase; Future  - CBC with platelets differential; Future  - Comprehensive metabolic panel; Future  - Lipase; Future  - Troponin T, High Sensitivity; Future  - EKG 12-lead, tracing only  - CT Abdomen Pelvis w Contrast; Future  - Troponin T, High Sensitivity  - Lipase  - Comprehensive metabolic panel  - CBC with platelets differential  - Amylase  - omeprazole (PRILOSEC) 40 MG DR capsule; Take 1 capsule (40 mg) by mouth daily  - sucralfate (CARAFATE) 1 GM tablet; Take 1 tablet (1 g) by mouth 4 times daily for 30 days    RLQ abdominal pain  CT negative for appendicitis  - CBC with platelets differential; Future  - Comprehensive metabolic panel; Future  - Lipase; Future  - CT Abdomen Pelvis w Contrast; Future  - Lipase  - Comprehensive metabolic panel  - CBC with platelets differential    Type 2 diabetes mellitus with diabetic cataract, with long-term current use of insulin (H)  Blood sugar 160 today  Did do an EKG and troponin since he has epigastric pain and is a diabetic.  Both EKG and troponin were negative.  - Comprehensive metabolic panel; Future  - Troponin T, High Sensitivity; Future  - EKG 12-lead, tracing only  - Troponin T, High Sensitivity  - Comprehensive metabolic panel    Anemia, unspecified type  Slowly worsening chronic anemia.  Patient has been instructed previously to start iron replacement but did not do well on iron in the past.   We did discuss taking the iron either every other day or 3 times a week    Abdominal aortic aneurysm (AAA) 3.0 cm to 5.5 cm in diameter in male (H24)  Discussed with patient that the abdominal aortic aneurysm has increased from 3.3 cm to 3.7 cm since 2019 and I did place a nonurgent vascular referral  - Vascular Surgery Referral; Future    Intertrigo  Nystatin powder prescribed  - nystatin (MYCOSTATIN) 514761 UNIT/GM external powder; Apply topically 2 times daily    Skin sore  I did clean the sore area at the left side of his chest with warm water and gauze and dried the area.  Then placed a Tegaderm over the skin sores.  Instructed the patient to keep the Tegaderm on until it falls off and he may replace the Tegaderm if this is working well to help heal his skin sores.  If the Tegaderm does not work well for him then he can continue with the zinc cream and nystatin powder.                No follow-ups on file.    Clarice Cortez MD  [facility]    Salvador Batista is a 73 year old, presenting for the following health issues:  Abdominal Pain      HPI     Abdominal/Flank Pain  Onset/Duration: 2 weeks  Description:   Character: Sharp  Location: epigastric region  Radiation: None and Back  Intensity: up to 8/10  Progression of Symptoms:  intermittent  Accompanying Signs & Symptoms:  Fever/chills: no   Gas/Bloating: YES  Nausea: no   Vomitting: no   Diarrhea: no   Constipation:YES  Dysuria: no            Hematuria: no            Frequency: YES-chronic           Incontinence of urine: no   History:            Last bowel movement: yesterday  Trauma: no   Previous similar pain: no    Previous tests done: none  Precipitating factors:   Does the pain change with:     Food: Worse with spicy food and pizza    Bowel Movement: YES-improved    Urination: no              Other factors: YES-improved with Pepcid  Therapies tried and outcome: Pepcid and ibuprofen      Patient is a 73-year-old male with  underlying type 2 diabetes and Klinefelter syndrome who has had 2 weeks of epigastric abdominal pain.  He has sharp stabbing epigastric pain that radiates directly to his back.  He also has been having constipation and when he does get a bowel movement or passes gas the pain is relieved.  He has had some gas and bloating as well.  Last night the pain was an 8 out of 10 and sharp and stabbing.  Currently the pain is a 3 out of 10.  He does have a history of shingles and this pain seems similar to that pain.  He has a sore on the left side of his chest which started after a sticker was removed from his chest at his hospitalization in mid December.  His pain is intermittent and last week it had actually gone away for a couple of days and then he had pizza and the pain got worse and has persisted.  He has been sleeping in his recliner as that feels better than lying flat.    Patient was hospitalized December 18 for pneumonia and influenza B.  He was placed on steroids and his blood sugars were very high.  He was discharged home on Levaquin and the last 4 days of Levaquin was having vomiting daily.  The vomiting has since subsided.    He denies chest pain, shortness of breath, jaw pain or shoulder pain.  He has no sweats.  No dysuria.    The patient has backed off of his Victoza and is not taking it currently.  He stopped that because  one of the side effects is nausea and he thought that might be making his symptoms worse.  When he was on the Victoza his morning blood sugars were in the 120-130 range and recently his blood sugars in the morning have been 180-210.    The sore on the left side of his chest has been there since his hospitalization.  He reports that it is improving.  He is using an over-the-counter zinc cream.    Review of Systems   Negative except as listed in HPI      Objective    BP (!) 155/87 (BP Location: Right arm, Patient Position: Sitting, Cuff Size: Adult Regular)   Pulse 78   Temp 98  F (36.7   C) (Oral)   Resp 19   SpO2 97%   There is no height or weight on file to calculate BMI.  Physical Exam   Vitals are noted and within normal limits except for elevated systolic blood pressure today  In general he is alert, oriented, and in no acute distress  Mouth mucous members are pink and moist and pharynx is not erythematous  Neck is supple with no cervical lymphadenopathy  Heart has a regular rate and rhythm with no murmurs  Lungs are clear to auscultation bilaterally with good air entry  Back no CVA tenderness  Abdomen with rectus diastases.  Bowel sounds are normal.  Soft and nondistended.  He is tender to palpation in the epigastric area as well as the right lower quadrant  Legs with no pedal edema  Skin: Left-sided chest under the breast with 3 areas of open sores with no surrounding erythema or induration.  No lesions on his back.  EKG: Normal sinus rhythm with no acute ST-T wave changes  Troponin: Negative  CBC: Ongoing chronic anemia.  No elevated white count  CMP: Minor abnormalities including low calcium and protein, mildly elevated chloride.  Glucose is 160  Lipase and amylase: Normal  Recheck of symptoms after GI cocktail: No significant change  CT abdomen pelvis: Slightly worsening aortic aneurysm.  No acute changes.  Results for orders placed or performed during the hospital encounter of 01/08/24   CT Abdomen Pelvis w Contrast     Status: None    Narrative    EXAM: CT ABDOMEN PELVIS W CONTRAST  LOCATION: [facility]  DATE: 1/8/2024    INDICATION:  Abdominal pain, epigastric, RLQ abdominal pain  COMPARISON: CTA chest 12/18/2023, CT chest 11/29/2021 CT AP 10/29/2019  TECHNIQUE: CT scan of the abdomen and pelvis was performed following injection of IV contrast. Multiplanar reformats were obtained. Dose reduction techniques were used.  CONTRAST: 90ml IV Isovue 370    FINDINGS:   LOWER CHEST: Resolution of the asymmetric multifocal pneumonia noted before, left greater than  right. Fibroatelectasis in the left lower lobe with minimal peribronchiolar groundglass opacities in the left base. No effusions. There is a small hiatal   hernia.    HEPATOBILIARY: Diffuse fatty infiltration of the liver.    PANCREAS: Mild fatty infiltration of the gland.    SPLEEN: Normal size with adjacent splenule.    ADRENAL GLANDS: Normal.    KIDNEYS/BLADDER: Renal cysts including a few that are hyperdense are stable and need no follow-up. No calculi.    BOWEL: No inflammatory changes. Quite redundant colon with moderate sigmoid diverticulosis. Bowel is of normal caliber.    LYMPH NODES: Normal.    VASCULATURE: Focal, saccular infrarenal abdominal aortic aneurysm measures 3.7 x 2.8 cm, increased from 3.3 cm x 2.4. Extensive vascular calcifications.    PELVIC ORGANS: Normal.    MUSCULOSKELETAL: There is a 1.5 cm umbilical hernia containing only fat. No suspicious bone lesions.      Impression    IMPRESSION:   1.  Small hiatal hernia again noted.  2.  No other abnormalities are seen to explain epigastric pain.  3.  Focal, saccular infrarenal abdominal aortic aneurysm measuring 3.7 x 2.8 cm was  3.3 x 2.4 cm in 2019. Consider non-emergent vascular followup.  4.  Distal colonic diverticulosis.  5.  Other noncritical findings as noted above.   Results for orders placed or performed in visit on 01/08/24   Troponin T, High Sensitivity     Status: Normal   Result Value Ref Range    Troponin T, High Sensitivity <6 <=22 ng/L   Lipase     Status: Normal   Result Value Ref Range    Lipase 58 13 - 60 U/L   Comprehensive metabolic panel     Status: Abnormal   Result Value Ref Range    Sodium 139 135 - 145 mmol/L    Potassium 3.8 3.4 - 5.3 mmol/L    Chloride 110 (H) 94 - 109 mmol/L    Carbon Dioxide (CO2) 25 20 - 32 mmol/L    Anion Gap 4 3 - 14 mmol/L    Urea Nitrogen 19 7 - 30 mg/dL    Creatinine 0.70 0.66 - 1.25 mg/dL    GFR Estimate >90 >60 mL/min/1.73m2    Calcium 7.9 (L) 8.5 - 10.1 mg/dL    Glucose 160 (H) 70 - 99 mg/dL     Alkaline Phosphatase 56 40 - 150 U/L    AST 18 0 - 45 U/L    ALT 20 0 - 70 U/L    Protein Total 5.4 (L) 6.8 - 8.8 g/dL    Albumin 2.8 (L) 3.4 - 5.0 g/dL    Bilirubin Total 0.5 0.2 - 1.3 mg/dL   Amylase     Status: Normal   Result Value Ref Range    Amylase 38 28 - 100 U/L   CBC with platelets and differential     Status: Abnormal   Result Value Ref Range    WBC Count 5.2 4.0 - 11.0 10e3/uL    RBC Count 3.13 (L) 4.40 - 5.90 10e6/uL    Hemoglobin 9.4 (L) 13.3 - 17.7 g/dL    Hematocrit 29.4 (L) 40.0 - 53.0 %    MCV 94 78 - 100 fL    MCH 30.0 26.5 - 33.0 pg    MCHC 32.0 31.5 - 36.5 g/dL    RDW 15.9 (H) 10.0 - 15.0 %    Platelet Count 211 150 - 450 10e3/uL    % Neutrophils 48 %    % Lymphocytes 34 %    % Monocytes 9 %    % Eosinophils 8 %    % Basophils 1 %    % Immature Granulocytes 1 %    Absolute Neutrophils 2.5 1.6 - 8.3 10e3/uL    Absolute Lymphocytes 1.8 0.8 - 5.3 10e3/uL    Absolute Monocytes 0.5 0.0 - 1.3 10e3/uL    Absolute Eosinophils 0.4 0.0 - 0.7 10e3/uL    Absolute Basophils 0.0 0.0 - 0.2 10e3/uL    Absolute Immature Granulocytes 0.1 <=0.4 10e3/uL   EKG 12-lead, tracing only     Status: None (Preliminary result)   Result Value Ref Range    Systolic Blood Pressure  mmHg    Diastolic Blood Pressure  mmHg    Ventricular Rate 67 BPM    Atrial Rate 67 BPM    MO Interval 172 ms    QRS Duration 94 ms     ms    QTc 439 ms    P Axis 84 degrees    R AXIS 39 degrees    T Axis 32 degrees    Interpretation ECG       Sinus rhythm with Premature atrial complexes  Otherwise normal ECG  When compared with ECG of 18-DEC-2023 09:27,  Vent. rate has decreased BY  33 BPM     CBC with platelets differential     Status: Abnormal    Narrative    The following orders were created for panel order CBC with platelets differential.  Procedure                               Abnormality         Status                     ---------                               -----------         ------                     CBC with platelets and  d...[[id number]]  Abnormal            Final result                 Please view results for these tests on the individual orders.

## 2024-01-08 NOTE — Clinical Note
TAMMY  -  seen in [facility] today.  Has a follow up appointment with you 1/29/24. He did not want GI referral today but would recommend GI referral for either consult or procedure (upper endoscopy) if he does not get better with meds.

## 2024-01-08 NOTE — PROGRESS NOTES
Patient presents with:  Abdominal Pain: After eating gets abd pain radiates to back  for over 1 week      Clinical Decision Making:  Epigastric pain Ddx: cholecystitis, PUD, GERD, gastritis, shingles, AAA, pancreatitis, gas, constipation. Abdominal exam not consistent with surgical emergency and patient is vitally stable. Given complexity of medical hx and broadness of Ddx I do feel that he would be a good candidate for the [facility]. Patient is agreeable to transfer. Report given to [facility] provider and he was accepted to the clinic. Patient is transporting himself.       ICD-10-CM    1. Abdominal pain, epigastric  R10.13 Referral to Acute and Diagnostic Services (Day of diagnostic / First order acute)      2. Type 2 diabetes mellitus with diabetic cataract, with long-term current use of insulin (H)  E11.36     Z79.4       3. Klinefelter's syndrome  Q98.4       4. Skin sore  L98.9           There are no Patient Instructions on file for this visit.    HPI:  Lester Shi is a 73 year old male with PMHx of DM2, DM1, Klinefelters syndrome, asthma, who presents today complaining of epigastric abdominal pain x 1-2 weeks on and off. Pain radiates through to the back. Patient was recently admitted on 12/18/23 for 3 days due to pneumonia and asthma exacerbation. For tx he was on Levaquin and Prednisone. He is feeling good about his breathing now and he has been afebrile. He is going to have a follow up chest CT, but it has yet to be scheduled. He reports having nausea and vomiting while on Levaquin, but that has resolved. His blood sugars have been up since the Prednisone. This mornings blood sugar was 210. He is prescribed Metformin and Victoza, but he has been stopping the Victoza because he feel it's upsetting his stomach. He reports hx of shingles and this pain feels somewhat similar to that pain, but in the past when he had shingles the pain resolved on its own more quickly than this. He has occasionally been taking 600 mg of  Ibuprofen and it does seem to help with the pain. He has not had any abdominal surgeries to his knowledge. He has been fairly regular with his bowel movements. He was feeling constipated while on Levaquin, but now he feels regular again. He has a BM every 2-3 days. When he was in the hospital he had a lot of leads on his chest and when they were taking them off some of the skin came off with it under the left breast. He's not sure if he had a skin reaction to the adhesive, but it's sore in that area.     History obtained from the patient.    Problem List:  2023-12: Mild intermittent asthma with exacerbation  2023-12: Respiratory failure (H)  2022-10: Hyperlipidemia, unspecified hyperlipidemia type  2021-07: Lung consolidation (H24)  2021-07: Lesion of right native kidney  2021-05: Nodule of right lung  2020-07: Squamous cell carcinoma of skin of face  2020-07: Status post total left knee replacement  2020-03: Mild persistent asthma with exacerbation  2020-03: Spinal stenosis of lumbar region with neurogenic claudication  2019-10: Sepsis (H)  2019-10: Abdominal pain, left upper quadrant  2019-03: Dizziness  2019-02: Type 2 diabetes mellitus with diabetic cataract, with long-  term current use of insulin (H)  2019-02: Pseudophakia of left eye  2019-02: Regular astigmatism of both eyes  2019-02: Encounter for examination following treatment at hospital  2019-02: Uncontrolled type 2 diabetes mellitus with hyperglycemia (H)  2019-02: Uncontrolled type 1 diabetes mellitus with hyperglycemia (H)  2019-02: Pneumonia of both lungs due to infectious organism  2018-12: Mild persistent asthma with acute exacerbation  2015-09: Varicose vein  Eczema  Anemia  Otitis Media  Essential Hypercholesterolemia  Essential hypertension, benign  Joint Pain, Localized In The Knee  Obesity  Allergic Rhinitis  Klinefelter's syndrome  Hyperglycemia  Acute kidney injury (H24)  Acute respiratory failure, unspecified whether with hypoxia or    hypercapnia (H)  Morbid obesity (H)      Past Medical History:   Diagnosis Date    Anemia, unspecified     Created by Conversion     Cataract     left eye removed, present in right eye    Chronic osteoarthritis     right knee    Colon polyps     Contact dermatitis and other eczema, due to unspecified cause     Created by Conversion     Diabetes mellitus, type 2 (H)     Klinefelter's syndrome     Created by Conversion     Obesity, unspecified     Created by Conversion     Pure hypercholesterolemia     Created by Conversion     Type II or unspecified type diabetes mellitus without mention of complication, not stated as uncontrolled     Created by Conversion     Uncomplicated asthma     adult asthma- wheezing    Unspecified essential hypertension     Created by Conversion     Varicose vein 2015       Social History     Tobacco Use    Smoking status: Former     Packs/day: 2.00     Years: 12.00     Additional pack years: 0.00     Total pack years: 24.00     Types: Cigarettes     Quit date: 1979     Years since quittin.0     Passive exposure: Never    Smokeless tobacco: Never   Substance Use Topics    Alcohol use: Yes     Comment: Alcoholic Drinks/day: rare       Review of Systems   Constitutional:  Negative for fever.   Gastrointestinal:  Positive for abdominal pain, constipation (improving) and nausea. Negative for abdominal distention, blood in stool and vomiting.       Vitals:    24 1012   BP: (!) 161/66   Pulse: 69   Resp: 18   Temp: 98.1  F (36.7  C)   TempSrc: Oral   SpO2: 96%       Physical Exam  Vitals and nursing note reviewed.   Constitutional:       General: He is not in acute distress.     Appearance: He is not toxic-appearing or diaphoretic.   HENT:      Head: Normocephalic and atraumatic.      Right Ear: External ear normal.      Left Ear: External ear normal.   Eyes:      Conjunctiva/sclera: Conjunctivae normal.   Cardiovascular:      Rate and Rhythm: Normal rate and regular rhythm.       Heart sounds: No murmur heard.  Pulmonary:      Effort: Pulmonary effort is normal. No respiratory distress.      Breath sounds: No stridor. No wheezing, rhonchi or rales.   Abdominal:      General: Abdomen is flat. Bowel sounds are normal. There is no distension.      Palpations: Abdomen is soft. There is no mass.      Tenderness: There is abdominal tenderness (epigastric, mild). There is no guarding or rebound. Negative signs include Whitney's sign and McBurney's sign.      Hernia: No hernia is present.   Skin:            Comments: Cluster of sores seen under left breast tissue. It's in intertriginous tissue. Slightly wet/glistening appearing and mildly erythematous. No induration or pus.    Neurological:      Mental Status: He is alert.   Psychiatric:         Mood and Affect: Mood normal.         Behavior: Behavior normal.         Thought Content: Thought content normal.         Judgment: Judgment normal.         Referral to Acute and Diagnostic Services    [phone] ([place] [address]    Transition to Acute & Diagnostic Services Clinic has been discussed with patient, and he agrees with next level of care.   Patient understands that evaluation/treatment at [facility] typically takes significantly longer than in clinic/urgent care (>2 hours).  [facility] Acute [facility] Diagnostics Services [facility] has been contacted by provider/staff to confirm patient acceptance.         Special issues:      None                     The following provider has assessed this patient for intervention at [facility], and directed the patient for referral: Tiffanie Madsen PA-C

## 2024-01-09 LAB
ATRIAL RATE - MUSE: 67 BPM
DIASTOLIC BLOOD PRESSURE - MUSE: NORMAL MMHG
INTERPRETATION ECG - MUSE: NORMAL
P AXIS - MUSE: 84 DEGREES
PR INTERVAL - MUSE: 172 MS
QRS DURATION - MUSE: 94 MS
QT - MUSE: 416 MS
QTC - MUSE: 439 MS
R AXIS - MUSE: 39 DEGREES
SYSTOLIC BLOOD PRESSURE - MUSE: NORMAL MMHG
T AXIS - MUSE: 32 DEGREES
VENTRICULAR RATE- MUSE: 67 BPM

## 2024-01-09 RX ORDER — SUCRALFATE 1 G/1
1 TABLET ORAL 4 TIMES DAILY
Qty: 360 TABLET | OUTPATIENT
Start: 2024-01-09

## 2024-01-09 RX ORDER — OMEPRAZOLE 40 MG/1
40 CAPSULE, DELAYED RELEASE ORAL DAILY
Qty: 90 CAPSULE | OUTPATIENT
Start: 2024-01-09

## 2024-01-09 NOTE — TELEPHONE ENCOUNTER
This refill request is a duplicate request, previously received or sent.  Sent denial notification to pharmacy.  Lay Blanchard RN  [facility]

## 2024-01-10 ENCOUNTER — OFFICE VISIT (OUTPATIENT)
Dept: FAMILY MEDICINE | Facility: CLINIC | Age: 74
End: 2024-01-10
Payer: COMMERCIAL

## 2024-01-10 VITALS
SYSTOLIC BLOOD PRESSURE: 148 MMHG | RESPIRATION RATE: 17 BRPM | WEIGHT: 223.4 LBS | OXYGEN SATURATION: 96 % | HEART RATE: 86 BPM | DIASTOLIC BLOOD PRESSURE: 70 MMHG | HEIGHT: 71 IN | BODY MASS INDEX: 31.27 KG/M2

## 2024-01-10 DIAGNOSIS — B02.29 POST HERPETIC NEURALGIA: Primary | ICD-10-CM

## 2024-01-10 DIAGNOSIS — G47.00 INSOMNIA, UNSPECIFIED TYPE: ICD-10-CM

## 2024-01-10 PROCEDURE — 99214 OFFICE O/P EST MOD 30 MIN: CPT | Performed by: FAMILY MEDICINE

## 2024-01-10 RX ORDER — VALACYCLOVIR HYDROCHLORIDE 1 G/1
1000 TABLET, FILM COATED ORAL 3 TIMES DAILY
Qty: 21 TABLET | Refills: 0 | Status: SHIPPED | OUTPATIENT
Start: 2024-01-10 | End: 2024-05-21

## 2024-01-10 RX ORDER — LIDOCAINE 50 MG/G
OINTMENT TOPICAL PRN
Qty: 30 G | Refills: 0 | Status: SHIPPED | OUTPATIENT
Start: 2024-01-10

## 2024-01-10 RX ORDER — TRAZODONE HYDROCHLORIDE 50 MG/1
50 TABLET, FILM COATED ORAL AT BEDTIME
Qty: 30 TABLET | Refills: 0 | Status: SHIPPED | OUTPATIENT
Start: 2024-01-10 | End: 2024-02-15

## 2024-01-10 ASSESSMENT — PATIENT HEALTH QUESTIONNAIRE - PHQ9
SUM OF ALL RESPONSES TO PHQ QUESTIONS 1-9: 5
10. IF YOU CHECKED OFF ANY PROBLEMS, HOW DIFFICULT HAVE THESE PROBLEMS MADE IT FOR YOU TO DO YOUR WORK, TAKE CARE OF THINGS AT HOME, OR GET ALONG WITH OTHER PEOPLE: SOMEWHAT DIFFICULT
SUM OF ALL RESPONSES TO PHQ QUESTIONS 1-9: 5

## 2024-01-10 ASSESSMENT — PAIN SCALES - GENERAL: PAINLEVEL: SEVERE PAIN (6)

## 2024-01-10 NOTE — PATIENT INSTRUCTIONS
Praveen Batista,    Thank you for entrusting your care with us today. After your visit today with MD Alvarado Penaloza this is the plan that was discussed at your appointment.    Get cholesterol labs when you see your primary care provider next.    Follow up in 6 months with Dr. Penaloza and an ultrasound prior.  A  will reach out to you closer to that time to schedule.       I am including additional information on these things and our contact information if you have any questions or concerns.   Please do not hesitate to reach out to us if you felt we did not answer your questions or you are unsure of the treatment plan after your visit today. Our number is [phone].Thank you for trusting us with your care.         Again thank you for your time.

## 2024-01-10 NOTE — PROGRESS NOTES
Assessment & Plan     Lester was seen today for derm problem.    Diagnoses and all orders for this visit:    Post herpetic neuralgia  Acute. Highly suspicious for herpes zoster d't nerve symptoms, recent illness stress, and history of shingles. Will treat with Valtrex for 7 days. Provided lidocaine cream to help with nerve pain PRN        -     lidocaine (XYLOCAINE) 5 % external ointment; Apply topically as needed for moderate pain  -     valACYclovir (VALTREX) 1000 mg tablet; Take 1 tablet (1,000 mg) by mouth 3 times daily for 7 days    Insomnia, unspecified type  Chronic. Unclear etiology, could be due to transition from hospital environment. OK for a short trial of sleep medication, recommend they follow up with primary care provider.   -     traZODone (DESYREL) 50 MG tablet; Take 1 tablet (50 mg) by mouth at bedtime          Prescription drug management  }   MED REC REQUIRED  Post Medication Reconciliation Status:  Patient was not discharged from an inpatient facility or TCU  See Patient Instructions    Ksenia Briceño, [employer] student     NELSON WEST DO  [facility]   Lester is a 73 year old, presenting for the following health issues:  Derm Problem (Possible shingles )        1/10/2024    10:49 AM   Additional Questions   Roomed by Uriel ALONSO CMA   Accompanied by Wife       History of Present Illness       Reason for visit:  Ulcer/shingals    He eats 0-1 servings of fruits and vegetables daily.He consumes 1 sweetened beverage(s) daily.He exercises with enough effort to increase his heart rate 9 or less minutes per day.  He exercises with enough effort to increase his heart rate 4 days per week.   He is taking medications regularly.         Concern - Possible shingles   Onset: Ongoing since the beginning of Jan.   Description: Sore under left breast. Pain has increased the last week. Radiates to back.   Intensity: 6/10  Progression of Symptoms:   "worsening  Accompanying Signs & Symptoms: Pain, itching, draining   Previous history of similar problem: Has a hx of Shingles   Precipitating factors:        Worsened by: NA  Alleviating factors:        Improved by: NA  Therapies tried and outcome: Was prescribed omeprazole, sucralfate and nystatin       Skin irritation started after hospitalization after electrode lead was ripped off in the hospital.   Under left breast, slowly improving.   Now has developed nerve pain like with ulcers   Pain started last night and wrapped around his L side.   Describes it as nerve pain, today it is 4/10.   Rash is  improving.     Wants H.pylori, was supposed to have in on Monday but it didn't get ordered.      Review of Systems   CONSTITUTIONAL: NEGATIVE for fever, chills, change in weight  ENT/MOUTH: NEGATIVE for ear, mouth and throat problems  RESP: NEGATIVE for significant cough or SOB  CV: NEGATIVE for chest pain, palpitations or peripheral edema      Objective    BP (!) 148/70 (BP Location: Right arm, Patient Position: Sitting, Cuff Size: Adult Regular)   Pulse 86   Resp 17   Ht 1.803 m (5' 11\")   Wt 101.3 kg (223 lb 6.4 oz)   SpO2 96%   BMI 31.16 kg/m    Body mass index is 31.16 kg/m .  Physical Exam  Skin:     Findings: Rash present.      Comments: 5 ulcerated lesions located on L chest under breast tissue. Largest ulcerated lesion with honey colored discharge.Slight crusting on the lateral three ulcers.  Along the T4 dermatomal region.         GENERAL: healthy, alert and no distress  NECK: no adenopathy, no asymmetry, masses, or scars and thyroid normal to palpation  RESP: lungs clear to auscultation - no rales, rhonchi or wheezes  CV: regular rate and rhythm, normal S1 S2, no S3 or S4, no murmur, click or rub, no peripheral edema and peripheral pulses strong  ABDOMEN: soft, nontender, no hepatosplenomegaly, no masses and bowel sounds normal  MS: no gross musculoskeletal defects noted, no edema      ----- Services " Performed by a STUDENT in Presence of ATTENDING Physician-------

## 2024-01-10 NOTE — PATIENT INSTRUCTIONS
I think it is likely that you have shingles. You will take the Valtrex three times a day for 7 days. You can apply lidocaine cream as needed for the nerve pain.

## 2024-01-10 NOTE — PROGRESS NOTES
[facility]        Patient is here for a consult to discuss Abdominal aortic aneurysm (AAA).  Patient has intermittent abdominal pain but states he is being treated for an ulcer.  He also has active shingles.    Pt is currently taking Aspirin and Statin.    /74   Pulse 88   Temp 98.4  F (36.9  C)   Resp 18   Wt 221 lb 12.8 oz (100.6 kg)   BMI 30.93 kg/m      The provider has been notified that the patient has no concerns.     Questions patient would like addressed today are: N/A.    Refills are needed: N/A    Has homecare services and agency name:  No

## 2024-01-11 ENCOUNTER — OFFICE VISIT (OUTPATIENT)
Dept: VASCULAR SURGERY | Facility: CLINIC | Age: 74
End: 2024-01-11
Attending: FAMILY MEDICINE
Payer: COMMERCIAL

## 2024-01-11 VITALS
WEIGHT: 221.8 LBS | HEART RATE: 88 BPM | BODY MASS INDEX: 30.93 KG/M2 | TEMPERATURE: 98.4 F | SYSTOLIC BLOOD PRESSURE: 138 MMHG | DIASTOLIC BLOOD PRESSURE: 74 MMHG | RESPIRATION RATE: 18 BRPM

## 2024-01-11 DIAGNOSIS — I71.40 ABDOMINAL AORTIC ANEURYSM (AAA) 3.0 CM TO 5.5 CM IN DIAMETER IN MALE (H): Primary | ICD-10-CM

## 2024-01-11 DIAGNOSIS — E78.5 DYSLIPIDEMIA, GOAL LDL BELOW 70: ICD-10-CM

## 2024-01-11 DIAGNOSIS — I10 ESSENTIAL HYPERTENSION: ICD-10-CM

## 2024-01-11 PROCEDURE — G0463 HOSPITAL OUTPT CLINIC VISIT: HCPCS | Performed by: HOSPITALIST

## 2024-01-11 PROCEDURE — 99204 OFFICE O/P NEW MOD 45 MIN: CPT | Performed by: HOSPITALIST

## 2024-01-11 ASSESSMENT — PAIN SCALES - GENERAL: PAINLEVEL: NO PAIN (0)

## 2024-01-11 NOTE — PROGRESS NOTES
VASCULAR MEDICINE CONSULT NOTE          LOCATION:  [facility]       Date of Service: 1/11/2024      Primary Care Provider: Ronda Hawk  Referring provider;  Clarice Cortez      Reason for the visit/chief complaint:   Saccular infrarenal abdominal aortic aneurysm      HPI:  Lester Shi is a pleasant 73 year old male who presents to our Vascular Medicine clinic with the above-mentioned complaint.    Mr. Shi was incidentally found to have saccular infrarenal abdominal aortic aneurysm measuring 3.7 x 2.8 cm on recent CT abdomen pelvis with IV contrast done on 1/8/2023 to evaluate abdominal pain. The aneurysm was actually seen previously on CT abdomen in 2019 measuring 3.3 x 2.4 cm per recent radiology comparison. Patient however reports that he was not aware of this finding back in 2019.    Other medical history includes Klinefelter syndrome, type 2 diabetes mellitus (previously controlled, recently A1c up to 9.4% due to multiple recent prednisone use for asthma exacerbation), hypertension, dyslipidemia and mild persistent asthma with recent exacerbations within the past month.    His abdominal pain that prompted his CT scan started the beginning of this month approximately 10 days ago.  He was evaluated by internal medicine and his pain was mainly epigastric with radiation to the back. This was initially considered to be from gastritis/peptic ulcer disease and he was treated empirically with omeprazole and sucralfate.  There was also some concern that this could be from shingles versus herpes zoster (has history of such, found to have skin vesicles in his back).  On follow-up, he was noted to have clear skin changes of herpes zoster and was prescribed valacyclovir.  Today, patient reports that his abdominal pain has completely subsided.    Denies intermittent claudication.  Denies history of MI.  Denies history of stroke.    Risk factors:  Hypertension: Diagnosed approximately  15-20 years ago.  Was uncontrolled, recently better controlled on multiple medications: Amlodipine 10 mg, losartan 100 mg, hydrochlorothiazide 25 mg and atenolol 100 mg.  Smoking: Former smoker, started at age 16, 2 PPD, quit 32 yrs.  Dyslipidemia: On rosuvastatin 20 mg.  LDL 82 from last year 2023.  TG has been significantly elevated. Due for recheck with PCP later this month.  Family history: Older brother who was a smoker had what seems to be a bypass surgery for PAD in his mid 40s.  He is unsure if he had aneurysm,  at age 79 from stroke.  Per patient, all siblings including himself were screened at that time which was approximately 20 years ago with negative screening.  Father  at age 59 from DVT while hospitalized.  Mother  at age 89 (natural death).  No history of sudden death.    Patient worked in the defense industry for many years, retired .  Currently enjoys driving a schoolbus daily.        REVIEW OF SYSTEMS:    A 12 point ROS was reviewed and is negative except what is mentioned in HPI.       Past medical history, surgical history, medications, family history, social history and allergies were reviewed. Pertinent points mentioned under HPI.        OBJECTIVE:    Vital signs:  /74   Pulse 88   Temp 98.4  F (36.9  C)   Resp 18   Wt 221 lb 12.8 oz (100.6 kg)   BMI 30.93 kg/m    Wt Readings from Last 1 Encounters:   24 221 lb 12.8 oz (100.6 kg)     Body mass index is 30.93 kg/m .    Physical exam:  General appearance: Pleasant male in no apparent distress.    HEENT: NC/AT.    Neck: Carotids +2/2 bilaterally without bruits.  No jugular venous distension.   Heart: RRR. Normal S1, S2. No murmur, rub, click, or gallop.   Chest: Clear to auscultation bilaterally.  Abdomen: Soft, nontender, nondistended. No pulsatile mass.  No bruits.   Extremities: No swelling.  Lower extremity vascular pulse exam was completely normal with palpable femoral, popliteal, DP and PT 2/2  bilaterally.  Skin: Normal skin color and temperature.  No wounds.  Neurological: Alert, awake and oriented       DIAGNOSTIC STUDIES:   Labs and diagnostics reviewed including outside records. Pertinent points are mentioned under HPI and assessment and plan sections.        ASSESSMENT AND PLAN:    Asymptomatic infrarenal saccular abdominal aortic aneurysm 37 cm  Long history of hypertension on 4 blood pressure medications  Former smoker with approximately 70-pack-year, quit 32 years ago  Dyslipidemia on statin  Type 2 diabetes mellitus previously controlled, recently uncontrolled due to prednisone use, A1c 9.4%  Klinefelter syndrome    Mr. Shi 's aneurysm is likely in the setting of atherosclerosis.  His risk factors for atherosclerosis include long history of tobacco smoking although quit 32 years ago, dyslipidemia, difficult to control hypertension, diabetes mellitus and age.   I did show him his CT scan.  We also discussed that there is no reported clear association between Klinefelter syndrome and abdominal aortic aneurysm per se.  Overall, we recommended medical management, risk factor control and ongoing surveillance.  When it comes to surgical repair cut off, this is well characterized for fusiform aneurysms.  Saccular aneurysms have less defined criteria with the exact diameter threshold for elective surgical treatment of asymptomatic aneurysms being difficult to determine but typically lower than fusiform aneurysms (around 5.5 cm).  We will repeat ultrasound aorta in 6 months and assess for any rapid growth.    He has recent echocardiogram from 9 months ago showing normal aortic root diameter and ascending aorta diameter of 3.7 cm.  No concern for popliteal aneurysm on exam.    We would recommend continuing his current 4 blood pressure medications as directed by PCP.  He reports his blood pressure finally started to improve.  Was 138/74 today.  His LDL from last year is above goal at 80.  He is  currently on rosuvastatin 20 mg.  He is due to repeat his lipids later this month with his PCP.  Continue aspirin 81 mg indefinitely.  Repeat ultrasound abdominal aorta in 6 months.      Recommendations:  Repeat aortoiliac ultrasound in 6 months.  Continue aspirin 81 mg indefinitely.  Continue high intensity statin/rosuvastatin 20 mg for now.  Repeat lipid panel as a scheduled with PCP end of the month per patient.  If LDL is above 70, would recommend increasing rosuvastatin dose to 40 mg.  Continue current blood pressure medications under supervision by PCP.  Goal blood pressure less than 130/80.      Pleasure meeting with  in our clinic today.      Alvarado Penaloza MD  Vascular Medicine  January 11, 2024

## 2024-01-29 ENCOUNTER — OFFICE VISIT (OUTPATIENT)
Dept: INTERNAL MEDICINE | Facility: CLINIC | Age: 74
End: 2024-01-29
Payer: COMMERCIAL

## 2024-01-29 VITALS
BODY MASS INDEX: 31.36 KG/M2 | RESPIRATION RATE: 18 BRPM | OXYGEN SATURATION: 95 % | DIASTOLIC BLOOD PRESSURE: 70 MMHG | WEIGHT: 224 LBS | HEIGHT: 71 IN | TEMPERATURE: 97.9 F | SYSTOLIC BLOOD PRESSURE: 134 MMHG | HEART RATE: 71 BPM

## 2024-01-29 DIAGNOSIS — Z12.5 ENCOUNTER FOR SCREENING FOR MALIGNANT NEOPLASM OF PROSTATE: ICD-10-CM

## 2024-01-29 DIAGNOSIS — D64.9 ANEMIA, UNSPECIFIED TYPE: ICD-10-CM

## 2024-01-29 DIAGNOSIS — E11.65 TYPE 2 DIABETES MELLITUS WITH HYPERGLYCEMIA, WITHOUT LONG-TERM CURRENT USE OF INSULIN (H): ICD-10-CM

## 2024-01-29 DIAGNOSIS — E11.36 TYPE 2 DIABETES MELLITUS WITH DIABETIC CATARACT, WITH LONG-TERM CURRENT USE OF INSULIN (H): Primary | ICD-10-CM

## 2024-01-29 DIAGNOSIS — Z79.4 TYPE 2 DIABETES MELLITUS WITH DIABETIC CATARACT, WITH LONG-TERM CURRENT USE OF INSULIN (H): Primary | ICD-10-CM

## 2024-01-29 DIAGNOSIS — Z00.00 ENCOUNTER FOR ANNUAL WELLNESS EXAM IN MEDICARE PATIENT: ICD-10-CM

## 2024-01-29 DIAGNOSIS — I10 ESSENTIAL HYPERTENSION, BENIGN: ICD-10-CM

## 2024-01-29 DIAGNOSIS — Q98.4 KLINEFELTER'S SYNDROME: ICD-10-CM

## 2024-01-29 DIAGNOSIS — J45.909 UNCOMPLICATED ASTHMA, UNSPECIFIED ASTHMA SEVERITY, UNSPECIFIED WHETHER PERSISTENT: ICD-10-CM

## 2024-01-29 LAB
ERYTHROCYTE [DISTWIDTH] IN BLOOD BY AUTOMATED COUNT: 16.7 % (ref 10–15)
FOLATE SERPL-MCNC: 10.2 NG/ML (ref 4.6–34.8)
HCT VFR BLD AUTO: 32.9 % (ref 40–53)
HGB BLD-MCNC: 10.6 G/DL (ref 13.3–17.7)
MCH RBC QN AUTO: 28.6 PG (ref 26.5–33)
MCHC RBC AUTO-ENTMCNC: 32.2 G/DL (ref 31.5–36.5)
MCV RBC AUTO: 89 FL (ref 78–100)
PLATELET # BLD AUTO: 298 10E3/UL (ref 150–450)
RBC # BLD AUTO: 3.7 10E6/UL (ref 4.4–5.9)
RETICS # AUTO: 0.07 10E6/UL (ref 0.01–0.11)
RETICS/RBC NFR AUTO: 2 % (ref 0.8–2.7)
WBC # BLD AUTO: 7.4 10E3/UL (ref 4–11)

## 2024-01-29 PROCEDURE — 80053 COMPREHEN METABOLIC PANEL: CPT | Performed by: NURSE PRACTITIONER

## 2024-01-29 PROCEDURE — 84466 ASSAY OF TRANSFERRIN: CPT | Performed by: NURSE PRACTITIONER

## 2024-01-29 PROCEDURE — 84443 ASSAY THYROID STIM HORMONE: CPT | Performed by: NURSE PRACTITIONER

## 2024-01-29 PROCEDURE — G0439 PPPS, SUBSEQ VISIT: HCPCS | Performed by: NURSE PRACTITIONER

## 2024-01-29 PROCEDURE — 82746 ASSAY OF FOLIC ACID SERUM: CPT | Performed by: NURSE PRACTITIONER

## 2024-01-29 PROCEDURE — 99214 OFFICE O/P EST MOD 30 MIN: CPT | Mod: 25 | Performed by: NURSE PRACTITIONER

## 2024-01-29 PROCEDURE — 85045 AUTOMATED RETICULOCYTE COUNT: CPT | Performed by: NURSE PRACTITIONER

## 2024-01-29 PROCEDURE — 85027 COMPLETE CBC AUTOMATED: CPT | Performed by: NURSE PRACTITIONER

## 2024-01-29 PROCEDURE — 36415 COLL VENOUS BLD VENIPUNCTURE: CPT | Performed by: NURSE PRACTITIONER

## 2024-01-29 PROCEDURE — 82728 ASSAY OF FERRITIN: CPT | Performed by: NURSE PRACTITIONER

## 2024-01-29 PROCEDURE — 80061 LIPID PANEL: CPT | Performed by: NURSE PRACTITIONER

## 2024-01-29 PROCEDURE — 82607 VITAMIN B-12: CPT | Performed by: NURSE PRACTITIONER

## 2024-01-29 PROCEDURE — 83540 ASSAY OF IRON: CPT | Performed by: NURSE PRACTITIONER

## 2024-01-29 PROCEDURE — G0103 PSA SCREENING: HCPCS | Performed by: NURSE PRACTITIONER

## 2024-01-29 RX ORDER — COVID-19 ANTIGEN TEST
440 KIT MISCELLANEOUS PRN
COMMUNITY
End: 2024-06-20

## 2024-01-29 RX ORDER — LIRAGLUTIDE 6 MG/ML
1.2 INJECTION SUBCUTANEOUS DAILY
Qty: 6 ML | Refills: 1 | Status: SHIPPED | OUTPATIENT
Start: 2024-01-29 | End: 2024-03-05

## 2024-01-29 RX ORDER — ACYCLOVIR 400 MG/1
1 TABLET ORAL
Qty: 3 EACH | Refills: 5 | Status: SHIPPED | OUTPATIENT
Start: 2024-01-29

## 2024-01-29 RX ORDER — ACYCLOVIR 400 MG/1
1 TABLET ORAL ONCE
Qty: 1 EACH | Refills: 0 | Status: SHIPPED | OUTPATIENT
Start: 2024-01-29 | End: 2024-01-29

## 2024-01-29 ASSESSMENT — ENCOUNTER SYMPTOMS
MYALGIAS: 0
FEVER: 0
PARESTHESIAS: 0
JOINT SWELLING: 1
ABDOMINAL PAIN: 0
CONSTIPATION: 0
DIZZINESS: 0
HEADACHES: 0
FREQUENCY: 1
HEMATOCHEZIA: 0
WEAKNESS: 0
DIARRHEA: 0
CHILLS: 0
HEARTBURN: 0
SHORTNESS OF BREATH: 0
PALPITATIONS: 0
COUGH: 0
HEMATURIA: 0
ARTHRALGIAS: 1
SORE THROAT: 0
DYSURIA: 0
EYE PAIN: 0
NAUSEA: 0
NERVOUS/ANXIOUS: 0

## 2024-01-29 ASSESSMENT — ACTIVITIES OF DAILY LIVING (ADL): CURRENT_FUNCTION: NO ASSISTANCE NEEDED

## 2024-01-29 NOTE — PATIENT INSTRUCTIONS
Restart the Victoza.    See if you can get the Dexcom G7 continuous glucometer from the pharmacy.    Come back and see me for your preop visit.  We will check diabetes labs at that time.    Recheck labs today.  Your hemoglobin has been slowly declining.  If your hemoglobin is still declining, you will not be able to have your knee surgery this spring.

## 2024-01-29 NOTE — PROGRESS NOTES
"wellnesPreventive Care Visit  [facility]  Garret Pope CNP, Nurse Practitioner - Family  Jan 29, 2024      SUBJECTIVE:   Lester is a 73 year old, presenting for the following:  Wellness Visit (Annual wellness and Establish care)        1/29/2024    12:08 PM   Additional Questions   Roomed by Madhuri MORROW     Are you in the first 12 months of your Medicare coverage?  No    Healthy Habits:     In general, how would you rate your overall health?  Good    Frequency of exercise:  1 day/week    Duration of exercise:  N/A    Do you usually eat at least 4 servings of fruit and vegetables a day, include whole grains    & fiber and avoid regularly eating high fat or \"junk\" foods?  No    Taking medications regularly:  Yes    Medication side effects:  None    Ability to successfully perform activities of daily living:  No assistance needed    Home Safety:  No safety concerns identified    Hearing Impairment:  No hearing concerns    In the past 6 months, have you been bothered by leaking of urine? Yes    In general, how would you rate your overall mental or emotional health?  Good    Additional concerns today:  No      Today's PHQ-2 Score:       1/29/2024    12:02 PM   PHQ-2 ( 1999 [employer])   Q1: Little interest or pleasure in doing things 0   Q2: Feeling down, depressed or hopeless 0   PHQ-2 Score 0   Q1: Little interest or pleasure in doing things Not at all   Q2: Feeling down, depressed or hopeless Not at all   PHQ-2 Score 0           Have you ever done Advance Care Planning? (For example, a Health Directive, POLST, or a discussion with a medical provider or your loved ones about your wishes): No, advance care planning information given to patient to review.  Patient declined advance care planning discussion at this time.       Fall risk  Fallen 2 or more times in the past year?: No  Any fall with injury in the past year?: Yes    Cognitive Screening   1) Repeat 3 items (Leader, Season, Table)    2) Clock " draw: NORMAL  3) 3 item recall: Recalls 3 objects  Results: 3 items recalled: COGNITIVE IMPAIRMENT LESS LIKELY    Mini-CogTM Copyright S Susan. Licensed by the author for use in [facility]; reprinted with permission (jose@[facility]). All rights reserved.      Do you have sleep apnea, excessive snoring or daytime drowsiness? : no    Reviewed and updated as needed this visit by clinical staff   Tobacco  Allergies  Meds              Reviewed and updated as needed this visit by Provider                  Social History     Tobacco Use    Smoking status: Former     Packs/day: 2.00     Years: 12.00     Additional pack years: 0.00     Total pack years: 24.00     Types: Cigarettes     Quit date: 1979     Years since quittin.1     Passive exposure: Never    Smokeless tobacco: Never   Substance Use Topics    Alcohol use: Yes     Comment: Alcoholic Drinks/day: rare             2024    11:08 AM   Alcohol Use   Prescreen: >3 drinks/day or >7 drinks/week? Not Applicable     Do you have a current opioid prescription? No  Do you use any other controlled substances or medications that are not prescribed by a provider? None        Current providers sharing in care for this patient include:   Patient Care Team:  Ronda Hawk CNP as PCP - General (Nurse Practitioner - Gerontology)  Nora Campos, PharmD as Pharmacist (Pharmacist)  Nora Campos, PharmD as Assigned MTM Pharmacist  Ronda Hawk CNP as Assigned PCP  Alvarado Penaloza MD as Assigned Heart and Vascular Provider    The following health maintenance items are reviewed in Epic and correct as of today:  Health Maintenance   Topic Date Due    RSV VACCINE (Pregnancy & 60+) (1 - 1-dose 60+ series) Never done    DIABETIC FOOT EXAM  2023    INFLUENZA VACCINE (1) 2023    LIPID  2024    MEDICARE ANNUAL WELLNESS VISIT  2024    ASTHMA CONTROL TEST  2024    A1C  2024    EYE EXAM  07/10/2024    ASTHMA ACTION PLAN   "07/17/2024    ANNUAL REVIEW OF HM ORDERS  08/07/2024    MICROALBUMIN  11/27/2024    BMP  01/08/2025    FALL RISK ASSESSMENT  01/29/2025    COLORECTAL CANCER SCREENING  05/01/2026    DTAP/TDAP/TD IMMUNIZATION (2 - Td or Tdap) 04/25/2027    ADVANCE CARE PLANNING  02/05/2028    HEPATITIS C SCREENING  Completed    PHQ-2 (once per calendar year)  Completed    Pneumococcal Vaccine: 65+ Years  Completed    ZOSTER IMMUNIZATION  Completed    AORTIC ANEURYSM SCREENING (SYSTEM ASSIGNED)  Completed    COVID-19 Vaccine  Completed    IPV IMMUNIZATION  Aged Out    HPV IMMUNIZATION  Aged Out    MENINGITIS IMMUNIZATION  Aged Out    RSV MONOCLONAL ANTIBODY  Aged Out         Review of Systems   Constitutional:  Negative for chills and fever.   HENT:  Negative for congestion, ear pain, hearing loss and sore throat.    Eyes:  Negative for pain and visual disturbance.   Respiratory:  Negative for cough and shortness of breath.    Cardiovascular:  Negative for chest pain and palpitations.   Gastrointestinal:  Negative for abdominal pain, constipation, diarrhea and nausea.   Genitourinary:  Positive for frequency and urgency. Negative for dysuria, genital sores, hematuria and penile discharge.   Musculoskeletal:  Positive for arthralgias and joint swelling. Negative for myalgias.   Skin:  Negative for rash.   Neurological:  Negative for dizziness, weakness and headaches.   Psychiatric/Behavioral:  The patient is not nervous/anxious.         Review of Systems  Constitutional, HEENT, cardiovascular, pulmonary, gi and gu systems are negative, except as otherwise noted.    OBJECTIVE:   /70 (BP Location: Right arm, Patient Position: Sitting)   Pulse 71   Temp 97.9  F (36.6  C)   Resp 18   Ht 1.803 m (5' 10.98\")   Wt 101.6 kg (224 lb)   SpO2 95%   BMI 31.26 kg/m     Estimated body mass index is 31.26 kg/m  as calculated from the following:    Height as of this encounter: 1.803 m (5' 10.98\").    Weight as of this encounter: 101.6 kg " (224 lb).  Physical Exam  GENERAL: alert and no distress  NECK: no adenopathy, no asymmetry, masses, or scars  RESP: lungs clear to auscultation - no rales, rhonchi or wheezes  CV: regular rate and rhythm, normal S1 S2, no S3 or S4, no murmur, click or rub, no peripheral edema  ABDOMEN: soft, nontender, no hepatosplenomegaly, no masses and bowel sounds normal  MS: no gross musculoskeletal defects noted, no edema    Diagnostic Test Results:  Labs reviewed in Epic    ASSESSMENT / PLAN:     1. Encounter for annual wellness exam in Medicare patient  Chronic medical issues reviewed    2. Type 2 diabetes mellitus with diabetic cataract, with long-term current use of insulin (H)  We spent most of our visit talking about his diabetes.  It sounds like he has been on prednisone multiple times this past fall due to upper respiratory illness.    I would like him to restart his Victoza.  We will see about getting him on an Dexcom G7  - Lipid panel reflex to direct LDL Non-fasting; Future    3. Essential hypertension, benign  Controlled on losartan, hydrochlorothiazide, atenolol, and amlodipine  - Comprehensive metabolic panel; Future    4. Klinefelter's syndrome  He had been on testosterone replacement but has not used the AndroGel in some time.  Regardless, we should check a PSA  - PSA, screen; Future    5. Type 2 diabetes mellitus with hyperglycemia, without long-term current use of insulin (H)  As above.  We talked about how diabetic control is important for his upcoming knee replacement surgery  - Continuous Blood Gluc  (DEXCOM G7 ) ISSAC; 1 each once for 1 dose Use to read blood sugars as per manufacturers instructions  Dispense: 1 each; Refill: 0  - Continuous Blood Gluc Sensor (DEXCOM G7 SENSOR) MISC; 1 each every 10 days Change sensor every 10 days  Dispense: 3 each; Refill: 5  - liraglutide (VICTOZA) 18 MG/3ML solution; Inject 1.2 mg Subcutaneous daily  Dispense: 6 mL; Refill: 1    6. Uncomplicated  "asthma, unspecified asthma severity, unspecified whether persistent  Has been much better since starting Advair    7. Encounter for screening for malignant neoplasm of prostate  - PSA, screen; Future    8. Anemia, unspecified type  Review of his chart shows that his hemoglobin has been steadily declining over the last 5 months.  Recheck CBC today  - CBC with platelets; Future    Patient Instructions   Restart the Victoza.    See if you can get the Dexcom G7 continuous glucometer from the pharmacy.    Come back and see me for your preop visit.  We will check diabetes labs at that time.    Recheck labs today.  Your hemoglobin has been slowly declining.  If your hemoglobin is still declining, you will not be able to have your knee surgery this spring.        Patient has been advised of split billing requirements and indicates understanding: Yes      Counseling  Reviewed preventive health counseling, as reflected in patient instructions       Regular exercise       Healthy diet/nutrition       Vision screening       Hearing screening      BMI  Estimated body mass index is 31.26 kg/m  as calculated from the following:    Height as of this encounter: 1.803 m (5' 10.98\").    Weight as of this encounter: 101.6 kg (224 lb).   Weight management plan: Patient was referred to their PCP to discuss a diet and exercise plan.      He reports that he quit smoking about 45 years ago. His smoking use included cigarettes. He has a 24 pack-year smoking history. He has never been exposed to tobacco smoke. He has never used smokeless tobacco.      Appropriate preventive services were discussed with this patient, including applicable screening as appropriate for fall prevention, nutrition, physical activity, Tobacco-use cessation, weight loss and cognition.  Checklist reviewing preventive services available has been given to the patient.    Reviewed patients plan of care and provided an AVS. The Basic Care Plan (routine screening as " documented in Health Maintenance) for Lester meets the Care Plan requirement. This Care Plan has been established and reviewed with the Patient.          Signed Electronically by: Garret Pope CNP    Identified Health Risks  I have reviewed Opioid Use Disorder and Substance Use Disorder risk factors and made any needed referrals.   Answers submitted by the patient for this visit:  Annual Preventive Visit (Submitted on 1/29/2024)  Chief Complaint: Annual Exam:  Blood in stool: No  heartburn: No  peripheral edema: No  mood changes: No  Skin sensation changes: No  impotence: No

## 2024-01-30 LAB
ALBUMIN SERPL BCG-MCNC: 4.2 G/DL (ref 3.5–5.2)
ALP SERPL-CCNC: 80 U/L (ref 40–150)
ALT SERPL W P-5'-P-CCNC: 19 U/L (ref 0–70)
ANION GAP SERPL CALCULATED.3IONS-SCNC: 11 MMOL/L (ref 7–15)
AST SERPL W P-5'-P-CCNC: 16 U/L (ref 0–45)
BILIRUB SERPL-MCNC: 0.5 MG/DL
BUN SERPL-MCNC: 34.1 MG/DL (ref 8–23)
CALCIUM SERPL-MCNC: 10 MG/DL (ref 8.8–10.2)
CHLORIDE SERPL-SCNC: 102 MMOL/L (ref 98–107)
CHOLEST SERPL-MCNC: 133 MG/DL
CREAT SERPL-MCNC: 1.06 MG/DL (ref 0.67–1.17)
DEPRECATED HCO3 PLAS-SCNC: 26 MMOL/L (ref 22–29)
EGFRCR SERPLBLD CKD-EPI 2021: 73 ML/MIN/1.73M2
FASTING STATUS PATIENT QL REPORTED: YES
FERRITIN SERPL-MCNC: 187 NG/ML (ref 31–409)
GLUCOSE SERPL-MCNC: 260 MG/DL (ref 70–99)
HDLC SERPL-MCNC: 55 MG/DL
IRON BINDING CAPACITY (ROCHE): 261 UG/DL (ref 240–430)
IRON SATN MFR SERPL: 15 % (ref 15–46)
IRON SERPL-MCNC: 38 UG/DL (ref 61–157)
LDLC SERPL CALC-MCNC: 51 MG/DL
NONHDLC SERPL-MCNC: 78 MG/DL
POTASSIUM SERPL-SCNC: 4.5 MMOL/L (ref 3.4–5.3)
PROT SERPL-MCNC: 7.8 G/DL (ref 6.4–8.3)
PSA SERPL DL<=0.01 NG/ML-MCNC: 0.12 NG/ML (ref 0–6.5)
SODIUM SERPL-SCNC: 139 MMOL/L (ref 135–145)
TRANSFERRIN SERPL-MCNC: 356 MG/DL (ref 200–360)
TRIGL SERPL-MCNC: 137 MG/DL
TSH SERPL DL<=0.005 MIU/L-ACNC: 1.63 UIU/ML (ref 0.3–4.2)
VIT B12 SERPL-MCNC: 700 PG/ML (ref 232–1245)

## 2024-01-31 ENCOUNTER — TELEPHONE (OUTPATIENT)
Dept: INTERNAL MEDICINE | Facility: CLINIC | Age: 74
End: 2024-01-31
Payer: COMMERCIAL

## 2024-02-07 ENCOUNTER — MYC MEDICAL ADVICE (OUTPATIENT)
Dept: INTERNAL MEDICINE | Facility: CLINIC | Age: 74
End: 2024-02-07
Payer: COMMERCIAL

## 2024-02-07 DIAGNOSIS — Z79.4 TYPE 2 DIABETES MELLITUS WITH DIABETIC CATARACT, WITH LONG-TERM CURRENT USE OF INSULIN (H): Primary | ICD-10-CM

## 2024-02-07 DIAGNOSIS — E11.36 TYPE 2 DIABETES MELLITUS WITH DIABETIC CATARACT, WITH LONG-TERM CURRENT USE OF INSULIN (H): Primary | ICD-10-CM

## 2024-02-13 ENCOUNTER — ANCILLARY PROCEDURE (OUTPATIENT)
Dept: GENERAL RADIOLOGY | Facility: CLINIC | Age: 74
End: 2024-02-13
Attending: PHYSICIAN ASSISTANT
Payer: COMMERCIAL

## 2024-02-13 ENCOUNTER — OFFICE VISIT (OUTPATIENT)
Dept: FAMILY MEDICINE | Facility: CLINIC | Age: 74
End: 2024-02-13
Payer: COMMERCIAL

## 2024-02-13 VITALS
DIASTOLIC BLOOD PRESSURE: 75 MMHG | OXYGEN SATURATION: 95 % | HEART RATE: 89 BPM | SYSTOLIC BLOOD PRESSURE: 186 MMHG | RESPIRATION RATE: 20 BRPM | TEMPERATURE: 98 F

## 2024-02-13 DIAGNOSIS — J18.9 PNEUMONIA OF LEFT LOWER LOBE DUE TO INFECTIOUS ORGANISM: ICD-10-CM

## 2024-02-13 DIAGNOSIS — J45.21 MILD INTERMITTENT ASTHMA WITH EXACERBATION: ICD-10-CM

## 2024-02-13 DIAGNOSIS — R05.1 ACUTE COUGH: Primary | ICD-10-CM

## 2024-02-13 LAB
FLUAV AG SPEC QL IA: NEGATIVE
FLUBV AG SPEC QL IA: POSITIVE

## 2024-02-13 PROCEDURE — 71046 X-RAY EXAM CHEST 2 VIEWS: CPT | Mod: TC | Performed by: RADIOLOGY

## 2024-02-13 PROCEDURE — 87804 INFLUENZA ASSAY W/OPTIC: CPT | Performed by: PHYSICIAN ASSISTANT

## 2024-02-13 PROCEDURE — 87635 SARS-COV-2 COVID-19 AMP PRB: CPT | Performed by: PHYSICIAN ASSISTANT

## 2024-02-13 PROCEDURE — 99214 OFFICE O/P EST MOD 30 MIN: CPT | Performed by: PHYSICIAN ASSISTANT

## 2024-02-13 RX ORDER — PREDNISONE 20 MG/1
40 TABLET ORAL DAILY
Qty: 10 TABLET | Refills: 0 | Status: SHIPPED | OUTPATIENT
Start: 2024-02-13 | End: 2024-02-18

## 2024-02-13 RX ORDER — OSELTAMIVIR PHOSPHATE 75 MG/1
75 CAPSULE ORAL 2 TIMES DAILY
Qty: 10 CAPSULE | Refills: 0 | Status: SHIPPED | OUTPATIENT
Start: 2024-02-13 | End: 2024-02-18

## 2024-02-13 RX ORDER — DOXYCYCLINE 100 MG/1
100 CAPSULE ORAL 2 TIMES DAILY
Qty: 20 CAPSULE | Refills: 0 | Status: SHIPPED | OUTPATIENT
Start: 2024-02-13 | End: 2024-02-23

## 2024-02-13 NOTE — LETTER
[facility]  [address] [address]  Phone: [phone]  Fax: [phone]    February 13, 2024        Lester Shi  [address] [address]          To whom it may concern:    RE: Lester Shi    He is seen today for influenza B and pneumonia.  He should be allowed to rest 2/13/2024 - 2/20/2024.  May return sooner as tolerated.    Please contact me for questions or concerns.      Sincerely,      Kojo Mccollum

## 2024-02-13 NOTE — TELEPHONE ENCOUNTER
Retail Pharmacy Prior Authorization Team   Phone: [phone]    PA Initiation    Medication: DEXCOM G6 TRANSMITTER MISC  Insurance Company: [employer] Part D - Phone [phone] Fax [phone]  Pharmacy Filling the Rx: [id number] [place], [place] - [address] [place] [facility] [place] [address]  Filling Pharmacy Phone: [phone]  Filling Pharmacy Fax:    Start Date: 2/13/2024      Note: Due to record-high volumes, our turn-around is time taking longer than normal . We are currently 10 days behind in the pools.   We are working diligently to submit all requests in a timely manner and in the order they are received. Please only flag true urgent requests as high priority to the pool at this time.   If you have questions - please send a note/message in the active PA encounter and send back to the RPPA (Retail Pharmacy Prior Authorization) team [[id number]].    If you have more specific questions about our process please reach out to our supervisor Courtney Montalvo.   Thank you!

## 2024-02-13 NOTE — PROGRESS NOTES
"  Assessment & Plan:      Problem List Items Addressed This Visit          Respiratory    Mild intermittent asthma with exacerbation    Relevant Medications    predniSONE (DELTASONE) 20 MG tablet     Other Visit Diagnoses       Acute cough    -  Primary    Relevant Medications    oseltamivir (TAMIFLU) 75 MG capsule    Other Relevant Orders    Symptomatic COVID-19 Virus (Coronavirus) by PCR Nose    Influenza A/B antigen (Completed)    XR Chest 2 Views (Completed)    Pneumonia of left lower lobe due to infectious organism        Relevant Medications    oseltamivir (TAMIFLU) 75 MG capsule    doxycycline hyclate (VIBRAMYCIN) 100 MG capsule    Other Relevant Orders    PRIMARY CARE FOLLOW-UP SCHEDULING          Medical Decision Making  Patient with history of asthma and recent hospitalization secondary to pneumonia presents with new cough, rattling in chest, and exposure to influenza A over the last 24 hours.  Chest x-ray does show signs concerning for pneumonia in the left lower lobe just anterior to the heart.  Patient is also positive for influenza B again.  He also appears to be showing signs of asthma exacerbation.  Recommend Tamiflu and short course of oral steroids.  Patient has several severe interactions to both penicillins as well as poor tolerance of Levaquin recently with pneumonia 2 months ago.  Thus, recommend slowly use of doxycycline.  Recommend patient follow-up for symptom recheck in 3 to 5 days with primary care.  Discussed signs worsening symptoms and when to present to the emergency room immediately.     Subjective:      Lester Shi is a 73 year old male with history of asthma and pneumonia, here for evaluation of cough.  Onset of symptoms was 24 hours ago.  Patient has known exposure to influenza A.  He started to note a \"rattle\" in his chest.  Symptoms seem to improve after single dose of albuterol nebulizer.  Patient was hospitalized 2 months ago after diagnosis of influenza B turning into " respiratory failure and hypoxia.  Patient did not get influenza vaccine this season.     The following portions of the patient's history were reviewed and updated as appropriate: allergies, current medications, and problem list.     Review of Systems  Pertinent items are noted in HPI.    Allergies  Allergies   Allergen Reactions    Amoxicillin Anaphylaxis    Codeine Nausea and Vomiting    Sulfamethoxazole-Trimethoprim [Sulfamethoxazole-Trimethoprim] Nausea and Vomiting     Tachycardia      Erythromycin Base [Erythromycin] GI Disturbance    Levaquin [Levofloxacin] Nausea and Vomiting    Seasonal Allergies Other (See Comments)     SPRINGTIME Nasal, bronchial    Simvastatin Muscle Pain (Myalgia)       Family History   Problem Relation Age of Onset    Cerebrovascular Disease Mother     Clotting Disorder Father     Heart Disease Brother        Social History     Tobacco Use    Smoking status: Former     Packs/day: 2.00     Years: 12.00     Additional pack years: 0.00     Total pack years: 24.00     Types: Cigarettes     Quit date: 1979     Years since quittin.1     Passive exposure: Never    Smokeless tobacco: Never   Substance Use Topics    Alcohol use: Yes     Comment: Alcoholic Drinks/day: rare        Objective:      BP (!) 186/75   Pulse 89   Temp 98  F (36.7  C) (Oral)   Resp 20   SpO2 95%   General appearance - alert, well appearing, and in no distress and non-toxic  Ears - bilateral TM's and external ear canals normal  Nose - normal and patent, no erythema, discharge or polyps  Mouth - mucous membranes moist, pharynx normal without lesions  Neck - supple, no significant adenopathy  Chest - clear to auscultation, no wheezes, rales or rhonchi, symmetric air entry  Heart - normal rate, regular rhythm, normal S1, S2, no murmurs, rubs, clicks or gallops     Lab & Imaging Results    Results for orders placed or performed in visit on 24   XR Chest 2 Views     Status: None    Narrative    EXAM: XR CHEST  2 VIEWS  LOCATION: [facility]  DATE: 2/13/2024    INDICATION:  Acute cough  COMPARISON: 01/24/2024, 12/09/2023. CT 01/08/2024      Impression    IMPRESSION: Small new opacity along the left inferior heart border could represent atelectasis, pneumonia or developing scar. No pleural effusion. Heart size appears normal.   Influenza A/B antigen     Status: Abnormal    Specimen: Nose; Swab   Result Value Ref Range    Influenza A antigen Negative Negative    Influenza B antigen Positive (A) Negative    Narrative    Test results must be correlated with clinical data. If necessary, results should be confirmed by a molecular assay or viral culture.       I personally reviewed these results and discussed findings with the patient.    The use of Dragon/GELI dictation services was used to construct the content of this note; any grammatical errors are non-intentional. Please contact the author directly if you are in need of any clarification.

## 2024-02-14 LAB — SARS-COV-2 RNA RESP QL NAA+PROBE: NEGATIVE

## 2024-02-15 DIAGNOSIS — G47.00 INSOMNIA, UNSPECIFIED TYPE: ICD-10-CM

## 2024-02-15 RX ORDER — TRAZODONE HYDROCHLORIDE 50 MG/1
50 TABLET, FILM COATED ORAL AT BEDTIME
Qty: 30 TABLET | Refills: 0 | Status: SHIPPED | OUTPATIENT
Start: 2024-02-15 | End: 2024-05-21

## 2024-02-15 NOTE — TELEPHONE ENCOUNTER
Retail Pharmacy Prior Authorization Team   Phone: [phone]    Extra question set requested by [employer]. Filled out and faxed back, waiting for outcome.     Thank you,  Liz Lares CPhT  [facility] PA Team        Note: Due to record-high volumes, our turn-around is time taking longer than normal . We are currently 10 days behind in the pools.   We are working diligently to submit all requests in a timely manner and in the order they are received. Please only flag TRUE URGENT requests as high priority to the pool at this time.   If you have questions - please send a note/message in the active PA encounter and send back to the RPPA (Retail Pharmacy Prior Authorization) team [[id number]].    If you have more specific questions about our process please reach out to our supervisor Courtney Montalvo.   Thank you!

## 2024-02-16 ENCOUNTER — ANCILLARY PROCEDURE (OUTPATIENT)
Dept: GENERAL RADIOLOGY | Facility: CLINIC | Age: 74
End: 2024-02-16
Attending: NURSE PRACTITIONER
Payer: COMMERCIAL

## 2024-02-16 ENCOUNTER — OFFICE VISIT (OUTPATIENT)
Dept: FAMILY MEDICINE | Facility: CLINIC | Age: 74
End: 2024-02-16
Payer: COMMERCIAL

## 2024-02-16 VITALS
BODY MASS INDEX: 31.4 KG/M2 | OXYGEN SATURATION: 98 % | HEART RATE: 86 BPM | TEMPERATURE: 97.1 F | WEIGHT: 225 LBS | SYSTOLIC BLOOD PRESSURE: 138 MMHG | DIASTOLIC BLOOD PRESSURE: 74 MMHG

## 2024-02-16 DIAGNOSIS — J45.909 ASTHMA: Primary | ICD-10-CM

## 2024-02-16 DIAGNOSIS — J18.9 PNEUMONIA OF LEFT LOWER LOBE DUE TO INFECTIOUS ORGANISM: Primary | ICD-10-CM

## 2024-02-16 DIAGNOSIS — J45.31 MILD PERSISTENT ASTHMA WITH EXACERBATION: ICD-10-CM

## 2024-02-16 DIAGNOSIS — J18.9 PNEUMONIA OF LEFT LOWER LOBE DUE TO INFECTIOUS ORGANISM: ICD-10-CM

## 2024-02-16 DIAGNOSIS — I10 ESSENTIAL HYPERTENSION: ICD-10-CM

## 2024-02-16 PROCEDURE — 71046 X-RAY EXAM CHEST 2 VIEWS: CPT | Mod: TC | Performed by: RADIOLOGY

## 2024-02-16 PROCEDURE — 99214 OFFICE O/P EST MOD 30 MIN: CPT | Performed by: NURSE PRACTITIONER

## 2024-02-16 RX ORDER — AMLODIPINE BESYLATE 10 MG/1
10 TABLET ORAL DAILY
Qty: 90 TABLET | Refills: 3 | Status: SHIPPED | OUTPATIENT
Start: 2024-02-16 | End: 2024-04-04

## 2024-02-16 ASSESSMENT — ASTHMA QUESTIONNAIRES
EMERGENCY_ROOM_LAST_YEAR_TOTAL: ONE
QUESTION_2 LAST FOUR WEEKS HOW OFTEN HAVE YOU HAD SHORTNESS OF BREATH: ONCE A DAY
QUESTION_3 LAST FOUR WEEKS HOW OFTEN DID YOUR ASTHMA SYMPTOMS (WHEEZING, COUGHING, SHORTNESS OF BREATH, CHEST TIGHTNESS OR PAIN) WAKE YOU UP AT NIGHT OR EARLIER THAN USUAL IN THE MORNING: NOT AT ALL
QUESTION_4 LAST FOUR WEEKS HOW OFTEN HAVE YOU USED YOUR RESCUE INHALER OR NEBULIZER MEDICATION (SUCH AS ALBUTEROL): ONE OR TWO TIMES PER DAY
ACT_TOTALSCORE: 15
ACT_TOTALSCORE: 15
HOSPITALIZATION_OVERNIGHT_LAST_YEAR_TOTAL: ONE
QUESTION_5 LAST FOUR WEEKS HOW WOULD YOU RATE YOUR ASTHMA CONTROL: SOMEWHAT CONTROLLED
QUESTION_1 LAST FOUR WEEKS HOW MUCH OF THE TIME DID YOUR ASTHMA KEEP YOU FROM GETTING AS MUCH DONE AT WORK, SCHOOL OR AT HOME: SOME OF THE TIME

## 2024-02-16 NOTE — PROGRESS NOTES
Assessment & Plan     Pneumonia of left lower lobe due to infectious organism  Chest xray shows improvement-I think continuing to treat with doxycycline is reasonable.   Some rhonchi in lower lobes but no wheezing-no concerns that we need more steroid.     - XR Chest 2 Views; Future    Mild persistent asthma with exacerbation  Patient with ongoing respiratory concerns including asthma, multiple bouts of pneumonia. Has previously seen pulmonology and is interested in seeing again.  He also has upcoming CT for incidental findings on xray.     - Adult Pulmonary Medicine  Referral; Future    Essential hypertension  Previously was stopped as patient had lost weight with ozempic-stopped ozempic due to gastritis and needs refill of amlodipine-he has still been taking what he had left but just recently ran out completely. Will refill today    - amLODIPine (NORVASC) 10 MG tablet; Take 1 tablet (10 mg) by mouth daily for 360 days          Blood sugar testing frequency justification:  Uncontrolled diabetes and recent use of steroids-higher glucose due to that        Subjective   Lester is a 73 year old, presenting for the following health issues:  Follow Up (Has been feeling a lot better after WIC ; how long contagious? )        2/16/2024    10:50 AM   Additional Questions   Roomed by Jaime X     History of Present Illness     Asthma:  He presents for follow up of asthma.  He has some cough, some wheezing, and some shortness of breath.  He is using a relief medication daily. He does not miss any doses of his controller medication throughout the week. Patient is aware of the following triggers: upper respiratory infections. The patient has not had a visit to the Emergency Room, Urgent Care or Hospital due to asthma since the last clinic visit.     Diabetes:   He presents for follow up of diabetes.  He is checking home blood glucose two times daily.   He checks blood glucose before meals.  Blood glucose is sometimes over  200 and never under 70. He is aware of hypoglycemia symptoms including none.   He is concerned about blood sugar frequently over 200.    He is not experiencing numbness or burning in feet, excessive thirst, blurry vision, weight changes or redness, sores or blisters on feet.           Reason for visit:  Influenza b    He eats 2-3 servings of fruits and vegetables daily.He consumes 1 sweetened beverage(s) daily.He exercises with enough effort to increase his heart rate 10 to 19 minutes per day.  He exercises with enough effort to increase his heart rate 5 days per week.   He is taking medications regularly.     Patient overall feeling improved. Does still endorse some shortness of breath with exertion but not a rest     Recently hospitalized in December for pneumonia. Is currently finishing doxycycline for pneumonia.     Has been using nebulizer, inhaler up to 3 times a day. Seems to help with symptoms.    Is finished with prednisone and tamiflu tomorrow-5 days.     Working on weight loss to help with diabetes control .                       Objective    /74   Pulse 86   Temp 97.1  F (36.2  C) (Temporal)   Wt 102.1 kg (225 lb)   SpO2 98%   BMI 31.40 kg/m    Body mass index is 31.4 kg/m .  Physical Exam  Vitals reviewed.   HENT:      Right Ear: Hearing normal.      Left Ear: Hearing normal.      Mouth/Throat:      Tonsils: No tonsillar exudate.   Cardiovascular:      Rate and Rhythm: Normal rate and regular rhythm.   Pulmonary:      Effort: Pulmonary effort is normal.      Breath sounds: Rhonchi (lower lobes bilateral) present. No wheezing or rales.   Neurological:      General: No focal deficit present.      Mental Status: He is alert and oriented to person, place, and time.                  Signed Electronically by: REAL SMITH CNP

## 2024-02-20 ENCOUNTER — HOSPITAL ENCOUNTER (OUTPATIENT)
Dept: CT IMAGING | Facility: CLINIC | Age: 74
Discharge: HOME | End: 2024-02-20
Attending: NURSE PRACTITIONER | Admitting: NURSE PRACTITIONER
Payer: COMMERCIAL

## 2024-02-20 DIAGNOSIS — J18.9 PNEUMONIA OF LEFT LOWER LOBE DUE TO INFECTIOUS ORGANISM: ICD-10-CM

## 2024-02-20 PROCEDURE — 71250 CT THORAX DX C-: CPT

## 2024-02-21 NOTE — TELEPHONE ENCOUNTER
Retail Pharmacy Prior Authorization Team   Phone: [phone]    PRIOR AUTHORIZATION DENIED    Med[facility]7  [facility]  Insurance Company: [employer] Part D - Phone [phone] Fax [phone]  Denial Date: 2/21/2024  Denial Reason(s): INSURANCE DENIED DUE TO PT NOT BEING ON INSULIN   Appeal Information: If the provider would like to appeal we will need a detailed   letter of medical necessity with clinical reasoning to start the process.   Please close the encounter when finished.   Patient Notified: NO

## 2024-02-22 NOTE — TELEPHONE ENCOUNTER
Retail Pharmacy Prior Authorization Team   Phone: [phone]    PA DENIED  If the provider would like to appeal we will need a detailed   letter of medical necessity with clinical reasoning to start the process.   Please close the encounter when finished.     Thank you,  Liz Lares CPhT    PA Team

## 2024-02-26 NOTE — TELEPHONE ENCOUNTER
Please call patient and let him know that his insurance will not cover the Dexcom G7 continuous glucose monitor because he is not on insulin.

## 2024-02-27 DIAGNOSIS — I10 ESSENTIAL HYPERTENSION: ICD-10-CM

## 2024-02-27 DIAGNOSIS — E78.5 HYPERLIPIDEMIA, UNSPECIFIED HYPERLIPIDEMIA TYPE: ICD-10-CM

## 2024-02-27 NOTE — TELEPHONE ENCOUNTER
Refill Request  Medication name: Pending Prescriptions:                       Disp   Refills    rosuvastatin (CRESTOR) 20 MG tablet       90 tab*3            Sig: Take 1 tablet (20 mg) by mouth daily    Requested Pharmacy:  [id number] [address] AT [facility] [address]

## 2024-02-28 DIAGNOSIS — E11.36 TYPE 2 DIABETES MELLITUS WITH DIABETIC CATARACT, WITH LONG-TERM CURRENT USE OF INSULIN (H): ICD-10-CM

## 2024-02-28 DIAGNOSIS — Z79.4 TYPE 2 DIABETES MELLITUS WITH DIABETIC CATARACT, WITH LONG-TERM CURRENT USE OF INSULIN (H): ICD-10-CM

## 2024-02-28 RX ORDER — METFORMIN HCL 500 MG
2000 TABLET, EXTENDED RELEASE 24 HR ORAL DAILY
Qty: 360 TABLET | Refills: 1 | Status: SHIPPED | OUTPATIENT
Start: 2024-02-28 | End: 2024-08-29

## 2024-02-28 RX ORDER — ROSUVASTATIN CALCIUM 20 MG/1
20 TABLET, COATED ORAL DAILY
Qty: 90 TABLET | Refills: 2 | Status: SHIPPED | OUTPATIENT
Start: 2024-02-28

## 2024-02-28 RX ORDER — ATENOLOL 100 MG/1
100 TABLET ORAL DAILY
Qty: 90 TABLET | Refills: 3 | OUTPATIENT
Start: 2024-02-28

## 2024-02-28 NOTE — TELEPHONE ENCOUNTER
Refill request for the following medication(s):  Pending Prescriptions:                       Disp   Refills    metFORMIN (GLUCOPHAGE XR) 500 MG 24 hr ta*120 ta*0            Sig: Take 2 tablets (1,000 mg) by mouth 2 times daily           (with meals)      Pharmacy: [facility] #[id number] [facility] [address] AT [facility] [address]         120

## 2024-03-06 DIAGNOSIS — I10 ESSENTIAL HYPERTENSION: ICD-10-CM

## 2024-03-07 RX ORDER — ATENOLOL 100 MG/1
100 TABLET ORAL DAILY
Qty: 90 TABLET | Refills: 2 | Status: SHIPPED | OUTPATIENT
Start: 2024-03-07

## 2024-03-07 NOTE — TELEPHONE ENCOUNTER
Prescription approved per [facility] Refill Protocol.  Wendi Gallegos, RN  [facility] Triage Nurse

## 2024-03-11 ENCOUNTER — APPOINTMENT (OUTPATIENT)
Dept: RADIOLOGY | Facility: CLINIC | Age: 74
End: 2024-03-11
Attending: STUDENT IN AN ORGANIZED HEALTH CARE EDUCATION/TRAINING PROGRAM
Payer: COMMERCIAL

## 2024-03-11 ENCOUNTER — HOSPITAL ENCOUNTER (EMERGENCY)
Facility: CLINIC | Age: 74
Discharge: HOME | End: 2024-03-11
Attending: STUDENT IN AN ORGANIZED HEALTH CARE EDUCATION/TRAINING PROGRAM | Admitting: STUDENT IN AN ORGANIZED HEALTH CARE EDUCATION/TRAINING PROGRAM
Payer: COMMERCIAL

## 2024-03-11 VITALS
DIASTOLIC BLOOD PRESSURE: 89 MMHG | BODY MASS INDEX: 30.48 KG/M2 | TEMPERATURE: 97.5 F | RESPIRATION RATE: 19 BRPM | HEART RATE: 88 BPM | SYSTOLIC BLOOD PRESSURE: 187 MMHG | OXYGEN SATURATION: 95 % | WEIGHT: 225 LBS | HEIGHT: 72 IN

## 2024-03-11 DIAGNOSIS — E11.36 TYPE 2 DIABETES MELLITUS WITH DIABETIC CATARACT, WITH LONG-TERM CURRENT USE OF INSULIN (H): ICD-10-CM

## 2024-03-11 DIAGNOSIS — J45.31 MILD PERSISTENT ASTHMA WITH ACUTE EXACERBATION: ICD-10-CM

## 2024-03-11 DIAGNOSIS — J45.901 EXACERBATION OF ASTHMA, UNSPECIFIED ASTHMA SEVERITY, UNSPECIFIED WHETHER PERSISTENT: ICD-10-CM

## 2024-03-11 DIAGNOSIS — Z79.4 TYPE 2 DIABETES MELLITUS WITH DIABETIC CATARACT, WITH LONG-TERM CURRENT USE OF INSULIN (H): ICD-10-CM

## 2024-03-11 LAB
ALBUMIN SERPL BCG-MCNC: 4.1 G/DL (ref 3.5–5.2)
ALP SERPL-CCNC: 92 U/L (ref 40–150)
ALT SERPL W P-5'-P-CCNC: 20 U/L (ref 0–70)
ANION GAP SERPL CALCULATED.3IONS-SCNC: 11 MMOL/L (ref 7–15)
AST SERPL W P-5'-P-CCNC: 13 U/L (ref 0–45)
ATRIAL RATE - MUSE: 86 BPM
BASOPHILS # BLD AUTO: 0.1 10E3/UL (ref 0–0.2)
BASOPHILS NFR BLD AUTO: 1 %
BILIRUB SERPL-MCNC: 0.5 MG/DL
BUN SERPL-MCNC: 24.5 MG/DL (ref 8–23)
CALCIUM SERPL-MCNC: 9.7 MG/DL (ref 8.8–10.2)
CHLORIDE SERPL-SCNC: 103 MMOL/L (ref 98–107)
CREAT SERPL-MCNC: 1.11 MG/DL (ref 0.67–1.17)
DEPRECATED HCO3 PLAS-SCNC: 27 MMOL/L (ref 22–29)
DIASTOLIC BLOOD PRESSURE - MUSE: 107 MMHG
EGFRCR SERPLBLD CKD-EPI 2021: 69 ML/MIN/1.73M2
EOSINOPHIL # BLD AUTO: 0.9 10E3/UL (ref 0–0.7)
EOSINOPHIL NFR BLD AUTO: 14 %
ERYTHROCYTE [DISTWIDTH] IN BLOOD BY AUTOMATED COUNT: 16.6 % (ref 10–15)
FLUAV RNA SPEC QL NAA+PROBE: NEGATIVE
FLUBV RNA RESP QL NAA+PROBE: NEGATIVE
GLUCOSE SERPL-MCNC: 213 MG/DL (ref 70–99)
HCT VFR BLD AUTO: 33.2 % (ref 40–53)
HGB BLD-MCNC: 11.4 G/DL (ref 13.3–17.7)
IMM GRANULOCYTES # BLD: 0 10E3/UL
IMM GRANULOCYTES NFR BLD: 0 %
INTERPRETATION ECG - MUSE: NORMAL
LYMPHOCYTES # BLD AUTO: 2.2 10E3/UL (ref 0–5.3)
LYMPHOCYTES NFR BLD AUTO: 33 %
MCH RBC QN AUTO: 30.6 PG (ref 26.5–33)
MCHC RBC AUTO-ENTMCNC: 34.3 G/DL (ref 31.5–36.5)
MCV RBC AUTO: 89 FL (ref 78–100)
MONOCYTES # BLD AUTO: 0.5 10E3/UL (ref 0–1.3)
MONOCYTES NFR BLD AUTO: 8 %
NEUTROPHILS # BLD AUTO: 3 10E3/UL (ref 1.6–8.3)
NEUTROPHILS NFR BLD AUTO: 45 %
NRBC # BLD AUTO: 0 10E3/UL
NRBC BLD AUTO-RTO: 0 /100
NT-PROBNP SERPL-MCNC: 79 PG/ML (ref 0–1800)
P AXIS - MUSE: 82 DEGREES
PLATELET # BLD AUTO: 309 10E3/UL (ref 150–450)
POTASSIUM SERPL-SCNC: 3.9 MMOL/L (ref 3.4–5.3)
PR INTERVAL - MUSE: 192 MS
PROT SERPL-MCNC: 7.8 G/DL (ref 6.4–8.3)
QRS DURATION - MUSE: 94 MS
QT - MUSE: 384 MS
QTC - MUSE: 459 MS
R AXIS - MUSE: 66 DEGREES
RBC # BLD AUTO: 3.72 10E6/UL (ref 4.4–5.9)
RSV RNA SPEC NAA+PROBE: NEGATIVE
SARS-COV-2 RNA RESP QL NAA+PROBE: NEGATIVE
SODIUM SERPL-SCNC: 141 MMOL/L (ref 135–145)
SYSTOLIC BLOOD PRESSURE - MUSE: 200 MMHG
T AXIS - MUSE: 69 DEGREES
TROPONIN T SERPL HS-MCNC: 10 NG/L
VENTRICULAR RATE- MUSE: 86 BPM
WBC # BLD AUTO: 6.7 10E3/UL (ref 4–11)

## 2024-03-11 PROCEDURE — 999N000157 HC STATISTIC RCP TIME EA 10 MIN

## 2024-03-11 PROCEDURE — 83036 HEMOGLOBIN GLYCOSYLATED A1C: CPT

## 2024-03-11 PROCEDURE — 96374 THER/PROPH/DIAG INJ IV PUSH: CPT

## 2024-03-11 PROCEDURE — 80053 COMPREHEN METABOLIC PANEL: CPT | Performed by: STUDENT IN AN ORGANIZED HEALTH CARE EDUCATION/TRAINING PROGRAM

## 2024-03-11 PROCEDURE — 93005 ELECTROCARDIOGRAM TRACING: CPT | Performed by: EMERGENCY MEDICINE

## 2024-03-11 PROCEDURE — 84484 ASSAY OF TROPONIN QUANT: CPT | Performed by: STUDENT IN AN ORGANIZED HEALTH CARE EDUCATION/TRAINING PROGRAM

## 2024-03-11 PROCEDURE — 99285 EMERGENCY DEPT VISIT HI MDM: CPT | Mod: 25

## 2024-03-11 PROCEDURE — 250N000009 HC RX 250: Performed by: STUDENT IN AN ORGANIZED HEALTH CARE EDUCATION/TRAINING PROGRAM

## 2024-03-11 PROCEDURE — 250N000011 HC RX IP 250 OP 636: Performed by: STUDENT IN AN ORGANIZED HEALTH CARE EDUCATION/TRAINING PROGRAM

## 2024-03-11 PROCEDURE — 71045 X-RAY EXAM CHEST 1 VIEW: CPT

## 2024-03-11 PROCEDURE — 83880 ASSAY OF NATRIURETIC PEPTIDE: CPT | Performed by: STUDENT IN AN ORGANIZED HEALTH CARE EDUCATION/TRAINING PROGRAM

## 2024-03-11 PROCEDURE — 94640 AIRWAY INHALATION TREATMENT: CPT

## 2024-03-11 PROCEDURE — 87637 SARSCOV2&INF A&B&RSV AMP PRB: CPT | Performed by: STUDENT IN AN ORGANIZED HEALTH CARE EDUCATION/TRAINING PROGRAM

## 2024-03-11 PROCEDURE — 85049 AUTOMATED PLATELET COUNT: CPT | Performed by: STUDENT IN AN ORGANIZED HEALTH CARE EDUCATION/TRAINING PROGRAM

## 2024-03-11 PROCEDURE — 36415 COLL VENOUS BLD VENIPUNCTURE: CPT | Performed by: STUDENT IN AN ORGANIZED HEALTH CARE EDUCATION/TRAINING PROGRAM

## 2024-03-11 RX ORDER — METHYLPREDNISOLONE SODIUM SUCCINATE 125 MG/2ML
125 INJECTION, POWDER, LYOPHILIZED, FOR SOLUTION INTRAMUSCULAR; INTRAVENOUS ONCE
Status: COMPLETED | OUTPATIENT
Start: 2024-03-11 | End: 2024-03-11

## 2024-03-11 RX ORDER — IPRATROPIUM BROMIDE AND ALBUTEROL SULFATE 2.5; .5 MG/3ML; MG/3ML
3 SOLUTION RESPIRATORY (INHALATION) ONCE
Status: COMPLETED | OUTPATIENT
Start: 2024-03-11 | End: 2024-03-11

## 2024-03-11 RX ORDER — PREDNISONE 20 MG/1
TABLET ORAL
Qty: 10 TABLET | Refills: 0 | Status: SHIPPED | OUTPATIENT
Start: 2024-03-11 | End: 2024-03-19

## 2024-03-11 RX ORDER — IPRATROPIUM BROMIDE AND ALBUTEROL SULFATE 2.5; .5 MG/3ML; MG/3ML
1 SOLUTION RESPIRATORY (INHALATION) EVERY 6 HOURS PRN
Qty: 90 ML | Refills: 0 | Status: SHIPPED | OUTPATIENT
Start: 2024-03-11 | End: 2024-06-28

## 2024-03-11 RX ADMIN — METHYLPREDNISOLONE SODIUM SUCCINATE 125 MG: 125 INJECTION, POWDER, FOR SOLUTION INTRAMUSCULAR; INTRAVENOUS at 15:45

## 2024-03-11 RX ADMIN — IPRATROPIUM BROMIDE AND ALBUTEROL SULFATE 3 ML: .5; 3 SOLUTION RESPIRATORY (INHALATION) at 16:04

## 2024-03-11 RX ADMIN — IPRATROPIUM BROMIDE AND ALBUTEROL SULFATE 3 ML: .5; 3 SOLUTION RESPIRATORY (INHALATION) at 15:19

## 2024-03-11 ASSESSMENT — ACTIVITIES OF DAILY LIVING (ADL)
ADLS_ACUITY_SCORE: 36

## 2024-03-11 ASSESSMENT — COLUMBIA-SUICIDE SEVERITY RATING SCALE - C-SSRS
2. HAVE YOU ACTUALLY HAD ANY THOUGHTS OF KILLING YOURSELF IN THE PAST MONTH?: NO
6. HAVE YOU EVER DONE ANYTHING, STARTED TO DO ANYTHING, OR PREPARED TO DO ANYTHING TO END YOUR LIFE?: NO
1. IN THE PAST MONTH, HAVE YOU WISHED YOU WERE DEAD OR WISHED YOU COULD GO TO SLEEP AND NOT WAKE UP?: NO

## 2024-03-11 NOTE — ED PROVIDER NOTES
NAME: Lester Shi  AGE: 74 year old male  YOB: 1950  MRN: [id number]  EVALUATION DATE & TIME: 3/11/2024  3:03 PM    PCP: Garret Pope  ED PROVIDER: Sara Masterson MD.    Chief Complaint   Patient presents with    Shortness of Breath       FINAL IMPRESSION:  1. Exacerbation of asthma, unspecified asthma severity, unspecified whether persistent    2. Mild persistent asthma with acute exacerbation        MEDICAL DECISION MAKING:    3:11 PM I met with the patient, obtained history, performed an initial exam, and discussed options and plan for diagnostics and treatment here in the ED.   4:46 PM Rechecked on patient    MDM: 74-year-old male with history of asthma, type 2 diabetes, anemia, pulmonary nodules, among others who presents with shortness of breath.  On arrival he is satting 91% on room air with increased work of breathing and bilateral expiratory wheezing.  Differential includes but not limited to asthma exacerbation, pneumonia, viral infection, pneumothorax, CHF, ACS, PE, metabolic abnormality, among others.  His EKG shows sinus rhythm with a nonspecific ST abnormality.  Troponin is within normal limits and he is having no chest pain, low suspicion for ACS (and with value WNL for his gender do not feel needs delta troponin).  BNP is normal.  Chest x-ray without pneumothorax, infiltrate or other acute findings.  Viral swabs are negative.  Labs are generally reassuring.  He has no tachycardia, evidence of DVT, no pleuritic chest pain and his presentation seems more consistent with reactive airway disease, overall low suspicion for PE.  He was given steroids and DuoNebs with improvement in symptoms.  He had only very mild wheezing on repeat exam and appeared much more comfortable and has been sating in the mid 90s.  I did offer observation admission and discussed that he is high risk for bouncing back.  He and his wife would prefer to go home at this time with prednisone, nebs and close  outpatient follow-up.  Strict return precautions discussed at length and they are in agreement with the plan and their questions were answered    Medical Decision Making  Obtained supplemental history:Supplemental history obtained?: Documented in chart  Reviewed external records: External records reviewed?: Documented in chart and Outpatient Record: 2/16/24  Care impacted by chronic illness:Chronic Lung Disease and Diabetes  Care significantly affected by social determinants of health:N/A and Access to Medical Care  Did you consider but not order tests?: Work up considered but not performed and documented in chart, if applicable  Did you interpret images independently?: Independent interpretation of ECG and images noted in documentation, when applicable.  Consultation discussion with other provider:Did you involve another provider (consultant, , pharmacy, etc.)?: No  Discharge. I prescribed additional prescription strength medication(s) as charted. See documentation for any additional details.    MEDICATIONS GIVEN IN THE EMERGENCY:  Medications   ipratropium - albuterol 0.5 mg/2.5 mg/3 mL (DUONEB) neb solution 3 mL (3 mLs Nebulization $Given 3/11/24 1515)   methylPREDNISolone sodium succinate (solu-MEDROL) injection 125 mg (125 mg Intravenous $Given 3/11/24 1545)   ipratropium - albuterol 0.5 mg/2.5 mg/3 mL (DUONEB) neb solution 3 mL (3 mLs Nebulization $Given 3/11/24 1604)       NEW PRESCRIPTIONS STARTED AT TODAY'S ER VISIT:  Discharge Medication List as of 3/11/2024  5:11 PM        START taking these medications    Details   predniSONE (DELTASONE) 20 MG tablet Take two tablets (= 40mg) each day for 5 (five) days, Disp-10 tablet, R-0, Local Print              =================================================================  HPI    Patient information was obtained from: patient and wife  Use of : N/A       Lester Shi is a 74 year old male with a past medical history of anemia, hypertension, type 2  diabetes mellitus, asthma, and hyperlipidemia, who presents to the ED via walk in for evaluation of shortness of breath.  Patient reports over the last over the last 3 days ago he has had increased shortness of breath.  He is also having nonproductive cough.  Has been using his inhalers at home but they only helped briefly.  No fevers.  No chest pain.  Has noted mild swelling to both ankles. No other complaints at this time. He is supposed to get a preop soon for a knee replacement. He was hospitalized for respiratory symptoms in December and reports he had pneumonia again earlier this year. He is not currently on antibiotics or steroids.     PAST MEDICAL HISTORY:  Past Medical History:   Diagnosis Date    Anemia, unspecified     Created by Conversion     Cataract     left eye removed, present in right eye    Chronic osteoarthritis     right knee    Colon polyps     Contact dermatitis and other eczema, due to unspecified cause     Created by Conversion     Diabetes mellitus, type 2 (H)     Klinefelter's syndrome     Created by Conversion     Obesity, unspecified     Created by Conversion     Pure hypercholesterolemia     Created by Conversion     Type II or unspecified type diabetes mellitus without mention of complication, not stated as uncontrolled     Created by Conversion     Uncomplicated asthma     adult asthma- wheezing    Unspecified essential hypertension     Created by Conversion     Varicose vein 9/28/2015       PAST SURGICAL HISTORY:  Past Surgical History:   Procedure Laterality Date    CATARACT EXTRACTION Left 2000    DENTAL SURGERY  1985    JOINT REPLACEMENT      left knee TKA 11/2013    ORTHOPEDIC SURGERY         CURRENT MEDICATIONS:    No current facility-administered medications for this encounter.    Current Outpatient Medications:     ipratropium - albuterol 0.5 mg/2.5 mg/3 mL (DUONEB) 0.5-2.5 (3) MG/3ML neb solution, Take 1 vial (3 mLs) by nebulization every 6 hours as needed for shortness of  breath, wheezing or cough, Disp: 90 mL, Rfl: 0    predniSONE (DELTASONE) 20 MG tablet, Take two tablets (= 40mg) each day for 5 (five) days, Disp: 10 tablet, Rfl: 0    albuterol (PROAIR HFA/PROVENTIL HFA/VENTOLIN HFA) 108 (90 Base) MCG/ACT inhaler, INHALE 2 PUFFS INTO THE LUNGS EVERY 6 HOURS AS NEEDED FOR WHEEZING, Disp: 8.5 g, Rfl: 1    albuterol (PROVENTIL) (2.5 MG/3ML) 0.083% neb solution, Take 1 vial (2.5 mg) by nebulization every 6 hours as needed for shortness of breath, wheezing or cough, Disp: 90 mL, Rfl: 1    amLODIPine (NORVASC) 10 MG tablet, Take 1 tablet (10 mg) by mouth daily for 360 days, Disp: 90 tablet, Rfl: 3    aspirin 81 MG EC tablet, Take 81 mg by mouth daily, Disp: , Rfl:     atenolol (TENORMIN) 100 MG tablet, Take 1 tablet (100 mg) by mouth daily, Disp: 90 tablet, Rfl: 2    azelastine (ASTELIN) 0.1 % nasal spray, 2 sprays each nostril 1-2x daily as needed for nasal congestion (use nightly for first 2 week), Disp: 30 mL, Rfl: 11    blood glucose (ACCU-CHEK GUIDE) test strip, 1 strip by In Vitro route 2 times daily Use to test blood sugar 2 times daily or as directed., Disp: 200 strip, Rfl: 3    cetirizine (ZYRTEC) 10 MG tablet, Take 10 mg by mouth daily as needed for allergies, Disp: , Rfl:     Continuous Blood Gluc Sensor (DEXCOM G7 SENSOR) MISC, 1 each every 10 days Change sensor every 10 days, Disp: 3 each, Rfl: 5    dulaglutide (TRULICITY) 0.75 MG/0.5ML pen, Inject 0.75 mg Subcutaneous every 7 days, Disp: 6 mL, Rfl: 0    dulaglutide (TRULICITY) 1.5 MG/0.5ML pen, Inject 1.5 mg Subcutaneous every 7 days, Disp: 6 mL, Rfl: 1    famotidine (PEPCID) 20 MG tablet, Take 20 mg by mouth 2 times daily, Disp: , Rfl:     fexofenadine (ALLEGRA) 180 MG tablet, Take 180 mg by mouth daily as needed for allergies, Disp: , Rfl:     fluticasone-salmeterol (ADVAIR) 100-50 MCG/ACT inhaler, Inhale 1 puff into the lungs every 12 hours, Disp: 14 each, Rfl: 0    hydrochlorothiazide (HYDRODIURIL) 25 MG tablet, Take 1  tablet (25 mg) by mouth every morning, Disp: 90 tablet, Rfl: 3    insulin pen needle (ULTICARE MINI) 31G X 6 MM miscellaneous, Use 1 pen needles daily or as directed., Disp: 100 each, Rfl: 3    lidocaine (XYLOCAINE) 5 % external ointment, Apply topically as needed for moderate pain, Disp: 30 g, Rfl: 0    losartan (COZAAR) 100 MG tablet, Take 1 tablet (100 mg) by mouth daily, Disp: 90 tablet, Rfl: 3    metFORMIN (GLUCOPHAGE XR) 500 MG 24 hr tablet, Take 4 tablets (2,000 mg) by mouth daily, Disp: 360 tablet, Rfl: 1    naproxen sodium 220 MG capsule, Take by mouth., Disp: , Rfl:     nystatin (MYCOSTATIN) 799215 UNIT/GM external powder, Apply topically 2 times daily, Disp: 60 g, Rfl: 0    potassium chloride ER (K-TAB) 20 MEQ CR tablet, Take 1 tablet (20 mEq) by mouth 2 times daily (Patient taking differently: Take 20 mEq by mouth daily), Disp: 60 tablet, Rfl: 0    rosuvastatin (CRESTOR) 20 MG tablet, Take 1 tablet (20 mg) by mouth daily, Disp: 90 tablet, Rfl: 2    testosterone (ANDROGEL/TESTIM) 50 MG/5GM (1%) topical gel, Place 1 packet (50 mg of testosterone) onto the skin daily Apply to the clean, dry intact skin of the shoulders, upper arms or abdomen., Disp: 90 packet, Rfl: 3    traZODone (DESYREL) 50 MG tablet, Take 1 tablet (50 mg) by mouth at bedtime, Disp: 30 tablet, Rfl: 0    triamcinolone (KENALOG) 0.1 % cream, [TRIAMCINOLONE (KENALOG) 0.1 % CREAM] APPLY EXTERNALLY TO THE AFFECTED AREA TWICE DAILY FOR 14 DAYS, Disp: 45 g, Rfl: 0    valACYclovir (VALTREX) 1000 mg tablet, Take 1 tablet (1,000 mg) by mouth 3 times daily for 7 days, Disp: 21 tablet, Rfl: 0    ALLERGIES:  Allergies   Allergen Reactions    Amoxicillin Anaphylaxis    Codeine Nausea and Vomiting    Sulfamethoxazole-Trimethoprim [Sulfamethoxazole-Trimethoprim] Nausea and Vomiting     Tachycardia      Erythromycin Base [Erythromycin] GI Disturbance    Levaquin [Levofloxacin] Nausea and Vomiting    Seasonal Allergies Other (See Comments)     SPRINGTIME  Nasal, bronchial    Simvastatin Muscle Pain (Myalgia)       FAMILY HISTORY:  Family History   Problem Relation Age of Onset    Cerebrovascular Disease Mother     Clotting Disorder Father     Heart Disease Brother        SOCIAL HISTORY:   Social History     Socioeconomic History    Marital status:    Tobacco Use    Smoking status: Former     Packs/day: 2.00     Years: 12.00     Additional pack years: 0.00     Total pack years: 24.00     Types: Cigarettes     Quit date: 1979     Years since quittin.2     Passive exposure: Never    Smokeless tobacco: Never   Vaping Use    Vaping Use: Never used   Substance and Sexual Activity    Alcohol use: Yes     Comment: Alcoholic Drinks/day: rare    Drug use: No    Sexual activity: Yes     Partners: Female     Social Determinants of Health     Financial Resource Strain: High Risk (2023)    Financial Resource Strain     Within the past 12 months, have you or your family members you live with been unable to get utilities (heat, electricity) when it was really needed?: Yes   Food Insecurity: Low Risk  (2023)    Food Insecurity     Within the past 12 months, did you worry that your food would run out before you got money to buy more?: No     Within the past 12 months, did the food you bought just not last and you didn t have money to get more?: No   Transportation Needs: Low Risk  (2023)    Transportation Needs     Within the past 12 months, has lack of transportation kept you from medical appointments, getting your medicines, non-medical meetings or appointments, work, or from getting things that you need?: No   Interpersonal Safety: Low Risk  (2024)    Interpersonal Safety     Do you feel physically and emotionally safe where you currently live?: Yes     Within the past 12 months, have you been hit, slapped, kicked or otherwise physically hurt by someone?: No     Within the past 12 months, have you been humiliated or emotionally abused in other  ways by your partner or ex-partner?: No   Housing Stability: Low Risk  (12/25/2023)    Housing Stability     Do you have housing? : Yes     Are you worried about losing your housing?: No       PHYSICAL EXAM:    Vitals: BP (!) 187/89   Pulse 88   Temp 97.5  F (36.4  C) (Oral)   Resp 19   Ht 1.829 m (6')   Wt 102.1 kg (225 lb)   SpO2 95%   BMI 30.52 kg/m     Constitutional: Well developed, well nourished.  HENT: Normocephalic, atraumatic. Neck-gross ROM intact.   Eyes: Pupils mid-range, sclera white  Respiratory: Increased work of breathing on arrival, able to speak in short sentences, bilateral expiratory wheezing  Cardiovascular: Normal heart rate, regular rhythm. Trace lower extremity edema. No LE erythema or warmth.  GI: Soft, not distended, non tender, no guarding  Musculoskeletal: Moving extremities intentionally and without pain. No obvious deformity.  Skin: Warm, dry, no rash.  Neurologic: Alert & oriented, speech clear, no focal deficits noted    LAB:  All pertinent labs reviewed and interpreted.  Labs Ordered and Resulted from Time of ED Arrival to Time of ED Departure   COMPREHENSIVE METABOLIC PANEL - Abnormal       Result Value    Sodium 141      Potassium 3.9      Carbon Dioxide (CO2) 27      Anion Gap 11      Urea Nitrogen 24.5 (*)     Creatinine 1.11      GFR Estimate 69      Calcium 9.7      Chloride 103      Glucose 213 (*)     Alkaline Phosphatase 92      AST 13      ALT 20      Protein Total 7.8      Albumin 4.1      Bilirubin Total 0.5     CBC WITH PLATELETS AND DIFFERENTIAL - Abnormal    WBC Count 6.7      RBC Count 3.72 (*)     Hemoglobin 11.4 (*)     Hematocrit 33.2 (*)     MCV 89      MCH 30.6      MCHC 34.3      RDW 16.6 (*)     Platelet Count 309      % Neutrophils 45      % Lymphocytes 33      % Monocytes 8      % Eosinophils 14      % Basophils 1      % Immature Granulocytes 0      NRBCs per 100 WBC 0      Absolute Neutrophils 3.0      Absolute Lymphocytes 2.2      Absolute Monocytes  0.5      Absolute Eosinophils 0.9 (*)     Absolute Basophils 0.1      Absolute Immature Granulocytes 0.0      Absolute NRBCs 0.0     TROPONIN T, HIGH SENSITIVITY - Normal    Troponin T, High Sensitivity 10     NT PROBNP INPATIENT - Normal    N terminal Pro BNP Inpatient 79     INFLUENZA A/B, RSV, & SARS-COV2 PCR - Normal    Influenza A PCR Negative      Influenza B PCR Negative      RSV PCR Negative      SARS CoV2 PCR Negative         RADIOLOGY:  XR Chest Port 1 View   Final Result   IMPRESSION: Negative chest. Lungs are clear. Normal heart size.             EKG:   Performed at: 15:08  Impression: Sinus rhythm. Nonspecific ST abnormality. Abnormal ECG.   Rate: 86  Rhythm: Sinus  QRS Interval: 94  QTc Interval: 459  Comparison: When compared with ECG of 08-JAN-2024 Premature atrial complexes are no longer present.   I have independently reviewed and interpreted the EKG(s) documented above.     PROCEDURES:   Procedures     I, Lilliam Man, am serving as a scribe to document services personally performed by Dr. Sara Masterson based on my observation and the provider's statements to me. I, Sara Masterson MD attest that Lilliam Man is acting in a scribe capacity, has observed my performance of the services and has documented them in accordance with my direction.    Sara Masterson M.D.  Emergency Medicine  [facility] EMERGENCY ROOM  [address] [id number] [phone]  Dept: [phone]       Sara Masterson MD  03/11/24 9419

## 2024-03-11 NOTE — Clinical Note
Lester Shi was seen and treated in our emergency department on 3/11/2024.  He may return to work on 03/13/2024.  If symptoms have improved     If you have any questions or concerns, please don't hesitate to call.      Sara Masterson MD

## 2024-03-11 NOTE — PROGRESS NOTES
Patient received neb treatment per MD order. Pt educated with the explanation of the medication they received. Patient showed understanding with verbalization.  BS: diminished with expiratory wheeze.  Increased aeration post treatment.  Pt on DIANE.     Daija Bahena RT on 3/11/2024 at 3:21 PM

## 2024-03-11 NOTE — DISCHARGE INSTRUCTIONS
"We considered hospital admission but we will do a trial at home but make sure to return if you are feeling worse  Please take the steroids as prescribed  I sent a prescription for a \"duoneb\" which you can try (the breathing med you got here)  Follow up in clinic tomorrow as planned  Return to the Emergency Room if your oxygen is <90% (for sure if <88%) and you have increased difficulty breathing, chest pain, lightheadedness or other worsening symptoms or concerns    "

## 2024-03-12 ENCOUNTER — MYC MEDICAL ADVICE (OUTPATIENT)
Dept: INTERNAL MEDICINE | Facility: CLINIC | Age: 74
End: 2024-03-12

## 2024-03-12 ENCOUNTER — OFFICE VISIT (OUTPATIENT)
Dept: INTERNAL MEDICINE | Facility: CLINIC | Age: 74
End: 2024-03-12
Payer: COMMERCIAL

## 2024-03-12 VITALS
DIASTOLIC BLOOD PRESSURE: 75 MMHG | BODY MASS INDEX: 30.07 KG/M2 | HEIGHT: 72 IN | TEMPERATURE: 97.6 F | HEART RATE: 99 BPM | SYSTOLIC BLOOD PRESSURE: 135 MMHG | RESPIRATION RATE: 19 BRPM | WEIGHT: 222 LBS | OXYGEN SATURATION: 94 %

## 2024-03-12 DIAGNOSIS — Z79.4 TYPE 2 DIABETES MELLITUS WITH DIABETIC CATARACT, WITH LONG-TERM CURRENT USE OF INSULIN (H): Primary | ICD-10-CM

## 2024-03-12 DIAGNOSIS — E11.36 TYPE 2 DIABETES MELLITUS WITH DIABETIC CATARACT, WITH LONG-TERM CURRENT USE OF INSULIN (H): Primary | ICD-10-CM

## 2024-03-12 DIAGNOSIS — I10 ESSENTIAL HYPERTENSION, BENIGN: ICD-10-CM

## 2024-03-12 DIAGNOSIS — I77.810 ASCENDING AORTA DILATION (H): ICD-10-CM

## 2024-03-12 DIAGNOSIS — N28.9 KIDNEY LESION: ICD-10-CM

## 2024-03-12 DIAGNOSIS — J45.51 SEVERE PERSISTENT ASTHMA WITH EXACERBATION (H): ICD-10-CM

## 2024-03-12 DIAGNOSIS — R93.89 ABNORMAL CHEST CT: ICD-10-CM

## 2024-03-12 DIAGNOSIS — Q98.4 KLINEFELTER'S SYNDROME: ICD-10-CM

## 2024-03-12 LAB — HBA1C MFR BLD: 9.4 %

## 2024-03-12 PROCEDURE — 99214 OFFICE O/P EST MOD 30 MIN: CPT | Performed by: NURSE PRACTITIONER

## 2024-03-12 RX ORDER — RESPIRATORY SYNCYTIAL VIRUS VACCINE 120MCG/0.5
0.5 KIT INTRAMUSCULAR ONCE
Qty: 1 EACH | Refills: 0 | Status: CANCELLED | OUTPATIENT
Start: 2024-03-12 | End: 2024-03-12

## 2024-03-12 ASSESSMENT — ASTHMA QUESTIONNAIRES
QUESTION_2 LAST FOUR WEEKS HOW OFTEN HAVE YOU HAD SHORTNESS OF BREATH: MORE THAN ONCE A DAY
QUESTION_4 LAST FOUR WEEKS HOW OFTEN HAVE YOU USED YOUR RESCUE INHALER OR NEBULIZER MEDICATION (SUCH AS ALBUTEROL): ONE OR TWO TIMES PER DAY
QUESTION_5 LAST FOUR WEEKS HOW WOULD YOU RATE YOUR ASTHMA CONTROL: SOMEWHAT CONTROLLED
HOSPITALIZATION_OVERNIGHT_LAST_YEAR_TOTAL: ONE
EMERGENCY_ROOM_LAST_YEAR_TOTAL: ONE
ACT_TOTALSCORE: 13
ACT_TOTALSCORE: 13
QUESTION_3 LAST FOUR WEEKS HOW OFTEN DID YOUR ASTHMA SYMPTOMS (WHEEZING, COUGHING, SHORTNESS OF BREATH, CHEST TIGHTNESS OR PAIN) WAKE YOU UP AT NIGHT OR EARLIER THAN USUAL IN THE MORNING: ONCE A WEEK
QUESTION_1 LAST FOUR WEEKS HOW MUCH OF THE TIME DID YOUR ASTHMA KEEP YOU FROM GETTING AS MUCH DONE AT WORK, SCHOOL OR AT HOME: A LITTLE OF THE TIME

## 2024-03-12 NOTE — PATIENT INSTRUCTIONS
You need to call and cancel your knee surgery.  Lets push back your knee surgery to the fall.    Increase the Trulicity to 1.5 mg.  I want you to do 2 injections at the same time in different sites on your abdomen or legs.    Start Jardiance 25 mg daily.  If the Jardiance is too expensive at the pharmacy, ask about coverage for Invokana or Farxiga.    I can actually add diabetes labs to your lab draw from yesterday.  No need for labs today.    We will recheck an A1c using the blood from yesterday's lab draw.    Follow-up in 2 weeks to review blood sugars.    Follow-up with pulmonology to discuss whether we should be adding something like Spiriva to your inhaler regimen.  I question possible COPD given your smoking history.    Continue on Advair.

## 2024-03-12 NOTE — TELEPHONE ENCOUNTER
"Updates from patient. Please advise.  LOV 3/12    \"Increase the Trulicity to 1.5 mg.  I want you to do 2 injections at the same time in different sites on your abdomen or legs.     Start Jardiance 25 mg daily.  If the Jardiance is too expensive at the pharmacy, ask about coverage for Invokana or Farxiga.\"        Markell Loco RN    "

## 2024-03-12 NOTE — PROGRESS NOTES
Assessment & Plan     Type 2 diabetes mellitus with diabetic cataract, with long-term current use of insulin (H)  His A1c is 9.4%.  I told him that he is not cleared for his upcoming knee surgery.  He will call his surgeon and reschedule his surgery for later this fall.  His knee is not bothering him excessively anyway.    Plan is to increase his Trulicity to 1.5 mg weekly.  He will add Jardiance.  He will follow-up in 2 weeks to review his blood sugar data.    - dulaglutide (TRULICITY) 1.5 MG/0.5ML pen; Inject 1.5 mg Subcutaneous every 7 days  - empagliflozin (JARDIANCE) 25 MG TABS tablet; Take 1 tablet (25 mg) by mouth daily  - Hemoglobin A1c; Future    Severe persistent asthma with exacerbation (H28)  He has been on multiple rounds of prednisone due to his uncontrolled asthma.  This has driven up his blood sugars, and has increased his A1c.    Currently using Advair 100-50, 1 puff every 12 hours.  He also has Zyrtec and Allegra on his medication list (unclear which when he is using), albuterol rescue inhaler, DuoNeb solution.    We talked about his significant smoking history.  He may have COPD.  Question whether he would benefit from something like Spiriva.  He will discuss his options at his upcoming pulmonology appointment next week    Klinefelter's syndrome  With his Klinefelter syndrome, he does have a smaller chest cavity, which may be predisposing him to these asthma exacerbations.    Essential hypertension, benign  His blood pressure is controlled on amlodipine, atenolol, hydrochlorothiazide, and losartan    Ascending aorta dilation (H24)  Ascending aorta was borderline on January 2023 echocardiogram, 3.7 cm LVEF 65 to 70% on that study with no WMA    Kidney lesion  We reviewed his CT scan of his chest from this past February 2024.  On that study, he had some consolidation in his lungs as well as some lesions in his kidneys.  It was recommended that he have a follow-up CT scan in 3 months, which would  be approximately May    Abnormal chest CT  As above    Patient Instructions   You need to call and cancel your knee surgery.  Lets push back your knee surgery to the fall.    Increase the Trulicity to 1.5 mg.  I want you to do 2 injections at the same time in different sites on your abdomen or legs.    Start Jardiance 25 mg daily.  If the Jardiance is too expensive at the pharmacy, ask about coverage for Invokana or Farxiga.    I can actually add diabetes labs to your lab draw from yesterday.  No need for labs today.    We will recheck an A1c using the blood from yesterday's lab draw.    Follow-up in 2 weeks to review blood sugars.    Follow-up with pulmonology to discuss whether we should be adding something like Spiriva to your inhaler regimen.  I question possible COPD given your smoking history.    Continue on Advair.                    Salvador Batista is a 74 year old, presenting for the following health issues:    Pre-Op Exam (Right knee replacement, 3/28, @ [facility], Dr Henriquez)      3/12/2024    11:19 AM   Additional Questions   Roomed by Gilma BEAL   Accompanied by wife     HPI     Originally scheduled as a preop for some knee surgery he was planning on having later this month.  However, he understands that his diabetes has not been well-controlled and he knows that he is not approved for his upcoming surgery                  Objective    /75   Pulse 99   Temp 97.6  F (36.4  C)   Resp 19   Ht 1.829 m (6')   Wt 100.7 kg (222 lb)   SpO2 94%   BMI 30.11 kg/m    Body mass index is 30.11 kg/m .  Physical Exam   He is not in respiratory distress, accompanied by his wife today.              Signed Electronically by: Garret Pope, NANCY

## 2024-03-12 NOTE — TELEPHONE ENCOUNTER
"LOV 3/12/24     \"Start Jardiance 25 mg daily. If the Jardiance is too expensive at the pharmacy, ask about coverage for Invokana or Farxiga.\"   "

## 2024-03-13 PROBLEM — J45.21 MILD INTERMITTENT ASTHMA WITH EXACERBATION: Status: RESOLVED | Noted: 2023-12-27 | Resolved: 2024-03-13

## 2024-03-13 PROBLEM — J45.51 SEVERE PERSISTENT ASTHMA WITH EXACERBATION (H): Status: ACTIVE | Noted: 2020-03-27

## 2024-03-13 PROBLEM — N28.9 KIDNEY LESION: Status: ACTIVE | Noted: 2024-03-13

## 2024-03-13 PROBLEM — R10.12 ABDOMINAL PAIN, LEFT UPPER QUADRANT: Status: RESOLVED | Noted: 2019-10-29 | Resolved: 2024-03-13

## 2024-03-13 PROBLEM — I77.810 ASCENDING AORTA DILATION (H): Status: ACTIVE | Noted: 2024-03-13

## 2024-03-13 PROBLEM — A41.9 SEPSIS (H): Chronic | Status: RESOLVED | Noted: 2019-10-29 | Resolved: 2024-03-13

## 2024-03-13 PROBLEM — J18.9 PNEUMONIA OF BOTH LUNGS DUE TO INFECTIOUS ORGANISM: Status: RESOLVED | Noted: 2019-02-09 | Resolved: 2024-03-13

## 2024-03-13 PROBLEM — J45.31 MILD PERSISTENT ASTHMA WITH ACUTE EXACERBATION: Status: RESOLVED | Noted: 2018-12-04 | Resolved: 2024-03-13

## 2024-03-13 PROBLEM — R93.89 ABNORMAL CHEST CT: Status: ACTIVE | Noted: 2024-03-13

## 2024-03-13 RX ORDER — DAPAGLIFLOZIN 10 MG/1
10 TABLET, FILM COATED ORAL DAILY
Qty: 90 TABLET | Refills: 1 | Status: SHIPPED | OUTPATIENT
Start: 2024-03-13 | End: 2024-03-14

## 2024-03-13 NOTE — TELEPHONE ENCOUNTER
Retail Pharmacy Prior Authorization Team   Phone: [phone]    Hello,     I received a call from  [employer]. The Dexcom G7  has been approved.  PA approval: [id number]  Good from 02/29/2024-12/31/2024.    Spoke to the pharmacy staff, they were able to get a paid claim and they will notify the patient once it's ready for .      Thank you,  Liz Lares, Gissel  [facility] PA Team

## 2024-03-14 ENCOUNTER — OFFICE VISIT (OUTPATIENT)
Dept: PULMONOLOGY | Facility: CLINIC | Age: 74
End: 2024-03-14
Attending: NURSE PRACTITIONER
Payer: COMMERCIAL

## 2024-03-14 DIAGNOSIS — J45.909 ASTHMA: ICD-10-CM

## 2024-03-14 DIAGNOSIS — Z79.4 TYPE 2 DIABETES MELLITUS WITH DIABETIC CATARACT, WITH LONG-TERM CURRENT USE OF INSULIN (H): Primary | ICD-10-CM

## 2024-03-14 DIAGNOSIS — E11.36 TYPE 2 DIABETES MELLITUS WITH DIABETIC CATARACT, WITH LONG-TERM CURRENT USE OF INSULIN (H): Primary | ICD-10-CM

## 2024-03-14 PROCEDURE — 94729 DIFFUSING CAPACITY: CPT | Performed by: INTERNAL MEDICINE

## 2024-03-14 PROCEDURE — 94726 PLETHYSMOGRAPHY LUNG VOLUMES: CPT | Performed by: INTERNAL MEDICINE

## 2024-03-14 PROCEDURE — 94375 RESPIRATORY FLOW VOLUME LOOP: CPT | Performed by: INTERNAL MEDICINE

## 2024-03-15 DIAGNOSIS — Z79.4 TYPE 2 DIABETES MELLITUS WITH DIABETIC CATARACT, WITH LONG-TERM CURRENT USE OF INSULIN (H): ICD-10-CM

## 2024-03-15 DIAGNOSIS — E11.36 TYPE 2 DIABETES MELLITUS WITH DIABETIC CATARACT, WITH LONG-TERM CURRENT USE OF INSULIN (H): ICD-10-CM

## 2024-03-15 LAB
DLCOCOR-%PRED-PRE: 95 %
DLCOCOR-PRE: 25.25 ML/MIN/MMHG
DLCOUNC-%PRED-PRE: 85 %
DLCOUNC-PRE: 22.63 ML/MIN/MMHG
DLCOUNC-PRED: 26.4 ML/MIN/MMHG
ERV-%PRED-PRE: 52 %
ERV-PRE: 0.78 L
ERV-PRED: 1.5 L
EXPTIME-PRE: 9.46 SEC
FEF2575-%PRED-PRE: 36 %
FEF2575-PRE: 0.79 L/SEC
FEF2575-PRED: 2.17 L/SEC
FEFMAX-%PRED-PRE: 52 %
FEFMAX-PRE: 4.32 L/SEC
FEFMAX-PRED: 8.16 L/SEC
FEV1-%PRED-PRE: 56 %
FEV1-PRE: 1.68 L
FEV1FEV6-PRE: 63 %
FEV1FEV6-PRED: 77 %
FEV1FVC-PRE: 60 %
FEV1FVC-PRED: 76 %
FEV1SVC-PRE: 62 %
FEV1SVC-PRED: 70 %
FIFMAX-PRE: 3.85 L/SEC
FRCPLETH-%PRED-PRE: 113 %
FRCPLETH-PRE: 4.42 L
FRCPLETH-PRED: 3.87 L
FVC-%PRED-PRE: 70 %
FVC-PRE: 2.78 L
FVC-PRED: 3.97 L
IC-%PRED-PRE: 65 %
IC-PRE: 1.95 L
IC-PRED: 2.99 L
RVPLETH-%PRED-PRE: 128 %
RVPLETH-PRE: 3.64 L
RVPLETH-PRED: 2.84 L
TLCPLETH-%PRED-PRE: 84 %
TLCPLETH-PRE: 6.36 L
TLCPLETH-PRED: 7.53 L
VA-%PRED-PRE: 65 %
VA-PRE: 4.48 L
VC-%PRED-PRE: 64 %
VC-PRE: 2.72 L
VC-PRED: 4.25 L

## 2024-03-15 NOTE — TELEPHONE ENCOUNTER
"[place]ck[place] 3/14 \"Sorry I should have thought about this before, I think I should only get 30 day supply of Jardiance just in case I have a bad reaction to it. That way I won't be out a lot of money. I'll try it for 30 days and if I have no bad experiences then I'll order a 90 day supply!! \"  "

## 2024-03-19 ENCOUNTER — LAB (OUTPATIENT)
Dept: LAB | Facility: HOSPITAL | Age: 74
End: 2024-03-19
Payer: COMMERCIAL

## 2024-03-19 ENCOUNTER — OFFICE VISIT (OUTPATIENT)
Dept: PULMONOLOGY | Facility: CLINIC | Age: 74
End: 2024-03-19
Payer: COMMERCIAL

## 2024-03-19 VITALS
HEIGHT: 72 IN | SYSTOLIC BLOOD PRESSURE: 132 MMHG | HEART RATE: 87 BPM | OXYGEN SATURATION: 95 % | WEIGHT: 221 LBS | DIASTOLIC BLOOD PRESSURE: 68 MMHG | BODY MASS INDEX: 29.93 KG/M2

## 2024-03-19 DIAGNOSIS — J82.83 EOSINOPHILIC ASTHMA: ICD-10-CM

## 2024-03-19 DIAGNOSIS — J82.83 EOSINOPHILIC ASTHMA: Primary | ICD-10-CM

## 2024-03-19 DIAGNOSIS — J45.40 MODERATE PERSISTENT ASTHMA, UNCOMPLICATED: ICD-10-CM

## 2024-03-19 DIAGNOSIS — J45.31 MILD PERSISTENT ASTHMA WITH EXACERBATION: ICD-10-CM

## 2024-03-19 LAB
BASOPHILS # BLD AUTO: 0.1 10E3/UL (ref 0–0.2)
BASOPHILS NFR BLD AUTO: 1 %
EOSINOPHIL # BLD AUTO: 0.5 10E3/UL (ref 0–0.7)
EOSINOPHIL NFR BLD AUTO: 5 %
ERYTHROCYTE [DISTWIDTH] IN BLOOD BY AUTOMATED COUNT: 15.7 % (ref 10–15)
HCT VFR BLD AUTO: 34.3 % (ref 40–53)
HGB BLD-MCNC: 11.1 G/DL (ref 13.3–17.7)
IMM GRANULOCYTES # BLD: 0 10E3/UL
IMM GRANULOCYTES NFR BLD: 0 %
LYMPHOCYTES # BLD AUTO: 3.1 10E3/UL (ref 0.8–5.3)
LYMPHOCYTES NFR BLD AUTO: 32 %
MCH RBC QN AUTO: 27.9 PG (ref 26.5–33)
MCHC RBC AUTO-ENTMCNC: 32.4 G/DL (ref 31.5–36.5)
MCV RBC AUTO: 86 FL (ref 78–100)
MONOCYTES # BLD AUTO: 0.8 10E3/UL (ref 0–1.3)
MONOCYTES NFR BLD AUTO: 8 %
NEUTROPHILS # BLD AUTO: 5.2 10E3/UL (ref 1.6–8.3)
NEUTROPHILS NFR BLD AUTO: 54 %
NRBC # BLD AUTO: 0 10E3/UL
NRBC BLD AUTO-RTO: 0 /100
PLATELET # BLD AUTO: 354 10E3/UL (ref 150–450)
RBC # BLD AUTO: 3.98 10E6/UL (ref 4.4–5.9)
WBC # BLD AUTO: 9.6 10E3/UL (ref 4–11)

## 2024-03-19 PROCEDURE — 36415 COLL VENOUS BLD VENIPUNCTURE: CPT

## 2024-03-19 PROCEDURE — 90677 PCV20 VACCINE IM: CPT | Performed by: INTERNAL MEDICINE

## 2024-03-19 PROCEDURE — 86003 ALLG SPEC IGE CRUDE XTRC EA: CPT

## 2024-03-19 PROCEDURE — 99214 OFFICE O/P EST MOD 30 MIN: CPT | Performed by: INTERNAL MEDICINE

## 2024-03-19 PROCEDURE — G0009 ADMIN PNEUMOCOCCAL VACCINE: HCPCS | Performed by: INTERNAL MEDICINE

## 2024-03-19 PROCEDURE — 85025 COMPLETE CBC W/AUTO DIFF WBC: CPT

## 2024-03-19 NOTE — PROGRESS NOTES
[facility] Clinic Outpatient Consultation    Assessment and Plan:   74M with a history of obesity, DM, HLD, Klinefelter's syndrome, seasonal allergies, recent hospitalization for LLL pneumonia and influenza and asthma, presents for evaluation for the above. PFTs showed moderate obstruction; unfortunately no bronchodilator testing was done. CT scans were reviewed in detail. The previously seen LLL consolidation is clearing up nicely. Currently he is asymptomatic and doing very well on ICS/LABA with rare rescue inhaler use. His eosinophils were elevated to 900 on a recent CBC. 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 Discussed management of asthma with emphasis on optimizing inhaler regimen, ensuring appropriate vaccinations, improving exercise tolerance, etc. FeNO was 15ppb today (normal) reflecting resolved airway inflammation after prednisone and absence of symptoms.     Recommendations:  - continue low dose Advair diskus BID. Rinse/gargle/spit after use  - continue albuterol inhaler vs. Neb as needed   - encouraged to exercise and remain active.  - check CBC, [place] allergen panel, IgE level today  - allergy/immunology referral to discuss biologic therapy  - declines flu shot. We'll give him PCV20 today. Defer additional vaccinations to his PMD.     Follow up in 6 months or sooner if needed.    Action plan: prednisone 40mg daily x5 days + z-pack    Andrea ([place]) MD Ryan  [facility] ([facility])  Clinic [phone]  Fax [phone]     CCx: asthma, history of recurrent pneumonia    HPI: 74M with a history of obesity, DM, HLD, Klinefelter's syndrome, seasonal allergies, recent hospitalization for LLL pneumonia and influenza and asthma, presents for evaluation for the above. He has allergies.   Uses low dose Advair diskus. Rare use of rescue inhaler or nebulizer. His asthma became worse around age 58.  Quit smoking 45 years ago.   Currently  feeling well.  He recently had another flare up and just finished prednisone.     ROS:  A 12-system review was obtained and was negative with the exception of the symptoms endorsed in the history of present illness.    PMH:  Past Medical History:   Diagnosis Date    Anemia, unspecified     Created by Conversion     Cataract     left eye removed, present in right eye    Chronic osteoarthritis     right knee    Colon polyps     Contact dermatitis and other eczema, due to unspecified cause     Created by Conversion     Diabetes mellitus, type 2 (H)     Klinefelter's syndrome     Created by Conversion     Obesity, unspecified     Created by Conversion     Pure hypercholesterolemia     Created by Conversion     Type II or unspecified type diabetes mellitus without mention of complication, not stated as uncontrolled     Created by Conversion     Uncomplicated asthma     adult asthma- wheezing    Unspecified essential hypertension     Created by Conversion     Varicose vein 9/28/2015        PSH:  Past Surgical History:   Procedure Laterality Date    CATARACT EXTRACTION Left 2000    DENTAL SURGERY  1985    JOINT REPLACEMENT      left knee TKA 11/2013    ORTHOPEDIC SURGERY         Allergies:  Allergies   Allergen Reactions    Amoxicillin Anaphylaxis    Codeine Nausea and Vomiting    Sulfamethoxazole-Trimethoprim [Sulfamethoxazole-Trimethoprim] Nausea and Vomiting     Tachycardia      Erythromycin Base [Erythromycin] GI Disturbance    Levaquin [Levofloxacin] Nausea and Vomiting    Seasonal Allergies Other (See Comments)     SPRINGTIME Nasal, bronchial    Simvastatin Muscle Pain (Myalgia)       Family HX:  Family History   Problem Relation Age of Onset    Cerebrovascular Disease Mother     Clotting Disorder Father     Heart Disease Brother        Social Hx:  Social History     Socioeconomic History    Marital status:      Spouse name: Not on file    Number of children: Not on file    Years of education: Not on file     Highest education level: Not on file   Occupational History    Not on file   Tobacco Use    Smoking status: Former     Packs/day: 2.00     Years: 12.00     Additional pack years: 0.00     Total pack years: 24.00     Types: Cigarettes     Quit date: 1979     Years since quittin.2     Passive exposure: Never    Smokeless tobacco: Never   Vaping Use    Vaping Use: Never used   Substance and Sexual Activity    Alcohol use: Yes     Comment: Alcoholic Drinks/day: rare    Drug use: No    Sexual activity: Yes     Partners: Female   Other Topics Concern    Not on file   Social History Narrative    Not on file     Social Determinants of Health     Financial Resource Strain: High Risk (2023)    Financial Resource Strain     Within the past 12 months, have you or your family members you live with been unable to get utilities (heat, electricity) when it was really needed?: Yes   Food Insecurity: Low Risk  (2023)    Food Insecurity     Within the past 12 months, did you worry that your food would run out before you got money to buy more?: No     Within the past 12 months, did the food you bought just not last and you didn t have money to get more?: No   Transportation Needs: Low Risk  (2023)    Transportation Needs     Within the past 12 months, has lack of transportation kept you from medical appointments, getting your medicines, non-medical meetings or appointments, work, or from getting things that you need?: No   Physical Activity: Not on file   Stress: Not on file   Social Connections: Not on file   Interpersonal Safety: Low Risk  (2024)    Interpersonal Safety     Do you feel physically and emotionally safe where you currently live?: Yes     Within the past 12 months, have you been hit, slapped, kicked or otherwise physically hurt by someone?: No     Within the past 12 months, have you been humiliated or emotionally abused in other ways by your partner or ex-partner?: No   Housing Stability:  Low Risk  (12/25/2023)    Housing Stability     Do you have housing? : Yes     Are you worried about losing your housing?: No       Current Meds:  Current Outpatient Medications   Medication Sig Dispense Refill    albuterol (PROAIR HFA/PROVENTIL HFA/VENTOLIN HFA) 108 (90 Base) MCG/ACT inhaler INHALE 2 PUFFS INTO THE LUNGS EVERY 6 HOURS AS NEEDED FOR WHEEZING 8.5 g 1    albuterol (PROVENTIL) (2.5 MG/3ML) 0.083% neb solution Take 1 vial (2.5 mg) by nebulization every 6 hours as needed for shortness of breath, wheezing or cough 90 mL 1    amLODIPine (NORVASC) 10 MG tablet Take 1 tablet (10 mg) by mouth daily for 360 days 90 tablet 3    atenolol (TENORMIN) 100 MG tablet Take 1 tablet (100 mg) by mouth daily 90 tablet 2    azelastine (ASTELIN) 0.1 % nasal spray 2 sprays each nostril 1-2x daily as needed for nasal congestion (use nightly for first 2 week) 30 mL 11    blood glucose (ACCU-CHEK GUIDE) test strip 1 strip by In Vitro route 2 times daily Use to test blood sugar 2 times daily or as directed. 200 strip 3    cetirizine (ZYRTEC) 10 MG tablet Take 10 mg by mouth daily as needed for allergies      dulaglutide (TRULICITY) 1.5 MG/0.5ML pen Inject 1.5 mg Subcutaneous every 7 days 6 mL 1    famotidine (PEPCID) 20 MG tablet Take 20 mg by mouth 2 times daily      fexofenadine (ALLEGRA) 180 MG tablet Take 180 mg by mouth daily as needed for allergies      fluticasone-salmeterol (ADVAIR) 100-50 MCG/ACT inhaler Inhale 1 puff into the lungs every 12 hours 14 each 0    hydrochlorothiazide (HYDRODIURIL) 25 MG tablet Take 1 tablet (25 mg) by mouth every morning 90 tablet 3    insulin pen needle (ULTICARE MINI) 31G X 6 MM miscellaneous Use 1 pen needles daily or as directed. 100 each 3    ipratropium - albuterol 0.5 mg/2.5 mg/3 mL (DUONEB) 0.5-2.5 (3) MG/3ML neb solution Take 1 vial (3 mLs) by nebulization every 6 hours as needed for shortness of breath, wheezing or cough 90 mL 0    lidocaine (XYLOCAINE) 5 % external ointment  Apply topically as needed for moderate pain 30 g 0    losartan (COZAAR) 100 MG tablet Take 1 tablet (100 mg) by mouth daily 90 tablet 3    metFORMIN (GLUCOPHAGE XR) 500 MG 24 hr tablet Take 4 tablets (2,000 mg) by mouth daily 360 tablet 1    naproxen sodium 220 MG capsule Take 440 mg by mouth as needed      nystatin (MYCOSTATIN) 591624 UNIT/GM external powder Apply topically 2 times daily 60 g 0    rosuvastatin (CRESTOR) 20 MG tablet Take 1 tablet (20 mg) by mouth daily 90 tablet 2    traZODone (DESYREL) 50 MG tablet Take 1 tablet (50 mg) by mouth at bedtime (Patient taking differently: Take 50 mg by mouth nightly as needed) 30 tablet 0    triamcinolone (KENALOG) 0.1 % cream [TRIAMCINOLONE (KENALOG) 0.1 % CREAM] APPLY EXTERNALLY TO THE AFFECTED AREA TWICE DAILY FOR 14 DAYS 45 g 0    Continuous Blood Gluc Sensor (DEXCOM G7 SENSOR) MISC 1 each every 10 days Change sensor every 10 days (Patient not taking: Reported on 3/19/2024) 3 each 5    empagliflozin (JARDIANCE) 25 MG TABS tablet Take 1 tablet (25 mg) by mouth daily (Patient not taking: Reported on 3/19/2024) 30 tablet 0    potassium chloride ER (K-TAB) 20 MEQ CR tablet Take 1 tablet (20 mEq) by mouth 2 times daily (Patient not taking: Reported on 3/12/2024) 60 tablet 0    testosterone (ANDROGEL/TESTIM) 50 MG/5GM (1%) topical gel Place 1 packet (50 mg of testosterone) onto the skin daily Apply to the clean, dry intact skin of the shoulders, upper arms or abdomen. (Patient not taking: Reported on 3/12/2024) 90 packet 3    valACYclovir (VALTREX) 1000 mg tablet Take 1 tablet (1,000 mg) by mouth 3 times daily for 7 days 21 tablet 0       Physical Exam:  /68 (BP Location: Left arm, Patient Position: Chair, Cuff Size: Adult Regular)   Pulse 87   Ht 1.829 m (6')   Wt 100.2 kg (221 lb)   SpO2 95%   BMI 29.97 kg/m    Gen: awake, alert, oriented, no distress  HEENT: nasal turbinates are unremarkable, no oropharyngeal lesions, no cervical or supraclavicular  lymphadenopathy  CV: RRR, no M/G/R  Resp: CTAB, no focal crackles or wheezes  Skin: no apparent rashes  Ext: no cyanosis, clubbing or edema  Neuro: alert, nonfocal    Labs:  Reviewed    Eos 900 on CBC 3/11.    Imaging studies:  Personally reviewed    EXAM: CT CHEST W/O CONTRAST  LOCATION: [facility]  DATE: 2/20/2024     INDICATION: Pneumonia of left lower lobe due to infectious organism.  COMPARISON: Chest film 2/16/2024 and CT 12/18/2023.  TECHNIQUE: CT chest without IV contrast. Multiplanar reformats were obtained. Dose reduction techniques were used.  CONTRAST: None.     FINDINGS:   LUNGS AND PLEURA: There is an area of consolidative volume loss involving the medial aspect of left lower lobe which has an AP diameter of approximately 4.6 cm and a transverse diameter of approximately 2.1 cm. The size of this has decreased since the   previous CT where it had a transverse diameter of approximately 5.1 cm. The dense areas of consolidation in the lingula seen on the prior CT have resolved. There has been partial clearing of the numerous nodular infiltrates throughout the remainder of   the lungs. The residual nodular infiltrates can be seen in the right middle lobe and right lower lobe. There is also a 6 mm ground-glass airspace opacity in the lingula of the left upper lobe adjacent to the fissure which remains stable. No new sites of   infiltrate. No pleural fluid. Calcified granulomas both lungs.     MEDIASTINUM/AXILLAE: No lymphadenopathy. No thoracic aortic aneurysm.     CORONARY ARTERY CALCIFICATION: Moderate.     UPPER ABDOMEN: There are several hyperdense lesions in the upper pole of the right kidney which remain unchanged measuring up to 2.5 cm. These likely represent proteinaceous or hemorrhagic cysts. 8 mm hyperdense lesion upper pole left kidney is also   unchanged. No follow-up indicated.     MUSCULOSKELETAL: Unremarkable.                                                                       IMPRESSION:   1.  There has been partial clearing of the pulmonary infiltrates and consolidation noted on the prior CT dated 12/18/2023. The largest residual area of consolidation remains in the left lower lobe measuring up to 2.1 cm in transverse diameter, this had   measured over 5 cm previously. There are additional smaller nodular infiltrates and groundglass lesions greatest in the mid and lower lungs. Given the residual areas of consolidation and nodularity which remain, I would recommend a follow-up CT in 3   months to assess for complete clearing.  2.  No new infiltrates identified. No pleural fluid.  3.  Unchanged hyperdense lesions within the kidneys most compatible with proteinaceous or hemorrhagic cysts. These can be reassessed at the time of follow-up chest CT.    Pulmonary Function Testing  3/14/2024  FEV1 1.68L, 56%  FVC 70%  Ratio 0.6  No BD testing done  TLC 6.36L, 84%  %  Dlco 95% court for hgb  Flow volume curve suggests obstruction  NB: pt used Advair 4 hours prior to testing

## 2024-03-19 NOTE — LETTER
3/19/2024         RE: Lester Shi  [address]  [place] [address]        Dear Colleague,    Thank you for referring your patient, Lester Shi, to the [facility]. Please see a copy of my visit note below.    Pulmonary Clinic Outpatient Consultation    Assessment and Plan:   74M with a history of obesity, DM, HLD, Klinefelter's syndrome, seasonal allergies, recent hospitalization for LLL pneumonia and influenza and asthma, presents for evaluation for the above. PFTs showed moderate obstruction; unfortunately no bronchodilator testing was done. CT scans were reviewed in detail. The previously seen LLL consolidation is clearing up nicely. Currently he is asymptomatic and doing very well on ICS/LABA with rare rescue inhaler use. His eosinophils were elevated to 900 on a recent CBC. 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 Discussed management of asthma with emphasis on optimizing inhaler regimen, ensuring appropriate vaccinations, improving exercise tolerance, etc. FeNO was 15ppb today (normal) reflecting resolved airway inflammation after prednisone and absence of symptoms.     Recommendations:  - continue low dose Advair diskus BID. Rinse/gargle/spit after use  - continue albuterol inhaler vs. Neb as needed   - encouraged to exercise and remain active.  - check CBC, midwest allergen panel, IgE level today  - allergy/immunology referral to discuss biologic therapy  - declines flu shot. We'll give him PCV20 today. Defer additional vaccinations to his PMD.     Follow up in 6 months or sooner if needed.    Action plan: prednisone 40mg daily x5 days + z-pack    Andrea ([place]) MD Ryan  [facility] ([facility])  Clinic [phone]  Fax [phone]     CCx: asthma, history of recurrent pneumonia    HPI: 74M with a history of obesity, DM, HLD, Klinefelter's syndrome, seasonal allergies, recent  hospitalization for LLL pneumonia and influenza and asthma, presents for evaluation for the above. He has allergies.   Uses low dose Advair diskus. Rare use of rescue inhaler or nebulizer. His asthma became worse around age 58.  Quit smoking 45 years ago.   Currently feeling well.  He recently had another flare up and just finished prednisone.     ROS:  A 12-system review was obtained and was negative with the exception of the symptoms endorsed in the history of present illness.    PMH:  Past Medical History:   Diagnosis Date     Anemia, unspecified     Created by Conversion      Cataract     left eye removed, present in right eye     Chronic osteoarthritis     right knee     Colon polyps      Contact dermatitis and other eczema, due to unspecified cause     Created by Conversion      Diabetes mellitus, type 2 (H)      Klinefelter's syndrome     Created by Conversion      Obesity, unspecified     Created by Conversion      Pure hypercholesterolemia     Created by Conversion      Type II or unspecified type diabetes mellitus without mention of complication, not stated as uncontrolled     Created by Conversion      Uncomplicated asthma     adult asthma- wheezing     Unspecified essential hypertension     Created by Conversion      Varicose vein 9/28/2015        PSH:  Past Surgical History:   Procedure Laterality Date     CATARACT EXTRACTION Left 2000     DENTAL SURGERY  1985     JOINT REPLACEMENT      left knee TKA 11/2013     ORTHOPEDIC SURGERY         Allergies:  Allergies   Allergen Reactions     Amoxicillin Anaphylaxis     Codeine Nausea and Vomiting     Sulfamethoxazole-Trimethoprim [Sulfamethoxazole-Trimethoprim] Nausea and Vomiting     Tachycardia       Erythromycin Base [Erythromycin] GI Disturbance     Levaquin [Levofloxacin] Nausea and Vomiting     Seasonal Allergies Other (See Comments)     SPRINGTIME Nasal, bronchial     Simvastatin Muscle Pain (Myalgia)       Family HX:  Family History   Problem Relation  Age of Onset     Cerebrovascular Disease Mother      Clotting Disorder Father      Heart Disease Brother        Social Hx:  Social History     Socioeconomic History     Marital status:      Spouse name: Not on file     Number of children: Not on file     Years of education: Not on file     Highest education level: Not on file   Occupational History     Not on file   Tobacco Use     Smoking status: Former     Packs/day: 2.00     Years: 12.00     Additional pack years: 0.00     Total pack years: 24.00     Types: Cigarettes     Quit date: 1979     Years since quittin.2     Passive exposure: Never     Smokeless tobacco: Never   Vaping Use     Vaping Use: Never used   Substance and Sexual Activity     Alcohol use: Yes     Comment: Alcoholic Drinks/day: rare     Drug use: No     Sexual activity: Yes     Partners: Female   Other Topics Concern     Not on file   Social History Narrative     Not on file     Social Determinants of Health     Financial Resource Strain: High Risk (2023)    Financial Resource Strain      Within the past 12 months, have you or your family members you live with been unable to get utilities (heat, electricity) when it was really needed?: Yes   Food Insecurity: Low Risk  (2023)    Food Insecurity      Within the past 12 months, did you worry that your food would run out before you got money to buy more?: No      Within the past 12 months, did the food you bought just not last and you didn t have money to get more?: No   Transportation Needs: Low Risk  (2023)    Transportation Needs      Within the past 12 months, has lack of transportation kept you from medical appointments, getting your medicines, non-medical meetings or appointments, work, or from getting things that you need?: No   Physical Activity: Not on file   Stress: Not on file   Social Connections: Not on file   Interpersonal Safety: Low Risk  (2024)    Interpersonal Safety      Do you feel physically  and emotionally safe where you currently live?: Yes      Within the past 12 months, have you been hit, slapped, kicked or otherwise physically hurt by someone?: No      Within the past 12 months, have you been humiliated or emotionally abused in other ways by your partner or ex-partner?: No   Housing Stability: Low Risk  (12/25/2023)    Housing Stability      Do you have housing? : Yes      Are you worried about losing your housing?: No       Current Meds:  Current Outpatient Medications   Medication Sig Dispense Refill     albuterol (PROAIR HFA/PROVENTIL HFA/VENTOLIN HFA) 108 (90 Base) MCG/ACT inhaler INHALE 2 PUFFS INTO THE LUNGS EVERY 6 HOURS AS NEEDED FOR WHEEZING 8.5 g 1     albuterol (PROVENTIL) (2.5 MG/3ML) 0.083% neb solution Take 1 vial (2.5 mg) by nebulization every 6 hours as needed for shortness of breath, wheezing or cough 90 mL 1     amLODIPine (NORVASC) 10 MG tablet Take 1 tablet (10 mg) by mouth daily for 360 days 90 tablet 3     atenolol (TENORMIN) 100 MG tablet Take 1 tablet (100 mg) by mouth daily 90 tablet 2     azelastine (ASTELIN) 0.1 % nasal spray 2 sprays each nostril 1-2x daily as needed for nasal congestion (use nightly for first 2 week) 30 mL 11     blood glucose (ACCU-CHEK GUIDE) test strip 1 strip by In Vitro route 2 times daily Use to test blood sugar 2 times daily or as directed. 200 strip 3     cetirizine (ZYRTEC) 10 MG tablet Take 10 mg by mouth daily as needed for allergies       dulaglutide (TRULICITY) 1.5 MG/0.5ML pen Inject 1.5 mg Subcutaneous every 7 days 6 mL 1     famotidine (PEPCID) 20 MG tablet Take 20 mg by mouth 2 times daily       fexofenadine (ALLEGRA) 180 MG tablet Take 180 mg by mouth daily as needed for allergies       fluticasone-salmeterol (ADVAIR) 100-50 MCG/ACT inhaler Inhale 1 puff into the lungs every 12 hours 14 each 0     hydrochlorothiazide (HYDRODIURIL) 25 MG tablet Take 1 tablet (25 mg) by mouth every morning 90 tablet 3     insulin pen needle (ULTICARE  MINI) 31G X 6 MM miscellaneous Use 1 pen needles daily or as directed. 100 each 3     ipratropium - albuterol 0.5 mg/2.5 mg/3 mL (DUONEB) 0.5-2.5 (3) MG/3ML neb solution Take 1 vial (3 mLs) by nebulization every 6 hours as needed for shortness of breath, wheezing or cough 90 mL 0     lidocaine (XYLOCAINE) 5 % external ointment Apply topically as needed for moderate pain 30 g 0     losartan (COZAAR) 100 MG tablet Take 1 tablet (100 mg) by mouth daily 90 tablet 3     metFORMIN (GLUCOPHAGE XR) 500 MG 24 hr tablet Take 4 tablets (2,000 mg) by mouth daily 360 tablet 1     naproxen sodium 220 MG capsule Take 440 mg by mouth as needed       nystatin (MYCOSTATIN) 514703 UNIT/GM external powder Apply topically 2 times daily 60 g 0     rosuvastatin (CRESTOR) 20 MG tablet Take 1 tablet (20 mg) by mouth daily 90 tablet 2     traZODone (DESYREL) 50 MG tablet Take 1 tablet (50 mg) by mouth at bedtime (Patient taking differently: Take 50 mg by mouth nightly as needed) 30 tablet 0     triamcinolone (KENALOG) 0.1 % cream [TRIAMCINOLONE (KENALOG) 0.1 % CREAM] APPLY EXTERNALLY TO THE AFFECTED AREA TWICE DAILY FOR 14 DAYS 45 g 0     Continuous Blood Gluc Sensor (DEXCOM G7 SENSOR) MISC 1 each every 10 days Change sensor every 10 days (Patient not taking: Reported on 3/19/2024) 3 each 5     empagliflozin (JARDIANCE) 25 MG TABS tablet Take 1 tablet (25 mg) by mouth daily (Patient not taking: Reported on 3/19/2024) 30 tablet 0     potassium chloride ER (K-TAB) 20 MEQ CR tablet Take 1 tablet (20 mEq) by mouth 2 times daily (Patient not taking: Reported on 3/12/2024) 60 tablet 0     testosterone (ANDROGEL/TESTIM) 50 MG/5GM (1%) topical gel Place 1 packet (50 mg of testosterone) onto the skin daily Apply to the clean, dry intact skin of the shoulders, upper arms or abdomen. (Patient not taking: Reported on 3/12/2024) 90 packet 3     valACYclovir (VALTREX) 1000 mg tablet Take 1 tablet (1,000 mg) by mouth 3 times daily for 7 days 21 tablet 0        Physical Exam:  /68 (BP Location: Left arm, Patient Position: Chair, Cuff Size: Adult Regular)   Pulse 87   Ht 1.829 m (6')   Wt 100.2 kg (221 lb)   SpO2 95%   BMI 29.97 kg/m    Gen: awake, alert, oriented, no distress  HEENT: nasal turbinates are unremarkable, no oropharyngeal lesions, no cervical or supraclavicular lymphadenopathy  CV: RRR, no M/G/R  Resp: CTAB, no focal crackles or wheezes  Skin: no apparent rashes  Ext: no cyanosis, clubbing or edema  Neuro: alert, nonfocal    Labs:  Reviewed    Eos 900 on CBC 3/11.    Imaging studies:  Personally reviewed    EXAM: CT CHEST W/O CONTRAST  LOCATION: [facility]  DATE: 2/20/2024     INDICATION: Pneumonia of left lower lobe due to infectious organism.  COMPARISON: Chest film 2/16/2024 and CT 12/18/2023.  TECHNIQUE: CT chest without IV contrast. Multiplanar reformats were obtained. Dose reduction techniques were used.  CONTRAST: None.     FINDINGS:   LUNGS AND PLEURA: There is an area of consolidative volume loss involving the medial aspect of left lower lobe which has an AP diameter of approximately 4.6 cm and a transverse diameter of approximately 2.1 cm. The size of this has decreased since the   previous CT where it had a transverse diameter of approximately 5.1 cm. The dense areas of consolidation in the lingula seen on the prior CT have resolved. There has been partial clearing of the numerous nodular infiltrates throughout the remainder of   the lungs. The residual nodular infiltrates can be seen in the right middle lobe and right lower lobe. There is also a 6 mm ground-glass airspace opacity in the lingula of the left upper lobe adjacent to the fissure which remains stable. No new sites of   infiltrate. No pleural fluid. Calcified granulomas both lungs.     MEDIASTINUM/AXILLAE: No lymphadenopathy. No thoracic aortic aneurysm.     CORONARY ARTERY CALCIFICATION: Moderate.     UPPER ABDOMEN: There are several hyperdense  lesions in the upper pole of the right kidney which remain unchanged measuring up to 2.5 cm. These likely represent proteinaceous or hemorrhagic cysts. 8 mm hyperdense lesion upper pole left kidney is also   unchanged. No follow-up indicated.     MUSCULOSKELETAL: Unremarkable.                                                                      IMPRESSION:   1.  There has been partial clearing of the pulmonary infiltrates and consolidation noted on the prior CT dated 12/18/2023. The largest residual area of consolidation remains in the left lower lobe measuring up to 2.1 cm in transverse diameter, this had   measured over 5 cm previously. There are additional smaller nodular infiltrates and groundglass lesions greatest in the mid and lower lungs. Given the residual areas of consolidation and nodularity which remain, I would recommend a follow-up CT in 3   months to assess for complete clearing.  2.  No new infiltrates identified. No pleural fluid.  3.  Unchanged hyperdense lesions within the kidneys most compatible with proteinaceous or hemorrhagic cysts. These can be reassessed at the time of follow-up chest CT.    Pulmonary Function Testing  3/14/2024  FEV1 1.68L, 56%  FVC 70%  Ratio 0.6  No BD testing done  TLC 6.36L, 84%  %  Dlco 95% court for hgb  Flow volume curve suggests obstruction  NB: pt used Advair 4 hours prior to testing    Niox result was  15    Margarita Ely LPN     Again, thank you for allowing me to participate in the care of your patient.        Sincerely,        Andrea Davis MD

## 2024-03-23 LAB
A ALTERNATA IGE QN: <0.1 KU(A)/L
ALLERGEN DANDELION: <0.1 KU(A)/L
ALLERGEN HOUSE DUST GREER: 3.23 KU(A)/L
ALLERGEN OLIVE TREE: <0.1 KU(A)/L
C HERBARUM IGE QN: <0.1 KU(A)/L
CAT DANDER IGG QN: 6.74 KU(A)/L
CEDAR IGE QN: <0.1 KU(A)/L
COCKSFOOT IGE QN: <0.1 KU(A)/L
COMMON RAGWEED IGE QN: <0.1 KU(A)/L
COTTONWOOD IGE QN: <0.1 KU(A)/L
D FARINAE IGE QN: <0.1 KU(A)/L
DOG DANDER+EPITH IGE QN: 0.81 KU(A)/L
GIANT RAGWEED IGE QN: <0.1 KU(A)/L
KENT BLUE GRASS IGE QN: <0.1 KU(A)/L
MAPLE IGE QN: 0.18 KU(A)/L
MEADOW FESCUE IGE QN: <0.1 KU(A)/L
PER RYE GRASS IGE QN: <0.1 KU(A)/L
SALTWORT IGE QN: <0.1 KU(A)/L
TIMOTHY IGE QN: <0.1 KU(A)/L
WHITE ELM IGE QN: <0.1 KU(A)/L

## 2024-03-26 DIAGNOSIS — J45.909 EXTRINSIC ASTHMA WITHOUT COMPLICATION, UNSPECIFIED ASTHMA SEVERITY, UNSPECIFIED WHETHER PERSISTENT: Primary | ICD-10-CM

## 2024-03-26 RX ORDER — MONTELUKAST SODIUM 10 MG/1
10 TABLET ORAL AT BEDTIME
Qty: 30 TABLET | Refills: 6 | Status: SHIPPED | OUTPATIENT
Start: 2024-03-26

## 2024-03-27 ENCOUNTER — OFFICE VISIT (OUTPATIENT)
Dept: INTERNAL MEDICINE | Facility: CLINIC | Age: 74
End: 2024-03-27
Payer: COMMERCIAL

## 2024-03-27 VITALS
DIASTOLIC BLOOD PRESSURE: 54 MMHG | OXYGEN SATURATION: 91 % | SYSTOLIC BLOOD PRESSURE: 104 MMHG | HEART RATE: 93 BPM | RESPIRATION RATE: 16 BRPM | WEIGHT: 220 LBS | TEMPERATURE: 97.8 F | HEIGHT: 72 IN | BODY MASS INDEX: 29.8 KG/M2

## 2024-03-27 DIAGNOSIS — Z79.4 TYPE 2 DIABETES MELLITUS WITH DIABETIC CATARACT, WITH LONG-TERM CURRENT USE OF INSULIN (H): ICD-10-CM

## 2024-03-27 DIAGNOSIS — Z79.899 MEDICATION MANAGEMENT: Primary | ICD-10-CM

## 2024-03-27 DIAGNOSIS — I71.21 ANEURYSM OF ASCENDING AORTA WITHOUT RUPTURE (H): ICD-10-CM

## 2024-03-27 DIAGNOSIS — E11.36 TYPE 2 DIABETES MELLITUS WITH DIABETIC CATARACT, WITH LONG-TERM CURRENT USE OF INSULIN (H): ICD-10-CM

## 2024-03-27 DIAGNOSIS — R93.89 ABNORMAL CHEST CT: ICD-10-CM

## 2024-03-27 PROCEDURE — 99214 OFFICE O/P EST MOD 30 MIN: CPT | Performed by: NURSE PRACTITIONER

## 2024-03-27 NOTE — PROGRESS NOTES
Assessment & Plan     Aneurysm of ascending aorta without rupture (H24)  We reviewed the results of his March 2023 echocardiogram, revealing borderline enlarged proximal ascending aorta of 3.7 cm.  He is due for 1 year follow-up study  - Echocardiogram Complete; Future    Abnormal chest CT  We reviewed his February 2024 CT.  Several small nodular infiltrates and groundglass lesions.  Because residual areas of consolidation nodularity remain, follow-up CT was recommended by radiology in May.  He also had some lesions in the kidney that needed to be reevaluated  - CT Chest w/o Contrast; Future    Type 2 diabetes mellitus with diabetic cataract, with long-term current use of insulin (H)  We are having a hard time finding the 1.5 mg dose of Trulicity.  He thinks that they may be in stock at [facility]  I will send in a prescription.  He is going to call and have them run it through his insurance.    Having some dry mouth issues since starting the Jardiance.  He has been checking his blood sugars and they have come down.  We had to call and cancel his knee surgery due to his A1c being 9.4%  - dulaglutide (TRULICITY) 1.5 MG/0.5ML pen; Inject 1.5 mg Subcutaneous every 7 days    Cough  He has a resolving viral upper respiratory infection.  No significant wheezing.  He does not feel he is at a point where he needs prednisone right now    Patient Instructions   You had an echocardiogram (ultrasound of the heart) in March 2023.  At that time you had borderline enlargement of the ascending aorta.  I ordered another echocardiogram today to serve as a 1 year follow-up study to check and see if there is been any enlargement.    Call [phone] to schedule the echocardiogram.    You had a CT scan of your chest in February 2024.  That CT scan showed some abnormal nodular infiltrates, as well as some cysts in your kidneys.  It was recommended that you have a follow-up CT scan in May for surveillance purposes.  This was ordered  for you today.    Call [phone] to set up the CT scan of your chest    If the dry mouth is becoming problematic, we may want to consider stopping the Jardiance and increasing Trulicity to 3 mg weekly.    Follow-up in 6 weeks to review blood sugars and make sure that you are on track.    Continue on Singulair as recommended by pulmonology.  It should be okay to take either Allegra or Zyrtec as needed for additional allergy relief.                    Salvador Batista is a 74 year old, presenting for the following health issues:  Follow Up (2 week follow up)      3/27/2024     9:40 AM   Additional Questions   Roomed by Madhuri MORROW     History of Present Illness     Asthma:  He presents for follow up of asthma.  He has some cough, some wheezing, and no shortness of breath.  He is using a relief medication daily. He typically misses taking his controller medication 1 time(s) per week. Patient is aware of the following triggers: same as previous visit, cold air and exercise or sports. The patient has not had a visit to the Emergency Room, Urgent Care or Hospital due to asthma since the last clinic visit.     Diabetes:   He presents for follow up of diabetes.  He is checking home blood glucose one time daily.   He checks blood glucose before meals.  Blood glucose is never over 200 and never under 70. He is aware of hypoglycemia symptoms including none.   He is concerned about frequent infections.   He is having numbness in feet.            He eats 2-3 servings of fruits and vegetables daily.He consumes 0 sweetened beverage(s) daily.He exercises with enough effort to increase his heart rate 10 to 19 minutes per day.  He exercises with enough effort to increase his heart rate 4 days per week.   He is taking medications regularly.                   Here for follow-up of multiple medical issues      Objective    /54 (BP Location: Right arm, Patient Position: Sitting)   Pulse 93   Temp 97.8  F (36.6  C)   Resp 16    "Ht 1.829 m (6' 0.01\")   Wt 99.8 kg (220 lb)   SpO2 91%   BMI 29.83 kg/m    Body mass index is 29.83 kg/m .  Physical Exam   Lungs clear to auscultation bilaterally              Signed Electronically by: Garret Pope CNP    "

## 2024-03-27 NOTE — PATIENT INSTRUCTIONS
You had an echocardiogram (ultrasound of the heart) in March 2023.  At that time you had borderline enlargement of the ascending aorta.  I ordered another echocardiogram today to serve as a 1 year follow-up study to check and see if there is been any enlargement.    Call [phone] to schedule the echocardiogram.    You had a CT scan of your chest in February 2024.  That CT scan showed some abnormal nodular infiltrates, as well as some cysts in your kidneys.  It was recommended that you have a follow-up CT scan in May for surveillance purposes.  This was ordered for you today.    Call [phone] to set up the CT scan of your chest    If the dry mouth is becoming problematic, we may want to consider stopping the Jardiance and increasing Trulicity to 3 mg weekly.    Follow-up in 6 weeks to review blood sugars and make sure that you are on track.    Continue on Singulair as recommended by pulmonology.  It should be okay to take either Allegra or Zyrtec as needed for additional allergy relief.

## 2024-04-04 ENCOUNTER — VIRTUAL VISIT (OUTPATIENT)
Dept: INTERNAL MEDICINE | Facility: CLINIC | Age: 74
End: 2024-04-04
Payer: COMMERCIAL

## 2024-04-04 DIAGNOSIS — I10 ESSENTIAL HYPERTENSION, BENIGN: ICD-10-CM

## 2024-04-04 DIAGNOSIS — J45.901 EXACERBATION OF ASTHMA, UNSPECIFIED ASTHMA SEVERITY, UNSPECIFIED WHETHER PERSISTENT: Primary | ICD-10-CM

## 2024-04-04 DIAGNOSIS — R04.0 EPISTAXIS: ICD-10-CM

## 2024-04-04 PROBLEM — K63.5 POLYP OF COLON: Status: ACTIVE | Noted: 2023-05-01

## 2024-04-04 PROCEDURE — 99442 PR PHYSICIAN TELEPHONE EVALUATION 11-20 MIN: CPT | Mod: 93 | Performed by: NURSE PRACTITIONER

## 2024-04-04 RX ORDER — DOXYCYCLINE 100 MG/1
100 CAPSULE ORAL 2 TIMES DAILY
Qty: 14 CAPSULE | Refills: 0 | Status: SHIPPED | OUTPATIENT
Start: 2024-04-04 | End: 2024-04-11

## 2024-04-04 RX ORDER — FLUTICASONE PROPIONATE AND SALMETEROL 250; 50 UG/1; UG/1
1 POWDER RESPIRATORY (INHALATION) 2 TIMES DAILY
Qty: 60 EACH | Refills: 1 | Status: SHIPPED | OUTPATIENT
Start: 2024-04-04 | End: 2024-05-21

## 2024-04-04 RX ORDER — PREDNISONE 20 MG/1
40 TABLET ORAL DAILY
Qty: 10 TABLET | Refills: 0 | Status: SHIPPED | OUTPATIENT
Start: 2024-04-04 | End: 2024-04-09

## 2024-04-04 RX ORDER — AZELASTINE 1 MG/ML
SPRAY, METERED NASAL
Qty: 30 ML | Refills: 11 | Status: SHIPPED | OUTPATIENT
Start: 2024-04-04

## 2024-04-04 NOTE — PROGRESS NOTES
Lester is a 74 year old who is being evaluated via a billable telephone visit.    What phone number would you like to be contacted at? home  How would you like to obtain your AVS? Sabrina  Originating Location (pt. Location): Home    Distant Location (provider location):  On-site    Assessment & Plan     Essential hypertension, benign  Lester had been having some lower blood pressures at home.  He elected to stop his amlodipine.  Since stopping the amlodipine, most of his blood pressures have been in the 120s over 70s range.  He continues on atenolol, losartan, and hydrochlorothiazide.    Exacerbation of asthma, unspecified asthma severity, unspecified whether persistent  He had a cold when I saw him last week.  We elected to hold off giving him antibiotics and prednisone.  However, he has been wheezing a bit more.  His symptoms are a bit worse.  We are going to increase the strength of his Advair to 250-50.  I am going to give him a 5-day course of prednisone and some doxycycline.  If his asthma continues to be a problem, we can increase his Advair again.  He continues on Singulair.    - fluticasone-salmeterol (ADVAIR) 250-50 MCG/ACT inhaler; Inhale 1 puff into the lungs 2 times daily  - predniSONE (DELTASONE) 20 MG tablet; Take 2 tablets (40 mg) by mouth daily for 5 days  - doxycycline hyclate (VIBRAMYCIN) 100 MG capsule; Take 1 capsule (100 mg) by mouth 2 times daily for 7 days    Allergies  He is on Allegra, needs refills of his Astelin nasal spray.  - azelastine (ASTELIN) 0.1 % nasal spray; 2 sprays each nostril 1-2x daily as needed for nasal congestion (use nightly for first 2 week)    The longitudinal plan of care for the diagnosis(es)/condition(s) as documented were addressed during this visit. Due to the added complexity in care, I will continue to support Lester in the subsequent management and with ongoing continuity of care.                   Salvador Batista is a 74 year old, presenting for the following  health issues:    Hypertension (Go over BP readings, been good lately, stopped Amlodipine, /63 yesterday 127/68 today / BS reading 168,148,132 - Ear plugged out, blow green phlegm, SOB)      4/4/2024    11:52 AM   Additional Questions   Roomed by Sangeeta VELASQUEZ     History of Present Illness     Asthma:  He presents for follow up of asthma.  He has some cough, some wheezing, and no shortness of breath.  He is using a relief medication daily. He typically misses taking his controller medication 1 time(s) per week. Patient is aware of the following triggers: same as previous visit, cold air and exercise or sports. The patient has not had a visit to the Emergency Room, Urgent Care or Hospital due to asthma since the last clinic visit.     Diabetes:   He presents for follow up of diabetes.  He is checking home blood glucose one time daily.   He checks blood glucose before meals.  Blood glucose is never over 200 and never under 70.    He is concerned about frequent infections.   He is having numbness in feet.            He eats 2-3 servings of fruits and vegetables daily.He consumes 0 sweetened beverage(s) daily.He exercises with enough effort to increase his heart rate 10 to 19 minutes per day.  He exercises with enough effort to increase his heart rate 4 days per week.   He is taking medications regularly.                   Objective           Vitals:  No vitals were obtained today due to virtual visit.    Physical Exam   General: Alert and no distress //Respiratory: No audible wheeze, cough, or shortness of breath // Psychiatric:  Appropriate affect, tone, and pace of words            Phone call duration: 17 minutes  Signed Electronically by: Garret Pope CNP

## 2024-04-10 ENCOUNTER — HOSPITAL ENCOUNTER (OUTPATIENT)
Dept: CARDIOLOGY | Facility: CLINIC | Age: 74
Discharge: HOME | End: 2024-04-10
Attending: NURSE PRACTITIONER | Admitting: NURSE PRACTITIONER
Payer: COMMERCIAL

## 2024-04-10 DIAGNOSIS — I71.21 ANEURYSM OF ASCENDING AORTA WITHOUT RUPTURE (H): ICD-10-CM

## 2024-04-10 LAB — LVEF ECHO: NORMAL

## 2024-04-10 PROCEDURE — 255N000002 HC RX 255 OP 636: Performed by: NURSE PRACTITIONER

## 2024-04-10 PROCEDURE — 93306 TTE W/DOPPLER COMPLETE: CPT | Mod: 26 | Performed by: INTERNAL MEDICINE

## 2024-04-10 RX ADMIN — PERFLUTREN 2 ML: 6.52 INJECTION, SUSPENSION INTRAVENOUS at 10:22

## 2024-04-21 ENCOUNTER — HOSPITAL ENCOUNTER (OUTPATIENT)
Dept: CT IMAGING | Facility: CLINIC | Age: 74
Discharge: HOME | End: 2024-04-21
Attending: NURSE PRACTITIONER | Admitting: NURSE PRACTITIONER
Payer: COMMERCIAL

## 2024-04-21 DIAGNOSIS — R93.89 ABNORMAL CHEST CT: ICD-10-CM

## 2024-04-21 PROCEDURE — 71250 CT THORAX DX C-: CPT

## 2024-04-22 ENCOUNTER — TELEPHONE (OUTPATIENT)
Dept: INTERNAL MEDICINE | Facility: CLINIC | Age: 74
End: 2024-04-22
Payer: COMMERCIAL

## 2024-04-22 DIAGNOSIS — R91.8 LUNG MASS: Primary | ICD-10-CM

## 2024-04-22 LAB — RADIOLOGIST FLAGS: ABNORMAL

## 2024-04-22 NOTE — TELEPHONE ENCOUNTER
04/22/2024    Pt returned call. TC relayed message from provider. Pt understood message. TC provided phone number to schedule PET scan. PT will call to schedule.     Nothing else needed at this time.    Carmen Schroeder

## 2024-04-22 NOTE — TELEPHONE ENCOUNTER
I called the patient to discuss his CT scan results.  I left a message and asked that he call the clinic back.  If he does call back, please relay the following message:    Radiology reviewed your CT scan.  The small mass in the left lower lobe has not changed in size.  They recommended that we consider getting a PET scan.  I will order that for you.    Please call [phone] to schedule the PET scan.  Please let me know if there are any other questions

## 2024-04-29 DIAGNOSIS — J45.30 MILD PERSISTENT ASTHMA WITHOUT COMPLICATION: ICD-10-CM

## 2024-04-29 RX ORDER — ALBUTEROL SULFATE 90 UG/1
2 AEROSOL, METERED RESPIRATORY (INHALATION) EVERY 6 HOURS PRN
Qty: 8.5 G | Refills: 1 | Status: SHIPPED | OUTPATIENT
Start: 2024-04-29

## 2024-04-29 NOTE — TELEPHONE ENCOUNTER
Pending Prescriptions:                       Disp   Refills    albuterol (PROAIR HFA/PROVENTIL HFA/MELQUIADES*8.5 g  1            Sig: Inhale 2 puffs into the lungs every 6 hours as           needed for wheezing    Pharmacy: [id number] - [place], [place] - [address] AT [facility]

## 2024-05-07 ENCOUNTER — HOSPITAL ENCOUNTER (OUTPATIENT)
Dept: PET IMAGING | Facility: HOSPITAL | Age: 74
Discharge: HOME | End: 2024-05-07
Attending: NURSE PRACTITIONER | Admitting: NURSE PRACTITIONER
Payer: COMMERCIAL

## 2024-05-07 DIAGNOSIS — R91.8 LUNG MASS: ICD-10-CM

## 2024-05-07 LAB — GLUCOSE BLDC GLUCOMTR-MCNC: 152 MG/DL (ref 70–99)

## 2024-05-07 PROCEDURE — 78815 PET IMAGE W/CT SKULL-THIGH: CPT | Mod: PS

## 2024-05-07 PROCEDURE — 82962 GLUCOSE BLOOD TEST: CPT

## 2024-05-07 PROCEDURE — A9552 F18 FDG: HCPCS | Performed by: NURSE PRACTITIONER

## 2024-05-07 PROCEDURE — 343N000001 HC RX 343: Performed by: NURSE PRACTITIONER

## 2024-05-07 RX ORDER — FLUDEOXYGLUCOSE F 18 200 MCI/ML
9-18 INJECTION, SOLUTION INTRAVENOUS ONCE
Status: COMPLETED | OUTPATIENT
Start: 2024-05-07 | End: 2024-05-07

## 2024-05-07 RX ADMIN — FLUDEOXYGLUCOSE F 18 13.16 MILLICURIE: 200 INJECTION, SOLUTION INTRAVENOUS at 10:12

## 2024-05-08 ENCOUNTER — TELEPHONE (OUTPATIENT)
Dept: INTERNAL MEDICINE | Facility: CLINIC | Age: 74
End: 2024-05-08
Payer: COMMERCIAL

## 2024-05-08 DIAGNOSIS — R91.8 LUNG MASS: Primary | ICD-10-CM

## 2024-05-08 NOTE — TELEPHONE ENCOUNTER
Called patient to discuss the PET scan results.  Radiology recommended biopsy.  I placed a referral to interventional radiology.  Lester is agreeable to this plan.

## 2024-05-11 ENCOUNTER — OFFICE VISIT (OUTPATIENT)
Dept: FAMILY MEDICINE | Facility: CLINIC | Age: 74
End: 2024-05-11
Payer: COMMERCIAL

## 2024-05-11 VITALS
HEART RATE: 75 BPM | BODY MASS INDEX: 30.44 KG/M2 | OXYGEN SATURATION: 94 % | DIASTOLIC BLOOD PRESSURE: 80 MMHG | WEIGHT: 224.5 LBS | SYSTOLIC BLOOD PRESSURE: 160 MMHG | TEMPERATURE: 96.9 F

## 2024-05-11 DIAGNOSIS — J45.51 SEVERE PERSISTENT ASTHMA WITH EXACERBATION (H): Primary | ICD-10-CM

## 2024-05-11 DIAGNOSIS — Z86.39 HISTORY OF TYPE 2 DIABETES MELLITUS: ICD-10-CM

## 2024-05-11 PROCEDURE — 99214 OFFICE O/P EST MOD 30 MIN: CPT | Performed by: NURSE PRACTITIONER

## 2024-05-11 RX ORDER — PREDNISONE 20 MG/1
40 TABLET ORAL DAILY
Qty: 10 TABLET | Refills: 0 | Status: SHIPPED | OUTPATIENT
Start: 2024-05-11 | End: 2024-05-16

## 2024-05-11 ASSESSMENT — ENCOUNTER SYMPTOMS
FEVER: 0
CHEST TIGHTNESS: 1
SHORTNESS OF BREATH: 1
CHILLS: 0
WHEEZING: 1

## 2024-05-11 NOTE — PATIENT INSTRUCTIONS
Start prednisone.      Should come back in 1-2 days if not better.    Continue nebs or inhalers.      If you need either more than 4 times per day, should get rechecked.

## 2024-05-11 NOTE — PROGRESS NOTES
"Assessment & Plan     Severe persistent asthma with exacerbation (H28)    - predniSONE (DELTASONE) 20 MG tablet  Dispense: 10 tablet; Refill: 0    History of type 2 diabetes mellitus         Focused exam and history done due to COVID-19 pandemic in a walk-in setting.      History, exam, and vital signs consistent with history of allergic asthma with typical allergic asthma exacerbation.  Has very tight wheezing throughout.  Has used neb or inhaler in addition to controller 8 times the last 24 hours.  Does have a history of type 2 diabetes.  Says he usually takes an additional metformin when he is on prednisone.    Recheck if not better in 1 to 2 days, sooner if worsening.  Does have a already scheduled PCP appointment if he is improving coming up in about 10 days.    Recheck if shortness of breath or new fevers develop.  Rest.     OTCs recommended: None [   ].  Dextromethorphan  [  ], guaifenesin [  ], pseudoephedrine [   ], Afrin for no greater than 3 days [  ], Tylenol or ibuprofen [  ].                Return in about 2 days (around 5/13/2024) for If no better.    Elvia Hidalgo, [facility]    Salvador Batista is a 74 year old male who presents to clinic today for the following health issues:  Chief Complaint   Patient presents with    Allergies    Asthma     Flare up     HPI    Hx allergic asthma.      Using albuterol and advair.  Has used inhalers 3-4 in 24 hours and duonebs every 3-4 hours in additionally.    Feeling wheezy, short of breath.      Taking singulair, astelin, pataday, zyrtec.      Is a Type II diabetic.  \"I take an extra metformin when I'm on prednisone.\"      No specific URI symptoms.        Review of Systems   Constitutional:  Negative for chills and fever.   Respiratory:  Positive for chest tightness, shortness of breath and wheezing.    Cardiovascular:  Negative for chest pain.           Objective    BP (!) 160/80   Pulse 75   Temp 96.9  F (36.1  C) " (Tympanic)   Wt 101.8 kg (224 lb 8 oz)   SpO2 94%   BMI 30.44 kg/m    Physical Exam  Constitutional:       Appearance: He is well-developed.   HENT:      Right Ear: External ear normal.      Left Ear: External ear normal.   Eyes:      General:         Right eye: No discharge.         Left eye: No discharge.      Conjunctiva/sclera: Conjunctivae normal.   Pulmonary:      Effort: Pulmonary effort is normal.      Breath sounds: Wheezing (Tight, high-pitched expiratory wheezing throughout.) present.   Musculoskeletal:         General: Normal range of motion.   Skin:     General: Skin is warm.   Neurological:      Mental Status: He is alert and oriented to person, place, and time.   Psychiatric:         Mood and Affect: Mood normal.         Behavior: Behavior normal.         Thought Content: Thought content normal.         Judgment: Judgment normal.

## 2024-05-16 ENCOUNTER — MYC MEDICAL ADVICE (OUTPATIENT)
Dept: INTERVENTIONAL RADIOLOGY/VASCULAR | Facility: CLINIC | Age: 74
End: 2024-05-16
Payer: COMMERCIAL

## 2024-05-17 ENCOUNTER — TELEPHONE (OUTPATIENT)
Dept: INTERVENTIONAL RADIOLOGY/VASCULAR | Facility: CLINIC | Age: 74
End: 2024-05-17
Payer: COMMERCIAL

## 2024-05-17 NOTE — TELEPHONE ENCOUNTER
Writer has spoken with Lester regarding planned procedure with IR via telephone.      Lester acknowledges understanding of pre-procedure instructions.         I have provided Lester with IR number ([phone]) for questions or concerns.    A documented H&P exam is noted in patient EMR with the date 5/11/2024.    Nora SOTELO  Interventional Radiology RN   [phone]

## 2024-05-21 ENCOUNTER — OFFICE VISIT (OUTPATIENT)
Dept: INTERNAL MEDICINE | Facility: CLINIC | Age: 74
End: 2024-05-21
Payer: COMMERCIAL

## 2024-05-21 VITALS
DIASTOLIC BLOOD PRESSURE: 70 MMHG | BODY MASS INDEX: 30.48 KG/M2 | HEART RATE: 77 BPM | OXYGEN SATURATION: 94 % | RESPIRATION RATE: 16 BRPM | SYSTOLIC BLOOD PRESSURE: 118 MMHG | WEIGHT: 225 LBS | HEIGHT: 72 IN | TEMPERATURE: 97.8 F

## 2024-05-21 DIAGNOSIS — N40.1 BENIGN PROSTATIC HYPERPLASIA WITH URINARY FREQUENCY: ICD-10-CM

## 2024-05-21 DIAGNOSIS — R91.1 NODULE OF RIGHT LUNG: ICD-10-CM

## 2024-05-21 DIAGNOSIS — R35.0 BENIGN PROSTATIC HYPERPLASIA WITH URINARY FREQUENCY: ICD-10-CM

## 2024-05-21 DIAGNOSIS — Z79.899 MEDICATION MANAGEMENT: Primary | ICD-10-CM

## 2024-05-21 DIAGNOSIS — Z79.4 TYPE 2 DIABETES MELLITUS WITH DIABETIC CATARACT, WITH LONG-TERM CURRENT USE OF INSULIN (H): ICD-10-CM

## 2024-05-21 DIAGNOSIS — J45.901 EXACERBATION OF ASTHMA, UNSPECIFIED ASTHMA SEVERITY, UNSPECIFIED WHETHER PERSISTENT: ICD-10-CM

## 2024-05-21 DIAGNOSIS — G47.00 INSOMNIA, UNSPECIFIED TYPE: ICD-10-CM

## 2024-05-21 DIAGNOSIS — J45.51 SEVERE PERSISTENT ASTHMA WITH EXACERBATION (H): ICD-10-CM

## 2024-05-21 DIAGNOSIS — E11.36 TYPE 2 DIABETES MELLITUS WITH DIABETIC CATARACT, WITH LONG-TERM CURRENT USE OF INSULIN (H): ICD-10-CM

## 2024-05-21 PROCEDURE — G2211 COMPLEX E/M VISIT ADD ON: HCPCS | Performed by: NURSE PRACTITIONER

## 2024-05-21 PROCEDURE — 99214 OFFICE O/P EST MOD 30 MIN: CPT | Performed by: NURSE PRACTITIONER

## 2024-05-21 RX ORDER — TAMSULOSIN HYDROCHLORIDE 0.4 MG/1
0.4 CAPSULE ORAL DAILY
Qty: 90 CAPSULE | Refills: 1 | Status: SHIPPED | OUTPATIENT
Start: 2024-05-21

## 2024-05-21 RX ORDER — TRAZODONE HYDROCHLORIDE 50 MG/1
50 TABLET, FILM COATED ORAL
Qty: 30 TABLET | Refills: 0 | Status: SHIPPED | OUTPATIENT
Start: 2024-05-21

## 2024-05-21 RX ORDER — CETIRIZINE HYDROCHLORIDE 10 MG/1
10 TABLET ORAL DAILY
COMMUNITY

## 2024-05-21 RX ORDER — FLUTICASONE PROPIONATE AND SALMETEROL 500; 50 UG/1; UG/1
1 POWDER RESPIRATORY (INHALATION) 2 TIMES DAILY
Qty: 60 EACH | Refills: 1 | Status: SHIPPED | OUTPATIENT
Start: 2024-05-21 | End: 2024-07-29

## 2024-05-21 ASSESSMENT — ASTHMA QUESTIONNAIRES
QUESTION_1 LAST FOUR WEEKS HOW MUCH OF THE TIME DID YOUR ASTHMA KEEP YOU FROM GETTING AS MUCH DONE AT WORK, SCHOOL OR AT HOME: NONE OF THE TIME
QUESTION_3 LAST FOUR WEEKS HOW OFTEN DID YOUR ASTHMA SYMPTOMS (WHEEZING, COUGHING, SHORTNESS OF BREATH, CHEST TIGHTNESS OR PAIN) WAKE YOU UP AT NIGHT OR EARLIER THAN USUAL IN THE MORNING: ONCE OR TWICE
ACT_TOTALSCORE: 17
QUESTION_4 LAST FOUR WEEKS HOW OFTEN HAVE YOU USED YOUR RESCUE INHALER OR NEBULIZER MEDICATION (SUCH AS ALBUTEROL): ONE OR TWO TIMES PER DAY
QUESTION_5 LAST FOUR WEEKS HOW WOULD YOU RATE YOUR ASTHMA CONTROL: SOMEWHAT CONTROLLED
ACT_TOTALSCORE: 17
QUESTION_2 LAST FOUR WEEKS HOW OFTEN HAVE YOU HAD SHORTNESS OF BREATH: THREE TO SIX TIMES A WEEK

## 2024-05-21 NOTE — PATIENT INSTRUCTIONS
I sent in a prescription for a new Advair inhaler.  This is a stronger dose.  I would like you to do 1 puff twice daily.    Lets try Flomax for your urinary issues.  Follow-up phone call in 2 weeks to see how things are going.    Refills of the trazodone provided.    I did place a referral to our pharmacy team so that you can reestablish with the pharmacist regarding her diabetes.

## 2024-05-21 NOTE — PROGRESS NOTES
Assessment & Plan     Severe persistent asthma with exacerbation (H28)  He was recently diagnosed with an asthma exacerbation and given a another prednisone burst.  At this point, we are going to max his Advair.  If he continues to have problems, consider adding Spiriva  - fluticasone-salmeterol (ADVAIR) 500-50 MCG/ACT inhaler; Inhale 1 puff into the lungs 2 times daily    Insomnia, unspecified type  Controlled on trazodone as needed.  He also uses melatonin  - traZODone (DESYREL) 50 MG tablet; Take 1 tablet (50 mg) by mouth nightly as needed    Type 2 diabetes mellitus with diabetic cataract, with long-term current use of insulin (H)  He is having problems finding the Trulicity.  He has not used the Trulicity in a month.  At this point, I am going to recommend that he establish with one of our clinical pharmacist so that we can get better control of his diabetes  - Med Therapy Management Referral    Benign prostatic hyperplasia with urinary frequency  Increased urinary urgency and frequency.  We will start Flomax.  Follow-up telephone call in 2 weeks to see how things are going.  He may also benefit from oxybutynin  - tamsulosin (FLOMAX) 0.4 MG capsule; Take 1 capsule (0.4 mg) by mouth daily    Nodule of right lung  Planned biopsy with IR tomorrow    Patient Instructions   I sent in a prescription for a new Advair inhaler.  This is a stronger dose.  I would like you to do 1 puff twice daily.    Lets try Flomax for your urinary issues.  Follow-up phone call in 2 weeks to see how things are going.    Refills of the trazodone provided.    I did place a referral to our pharmacy team so that you can reestablish with the pharmacist regarding her diabetes.    The longitudinal plan of care for the diagnosis(es)/condition(s) as documented were addressed during this visit. Due to the added complexity in care, I will continue to support Lester in the subsequent management and with ongoing continuity of  "care.                    Subjective   Lestre is a 74 year old, presenting for the following health issues:  Follow Up      5/21/2024    10:16 AM   Additional Questions   Roomed by Madhuri MORROW     History of Present Illness     Asthma:  He presents for follow up of asthma.  He has no cough, some wheezing, and some shortness of breath.  He is using a relief medication daily. He typically misses taking his controller medication 1 time(s) per week. Patient is aware of the following triggers: pollens. The patient has not had a visit to the Emergency Room, Urgent Care or Hospital due to asthma since the last clinic visit.     Diabetes:   He presents for follow up of diabetes.  He is checking home blood glucose two times daily.   He checks blood glucose before and after meals.  Blood glucose is sometimes over 200 and never under 70.  When his blood glucose is low, the patient is asymptomatic for confusion, blurred vision, lethargy and reports not feeling dizzy, shaky, or weak.   He has no concerns regarding his diabetes at this time.  He is having numbness in feet.            He eats 0-1 servings of fruits and vegetables daily.He consumes 1 sweetened beverage(s) daily.He exercises with enough effort to increase his heart rate 9 or less minutes per day.  He exercises with enough effort to increase his heart rate 3 or less days per week.   He is taking medications regularly.                     Objective    /70 (BP Location: Right arm, Patient Position: Sitting)   Pulse 77   Temp 97.8  F (36.6  C)   Resp 16   Ht 1.829 m (6' 0.01\")   Wt 102.1 kg (225 lb)   SpO2 94%   BMI 30.51 kg/m    Body mass index is 30.51 kg/m .  Physical Exam   Patient healthy appearing and in no acute distress            Signed Electronically by: Garret Pope, CNP    "

## 2024-05-22 ENCOUNTER — TELEPHONE (OUTPATIENT)
Dept: INTERNAL MEDICINE | Facility: CLINIC | Age: 74
End: 2024-05-22

## 2024-05-22 NOTE — TELEPHONE ENCOUNTER
Please call patient.  Let him know that the interventional radiologist sent me a message about his possible biopsy and recommended against that procedure given his diabetes issues as well as his asthma issues.    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  Please tell Lester that his pulmonologist, Dr. Davis is aware of the situation would like him to to schedule appointment to be seen.  Lester should call [phone] to schedule an appointment sooner, rather than later.

## 2024-05-22 NOTE — TELEPHONE ENCOUNTER
Outgoing call to patient. Relayed PCP's detailed message. No further questions/concerns at this time.

## 2024-05-24 ENCOUNTER — OFFICE VISIT (OUTPATIENT)
Dept: PULMONOLOGY | Facility: CLINIC | Age: 74
End: 2024-05-24
Payer: COMMERCIAL

## 2024-05-24 VITALS
HEART RATE: 86 BPM | WEIGHT: 222 LBS | SYSTOLIC BLOOD PRESSURE: 134 MMHG | OXYGEN SATURATION: 96 % | DIASTOLIC BLOOD PRESSURE: 68 MMHG | BODY MASS INDEX: 30.1 KG/M2

## 2024-05-24 DIAGNOSIS — R91.8 PULMONARY INFILTRATES: Primary | ICD-10-CM

## 2024-05-24 PROCEDURE — 99214 OFFICE O/P EST MOD 30 MIN: CPT | Performed by: INTERNAL MEDICINE

## 2024-05-24 NOTE — PROGRESS NOTES
Pulmonary Clinic Outpatient Consultation    Assessment and Plan:   74M with a history of obesity, DM, HLD, Klinefelter's syndrome, seasonal allergies, recent hospitalization for LLL pneumonia and influenza and asthma, presents for follow up of asthma with frequent exacerbations. His PCP has been doing serial CT scans, a PET/CT and ordered a lung biopsy which was unable to be done due to hyperglycemia and wheezing. We once again reviewed the imaging and the LLL opacity has actually decreased significantly in size since Dec 2023. It appears that the recent scans from 2024 were not compared to this December scan. That having been said, with his poor asthma control, frequent need for prednisone and the persistent opacity I think it is worth taking a close look at that but I will start with a bronchoscopy with BAL first to evaluate for infections and eosinophilic process.    As noted previously: PFTs showed moderate obstruction; unfortunately no bronchodilator testing was done. Currently he is asymptomatic and doing very well on ICS/LABA with rare rescue inhaler use. His eosinophils were elevated to 900 on a recent CBC. 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 Last FeNO was normal at 15ppb.    Recommendations:  - continue high dose Wixela BID. Rinse/gargle/spit after use  - continue albuterol inhaler vs. Neb as needed   - encouraged to exercise and remain active.  - continue montelukast 10mg daily for allergy control  - allergy/immunology appointment with Dr. Rendon set up for August. Will ask Dr. Rendon if she can see him sooner to discuss biologics.  - bronchoscopy with BAL to be set up in the next few weeks. Our clinic will call him.   Addendum: reviewed CT with Dr. Gutierrez from radiology. Suggestion made to check ANCA for vasculitis given solid mass like opacity as well as bronchial wall thickening and airway-centric nodules. Informed Lester via mychart and labs ordered.   - UTD with PCV20.  Declined flu shot last visit. Defer additional discussions to his PCP.     Follow up in 4-6 weeks for reassessment.     Action plan: prednisone 40mg daily x5 days + z-pack    Andrea Davis MD (Avi)  [facility] ([facility])  Clinic [phone]  Fax [phone]     CCx: asthma, history of recurrent pneumonia    HPI: Interim history: I last saw Lester on 3/19. Since that time, he was followed by his primary care provider and had a follow up CT chest which showed continued clearing of the pulmonary infiltrate in the LLL. The PCP then ordered a PET/CT for unclear reasons which showed metabolic activity of the LLL process. The PCP then set Lester up for a lung biopsy which he was unable to complete due to coughing and wheezing at the start of the procedure.    Multiple asthma exacerbations this year requiring short courses of prednisone.   His PCP has increased his advair dose.  He says the allergy season makes his asthma worse.     Today, he reports he's doing pretty well. Back to baseline. Off prednisone. Using albuterol neb once a day. Using max dose Wixela    ROS:  A 12-system review was obtained and was negative with the exception of the symptoms endorsed in the history of present illness.    PMH:  Past Medical History:   Diagnosis Date    Anemia, unspecified     Created by Conversion     Cataract     left eye removed, present in right eye    Chronic osteoarthritis     right knee    Colon polyps     Contact dermatitis and other eczema, due to unspecified cause     Created by Conversion     Diabetes mellitus, type 2 (H)     Klinefelter's syndrome     Created by Conversion     Obesity, unspecified     Created by Conversion     Pure hypercholesterolemia     Created by Conversion     Type II or unspecified type diabetes mellitus without mention of complication, not stated as uncontrolled     Created by Conversion     Uncomplicated asthma     adult asthma- wheezing    Unspecified  essential hypertension     Created by Conversion     Varicose vein 2015        PSH:  Past Surgical History:   Procedure Laterality Date    CATARACT EXTRACTION Left 2000    DENTAL SURGERY  1985    JOINT REPLACEMENT      left knee TKA 2013    ORTHOPEDIC SURGERY         Allergies:  Allergies   Allergen Reactions    Amoxicillin Anaphylaxis    Codeine Nausea and Vomiting    Sulfamethoxazole-Trimethoprim [Sulfamethoxazole-Trimethoprim] Nausea and Vomiting     Tachycardia      Erythromycin Base [Erythromycin] GI Disturbance    Levaquin [Levofloxacin] Nausea and Vomiting    Seasonal Allergies Other (See Comments)     SPRINGTIME Nasal, bronchial    Simvastatin Muscle Pain (Myalgia)       Family HX:  Family History   Problem Relation Age of Onset    Cerebrovascular Disease Mother     Clotting Disorder Father     Heart Disease Brother        Social Hx:  Social History     Socioeconomic History    Marital status:      Spouse name: Not on file    Number of children: Not on file    Years of education: Not on file    Highest education level: Not on file   Occupational History    Not on file   Tobacco Use    Smoking status: Former     Current packs/day: 0.00     Average packs/day: 2.0 packs/day for 12.0 years (24.0 ttl pk-yrs)     Types: Cigarettes     Start date: 1967     Quit date: 1979     Years since quittin.4     Passive exposure: Never    Smokeless tobacco: Never   Vaping Use    Vaping status: Never Used   Substance and Sexual Activity    Alcohol use: Yes     Comment: Alcoholic Drinks/day: rare    Drug use: No    Sexual activity: Yes     Partners: Female   Other Topics Concern    Not on file   Social History Narrative    Not on file     Social Determinants of Health     Financial Resource Strain: High Risk (2023)    Financial Resource Strain     Within the past 12 months, have you or your family members you live with been unable to get utilities (heat, electricity) when it was really needed?:  Yes   Food Insecurity: Low Risk  (12/25/2023)    Food Insecurity     Within the past 12 months, did you worry that your food would run out before you got money to buy more?: No     Within the past 12 months, did the food you bought just not last and you didn t have money to get more?: No   Transportation Needs: Low Risk  (12/25/2023)    Transportation Needs     Within the past 12 months, has lack of transportation kept you from medical appointments, getting your medicines, non-medical meetings or appointments, work, or from getting things that you need?: No   Physical Activity: Not on file   Stress: Not on file   Social Connections: Unknown (1/24/2024)    [facility]    Social Connections     Frequency of Communication with Friends and Family: Not on file   Interpersonal Safety: Low Risk  (1/29/2024)    Interpersonal Safety     Do you feel physically and emotionally safe where you currently live?: Yes     Within the past 12 months, have you been hit, slapped, kicked or otherwise physically hurt by someone?: No     Within the past 12 months, have you been humiliated or emotionally abused in other ways by your partner or ex-partner?: No   Housing Stability: Low Risk  (12/25/2023)    Housing Stability     Do you have housing? : Yes     Are you worried about losing your housing?: No       Current Meds:  Current Outpatient Medications   Medication Sig Dispense Refill    albuterol (PROAIR HFA/PROVENTIL HFA/VENTOLIN HFA) 108 (90 Base) MCG/ACT inhaler Inhale 2 puffs into the lungs every 6 hours as needed for wheezing 8.5 g 1    albuterol (PROVENTIL) (2.5 MG/3ML) 0.083% neb solution Take 1 vial (2.5 mg) by nebulization every 6 hours as needed for shortness of breath, wheezing or cough 90 mL 1    atenolol (TENORMIN) 100 MG tablet Take 1 tablet (100 mg) by mouth daily 90 tablet 2    azelastine (ASTELIN) 0.1 % nasal spray 2 sprays each  nostril 1-2x daily as needed for nasal congestion (use nightly for first 2 week) 30 mL 11    blood glucose (ACCU-CHEK GUIDE) test strip 1 strip by In Vitro route 2 times daily Use to test blood sugar 2 times daily or as directed. 200 strip 3    cetirizine (ZYRTEC) 10 MG tablet Take 10 mg by mouth daily      Continuous Blood Gluc Sensor (DEXCOM G7 SENSOR) MISC 1 each every 10 days Change sensor every 10 days 3 each 5    dulaglutide (TRULICITY) 1.5 MG/0.5ML pen Inject 1.5 mg Subcutaneous every 7 days 6 mL 0    empagliflozin (JARDIANCE) 25 MG TABS tablet Take 1 tablet (25 mg) by mouth daily 30 tablet 0    famotidine (PEPCID) 20 MG tablet Take 20 mg by mouth 2 times daily      fluticasone-salmeterol (ADVAIR) 500-50 MCG/ACT inhaler Inhale 1 puff into the lungs 2 times daily 60 each 1    hydrochlorothiazide (HYDRODIURIL) 25 MG tablet Take 1 tablet (25 mg) by mouth every morning 90 tablet 3    insulin pen needle (ULTICARE MINI) 31G X 6 MM miscellaneous Use 1 pen needles daily or as directed. 100 each 3    ipratropium - albuterol 0.5 mg/2.5 mg/3 mL (DUONEB) 0.5-2.5 (3) MG/3ML neb solution Take 1 vial (3 mLs) by nebulization every 6 hours as needed for shortness of breath, wheezing or cough 90 mL 0    lidocaine (XYLOCAINE) 5 % external ointment Apply topically as needed for moderate pain 30 g 0    losartan (COZAAR) 100 MG tablet Take 1 tablet (100 mg) by mouth daily 90 tablet 3    metFORMIN (GLUCOPHAGE XR) 500 MG 24 hr tablet Take 4 tablets (2,000 mg) by mouth daily 360 tablet 1    montelukast (SINGULAIR) 10 MG tablet Take 1 tablet (10 mg) by mouth at bedtime 30 tablet 6    naproxen sodium 220 MG capsule Take 440 mg by mouth as needed      nystatin (MYCOSTATIN) 650093 UNIT/GM external powder Apply topically 2 times daily 60 g 0    rosuvastatin (CRESTOR) 20 MG tablet Take 1 tablet (20 mg) by mouth daily 90 tablet 2    tamsulosin (FLOMAX) 0.4 MG capsule Take 1 capsule (0.4 mg) by mouth daily 90 capsule 1    testosterone  (ANDROGEL/TESTIM) 50 MG/5GM (1%) topical gel Place 1 packet (50 mg of testosterone) onto the skin daily Apply to the clean, dry intact skin of the shoulders, upper arms or abdomen. 90 packet 3    traZODone (DESYREL) 50 MG tablet Take 1 tablet (50 mg) by mouth nightly as needed 30 tablet 0    triamcinolone (KENALOG) 0.1 % cream [TRIAMCINOLONE (KENALOG) 0.1 % CREAM] APPLY EXTERNALLY TO THE AFFECTED AREA TWICE DAILY FOR 14 DAYS 45 g 0       Physical Exam:  There were no vitals taken for this visit.  Gen: awake, alert, oriented, no distress  HEENT: nasal turbinates are unremarkable, no oropharyngeal lesions, no cervical or supraclavicular lymphadenopathy  CV: RRR, no M/G/R  Resp: CTAB, no focal crackles or wheezes  Skin: no apparent rashes  Ext: no cyanosis, clubbing or edema  Neuro: alert, nonfocal    Labs:  Reviewed    Eos 900 on CBC 3/11.  CBC from 3/11: normal eos count  [place] allergen panel: elevated IgE to cat dander, dog dander, maple and house khoury dust      Imaging studies:  Personally reviewed    EXAM: CT CHEST W/O CONTRAST  LOCATION: [facility]  DATE: 4/21/2024     INDICATION:  Abnormal chest CT  COMPARISON: 02/20/2024 CT. 08/13/2021 MRI.  TECHNIQUE: CT chest without IV contrast. Multiplanar reformats were obtained. Dose reduction techniques were used.  CONTRAST: None.     FINDINGS:   LUNGS AND PLEURA: Mild bronchial wall thickening with a small amount of debris in the airways. Again seen is a confluent mass in the medial left lower lobe which has not changed in size. On series 5 image 107 this is 4.6 cm AP x 2.1 cm transverse.   Maximum transverse diameter is 2.6 cm. A 7 mm x 6 mm opacity in the medial lingula has not changed (image 154). Otherwise scattered small solid nodules have not changed. There are extensive new ground glass opacities throughout the lungs. There are a few   new small solid nodules in the lung bases. There is a new small density in the lingula which is  favored to represent fat deposition and atelectasis (series 5 image 168).      MEDIASTINUM/AXILLAE: Small hiatal hernia.     CORONARY ARTERY CALCIFICATION: Severe.     UPPER ABDOMEN: Lobular foci in the upper right and left kidney have not changed. On the comparison MRI study these were found to be benign. No follow-up required.     MUSCULOSKELETAL: Degenerative change in the spine.                                                                      IMPRESSION:   1.  The confluent mass in the medial left lower lobe has not changed in size. Other pre-existing scattered nodules have not changed. PET/CT is recommended.  2.  New groundglass opacities throughout the lungs. There are a few new solid nodules in the lung bases. These are nonspecific though an infectious or inflammatory process is possible.   3.  Bronchial wall thickening and debris in the airways can be seen with bronchitis.  4.  Severe coronary artery disease.        [Access Center: Persistent left lower lobe mass. Recommend PET/CT.]     This report will be copied to the [facility] to ensure a provider acknowledges the finding. Access Center is available Monday through Friday 8am-3:30 pm.     COMPARISON ADDENDUM:   In addition to the prior chest CT comparisons from 4/21/2024 and 2/20/2024 and PET/CT from 8/13/2021, an additional chest CT from 12/18/2023 is also reviewed. The current masslike consolidative opacity in the left lower lobe was much larger on CT   12/18/2023. Findings have decreased in size since that time, favoring an infectious/inflammatory etiology although remain indeterminate. Continued chest CT imaging surveillance recommended to ensure continued improvement.  END ADDENDUM   Addended by Rufus Burris MD on 5/22/2024  9:22 AM     Study Result    Narrative & Impression   EXAM: PET ONCOLOGY (EYES TO THIGHS)  LOCATION: [facility]  DATE: 5/7/2024     INDICATION: Solitary pulmonary nodule. Masslike  consolidation left lower lobe. Other nonspecific abnormal finding of lung field.  COMPARISON: CT chest 4/21/2024, 2/20/2024, PET/CT 8/13/2021  TECHNIQUE: Serum glucose level 152 mg/dL. One hour post intravenous administration of 13.2 mCi F-18 FDG, PET imaging was performed from the skull vertex to mid thighs, utilizing attenuation correction with concurrent axial CT and PET/CT image fusion.   Dose reduction techniques were used.     FINDINGS: An irregularly-shaped masslike consolidative pulmonary opacity in the posteromedial left lower lobe is similar to 4/21/2024 although larger than 8/13/2021, largest component 2.7 x 3.4 x 6.6 cm, and demonstrates increase in marked uptake (SUVmax   7.3, previously 3.2). No FDG avid adenopathy in the chest or elsewhere. No suspicious focal uptake in the liver or skeleton. Normal adrenal glands.     Moderate senescent intracranial changes. Mucous retention cyst right maxillary sinus. Benign sebaceous cyst midline upper back. Moderate atherosclerotic calcifications including the coronary arteries. Mild bandlike scarring or atelectasis in the lower   lungs. Few scattered benign calcified pulmonary granulomas. Small esophageal hiatal hernia. Few small benign bilateral renal cysts, no follow-up needed. Splenule. Small fat-containing umbilical hernia. Moderate colonic diverticulosis, greatest in the   sigmoid region. Mild scattered degenerative changes in the spine.                                                                      IMPRESSION:  1. An enlarging area of masslike consolidation in the posteromedial left lower lobe with increased marked uptake is indeterminate, suspicious for malignancy. This is amenable to percutaneous CT guided biopsy.  2. No evidence of carrie or distant metastasis.       Pulmonary Function Testing  3/14/2024  FEV1 1.68L, 56%  FVC 70%  Ratio 0.6  No BD testing done  TLC 6.36L, 84%  %  Dlco 95% court for hgb  Flow volume curve suggests  obstruction  NB: pt used Advair 4 hours prior to testing

## 2024-05-24 NOTE — LETTER
5/24/2024         RE: Lester Shi  [address]  [place] [address]        Dear Colleague,    Thank you for referring your patient, Lester Shi, to the [facility]. Please see a copy of my visit note below.    Pulmonary Clinic Outpatient Consultation    Assessment and Plan:   74M with a history of obesity, DM, HLD, Klinefelter's syndrome, seasonal allergies, recent hospitalization for LLL pneumonia and influenza and asthma, presents for follow up of asthma with frequent exacerbations. His PCP has been doing serial CT scans, a PET/CT and ordered a lung biopsy which was unable to be done due to hyperglycemia and wheezing. We once again reviewed the imaging and the LLL opacity has actually decreased significantly in size since Dec 2023. It appears that the recent scans from 2024 were not compared to this December scan. That having been said, with his poor asthma control, frequent need for prednisone and the persistent opacity I think it is worth taking a close look at that but I will start with a bronchoscopy with BAL first to evaluate for infections and eosinophilic process.    As noted previously: PFTs showed moderate obstruction; unfortunately no bronchodilator testing was done. Currently he is asymptomatic and doing very well on ICS/LABA with rare rescue inhaler use. His eosinophils were elevated to 900 on a recent CBC. 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 Last FeNO was normal at 15ppb.    Recommendations:  - continue high dose Wixela BID. Rinse/gargle/spit after use  - continue albuterol inhaler vs. Neb as needed   - encouraged to exercise and remain active.  - continue montelukast 10mg daily for allergy control  - allergy/immunology appointment with Dr. Rendon set up for August. Will ask Dr. Rendon if she can see him sooner to discuss biologics.  - bronchoscopy with BAL to be set up in the next few weeks. Our clinic will call him.   -  UTD with PCV20. Declined flu shot last visit. Defer additional discussions to his PCP.     Follow up in 4-6 weeks for reassessment.     Action plan: prednisone 40mg daily x5 days + z-pack    Andrea Davis MD (Avi)  [facility] Pulmonary [facility] ([facility])  Clinic [phone]  Fax [phone]     CCx: asthma, history of recurrent pneumonia    HPI: Interim history: I last saw Lester on 3/19. Since that time, he was followed by his primary care provider and had a follow up CT chest which showed continued clearing of the pulmonary infiltrate in the LLL. The PCP then ordered a PET/CT for unclear reasons which showed metabolic activity of the LLL process. The PCP then set Lester up for a lung biopsy which he was unable to complete due to coughing and wheezing at the start of the procedure.    Multiple asthma exacerbations this year requiring short courses of prednisone.   His PCP has increased his advair dose.  He says the allergy season makes his asthma worse.     Today, he reports he's doing pretty well. Back to baseline. Off prednisone. Using albuterol neb once a day. Using max dose Wixela    ROS:  A 12-system review was obtained and was negative with the exception of the symptoms endorsed in the history of present illness.    PMH:  Past Medical History:   Diagnosis Date     Anemia, unspecified     Created by Conversion      Cataract     left eye removed, present in right eye     Chronic osteoarthritis     right knee     Colon polyps      Contact dermatitis and other eczema, due to unspecified cause     Created by Conversion      Diabetes mellitus, type 2 (H)      Klinefelter's syndrome     Created by Conversion      Obesity, unspecified     Created by Conversion      Pure hypercholesterolemia     Created by Conversion      Type II or unspecified type diabetes mellitus without mention of complication, not stated as uncontrolled     Created by Conversion      Uncomplicated asthma     adult asthma-  wheezing     Unspecified essential hypertension     Created by Conversion      Varicose vein 2015        PSH:  Past Surgical History:   Procedure Laterality Date     CATARACT EXTRACTION Left 2000     DENTAL SURGERY  1985     JOINT REPLACEMENT      left knee TKA 2013     ORTHOPEDIC SURGERY         Allergies:  Allergies   Allergen Reactions     Amoxicillin Anaphylaxis     Codeine Nausea and Vomiting     Sulfamethoxazole-Trimethoprim [Sulfamethoxazole-Trimethoprim] Nausea and Vomiting     Tachycardia       Erythromycin Base [Erythromycin] GI Disturbance     Levaquin [Levofloxacin] Nausea and Vomiting     Seasonal Allergies Other (See Comments)     SPRINGTIME Nasal, bronchial     Simvastatin Muscle Pain (Myalgia)       Family HX:  Family History   Problem Relation Age of Onset     Cerebrovascular Disease Mother      Clotting Disorder Father      Heart Disease Brother        Social Hx:  Social History     Socioeconomic History     Marital status:      Spouse name: Not on file     Number of children: Not on file     Years of education: Not on file     Highest education level: Not on file   Occupational History     Not on file   Tobacco Use     Smoking status: Former     Current packs/day: 0.00     Average packs/day: 2.0 packs/day for 12.0 years (24.0 ttl pk-yrs)     Types: Cigarettes     Start date: 1967     Quit date: 1979     Years since quittin.4     Passive exposure: Never     Smokeless tobacco: Never   Vaping Use     Vaping status: Never Used   Substance and Sexual Activity     Alcohol use: Yes     Comment: Alcoholic Drinks/day: rare     Drug use: No     Sexual activity: Yes     Partners: Female   Other Topics Concern     Not on file   Social History Narrative     Not on file     Social Determinants of Health     Financial Resource Strain: High Risk (2023)    Financial Resource Strain      Within the past 12 months, have you or your family members you live with been unable to get  utilities (heat, electricity) when it was really needed?: Yes   Food Insecurity: Low Risk  (12/25/2023)    Food Insecurity      Within the past 12 months, did you worry that your food would run out before you got money to buy more?: No      Within the past 12 months, did the food you bought just not last and you didn t have money to get more?: No   Transportation Needs: Low Risk  (12/25/2023)    Transportation Needs      Within the past 12 months, has lack of transportation kept you from medical appointments, getting your medicines, non-medical meetings or appointments, work, or from getting things that you need?: No   Physical Activity: Not on file   Stress: Not on file   Social Connections: Unknown (1/24/2024)    [facility] [employer] [facility]    Social Connections      Frequency of Communication with Friends and Family: Not on file   Interpersonal Safety: Low Risk  (1/29/2024)    Interpersonal Safety      Do you feel physically and emotionally safe where you currently live?: Yes      Within the past 12 months, have you been hit, slapped, kicked or otherwise physically hurt by someone?: No      Within the past 12 months, have you been humiliated or emotionally abused in other ways by your partner or ex-partner?: No   Housing Stability: Low Risk  (12/25/2023)    Housing Stability      Do you have housing? : Yes      Are you worried about losing your housing?: No       Current Meds:  Current Outpatient Medications   Medication Sig Dispense Refill     albuterol (PROAIR HFA/PROVENTIL HFA/VENTOLIN HFA) 108 (90 Base) MCG/ACT inhaler Inhale 2 puffs into the lungs every 6 hours as needed for wheezing 8.5 g 1     albuterol (PROVENTIL) (2.5 MG/3ML) 0.083% neb solution Take 1 vial (2.5 mg) by nebulization every 6 hours as needed for shortness of breath, wheezing or cough 90 mL 1     atenolol (TENORMIN) 100 MG tablet Take 1 tablet (100 mg) by mouth  daily 90 tablet 2     azelastine (ASTELIN) 0.1 % nasal spray 2 sprays each nostril 1-2x daily as needed for nasal congestion (use nightly for first 2 week) 30 mL 11     blood glucose (ACCU-CHEK GUIDE) test strip 1 strip by In Vitro route 2 times daily Use to test blood sugar 2 times daily or as directed. 200 strip 3     cetirizine (ZYRTEC) 10 MG tablet Take 10 mg by mouth daily       Continuous Blood Gluc Sensor (DEXCOM G7 SENSOR) MISC 1 each every 10 days Change sensor every 10 days 3 each 5     dulaglutide (TRULICITY) 1.5 MG/0.5ML pen Inject 1.5 mg Subcutaneous every 7 days 6 mL 0     empagliflozin (JARDIANCE) 25 MG TABS tablet Take 1 tablet (25 mg) by mouth daily 30 tablet 0     famotidine (PEPCID) 20 MG tablet Take 20 mg by mouth 2 times daily       fluticasone-salmeterol (ADVAIR) 500-50 MCG/ACT inhaler Inhale 1 puff into the lungs 2 times daily 60 each 1     hydrochlorothiazide (HYDRODIURIL) 25 MG tablet Take 1 tablet (25 mg) by mouth every morning 90 tablet 3     insulin pen needle (ULTICARE MINI) 31G X 6 MM miscellaneous Use 1 pen needles daily or as directed. 100 each 3     ipratropium - albuterol 0.5 mg/2.5 mg/3 mL (DUONEB) 0.5-2.5 (3) MG/3ML neb solution Take 1 vial (3 mLs) by nebulization every 6 hours as needed for shortness of breath, wheezing or cough 90 mL 0     lidocaine (XYLOCAINE) 5 % external ointment Apply topically as needed for moderate pain 30 g 0     losartan (COZAAR) 100 MG tablet Take 1 tablet (100 mg) by mouth daily 90 tablet 3     metFORMIN (GLUCOPHAGE XR) 500 MG 24 hr tablet Take 4 tablets (2,000 mg) by mouth daily 360 tablet 1     montelukast (SINGULAIR) 10 MG tablet Take 1 tablet (10 mg) by mouth at bedtime 30 tablet 6     naproxen sodium 220 MG capsule Take 440 mg by mouth as needed       nystatin (MYCOSTATIN) 729621 UNIT/GM external powder Apply topically 2 times daily 60 g 0     rosuvastatin (CRESTOR) 20 MG tablet Take 1 tablet (20 mg) by mouth daily 90 tablet 2     tamsulosin  (FLOMAX) 0.4 MG capsule Take 1 capsule (0.4 mg) by mouth daily 90 capsule 1     testosterone (ANDROGEL/TESTIM) 50 MG/5GM (1%) topical gel Place 1 packet (50 mg of testosterone) onto the skin daily Apply to the clean, dry intact skin of the shoulders, upper arms or abdomen. 90 packet 3     traZODone (DESYREL) 50 MG tablet Take 1 tablet (50 mg) by mouth nightly as needed 30 tablet 0     triamcinolone (KENALOG) 0.1 % cream [TRIAMCINOLONE (KENALOG) 0.1 % CREAM] APPLY EXTERNALLY TO THE AFFECTED AREA TWICE DAILY FOR 14 DAYS 45 g 0       Physical Exam:  There were no vitals taken for this visit.  Gen: awake, alert, oriented, no distress  HEENT: nasal turbinates are unremarkable, no oropharyngeal lesions, no cervical or supraclavicular lymphadenopathy  CV: RRR, no M/G/R  Resp: CTAB, no focal crackles or wheezes  Skin: no apparent rashes  Ext: no cyanosis, clubbing or edema  Neuro: alert, nonfocal    Labs:  Reviewed    Eos 900 on CBC 3/11.  CBC from 3/11: normal eos count  [place] allergen panel: elevated IgE to cat dander, dog dander, maple and house khoury dust      Imaging studies:  Personally reviewed    EXAM: CT CHEST W/O CONTRAST  LOCATION: [facility]  DATE: 4/21/2024     INDICATION:  Abnormal chest CT  COMPARISON: 02/20/2024 CT. 08/13/2021 MRI.  TECHNIQUE: CT chest without IV contrast. Multiplanar reformats were obtained. Dose reduction techniques were used.  CONTRAST: None.     FINDINGS:   LUNGS AND PLEURA: Mild bronchial wall thickening with a small amount of debris in the airways. Again seen is a confluent mass in the medial left lower lobe which has not changed in size. On series 5 image 107 this is 4.6 cm AP x 2.1 cm transverse.   Maximum transverse diameter is 2.6 cm. A 7 mm x 6 mm opacity in the medial lingula has not changed (image 154). Otherwise scattered small solid nodules have not changed. There are extensive new ground glass opacities throughout the lungs. There are a few   new  small solid nodules in the lung bases. There is a new small density in the lingula which is favored to represent fat deposition and atelectasis (series 5 image 168).      MEDIASTINUM/AXILLAE: Small hiatal hernia.     CORONARY ARTERY CALCIFICATION: Severe.     UPPER ABDOMEN: Lobular foci in the upper right and left kidney have not changed. On the comparison MRI study these were found to be benign. No follow-up required.     MUSCULOSKELETAL: Degenerative change in the spine.                                                                      IMPRESSION:   1.  The confluent mass in the medial left lower lobe has not changed in size. Other pre-existing scattered nodules have not changed. PET/CT is recommended.  2.  New groundglass opacities throughout the lungs. There are a few new solid nodules in the lung bases. These are nonspecific though an infectious or inflammatory process is possible.   3.  Bronchial wall thickening and debris in the airways can be seen with bronchitis.  4.  Severe coronary artery disease.        [Access Center: Persistent left lower lobe mass. Recommend PET/CT.]     This report will be copied to the [facility] to ensure a provider acknowledges the finding. Access Center is available Monday through Friday 8am-3:30 pm.     COMPARISON ADDENDUM:   In addition to the prior chest CT comparisons from 4/21/2024 and 2/20/2024 and PET/CT from 8/13/2021, an additional chest CT from 12/18/2023 is also reviewed. The current masslike consolidative opacity in the left lower lobe was much larger on CT   12/18/2023. Findings have decreased in size since that time, favoring an infectious/inflammatory etiology although remain indeterminate. Continued chest CT imaging surveillance recommended to ensure continued improvement.  END ADDENDUM   Addended by Rufus Burris MD on 5/22/2024  9:22 AM     Study Result    Narrative & Impression   EXAM: PET ONCOLOGY (EYES TO THIGHS)  LOCATION: [facility]  DATE: 5/7/2024     INDICATION: Solitary pulmonary nodule. Masslike consolidation left lower lobe. Other nonspecific abnormal finding of lung field.  COMPARISON: CT chest 4/21/2024, 2/20/2024, PET/CT 8/13/2021  TECHNIQUE: Serum glucose level 152 mg/dL. One hour post intravenous administration of 13.2 mCi F-18 FDG, PET imaging was performed from the skull vertex to mid thighs, utilizing attenuation correction with concurrent axial CT and PET/CT image fusion.   Dose reduction techniques were used.     FINDINGS: An irregularly-shaped masslike consolidative pulmonary opacity in the posteromedial left lower lobe is similar to 4/21/2024 although larger than 8/13/2021, largest component 2.7 x 3.4 x 6.6 cm, and demonstrates increase in marked uptake (SUVmax   7.3, previously 3.2). No FDG avid adenopathy in the chest or elsewhere. No suspicious focal uptake in the liver or skeleton. Normal adrenal glands.     Moderate senescent intracranial changes. Mucous retention cyst right maxillary sinus. Benign sebaceous cyst midline upper back. Moderate atherosclerotic calcifications including the coronary arteries. Mild bandlike scarring or atelectasis in the lower   lungs. Few scattered benign calcified pulmonary granulomas. Small esophageal hiatal hernia. Few small benign bilateral renal cysts, no follow-up needed. Splenule. Small fat-containing umbilical hernia. Moderate colonic diverticulosis, greatest in the   sigmoid region. Mild scattered degenerative changes in the spine.                                                                      IMPRESSION:  1. An enlarging area of masslike consolidation in the posteromedial left lower lobe with increased marked uptake is indeterminate, suspicious for malignancy. This is amenable to percutaneous CT guided biopsy.  2. No evidence of carrie or distant metastasis.       Pulmonary Function Testing  3/14/2024  FEV1 1.68L, 56%  FVC 70%  Ratio 0.6  No BD testing  done  TLC 6.36L, 84%  %  Dlco 95% court for hgb  Flow volume curve suggests obstruction  NB: pt used Advair 4 hours prior to testing    Again, thank you for allowing me to participate in the care of your patient.        Sincerely,        Andrea Davis MD

## 2024-05-28 ENCOUNTER — TELEPHONE (OUTPATIENT)
Dept: PULMONOLOGY | Facility: CLINIC | Age: 74
End: 2024-05-28
Payer: COMMERCIAL

## 2024-05-28 RX ORDER — SODIUM CHLORIDE 9 MG/ML
INJECTION, SOLUTION INTRAVENOUS CONTINUOUS
Status: CANCELLED | OUTPATIENT
Start: 2024-05-28

## 2024-05-28 RX ORDER — LIDOCAINE 40 MG/G
CREAM TOPICAL
Status: CANCELLED | OUTPATIENT
Start: 2024-05-28

## 2024-05-28 RX ORDER — ALBUTEROL SULFATE 0.83 MG/ML
2.5 SOLUTION RESPIRATORY (INHALATION) ONCE
Status: CANCELLED | OUTPATIENT
Start: 2024-05-28 | End: 2024-05-28

## 2024-05-28 NOTE — TELEPHONE ENCOUNTER
Called patient to go over bronchoscopy education. Informed patient that procedure is scheduled for 5/29 at 9am at [facility].  Instructed to arrive at 730am.  Discussed that an IV will be placed and IV sedation will be given.  Biopsies may be done.  Instructed to have nothing to eat after midnight. Ok to have clear liquids until 530am.    Medication instructions: take BP meds in morning, hold metformin the the AM.   Stop Aspirin, Ibuprofen, NSAIDS, coxibs (ie:celebrex)date:  n/a  Stop blood thinner date: n/a    Patient understands that they will need a ride home and someone to stay with them after the procedure.  Patient had no further questions and is agreeable to procedure.  Phone number given for questions.    Cathryn Schwarz RN  Pulmonary Specialty Procedures  [facility]  [phone]

## 2024-05-29 ENCOUNTER — HOSPITAL ENCOUNTER (OUTPATIENT)
Dept: CARDIOLOGY | Facility: HOSPITAL | Age: 74
Discharge: HOME | End: 2024-05-29
Attending: INTERNAL MEDICINE | Admitting: INTERNAL MEDICINE
Payer: COMMERCIAL

## 2024-05-29 VITALS
DIASTOLIC BLOOD PRESSURE: 61 MMHG | BODY MASS INDEX: 30.24 KG/M2 | WEIGHT: 223 LBS | SYSTOLIC BLOOD PRESSURE: 111 MMHG | RESPIRATION RATE: 20 BRPM | OXYGEN SATURATION: 94 % | HEART RATE: 66 BPM | TEMPERATURE: 97.9 F

## 2024-05-29 DIAGNOSIS — J18.1 LUNG CONSOLIDATION (H): Primary | ICD-10-CM

## 2024-05-29 DIAGNOSIS — B37.0 CANDIDIASIS OF MOUTH: ICD-10-CM

## 2024-05-29 DIAGNOSIS — J82.83 EOSINOPHILIC ASTHMA: ICD-10-CM

## 2024-05-29 DIAGNOSIS — J82.83 EOSINOPHILIC ASTHMA: Primary | ICD-10-CM

## 2024-05-29 DIAGNOSIS — R91.8 PULMONARY INFILTRATES: ICD-10-CM

## 2024-05-29 LAB
% LINING CELLS, BODY FLUID: 24 %
APPEARANCE FLD: ABNORMAL
BACTERIA SPEC CULT: NORMAL
C PNEUM DNA SPEC QL NAA+PROBE: NOT DETECTED
CELL COUNT BODY FLUID SOURCE: ABNORMAL
COLOR FLD: ABNORMAL
EOSINOPHIL NFR FLD MANUAL: 4 %
FLUAV H1 2009 PAND RNA SPEC QL NAA+PROBE: NOT DETECTED
FLUAV H1 RNA SPEC QL NAA+PROBE: NOT DETECTED
FLUAV H3 RNA SPEC QL NAA+PROBE: NOT DETECTED
FLUAV RNA SPEC QL NAA+PROBE: NOT DETECTED
FLUBV RNA SPEC QL NAA+PROBE: NOT DETECTED
GLUCOSE BLDC GLUCOMTR-MCNC: 265 MG/DL (ref 70–99)
GRAM STAIN RESULT: NORMAL
GRAM STAIN RESULT: NORMAL
HADV DNA SPEC QL NAA+PROBE: NOT DETECTED
HCOV PNL SPEC NAA+PROBE: NOT DETECTED
HMPV RNA SPEC QL NAA+PROBE: NOT DETECTED
HPIV1 RNA SPEC QL NAA+PROBE: NOT DETECTED
HPIV2 RNA SPEC QL NAA+PROBE: NOT DETECTED
HPIV3 RNA SPEC QL NAA+PROBE: NOT DETECTED
HPIV4 RNA SPEC QL NAA+PROBE: NOT DETECTED
KOH PREPARATION: NORMAL
KOH PREPARATION: NORMAL
LYMPHOCYTES NFR FLD MANUAL: 29 %
M PNEUMO DNA SPEC QL NAA+PROBE: NOT DETECTED
MONOS+MACROS NFR FLD MANUAL: 7 %
NEUTS BAND NFR FLD MANUAL: 36 %
RSV RNA SPEC QL NAA+PROBE: NOT DETECTED
RSV RNA SPEC QL NAA+PROBE: NOT DETECTED
RV+EV RNA SPEC QL NAA+PROBE: NOT DETECTED
WBC # FLD AUTO: 683 /UL

## 2024-05-29 PROCEDURE — 87205 SMEAR GRAM STAIN: CPT | Performed by: INTERNAL MEDICINE

## 2024-05-29 PROCEDURE — 250N000011 HC RX IP 250 OP 636: Performed by: INTERNAL MEDICINE

## 2024-05-29 PROCEDURE — 87581 M.PNEUMON DNA AMP PROBE: CPT | Performed by: INTERNAL MEDICINE

## 2024-05-29 PROCEDURE — 88108 CYTOPATH CONCENTRATE TECH: CPT | Mod: TC | Performed by: INTERNAL MEDICINE

## 2024-05-29 PROCEDURE — 87181 SC STD AGAR DILUTION PER AGT: CPT | Mod: 59 | Performed by: INTERNAL MEDICINE

## 2024-05-29 PROCEDURE — 99152 MOD SED SAME PHYS/QHP 5/>YRS: CPT | Performed by: INTERNAL MEDICINE

## 2024-05-29 PROCEDURE — 999N000260

## 2024-05-29 PROCEDURE — 87102 FUNGUS ISOLATION CULTURE: CPT | Performed by: INTERNAL MEDICINE

## 2024-05-29 PROCEDURE — 82962 GLUCOSE BLOOD TEST: CPT

## 2024-05-29 PROCEDURE — 87076 CULTURE ANAEROBE IDENT EACH: CPT | Performed by: INTERNAL MEDICINE

## 2024-05-29 PROCEDURE — 87210 SMEAR WET MOUNT SALINE/INK: CPT | Performed by: INTERNAL MEDICINE

## 2024-05-29 PROCEDURE — 31624 DX BRONCHOSCOPE/LAVAGE: CPT

## 2024-05-29 PROCEDURE — 31624 DX BRONCHOSCOPE/LAVAGE: CPT | Performed by: INTERNAL MEDICINE

## 2024-05-29 PROCEDURE — 250N000009 HC RX 250: Performed by: INTERNAL MEDICINE

## 2024-05-29 PROCEDURE — 86606 ASPERGILLUS ANTIBODY: CPT

## 2024-05-29 PROCEDURE — 86612 BLASTOMYCES ANTIBODY: CPT

## 2024-05-29 PROCEDURE — 82785 ASSAY OF IGE: CPT

## 2024-05-29 PROCEDURE — 86038 ANTINUCLEAR ANTIBODIES: CPT

## 2024-05-29 PROCEDURE — 89050 BODY FLUID CELL COUNT: CPT | Performed by: INTERNAL MEDICINE

## 2024-05-29 PROCEDURE — 87798 DETECT AGENT NOS DNA AMP: CPT | Performed by: INTERNAL MEDICINE

## 2024-05-29 PROCEDURE — 36415 COLL VENOUS BLD VENIPUNCTURE: CPT

## 2024-05-29 PROCEDURE — 87305 ASPERGILLUS AG IA: CPT | Performed by: INTERNAL MEDICINE

## 2024-05-29 PROCEDURE — 87206 SMEAR FLUORESCENT/ACID STAI: CPT | Performed by: INTERNAL MEDICINE

## 2024-05-29 RX ORDER — ONDANSETRON 4 MG/1
4 TABLET, ORALLY DISINTEGRATING ORAL EVERY 6 HOURS PRN
Status: DISCONTINUED | OUTPATIENT
Start: 2024-05-29 | End: 2024-05-30 | Stop reason: HOSPADM

## 2024-05-29 RX ORDER — NALOXONE HYDROCHLORIDE 0.4 MG/ML
0.2 INJECTION, SOLUTION INTRAMUSCULAR; INTRAVENOUS; SUBCUTANEOUS
Status: DISCONTINUED | OUTPATIENT
Start: 2024-05-29 | End: 2024-05-30 | Stop reason: HOSPADM

## 2024-05-29 RX ORDER — LIDOCAINE HYDROCHLORIDE 20 MG/ML
SOLUTION OROPHARYNGEAL
Status: COMPLETED | OUTPATIENT
Start: 2024-05-29 | End: 2024-05-29

## 2024-05-29 RX ORDER — NALOXONE HYDROCHLORIDE 0.4 MG/ML
0.4 INJECTION, SOLUTION INTRAMUSCULAR; INTRAVENOUS; SUBCUTANEOUS
Status: DISCONTINUED | OUTPATIENT
Start: 2024-05-29 | End: 2024-05-30 | Stop reason: HOSPADM

## 2024-05-29 RX ORDER — SODIUM CHLORIDE 9 MG/ML
INJECTION, SOLUTION INTRAVENOUS CONTINUOUS
Status: DISCONTINUED | OUTPATIENT
Start: 2024-05-29 | End: 2024-05-30 | Stop reason: HOSPADM

## 2024-05-29 RX ORDER — FLUMAZENIL 0.1 MG/ML
0.2 INJECTION, SOLUTION INTRAVENOUS
Status: ACTIVE | OUTPATIENT
Start: 2024-05-29 | End: 2024-05-29

## 2024-05-29 RX ORDER — ONDANSETRON 2 MG/ML
4 INJECTION INTRAMUSCULAR; INTRAVENOUS EVERY 6 HOURS PRN
Status: DISCONTINUED | OUTPATIENT
Start: 2024-05-29 | End: 2024-05-30 | Stop reason: HOSPADM

## 2024-05-29 RX ORDER — FLUCONAZOLE 100 MG/1
100 TABLET ORAL DAILY
Qty: 15 TABLET | Refills: 0 | Status: SHIPPED | OUTPATIENT
Start: 2024-05-29 | End: 2024-06-13

## 2024-05-29 RX ORDER — LIDOCAINE 40 MG/G
CREAM TOPICAL
Status: DISCONTINUED | OUTPATIENT
Start: 2024-05-29 | End: 2024-05-30 | Stop reason: HOSPADM

## 2024-05-29 RX ORDER — LIDOCAINE HYDROCHLORIDE 40 MG/ML
INJECTION, SOLUTION RETROBULBAR
Status: COMPLETED | OUTPATIENT
Start: 2024-05-29 | End: 2024-05-29

## 2024-05-29 RX ORDER — FENTANYL CITRATE 50 UG/ML
INJECTION, SOLUTION INTRAMUSCULAR; INTRAVENOUS
Status: COMPLETED | OUTPATIENT
Start: 2024-05-29 | End: 2024-05-29

## 2024-05-29 RX ORDER — ALBUTEROL SULFATE 0.83 MG/ML
2.5 SOLUTION RESPIRATORY (INHALATION) ONCE
Status: DISCONTINUED | OUTPATIENT
Start: 2024-05-29 | End: 2024-05-30 | Stop reason: HOSPADM

## 2024-05-29 RX ADMIN — FENTANYL CITRATE 25 MCG: 50 INJECTION INTRAMUSCULAR; INTRAVENOUS at 09:20

## 2024-05-29 RX ADMIN — MIDAZOLAM 0.5 MG: 1 INJECTION INTRAMUSCULAR; INTRAVENOUS at 09:11

## 2024-05-29 RX ADMIN — LIDOCAINE HYDROCHLORIDE 5 ML: 20 SOLUTION ORAL; TOPICAL at 09:07

## 2024-05-29 RX ADMIN — LIDOCAINE HYDROCHLORIDE 2 ML: 40 INJECTION, SOLUTION RETROBULBAR; TOPICAL at 09:18

## 2024-05-29 RX ADMIN — FENTANYL CITRATE 50 MCG: 50 INJECTION INTRAMUSCULAR; INTRAVENOUS at 09:16

## 2024-05-29 RX ADMIN — MIDAZOLAM 1 MG: 1 INJECTION INTRAMUSCULAR; INTRAVENOUS at 09:24

## 2024-05-29 RX ADMIN — LIDOCAINE HYDROCHLORIDE 6 ML: 10 INJECTION, SOLUTION EPIDURAL; INFILTRATION; INTRACAUDAL; PERINEURAL at 09:19

## 2024-05-29 RX ADMIN — LIDOCAINE HYDROCHLORIDE 5 ML: 40 INJECTION, SOLUTION RETROBULBAR; TOPICAL at 09:00

## 2024-05-29 RX ADMIN — FENTANYL CITRATE 25 MCG: 50 INJECTION INTRAMUSCULAR; INTRAVENOUS at 09:12

## 2024-05-29 RX ADMIN — MIDAZOLAM 1 MG: 1 INJECTION INTRAMUSCULAR; INTRAVENOUS at 09:15

## 2024-05-29 NOTE — PRE-PROCEDURE
GENERAL PRE-PROCEDURE:   Procedure:  Diagnostic bronchoscopy with BAL  Date/Time:  5/29/2024 8:46 AM    Written consent obtained?: Yes    Risks and benefits: Risks, benefits and alternatives were discussed    Consent given by:  Patient  Patient states understanding of procedure being performed: Yes    Patient's understanding of procedure matches consent: Yes    Procedure consent matches procedure scheduled: Yes    Expected level of sedation:  Moderate  Appropriately NPO:  Yes  Mallampati  :  Grade 1- soft palate, uvula, tonsillar pillars, and posterior pharyngeal wall visible  Lungs:  Lungs clear with good breath sounds bilaterally  Heart:  Normal heart sounds and rate  History & Physical reviewed:  History and physical reviewed and no updates needed  Statement of review:  I have reviewed the lab findings, diagnostic data, medications, and the plan for sedation

## 2024-05-29 NOTE — DISCHARGE INSTRUCTIONS
1. You are required to have someone accompany you home.    2. Rest today. Do not drive or operate machinery today. Over-activity may produce nausea and dizziness.    3. You should follow your normal diet. Drink plenty of fluids. Do not drink any alcoholic beverages for 24 hours. *(Alcohol may interact with the medications you received today).    4. NO HOT FOODS or LIQUIDS FOR 6 HOURS after the procedure.    5. You may have a sore throat or cough, or cough up small amounts of blood. This is normal. These symptoms should resolve in 24 hours.     6. If you have shortness of breath, a temperature above 101.1 degree F for more than 24 hours, or bleeding from your nose or throat. Call your doctor or go to the Emergency Room with any of these signs.    7. If you have further questions call your doctor               Andrea Davis [phone]

## 2024-05-29 NOTE — PROGRESS NOTES
Post-bronchoscopy update    Lester is recovering well post-bronchoscopy. Vitals stable. He is a bit groggy from the sedation but easily arousable.  The distal airways were quite inflamed and irritable with some edema consistent with diffuse mucosal inflammation. See procedure note for details. Thrush was seen at the base of the tongue.     Will give fluconazole 100mg PO daily for oral thrush.  Advised Lester to make sure he rinse/gargles/spits with tap water every use of his steroid inhaler.     Will check fungal antibodies, IgE level  ANCA and ROSEANN ordered previously; not done. Will have lab collect these before he leaves today.    Follow up with allergy/immunology and pulmonary clinic as scheduled.    Andrea ([place]) MD Ryan  [facility] Pulmonary & Critical Care ([facility])  Clinic [phone]  Fax [phone]

## 2024-05-29 NOTE — PROGRESS NOTES
Respiratory Therapy Procedure Note  Lester Shi is a 74 year old male     Pre-Procedure Vitals:  SpO2: 95% on RA  HR: 68  RR: 18    BP: 141/67  ETCO2: 38    Summary of Care during Procedure:   Assisted Dr. Davis during Bronchoscopy done on 5/29/24 in the procedure room.  4cc of 4% lido neb given  Viscous lido x1 given  2cc 4% lido given x1 above cords  2cc 1% lido given x3 below cords  Bronchoscopy procedure was done successfully without any complication.  BAL of LLL was collected      Post-Procedure Vitals:  SpO2: 94% on RA  HR: 69  RR: 18   BP: 128/61  ETCO2: 36     Veronika Borrero, RT 5/29/2024 10:21 AM

## 2024-05-29 NOTE — PROCEDURES
[facility]    Procedure: Bronchoscopy ([facility] Pulmonology Only)    Date/Time: 5/29/2024 9:34 AM    Performed by: Andrea Davis MD  Authorized by: Andrea Davis MD      UNIVERSAL PROTOCOL   Site Marked: NA  Prior Images Obtained and Reviewed:  Yes  Required items: Required blood products, implants, devices and special equipment available    Patient identity confirmed:  Verbally with patient, arm band, provided demographic data and hospital-assigned identification number  Patient was reevaluated immediately before administering moderate or deep sedation or anesthesia  Confirmation Checklist:  Patient's identity using two indicators, relevant allergies, procedure was appropriate and matched the consent or emergent situation and correct equipment/implants were available  Time out: Immediately prior to the procedure a time out was called    Universal Protocol: the Joint Commission Universal Protocol was followed       ANESTHESIA    Anesthesia:  Local infiltration (2cc 4% lidocaine above the cords; 6cc 1% lidocaine above the cords)  Local Anesthetic:  Lidocaine 1% without epinephrine      SEDATION  Patient Sedated: Yes    Sedation:  Fentanyl and midazolam (2.5mg midazolam, 100mcg fentanyl)  Vital signs: Vital signs monitored during sedation      PROCEDURE  Describe Procedure: Posterior/back of the tongue: thrush noted    Vocal cords: normal    Trachea and main megan: normal    Right lung airways: mild to moderate edema and erythema of the bronchial mucosa. No bleeding, masses or endobronchial lesions.    Left lung airways: moderately severe edema of the bronchial mucosa. No bleeding, masses or endobronchial lesions noted.  A BAL was performed in the LLL (attempted to go into the superior segment of the LLL but unable to get good wedge, so the BAL was of the superior and basilar segments).  150cc total NS instilled 25cc of bloody fluid was returned.  The first lavage contained appears bloodier  but this cleared with subsequent lavage; no evidence of alveolar hemorrhage.   Length of time physician/provider present for 1:1 monitoring during sedation: 20   Associated diagnoses: pulmonary infiltrates  Lung consolidation  Severe persistent asthma  eosinophilia       PAST MEDICAL HISTORY:  No pertinent past medical history

## 2024-05-30 LAB
ANA SER QL IF: NEGATIVE
GALACTOMANNAN AG BAL QL: NEGATIVE
GALACTOMANNAN AG SPEC IA-ACNC: 0.18
PATH REPORT.COMMENTS IMP SPEC: NORMAL
PATH REPORT.FINAL DX SPEC: NORMAL
PATH REPORT.GROSS SPEC: NORMAL
PATH REPORT.MICROSCOPIC SPEC OTHER STN: NORMAL

## 2024-05-30 PROCEDURE — 88305 TISSUE EXAM BY PATHOLOGIST: CPT | Mod: 26 | Performed by: INTERNAL MEDICINE

## 2024-05-30 PROCEDURE — 88108 CYTOPATH CONCENTRATE TECH: CPT | Mod: 26 | Performed by: INTERNAL MEDICINE

## 2024-05-31 LAB — P JIROVECII DNA SPEC QL NAA+PROBE: NOT DETECTED

## 2024-06-01 DIAGNOSIS — J40 MORAXELLA CATARRHALIS BRONCHITIS: Primary | ICD-10-CM

## 2024-06-01 DIAGNOSIS — B96.89 MORAXELLA CATARRHALIS BRONCHITIS: Primary | ICD-10-CM

## 2024-06-01 LAB
ASPERGILLUS AB TITR SER CF: NORMAL {TITER}
B DERMAT AB SER-ACNC: 0.5 IV
BACTERIA BRONCH: ABNORMAL
COCCIDIOIDES AB TITR SER CF: NORMAL {TITER}
H CAPSUL MYC AB TITR SER CF: NORMAL {TITER}
H CAPSUL YST AB TITR SER CF: NORMAL {TITER}
IGE SERPL-ACNC: 77 KU/L (ref 0–114)

## 2024-06-01 RX ORDER — AZITHROMYCIN 250 MG/1
TABLET, FILM COATED ORAL
Qty: 6 TABLET | Refills: 0 | Status: SHIPPED | OUTPATIENT
Start: 2024-06-01 | End: 2024-06-06

## 2024-06-03 DIAGNOSIS — I10 ESSENTIAL HYPERTENSION, BENIGN: ICD-10-CM

## 2024-06-03 RX ORDER — LOSARTAN POTASSIUM 100 MG/1
100 TABLET ORAL DAILY
Qty: 90 TABLET | Refills: 2 | Status: SHIPPED | OUTPATIENT
Start: 2024-06-03

## 2024-06-03 NOTE — TELEPHONE ENCOUNTER
Pending Prescriptions:                       Disp   Refills    losartan (COZAAR) 100 MG tablet           90 tab*3            Sig: Take 1 tablet (100 mg) by mouth daily    Pharmacy:    [id number] - [facility] [address] [facility] [address]

## 2024-06-04 ENCOUNTER — VIRTUAL VISIT (OUTPATIENT)
Dept: INTERNAL MEDICINE | Facility: CLINIC | Age: 74
End: 2024-06-04
Payer: COMMERCIAL

## 2024-06-04 DIAGNOSIS — J45.50 SEVERE PERSISTENT ASTHMA WITHOUT COMPLICATION (H): ICD-10-CM

## 2024-06-04 DIAGNOSIS — J18.1 LUNG CONSOLIDATION (H): Primary | ICD-10-CM

## 2024-06-04 DIAGNOSIS — E11.36 TYPE 2 DIABETES MELLITUS WITH DIABETIC CATARACT, WITHOUT LONG-TERM CURRENT USE OF INSULIN (H): ICD-10-CM

## 2024-06-04 PROCEDURE — 99443 PR PHYSICIAN TELEPHONE EVALUATION 21-30 MIN: CPT | Mod: 93 | Performed by: NURSE PRACTITIONER

## 2024-06-04 NOTE — PROGRESS NOTES
Lester is a 74 year old who is being evaluated via a billable telephone visit.    What phone number would you like to be contacted at? [phone]  How would you like to obtain your AVS? Sabrina  Originating Location (pt. Location): Home    Distant Location (provider location):  On-site    Assessment & Plan     Lung consolidation (H24)  Lester and I talked about his abnormal CT scans as well as his bronchoscopy.    I apologized for having him proceed with the IR referral for biopsy.    Fortunately, his pulmonologist has been working closely with him.  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  He finished his prescription for the azithromycin and is feeling a bit better from a respiratory standpoint.    Severe persistent asthma without complication (H28)  We maxed out his Advair at his last appointment.  He continues on Singulair.    He understands that his small chest cavity places him at increased risk of complications from a respiratory standpoint.    The goal is to try and minimize his asthma exacerbations as much as possible; he has had to rely on prednisone bursts multiple times over this last year and this has driven up his blood sugars considerably.    His respiratory status has improved since I last spoke with him.    Type 2 diabetes mellitus with diabetic cataract, without long-term current use of insulin (H)  He cannot find the Trulicity.  He continues on metformin and Jardiance.  He has a follow-up appointment with my pharmacist colleague coming up in about 2 weeks.    Had been on Ozempic in the past.  Looks like this was stopped due to gastritis.  He has also been on Victoza but it looks like the insurance will not cover that.                    Subjective   Lester is a 74 year old, presenting for the following health issues:  Follow Up (Bronchoscopy follow - still SOB )    History of Present Illness       Reason for visit:  Follow up    He eats 0-1 servings of fruits and vegetables  daily.He consumes 1 sweetened beverage(s) daily.He exercises with enough effort to increase his heart rate 9 or less minutes per day.  He exercises with enough effort to increase his heart rate 3 or less days per week.   He is taking medications regularly.                   Objective           Vitals:  No vitals were obtained today due to virtual visit.    Physical Exam   General: Alert and no distress //Respiratory: No audible wheeze, cough, or shortness of breath // Psychiatric:  Appropriate affect, tone, and pace of words            Phone call duration: 26 minutes  Signed Electronically by: Garret Pope, CNP

## 2024-06-11 ENCOUNTER — OFFICE VISIT (OUTPATIENT)
Dept: ALLERGY | Facility: CLINIC | Age: 74
End: 2024-06-11
Payer: COMMERCIAL

## 2024-06-11 VITALS
HEART RATE: 69 BPM | RESPIRATION RATE: 20 BRPM | WEIGHT: 223 LBS | HEIGHT: 72 IN | BODY MASS INDEX: 30.2 KG/M2 | OXYGEN SATURATION: 98 %

## 2024-06-11 DIAGNOSIS — J82.83 EOSINOPHILIC ASTHMA: ICD-10-CM

## 2024-06-11 DIAGNOSIS — J45.50 SEVERE PERSISTENT ASTHMA WITHOUT COMPLICATION (H): Primary | ICD-10-CM

## 2024-06-11 DIAGNOSIS — J30.1 SEASONAL ALLERGIC RHINITIS DUE TO POLLEN: ICD-10-CM

## 2024-06-11 PROCEDURE — 99204 OFFICE O/P NEW MOD 45 MIN: CPT | Performed by: ALLERGY & IMMUNOLOGY

## 2024-06-11 RX ORDER — DUPILUMAB 300 MG/2ML
300 INJECTION, SOLUTION SUBCUTANEOUS
Qty: 4 ML | Refills: 2 | Status: SHIPPED | OUTPATIENT
Start: 2024-06-11

## 2024-06-11 ASSESSMENT — ASTHMA QUESTIONNAIRES
ACT_TOTALSCORE: 12
ACT_TOTALSCORE: 12
QUESTION_3 LAST FOUR WEEKS HOW OFTEN DID YOUR ASTHMA SYMPTOMS (WHEEZING, COUGHING, SHORTNESS OF BREATH, CHEST TIGHTNESS OR PAIN) WAKE YOU UP AT NIGHT OR EARLIER THAN USUAL IN THE MORNING: ONCE A WEEK
HOSPITALIZATION_OVERNIGHT_LAST_YEAR_TOTAL: ONE
QUESTION_4 LAST FOUR WEEKS HOW OFTEN HAVE YOU USED YOUR RESCUE INHALER OR NEBULIZER MEDICATION (SUCH AS ALBUTEROL): ONE OR TWO TIMES PER DAY
QUESTION_2 LAST FOUR WEEKS HOW OFTEN HAVE YOU HAD SHORTNESS OF BREATH: MORE THAN ONCE A DAY
QUESTION_1 LAST FOUR WEEKS HOW MUCH OF THE TIME DID YOUR ASTHMA KEEP YOU FROM GETTING AS MUCH DONE AT WORK, SCHOOL OR AT HOME: SOME OF THE TIME
QUESTION_5 LAST FOUR WEEKS HOW WOULD YOU RATE YOUR ASTHMA CONTROL: SOMEWHAT CONTROLLED
EMERGENCY_ROOM_LAST_YEAR_TOTAL: ONE

## 2024-06-11 NOTE — LETTER
6/11/2024      Lester Shi  [address] [id number]      Dear Colleague,    Thank you for referring your patient, Lester Shi, to the [facility]. Please see a copy of my visit note below.          Subjective  Lester is a 74 year old, presenting for the following health issues:  Asthma Recheck and Asthma Consult (Asthma, referred from DR Davis)    HPI     Chief complaint: Asthma    History of present illness: This is a 74-year-old gentleman accompanied by his wife here today to be seen for asthma.  I was asked to see him for evaluation of asthma by Nohemi Lezama.  Patient has a history of Klinefelter syndrome and diabetes as well as asthma and environmental allergies.  States he was diagnosed with asthma in 1965.  He states his asthma had been well-controlled into the last few years.  This year his asthma is been worse.  Every December he seems to develop pneumonia and this last year he had pneumonia in the left lower lung opacity that was discovered on CT scan.  This seemed to flare his asthma and he was hospitalized once and then has been in the emergency room multiple times.  Around that time he was also diagnosed with influenza.  He states he had difficulty recovering from these illnesses and he states he is very short of breath even with small exertion.  He has had to be on prednisone multiple times in the last 6 months.  Prednisone does alleviate the symptoms temporarily.  Recently underwent bronchoscopy with BAL and was found to have Moraxella.  He was treated with azithromycin and feels better as a result but continues to have shortness of breath.  He is currently using Wixela 500/51 puff twice daily.  Has not been on an anticholinergic but has used ipratropium as needed in the past for rescue.  He is here today to discuss biologic therapy.  He has had eosinophils in the past that were as high as 900.  Environmental allergy panel showed that he was  positive for dust cats dogs and tree pollen.  Recent total IgE was 77.  Most recent eosinophil count was 500.  Most recent pulmonary function test showed air trapping, bronchodilator testing was not performed, he had a mixed obstructive and restrictive physiology with a mildly reduced FEV1.  DLCO was normal.  CT of the chest in April showed a confluent mass in the medial left lower lobe unchanged in size, groundglass opacities throughout the lungs and bronchial wall thickening.  Lung biopsy was unable to be completed.      Past medical history: Spinal stenosis, type 2 diabetes, hyperlipidemia, hypertension    Social history: [place] is , [place], no [place], non-smoker, former smoker but when he was much younger    Family history negative for allergies and asthma          Objective   Pulse 69   Resp 20   Ht 1.829 m (6')   Wt 101.2 kg (223 lb)   SpO2 98%   BMI 30.24 kg/m    Body mass index is 30.24 kg/m .  Physical Exam   Gen: Pleasant male not in acute distress  HEENT: Eyes no erythema of the bulbar or palpebral conjunctiva, no edema. Nose: No congestion, mucosa normal. Mouth: Throat clear, no lip or tongue edema.   Respiratory: Clear to auscultation bilaterally, no adventitious breath sounds  Skin: No rashes or lesions  Psych: Alert and oriented times 3    Impression report and plan:  1.  Severe persistent asthma  2.  Allergic rhinitis    I am concerned that his breathing symptoms are not only due to asthma but he likely does have an asthma component given his previous eosinophilia.  Recommended a 3-month trial of Dupixent.  300 mg every 2 weeks.  Loading dose of 600 mg.  Reviewed risk of eye irritation and eosinophilic granulomatous polyangitis and rare risk of cutaneous T-cell lymphoma.      Time spent with patient, chart review and documentation, 51 minutes on date of service.  Signed Electronically by: Carlie CORETZ MD        Again, thank you for allowing me to participate in the care of your  patient.        Sincerely,        Carlie CORTEZ MD

## 2024-06-11 NOTE — PROGRESS NOTES
Salvador Batista is a 74 year old, presenting for the following health issues:  Asthma Recheck and Asthma Consult (Asthma, referred from DR Davis)    HPI     Chief complaint: Asthma    History of present illness: This is a 74-year-old gentleman accompanied by his wife here today to be seen for asthma.  I was asked to see him for evaluation of asthma by Nohemi Lezama.  Patient has a history of Klinefelter syndrome and diabetes as well as asthma and environmental allergies.  States he was diagnosed with asthma in 1965.  He states his asthma had been well-controlled into the last few years.  This year his asthma is been worse.  Every December he seems to develop pneumonia and this last year he had pneumonia in the left lower lung opacity that was discovered on CT scan.  This seemed to flare his asthma and he was hospitalized once and then has been in the emergency room multiple times.  Around that time he was also diagnosed with influenza.  He states he had difficulty recovering from these illnesses and he states he is very short of breath even with small exertion.  He has had to be on prednisone multiple times in the last 6 months.  Prednisone does alleviate the symptoms temporarily.  Recently underwent bronchoscopy with BAL and was found to have Moraxella.  He was treated with azithromycin and feels better as a result but continues to have shortness of breath.  He is currently using Wixela 500/51 puff twice daily.  Has not been on an anticholinergic but has used ipratropium as needed in the past for rescue.  He is here today to discuss biologic therapy.  He has had eosinophils in the past that were as high as 900.  Environmental allergy panel showed that he was positive for dust cats dogs and tree pollen.  Recent total IgE was 77.  Most recent eosinophil count was 500.  Most recent pulmonary function test showed air trapping, bronchodilator testing was not performed, he had a mixed obstructive and  restrictive physiology with a mildly reduced FEV1.  DLCO was normal.  CT of the chest in April showed a confluent mass in the medial left lower lobe unchanged in size, groundglass opacities throughout the lungs and bronchial wall thickening.  Lung biopsy was unable to be completed.      Past medical history: Spinal stenosis, type 2 diabetes, hyperlipidemia, hypertension    Social history: [place] is , [place], no [place], non-smoker, former smoker but when he was much younger    Family history negative for allergies and asthma          Objective    Pulse 69   Resp 20   Ht 1.829 m (6')   Wt 101.2 kg (223 lb)   SpO2 98%   BMI 30.24 kg/m    Body mass index is 30.24 kg/m .  Physical Exam   Gen: Pleasant male not in acute distress  HEENT: Eyes no erythema of the bulbar or palpebral conjunctiva, no edema. Nose: No congestion, mucosa normal. Mouth: Throat clear, no lip or tongue edema.   Respiratory: Clear to auscultation bilaterally, no adventitious breath sounds  Skin: No rashes or lesions  Psych: Alert and oriented times 3    Impression report and plan:  1.  Severe persistent asthma  2.  Allergic rhinitis    I am concerned that his breathing symptoms are not only due to asthma but he likely does have an asthma component given his previous eosinophilia.  Recommended a 3-month trial of Dupixent.  300 mg every 2 weeks.  Loading dose of 600 mg.  Reviewed risk of eye irritation and eosinophilic granulomatous polyangitis and rare risk of cutaneous T-cell lymphoma.      Time spent with patient, chart review and documentation, 51 minutes on date of service.  Signed Electronically by: Carlie CORTEZ MD

## 2024-06-12 ENCOUNTER — TELEPHONE (OUTPATIENT)
Dept: ALLERGY | Facility: CLINIC | Age: 74
End: 2024-06-12
Payer: COMMERCIAL

## 2024-06-12 NOTE — TELEPHONE ENCOUNTER
PA Initiation    Medication: DUPIXENT 300 MG/2ML SC SOPN  Insurance Company: [employer] Part D - Phone [phone] Fax [phone]  Pharmacy Filling the Rx:    Filling Pharmacy Phone:    Filling Pharmacy Fax:    Start Date: 6/12/2024    : [id number])        NEFTALY Escobar, [facility]  Specialty Pharmacy Clinic Liaison     [facility]    [email or web address]     Phone: [phone]  Fax: [phone]

## 2024-06-13 NOTE — TELEPHONE ENCOUNTER
Prior Authorization Approval    Medication: DUPIXENT 300 MG/2ML SC SOPN  Authorization Effective Date: 6/13/2024  Authorization Expiration Date: 12/12/2024  Approved Dose/Quantity: 600mg and maintenance   Reference #: : [id number]   Insurance Company: [employer] - Phone [phone] Fax [phone]  Expected CoPay: $    CoPay Card Available: No    Financial Assistance Needed: will discss pap  Which Pharmacy is filling the prescription: [place] [facility] [address]    Start Date: 6/12/2024  High Patient Pay Amount:  The final Patient Pay Amount ($1231.93) exceeds  the max limit for the High Patient Pay Threshold ($100.00      NEFTALY Escobar, [facility]  Specialty Pharmacy Clinic Liaison     [facility]    [email or web address]     Phone: [phone]  Fax: [phone]

## 2024-06-20 ENCOUNTER — OFFICE VISIT (OUTPATIENT)
Dept: PHARMACY | Facility: CLINIC | Age: 74
End: 2024-06-20
Attending: NURSE PRACTITIONER
Payer: COMMERCIAL

## 2024-06-20 VITALS
DIASTOLIC BLOOD PRESSURE: 70 MMHG | BODY MASS INDEX: 30.08 KG/M2 | HEART RATE: 64 BPM | SYSTOLIC BLOOD PRESSURE: 140 MMHG | WEIGHT: 221.8 LBS

## 2024-06-20 DIAGNOSIS — Z79.4 TYPE 2 DIABETES MELLITUS WITH DIABETIC CATARACT, WITH LONG-TERM CURRENT USE OF INSULIN (H): Primary | ICD-10-CM

## 2024-06-20 DIAGNOSIS — J45.31 MILD PERSISTENT ASTHMA WITH ACUTE EXACERBATION: ICD-10-CM

## 2024-06-20 DIAGNOSIS — R35.0 BENIGN PROSTATIC HYPERPLASIA WITH URINARY FREQUENCY: ICD-10-CM

## 2024-06-20 DIAGNOSIS — E78.5 HYPERLIPIDEMIA, UNSPECIFIED HYPERLIPIDEMIA TYPE: ICD-10-CM

## 2024-06-20 DIAGNOSIS — L30.9 ECZEMA, UNSPECIFIED TYPE: ICD-10-CM

## 2024-06-20 DIAGNOSIS — I10 PRIMARY HYPERTENSION: ICD-10-CM

## 2024-06-20 DIAGNOSIS — E11.36 TYPE 2 DIABETES MELLITUS WITH DIABETIC CATARACT, WITH LONG-TERM CURRENT USE OF INSULIN (H): Primary | ICD-10-CM

## 2024-06-20 DIAGNOSIS — G47.00 INSOMNIA, UNSPECIFIED TYPE: ICD-10-CM

## 2024-06-20 DIAGNOSIS — M25.569 ARTHRALGIA OF LOWER LEG, UNSPECIFIED LATERALITY: ICD-10-CM

## 2024-06-20 DIAGNOSIS — N40.0 BPH (BENIGN PROSTATIC HYPERPLASIA): ICD-10-CM

## 2024-06-20 DIAGNOSIS — N40.1 BENIGN PROSTATIC HYPERPLASIA WITH URINARY FREQUENCY: ICD-10-CM

## 2024-06-20 DIAGNOSIS — R12 HEARTBURN: ICD-10-CM

## 2024-06-20 PROCEDURE — 99207 PR NO CHARGE LOS: CPT

## 2024-06-20 RX ORDER — IBUPROFEN 200 MG
400 TABLET ORAL EVERY 4 HOURS PRN
COMMUNITY

## 2024-06-20 RX ORDER — PHENOL 1.4 %
20 AEROSOL, SPRAY (ML) MUCOUS MEMBRANE
COMMUNITY

## 2024-06-20 NOTE — PROGRESS NOTES
Medication Therapy Management (MTM) Encounter    ASSESSMENT:                            Medication Adherence/Access: See below for considerations    Diabetes   Patient is not meeting A1c goal of < 8% given age and comorbidities. Self monitoring of blood glucose is not at goal of fasting  mg/dL.  Microalbumin is at goal < 30mg/g. Recommend restarting Trulicity at this time. Due for A1c but will defer to next visit once he restarts Trulicity. Recommended bringing Dexcom to follow-up to go over use and functions.    Hypertension   Patient not at goal of <140/90. Recommend home monitoring and may need to restart amlodipine if continues to be elevated. Kidney function and electrolytes are WNL.       Hyperlipidemia   Stable. Lipids WNL and LDL at goal <70.    Asthma   Symptoms are improved from recent infections and exacerbations and relatively well-managed on as needed albuterol and maintenance Advair max dose. Follows with pulmonologist and recently allergist who recommended Dupixent. He may qualify for a cesar.      GERD    Stable.      BPH:  Can discuss symptoms at follow-up and consider dose increase if needed.     Insomnia:   Stable.     Joint Pain:  Controlled with as needed pain medications. Continue to work to A1c goal <8.5% (<8% ideally) so he can proceed with knee surgery.     Eczema:  Stable.     PLAN:                            Check blood pressure at home. Restart amlodipine if your blood pressure is consistently greater than 140/90.   Start Trulicity 0.75mg every week again.  Bring in Dexcom and blood pressure cuff to next visit    Follow-up: Return in 2 months (on 8/19/2024) for Follow up, with me.    SUBJECTIVE/OBJECTIVE:                          Lester Shi is a 74 year old male seen for a follow-up visit from 7/17/23.       Reason for visit: comprehensive medication review, diabetes management     Allergies/ADRs: Reviewed in chart  Past Medical History: Reviewed in chart  Tobacco: He reports that  he quit smoking about 45 years ago. His smoking use included cigarettes. He started smoking about 57 years ago. He has a 24 pack-year smoking history. He has never been exposed to tobacco smoke. He has never used smokeless tobacco.  Alcohol: Less than 1 beverage / month  Caffeine: maybe once a day    Medication Adherence/Access: Forgets to take Advair about once a week. Currently in donut hole - getting 1 month of Jardiance at a time instead of 3 months.    Diabetes   Metformin XR 2000mg daily - occasional diarrhea though patient is not sure if this is necessarily related to the medication   Jardiance 25mg daily  Trulicity 1.5mg weekly - stopped a couple months ago due to supply issues    Checking blood glucose once daily in the mornins-180s. He says blood glucose was 120-130s when he was on Trulicity    Diet/Exercise: ice cream a couple times a month    Medication history:  Did not tolerate Ozempic. Victoza not covered.  Was on prednisone last year for asthma exacerbation that raised his blood glucose.      Eye exam in the last 12 months? Due soon  Foot exam: due    Hypertension   Losartan 100mg daily   Hydrochlorothiazide 25mg daily  Atenolol 100mg daily    Stopped amlodipine on his own a couple months ago because blood pressure was running low - 120s/70s and symptomatic. He stopped all of his blood pressure medications a couple weeks ago but blood pressure increased to 156/77 so he restarted them a few days ago (except amlodipine). Denies dizziness now.     Patient self monitors blood pressure.  Home BP monitoring 136/70 since restarting medications .       Hyperlipidemia   Rosuvastatin 20mg once daily   Patient reports no significant myalgias or other side effects.     Asthma   Albuterol HFA as needed - uses once a day  Albuterol nebs as needed - uses if he is wheezing, usually in the morning and at bedtime   Advair 500-50mcg twice daily   Montelukast 10mg at bedtime   Azelastine 0.1% nasal spray as needed  "  Zyrtec 10mg daily only during spring    Per chart, \"This year his asthma is been worse. Every December he seems to develop pneumonia and this last year he had pneumonia in the left lower lung opacity that was discovered on CT scan. This seemed to flare his asthma and he was hospitalized once and then has been in the emergency room multiple times. Around that time he was also diagnosed with influenza. He states he had difficulty recovering from these illnesses and he states he is very short of breath even with small exertion. He has had to be on prednisone multiple times in the last 6 months. Prednisone does alleviate the symptoms temporarily. Recently underwent bronchoscopy with BAL and was found to have Moraxella. He was treated with azithromycin and feels better as a result but continues to have shortness of breath.\"    Saw allergist recently, was recommended to start Dupixent but this is too expensive. Today he reports he feels 80% better than he was. Still winded occasionally from walking or increased physical activity. Overall, he is comfortable continuing on nebs as needed for symptoms.  He also has some lung nodules but was recommended to not get anymore CT scans due to radiation. He is not sure how they will follow-up on monitoring.   Patient forgets to rinse their mouth after using steroid inhaler. He had thrush in esophagus which has been treated now.      GERD    Famotidine 20 twice daily as needed   No issues reported today.     BPH:  Tamsulosin 0.4mg daily  Recently started for urinary frequency. Today he still reports urinary frequency though did not specifically assess symptoms improvement.   No side effects reported today.    Insomnia:   Trazodone 50mg at bedtime - uses twice a week on average  Melatonin 10-20mg at bedtime - uses melatonin more often than trazodone    Joint Pain:  Lidocaine ointment as needed   Ibuprofen 400mg as needed for muscle pain/knee pain  He wants to get knee surgery " however says they want his A1c to be <8.5% before they will proceed.     Eczema:  Nystatin powder  Triamcinolone 0.1% cream twice daily   Patient uses as needed    Today's Vitals: BP (!) 140/70   Pulse 64   Wt 221 lb 12.8 oz (100.6 kg)   BMI 30.08 kg/m    ----------------      I spent 45 minutes with this patient today. All changes were made via collaborative practice agreement with Garret Pope CNP. A copy of the visit note was provided to the patient's provider(s).    A summary of these recommendations was given to the patient.    Lissa Victor, PharmD  Medication Therapy Management (MTM) Pharmacist       Medication Therapy Recommendations  Type 2 diabetes mellitus with diabetic cataract, with long-term current use of insulin (H)    Rationale: Synergistic therapy - Needs additional medication therapy - Indication   Recommendation: Start Medication - TRULICITY SC   Status: Accepted per CPA

## 2024-06-20 NOTE — PATIENT INSTRUCTIONS
"Recommendations from today's MTM visit:                                                         Check blood pressure at home. Restart amlodipine if your blood pressure is consistently greater than 140/90.   Start Trulicity 0.75mg every week again.  Bring in Dexcom and blood pressure cuff to next visit       Follow-up:   Appointments in Next Year      Jun 28, 2024 11:00 AM  (Arrive by 10:45 AM)  Return Asthma Visit with REAL Kolb CNP  [facility] [place] ([facility]) [phone]     Jul 10, 2024 9:20 AM  (Arrive by 9:05 AM)  US AORTA/IVC/ILIAC DUPLEX COMPLETE with WB[facility] VC US 1  [facility] [place] ([facility] ) [phone]     Jul 10, 2024 10:00 AM  (Arrive by 9:45 AM)  Return Vascular Patient with Alvardao Penaloza MD  [facility] ([facility] ) [phone]     Aug 19, 2024 10:00 AM  Pharmacist Visit with Lissa Victor RPH  [facility] ([facility] ) [phone]     Oct 04, 2024 10:30 AM  (Arrive by 10:15 AM)  Return Patient with Andrea Davis MD  [facility] ([facility]) [phone]            It was great speaking with you today.  I value your experience and would be very thankful for your time in providing feedback in our clinic survey. In the next few days, you may receive an email or text message from [facility] [employer] with a link to a survey related to your  clinical pharmacist.\"     To schedule another MTM appointment, please call the clinic directly or you may call the MTM scheduling line at [phone].    My Clinical Pharmacist's contact information:                                                      Please feel free to contact me with any questions or concerns you have.      Lissa Victor, PharmRODRIGO  Medication Therapy Management (MTM) " Pharmacist

## 2024-06-26 LAB
BACTERIA BRONCH: NO GROWTH
BACTERIA BRONCH: NO GROWTH

## 2024-06-28 ENCOUNTER — OFFICE VISIT (OUTPATIENT)
Dept: PULMONOLOGY | Facility: CLINIC | Age: 74
End: 2024-06-28
Attending: INTERNAL MEDICINE
Payer: COMMERCIAL

## 2024-06-28 VITALS
DIASTOLIC BLOOD PRESSURE: 81 MMHG | BODY MASS INDEX: 30.11 KG/M2 | SYSTOLIC BLOOD PRESSURE: 153 MMHG | HEART RATE: 79 BPM | WEIGHT: 222 LBS | OXYGEN SATURATION: 97 %

## 2024-06-28 DIAGNOSIS — J40 MORAXELLA CATARRHALIS BRONCHITIS: ICD-10-CM

## 2024-06-28 DIAGNOSIS — B96.89 MORAXELLA CATARRHALIS BRONCHITIS: ICD-10-CM

## 2024-06-28 DIAGNOSIS — J45.40 MODERATE PERSISTENT ASTHMA, UNCOMPLICATED: Primary | ICD-10-CM

## 2024-06-28 PROCEDURE — 99214 OFFICE O/P EST MOD 30 MIN: CPT | Performed by: NURSE PRACTITIONER

## 2024-06-28 RX ORDER — IPRATROPIUM BROMIDE AND ALBUTEROL SULFATE 2.5; .5 MG/3ML; MG/3ML
1 SOLUTION RESPIRATORY (INHALATION) EVERY 6 HOURS PRN
Qty: 90 ML | Refills: 3 | Status: SHIPPED | OUTPATIENT
Start: 2024-06-28 | End: 2024-06-28

## 2024-06-28 RX ORDER — IPRATROPIUM BROMIDE AND ALBUTEROL SULFATE 2.5; .5 MG/3ML; MG/3ML
1 SOLUTION RESPIRATORY (INHALATION) EVERY 6 HOURS PRN
Qty: 540 ML | Refills: 3 | Status: SHIPPED | OUTPATIENT
Start: 2024-06-28

## 2024-06-28 NOTE — PROGRESS NOTES
Pulmonary Clinic Outpatient Clinic Visit  June 28, 2024    Assessment and Plan:   74M with a history of obesity, DM, HLD, Klinefelter's syndrome, seasonal allergies, recent hospitalization for LLL pneumonia and influenza and asthma, presents for follow up. His PCP has been doing serial CT scans, a PET/CT and ordered a lung biopsy which was unable to be done due to hyperglycemia and wheezing.  Imaging then showed the LLL opacity had actually decreased significantly in size since Dec 2023. It appears that the recent scans from 2024 were not compared to this December scan. Given poor asthma control a bronchoscopy with BAL was performed which grewMoraxella catarrhalis and actinomyces. He was treated with azithromycin.  ROSEANN and fungal antibodies negative.  Recently evaluated by Dr. 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  Out-of-pocket cost for this was over $1200 per month.    As noted previously: PFTs showed moderate obstruction; unfortunately no bronchodilator testing was done. Currently he is asymptomatic and doing very well on ICS/LABA with rare rescue inhaler use. His eosinophils were elevated to 900 on a previous CBC, IgE normal at 77. 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   Past FeNO was normal at 15ppb.    Recommendations:  - continue high dose Wixela BID. Rinse/gargle/spit after use  - continue albuterol inhaler vs. Neb as needed.  Requesting DuoNeb refilled today.   - encouraged to exercise and remain active.  - continue montelukast 10mg daily for allergy control  - he will reach out to insurance to see what the out-of-pocket cost for a LAMA or triple therapy with Trelegy or Breztri is.  He is keen to increase inhaler regimen if his return of shortness of breath and chest tightness is not related to lung opacities.  I recommended he reach out to Dr. Rendon to let her know that Dupixent was not covered under his insurance formulary.  - CT chest 6 to 8  weeks out from antibiotic treatment to assess for resolution of opacities.  We discussed continued monitoring based on symptoms but given he has worsened in the last week he would like further imaging.  - UTD with PCV20. Declined flu shot last visit. Defer additional discussions to his PCP.     Follow up as planned with Dr. Davis     Action plan: prednisone 40mg daily x5 days + z-pack    Paty Carlisle, CNP  Pulmonary Medicine  [facility]   [phone]    CCx: asthma, history of recurrent pneumonia  Chief Complaint   Patient presents with    Follow Up     1 month follow up:  Pulmonary infiltrates     HPI:   Lester was last seen in clinic by Dr. Davis on 5/24. Since then had a bronchoscopy with BAL for assessment of his persistent pulmonary infiltrates on 5/29. During procedure thrush was noted so he was given fluconazole 100 mg daily x 15 days.  Reminded to rinse and gargle after his ICS inhaler.  BAL culture showed Moraxella catarrhalis and actinomyces susceptible to azithromycin which was prescribed x 5 days.   He reports feeling great for a few weeks following completion of antibiotics.  Unfortunately, he has noticed increased shortness of breath and chest tightness with activity over the last week or so.  He has been using albuterol nebs twice daily with relief.  Continues on Wixela twice daily.    Multiple asthma exacerbations this year requiring short courses of prednisone.   His PCP has increased his ICS/LABA dose.  He says the allergy season makes his asthma worse.     ROS:  A 10-system review was obtained and was negative with the exception of the symptoms endorsed in the history of present illness.    PMH:  Past Medical History:   Diagnosis Date    Anemia, unspecified     Created by Conversion     Cataract     left eye removed, present in right eye    Chronic osteoarthritis     right knee    Colon polyps     Contact dermatitis and other eczema, due to unspecified cause     Created by Conversion      Diabetes mellitus, type 2 (H)     Klinefelter's syndrome     Created by Conversion     Obesity, unspecified     Created by Conversion     Pure hypercholesterolemia     Created by Conversion     Type II or unspecified type diabetes mellitus without mention of complication, not stated as uncontrolled     Created by Conversion     Uncomplicated asthma     adult asthma- wheezing    Unspecified essential hypertension     Created by Conversion     Varicose vein 2015      PSH:  Past Surgical History:   Procedure Laterality Date    CATARACT EXTRACTION Left 2000    DENTAL SURGERY  1985    JOINT REPLACEMENT      left knee TKA 2013    ORTHOPEDIC SURGERY       Allergies:  Allergies   Allergen Reactions    Amoxicillin Anaphylaxis    Codeine Nausea and Vomiting    Sulfamethoxazole-Trimethoprim [Sulfamethoxazole-Trimethoprim] Nausea and Vomiting     Tachycardia      Levaquin [Levofloxacin] Nausea and Vomiting    Ozempic (0.25 Or 0.5 Mg-Dose) [Semaglutide(0.25 Or 0.5mg-Dos)] GI Disturbance    Seasonal Allergies Other (See Comments)     SPRINGTIME Nasal, bronchial    Simvastatin Muscle Pain (Myalgia)     Family HX:  Family History   Problem Relation Age of Onset    Cerebrovascular Disease Mother     Clotting Disorder Father     Heart Disease Brother      Social Hx:  Social History     Socioeconomic History    Marital status:      Spouse name: Not on file    Number of children: Not on file    Years of education: Not on file    Highest education level: Not on file   Occupational History    Not on file   Tobacco Use    Smoking status: Former     Current packs/day: 0.00     Average packs/day: 2.0 packs/day for 12.0 years (24.0 ttl pk-yrs)     Types: Cigarettes     Start date: 1967     Quit date: 1979     Years since quittin.5     Passive exposure: Never    Smokeless tobacco: Never   Vaping Use    Vaping status: Never Used   Substance and Sexual Activity    Alcohol use: Yes     Comment: Alcoholic Drinks/day:  rare    Drug use: No    Sexual activity: Yes     Partners: Female   Other Topics Concern    Not on file   Social History Narrative    Not on file     Social Determinants of Health     Financial Resource Strain: High Risk (12/25/2023)    Financial Resource Strain     Within the past 12 months, have you or your family members you live with been unable to get utilities (heat, electricity) when it was really needed?: Yes   Food Insecurity: Low Risk  (12/25/2023)    Food Insecurity     Within the past 12 months, did you worry that your food would run out before you got money to buy more?: No     Within the past 12 months, did the food you bought just not last and you didn t have money to get more?: No   Transportation Needs: Low Risk  (12/25/2023)    Transportation Needs     Within the past 12 months, has lack of transportation kept you from medical appointments, getting your medicines, non-medical meetings or appointments, work, or from getting things that you need?: No   Physical Activity: Not on file   Stress: Not on file   Social Connections: Unknown (1/24/2024)    Received from [facility], [facility]    Social Connections     Frequency of Communication with Friends and Family: Not on file   Interpersonal Safety: Low Risk  (1/29/2024)    Interpersonal Safety     Do you feel physically and emotionally safe where you currently live?: Yes     Within the past 12 months, have you been hit, slapped, kicked or otherwise physically hurt by someone?: No     Within the past 12 months, have you been humiliated or emotionally abused in other ways by your partner or ex-partner?: No   Housing Stability: Low Risk  (12/25/2023)    Housing Stability     Do you have housing? : Yes     Are you worried about losing your housing?: No     Current Meds:  Current Outpatient Medications   Medication Sig Dispense Refill    albuterol (PROAIR HFA/PROVENTIL HFA/VENTOLIN HFA) 108  (90 Base) MCG/ACT inhaler Inhale 2 puffs into the lungs every 6 hours as needed for wheezing 8.5 g 1    albuterol (PROVENTIL) (2.5 MG/3ML) 0.083% neb solution Take 1 vial (2.5 mg) by nebulization every 6 hours as needed for shortness of breath, wheezing or cough 90 mL 1    atenolol (TENORMIN) 100 MG tablet Take 1 tablet (100 mg) by mouth daily 90 tablet 2    azelastine (ASTELIN) 0.1 % nasal spray 2 sprays each nostril 1-2x daily as needed for nasal congestion (use nightly for first 2 week) 30 mL 11    blood glucose (ACCU-CHEK GUIDE) test strip 1 strip by In Vitro route 2 times daily Use to test blood sugar 2 times daily or as directed. 200 strip 3    cetirizine (ZYRTEC) 10 MG tablet Take 10 mg by mouth daily      Continuous Blood Gluc Sensor (DEXCOM G7 SENSOR) MISC 1 each every 10 days Change sensor every 10 days 3 each 5    dulaglutide (TRULICITY) 0.75 MG/0.5ML pen Inject 0.75 mg Subcutaneous every 7 days 2 mL 1    empagliflozin (JARDIANCE) 25 MG TABS tablet Take 1 tablet (25 mg) by mouth daily 30 tablet 0    famotidine (PEPCID) 20 MG tablet Take 20 mg by mouth 2 times daily as needed      fluticasone-salmeterol (ADVAIR) 500-50 MCG/ACT inhaler Inhale 1 puff into the lungs 2 times daily 60 each 1    hydrochlorothiazide (HYDRODIURIL) 25 MG tablet Take 1 tablet (25 mg) by mouth every morning 90 tablet 3    ibuprofen (ADVIL/MOTRIN) 200 MG tablet Take 400 mg by mouth every 4 hours as needed for pain      insulin pen needle (ULTICARE MINI) 31G X 6 MM miscellaneous Use 1 pen needles daily or as directed. 100 each 3    ipratropium - albuterol 0.5 mg/2.5 mg/3 mL (DUONEB) 0.5-2.5 (3) MG/3ML neb solution Take 1 vial (3 mLs) by nebulization every 6 hours as needed for shortness of breath, wheezing or cough 90 mL 3    lidocaine (XYLOCAINE) 5 % external ointment Apply topically as needed for moderate pain 30 g 0    losartan (COZAAR) 100 MG tablet Take 1 tablet (100 mg) by mouth daily 90 tablet 2    Melatonin 10 MG TABS tablet  Take 20 mg by mouth nightly as needed for sleep      metFORMIN (GLUCOPHAGE XR) 500 MG 24 hr tablet Take 4 tablets (2,000 mg) by mouth daily 360 tablet 1    montelukast (SINGULAIR) 10 MG tablet Take 1 tablet (10 mg) by mouth at bedtime 30 tablet 6    nystatin (MYCOSTATIN) 594759 UNIT/GM external powder Apply topically 2 times daily 60 g 0    rosuvastatin (CRESTOR) 20 MG tablet Take 1 tablet (20 mg) by mouth daily 90 tablet 2    tamsulosin (FLOMAX) 0.4 MG capsule Take 1 capsule (0.4 mg) by mouth daily 90 capsule 1    traZODone (DESYREL) 50 MG tablet Take 1 tablet (50 mg) by mouth nightly as needed 30 tablet 0    triamcinolone (KENALOG) 0.1 % cream [TRIAMCINOLONE (KENALOG) 0.1 % CREAM] APPLY EXTERNALLY TO THE AFFECTED AREA TWICE DAILY FOR 14 DAYS 45 g 0    dupilumab (DUPIXENT) 300 MG/2ML prefilled pen Inject 2 mLs (300 mg) Subcutaneous every 14 days Inject 600 mg with first dose (Patient not taking: Reported on 6/28/2024) 4 mL 2       Physical Exam:  BP (!) 153/81 (BP Location: Left arm, Patient Position: Sitting, Cuff Size: Adult Large)   Pulse 79   Wt 100.7 kg (222 lb)   SpO2 97%   BMI 30.11 kg/m      Physical Exam  Constitutional:       General: He is not in acute distress.     Appearance: He is not ill-appearing or diaphoretic.   Cardiovascular:      Rate and Rhythm: Normal rate and regular rhythm.      Heart sounds: Normal heart sounds.   Pulmonary:      Effort: Pulmonary effort is normal. No respiratory distress.      Breath sounds: No wheezing or rhonchi.   Neurological:      Mental Status: He is alert.   Psychiatric:         Behavior: Behavior normal.       Labs:  Reviewed    Eos 900 on CBC 3/11.  CBC from 3/11: normal eos count  [place] allergen panel: elevated IgE to cat dander, dog dander, maple and house khoury dust     Latest Reference Range & Units 05/29/24 09:39 05/29/24 10:42   RESPIRATORY AEROBIC BACTERIAL CULTURE  3+ Moraxella catarrhalis Abnormal      ACTINOMYCES SPECIES CULTURE  3+ Inderjit  (Actinomyces) odontolytica Abnormal      AFB CULTURE AND STAIN NON BLOOD  No acid fast bacilli seen     AFB CULTURE AND STAIN RESPIRATORY  No acid fast bacilli seen     Aspergillus Ruth CF <1:8   <1:8   Aspergillus Galactomannan Antigen BAL Negative  Negative    Aspergillus Galactomannan Index  0.18    Blastomyces Ruth by EIA <=0.9 IV  0.5   GRAM STAIN    >25 PMNs/low power field      1+ Mixed sienna          KOH PREP  No fungal elements seen    NOCARDIA SPECIES CULTURE  No growth    P. jirovecii By PCR  Not Detected    FUNGAL OR YEAST CULTURE ROUTINE  No Growth       Imaging studies:  Personally reviewed    EXAM: CT CHEST W/O CONTRAST  LOCATION: [facility]  DATE: 4/21/2024     INDICATION:  Abnormal chest CT  COMPARISON: 02/20/2024 CT. 08/13/2021 MRI.  TECHNIQUE: CT chest without IV contrast. Multiplanar reformats were obtained. Dose reduction techniques were used.  CONTRAST: None.     FINDINGS:   LUNGS AND PLEURA: Mild bronchial wall thickening with a small amount of debris in the airways. Again seen is a confluent mass in the medial left lower lobe which has not changed in size. On series 5 image 107 this is 4.6 cm AP x 2.1 cm transverse.   Maximum transverse diameter is 2.6 cm. A 7 mm x 6 mm opacity in the medial lingula has not changed (image 154). Otherwise scattered small solid nodules have not changed. There are extensive new ground glass opacities throughout the lungs. There are a few   new small solid nodules in the lung bases. There is a new small density in the lingula which is favored to represent fat deposition and atelectasis (series 5 image 168).      MEDIASTINUM/AXILLAE: Small hiatal hernia.     CORONARY ARTERY CALCIFICATION: Severe.     UPPER ABDOMEN: Lobular foci in the upper right and left kidney have not changed. On the comparison MRI study these were found to be benign. No follow-up required.     MUSCULOSKELETAL: Degenerative change in the spine.                                                                       IMPRESSION:   1.  The confluent mass in the medial left lower lobe has not changed in size. Other pre-existing scattered nodules have not changed. PET/CT is recommended.  2.  New groundglass opacities throughout the lungs. There are a few new solid nodules in the lung bases. These are nonspecific though an infectious or inflammatory process is possible.   3.  Bronchial wall thickening and debris in the airways can be seen with bronchitis.  4.  Severe coronary artery disease.        [Access Center: Persistent left lower lobe mass. Recommend PET/CT.]     This report will be copied to the [facility] to ensure a provider acknowledges the finding. Access Center is available Monday through Friday 8am-3:30 pm.     COMPARISON ADDENDUM:   In addition to the prior chest CT comparisons from 4/21/2024 and 2/20/2024 and PET/CT from 8/13/2021, an additional chest CT from 12/18/2023 is also reviewed. The current masslike consolidative opacity in the left lower lobe was much larger on CT   12/18/2023. Findings have decreased in size since that time, favoring an infectious/inflammatory etiology although remain indeterminate. Continued chest CT imaging surveillance recommended to ensure continued improvement.  END ADDENDUM   Addended by Rufus Burris MD on 5/22/2024  9:22 AM     Study Result    Narrative & Impression   EXAM: PET ONCOLOGY (EYES TO THIGHS)  LOCATION: [facility]  DATE: 5/7/2024     INDICATION: Solitary pulmonary nodule. Masslike consolidation left lower lobe. Other nonspecific abnormal finding of lung field.  COMPARISON: CT chest 4/21/2024, 2/20/2024, PET/CT 8/13/2021  TECHNIQUE: Serum glucose level 152 mg/dL. One hour post intravenous administration of 13.2 mCi F-18 FDG, PET imaging was performed from the skull vertex to mid thighs, utilizing attenuation correction with concurrent axial CT and PET/CT image fusion.   Dose reduction  techniques were used.     FINDINGS: An irregularly-shaped masslike consolidative pulmonary opacity in the posteromedial left lower lobe is similar to 4/21/2024 although larger than 8/13/2021, largest component 2.7 x 3.4 x 6.6 cm, and demonstrates increase in marked uptake (SUVmax   7.3, previously 3.2). No FDG avid adenopathy in the chest or elsewhere. No suspicious focal uptake in the liver or skeleton. Normal adrenal glands.     Moderate senescent intracranial changes. Mucous retention cyst right maxillary sinus. Benign sebaceous cyst midline upper back. Moderate atherosclerotic calcifications including the coronary arteries. Mild bandlike scarring or atelectasis in the lower   lungs. Few scattered benign calcified pulmonary granulomas. Small esophageal hiatal hernia. Few small benign bilateral renal cysts, no follow-up needed. Splenule. Small fat-containing umbilical hernia. Moderate colonic diverticulosis, greatest in the   sigmoid region. Mild scattered degenerative changes in the spine.                                                                      IMPRESSION:  1. An enlarging area of masslike consolidation in the posteromedial left lower lobe with increased marked uptake is indeterminate, suspicious for malignancy. This is amenable to percutaneous CT guided biopsy.  2. No evidence of carrie or distant metastasis.       Pulmonary Function Testing  3/14/2024  FEV1 1.68L, 56%  FVC 70%  Ratio 0.6  No BD testing done  TLC 6.36L, 84%  %  Dlco 95% court for hgb  Flow volume curve suggests obstruction  NB: pt used Advair 4 hours prior to testing

## 2024-06-28 NOTE — Clinical Note
I saw Lester today.  He is doing pretty well, felt great for a few weeks following azithromycin but over the past week has had some increased symptoms with activity.  He was outside fishing a lot so could be his asthma? I ordered a CT in a few weeks (around 7 weeks posttreatment) and asked him to check with his insurance company to see if a LAMA or ICS/LABA/LAMA is covered.  His Dupixent was declined and it is too expensive out-of-pocket.  Sometimes this is covered if the patient is documented on triple therapy.  I also told him to reach out to Dr. Rendon and let her know.

## 2024-06-28 NOTE — PROGRESS NOTES
Patient oxygen saturation on RA at rest is 96%.  Oxygen saturation after ambulating 400 feet on RA is 95%.    Patient is ambulatory within his/her home.    Hernan ROONEY CMA, [facility]

## 2024-06-28 NOTE — PATIENT INSTRUCTIONS
It was a pleasure seeing you in clinic today. This is what we discussed:    Contact your insurance and ask what the out of pocket cost for Trelegy or Breztri is. OR what the cost of adding Spiriva or Incruse to your Wixela would be.   We will get another CT scan in a few weeks.   Use your albuterol  or Duoneb every 6 hours as needed for cough, shortness of breath, wheeze, or chest tightness.   Follow up with Dr. Davis as planned in Oct  If you have worsening symptoms, questions, or need to speak with the nurse please call [phone].

## 2024-07-01 ENCOUNTER — MYC MEDICAL ADVICE (OUTPATIENT)
Dept: INTERNAL MEDICINE | Facility: CLINIC | Age: 74
End: 2024-07-01
Payer: COMMERCIAL

## 2024-07-01 DIAGNOSIS — I10 ESSENTIAL HYPERTENSION, BENIGN: ICD-10-CM

## 2024-07-01 NOTE — TELEPHONE ENCOUNTER
Pended hydrochlorothiazide for refill, also pended amlodipine of provider wants to restart that medication as well.       OV 6/20/24 with MTM   Check blood pressure at home. Restart amlodipine if your blood pressure is consistently greater than 140/90.   Start Trulicity 0.75mg every week again.  Bring in Dexcom and blood pressure cuff to next visit

## 2024-07-02 RX ORDER — AMLODIPINE BESYLATE 10 MG/1
10 TABLET ORAL DAILY
Qty: 90 TABLET | Refills: 0 | Status: SHIPPED | OUTPATIENT
Start: 2024-07-02 | End: 2024-10-03

## 2024-07-02 RX ORDER — HYDROCHLOROTHIAZIDE 25 MG/1
25 TABLET ORAL EVERY MORNING
Qty: 90 TABLET | Refills: 3 | Status: SHIPPED | OUTPATIENT
Start: 2024-07-02

## 2024-07-03 NOTE — TELEPHONE ENCOUNTER
Pt unreachable trying to discuss Financial assistance    NEFTALY Escobar, [facility]  Specialty Pharmacy Clinic Liaison     [facility]    [email or web address]     Phone: [phone]  Fax: [phone]

## 2024-07-16 NOTE — TELEPHONE ENCOUNTER
Finally reached patient he would like to try for fpap    He is going to email me his documents     NEFTALY Escobar, [facility]  Specialty Pharmacy Clinic Liaison     [facility]    [email or web address]     Phone: [phone]  Fax: [phone]

## 2024-07-19 ENCOUNTER — HOSPITAL ENCOUNTER (OUTPATIENT)
Dept: CT IMAGING | Facility: CLINIC | Age: 74
Discharge: HOME | End: 2024-07-19
Attending: NURSE PRACTITIONER | Admitting: NURSE PRACTITIONER
Payer: COMMERCIAL

## 2024-07-19 ENCOUNTER — TELEPHONE (OUTPATIENT)
Dept: PULMONOLOGY | Facility: CLINIC | Age: 74
End: 2024-07-19
Payer: COMMERCIAL

## 2024-07-19 DIAGNOSIS — R91.8 PULMONARY INFILTRATES: Primary | ICD-10-CM

## 2024-07-19 DIAGNOSIS — J40 MORAXELLA CATARRHALIS BRONCHITIS: ICD-10-CM

## 2024-07-19 DIAGNOSIS — B96.89 MORAXELLA CATARRHALIS BRONCHITIS: ICD-10-CM

## 2024-07-19 PROCEDURE — 71250 CT THORAX DX C-: CPT

## 2024-07-19 NOTE — TELEPHONE ENCOUNTER
Spoke with Lester regarding CT results. Images reviewed by Dr. Davis who suggested possible re-attempt at biopsy. Lester states he is feeling much better, no longer needing nebs. He would prefer to wait and monitor. He is scheduled to see Dr. Davis in Oct which works out with timing for 3 month scan. I will order this today so it can be set up prior to his next appointment.     Paty Carlisle, CNP  Pulmonary Medicine  [facility]   [phone]        EXAM: CT CHEST W/O CONTRAST  LOCATION: [facility]  DATE: 7/19/2024     INDICATION: follow up lung opacities, BAL with m. catarrhalis  COMPARISON: 4/21/2024, PET/CT from 5/7/2024  TECHNIQUE: CT chest without IV contrast. Multiplanar reformats were obtained. Dose reduction techniques were used.  CONTRAST: None.     FINDINGS:   LUNGS AND PLEURA: Again seen is the bilobed masslike consolidation in the medial left lower lobe. The superior component measures 3.6 x 1.9 cm compared to 3.7 x 1.9 cm previously (series 4/108) and the inferior component 4.8 x 1.8 cm compared to 4.7 x   1.7 cm previously (series 4/108 and 118 respectively). Increased size of the irregular nodule in the left lingula measuring 0.9 x 0.8 cm compared to 0.7 x 0.6 cm previously (series 4/164). The remaining scattered pulmonary nodules are otherwise not   significantly changed. No effusions. Mild bronchial wall thickening. Small volume airway secretions.     MEDIASTINUM/AXILLAE: Heart size is normal. Aortic valve calcifications. Normal caliber aorta. No adenopathy. Small hiatal hernia.     CORONARY ARTERY CALCIFICATION: Severe.     UPPER ABDOMEN: Benign bilateral renal cysts including several which contain hemorrhagic or proteinaceous contents. No specific follow-up required.     MUSCULOSKELETAL: Degenerative changes of the spine. No suspicious osseous lesions.                                                                      IMPRESSION:   1.  Relatively similar bilobed  masslike consolidation in the medial left lower lobe remains indeterminant. Tissue sampling should be considered. Alternatively, a follow-up in 3 months could also be performed.  2.  Slightly increased size of the left lingula nodule measuring 0.9 x 0.8 cm compared to 0.7 x 0.6 cm previously. Continued attention on follow-up. The remaining scattered pulmonary nodules are otherwise not significant changed.

## 2024-07-24 LAB
ACID FAST STAIN (ARUP): NORMAL

## 2024-07-25 ENCOUNTER — OFFICE VISIT (OUTPATIENT)
Dept: VASCULAR SURGERY | Facility: CLINIC | Age: 74
End: 2024-07-25
Attending: HOSPITALIST
Payer: COMMERCIAL

## 2024-07-25 ENCOUNTER — ANCILLARY PROCEDURE (OUTPATIENT)
Dept: VASCULAR ULTRASOUND | Facility: CLINIC | Age: 74
End: 2024-07-25
Attending: HOSPITALIST
Payer: COMMERCIAL

## 2024-07-25 VITALS
SYSTOLIC BLOOD PRESSURE: 160 MMHG | OXYGEN SATURATION: 97 % | TEMPERATURE: 97.7 F | HEART RATE: 63 BPM | RESPIRATION RATE: 16 BRPM | DIASTOLIC BLOOD PRESSURE: 81 MMHG

## 2024-07-25 DIAGNOSIS — I71.40 ABDOMINAL AORTIC ANEURYSM (AAA) 3.0 CM TO 5.5 CM IN DIAMETER IN MALE (H): Primary | ICD-10-CM

## 2024-07-25 DIAGNOSIS — I71.40 ABDOMINAL AORTIC ANEURYSM (AAA) 3.0 CM TO 5.5 CM IN DIAMETER IN MALE (H): ICD-10-CM

## 2024-07-25 PROCEDURE — 93978 VASCULAR STUDY: CPT

## 2024-07-25 PROCEDURE — 99213 OFFICE O/P EST LOW 20 MIN: CPT | Performed by: HOSPITALIST

## 2024-07-25 PROCEDURE — G0463 HOSPITAL OUTPT CLINIC VISIT: HCPCS | Mod: 25 | Performed by: HOSPITALIST

## 2024-07-25 PROCEDURE — 93978 VASCULAR STUDY: CPT | Mod: 26 | Performed by: SURGERY

## 2024-07-25 ASSESSMENT — PAIN SCALES - GENERAL: PAINLEVEL: NO PAIN (0)

## 2024-07-25 NOTE — PATIENT INSTRUCTIONS
Praveen Batista,    Thank you for entrusting your care with us today. After your visit today with MD Alvarado Penaloza this is the plan that was discussed at your appointment.      Follow up in 1 year with Dr. Penaloza and an ultrasound of your aorta prior.  A  will reach out closer to that time to schedule.         I am including additional information on these things and our contact information if you have any questions or concerns.   Please do not hesitate to reach out to us if you felt we did not answer your questions or you are unsure of the treatment plan after your visit today. Our number is [phone].Thank you for trusting us with your care.         Again thank you for your time.

## 2024-07-25 NOTE — PROGRESS NOTES
[facility]        Patient is here for a 1 year follow up  to discuss Abdominal aortic aneurysm (AAA).    Pt is currently taking Statin.    BP (!) 160/81   Pulse 63   Temp 97.7  F (36.5  C)   Resp 16   SpO2 97%     The provider has been notified that the patient has no concerns.     Questions patient would like addressed today are: N/A.    Refills are needed: N/A    Has homecare services and agency name:  No

## 2024-07-25 NOTE — Clinical Note
Needs 1 year follow up around 7/25/25 with Dr. Penaloza and an US aorta prior, order is in.  1 year follow up AAA

## 2024-07-25 NOTE — PROGRESS NOTES
VASCULAR MEDICINE PROGRESS NOTE          LOCATION:  [facility]       Date of Service: 7/25/2024      Primary Care Provider: Garret Pope      Reason for the visit/chief complaint:   Follow up on infrarenal AAA      Subjective:  Lester Shi is a pleasant 74 year old male who presents to our Vascular Medicine clinic follow-up.  We first met 6 months ago 1/11/2024.  I refer the reader to my initial consultation note for details.    He is here today for his infrarenal AAA surveillance.  Denies abdominal pain, back pain or intermittent claudication.    Of note, he was incidentally found to have saccular infrarenal abdominal aortic aneurysm on recent CT abdomen pelvis that was done earlier this year 1/8/2024 to evaluate abdominal pain.  However, when previous CT abdomen was reviewed from 2019, he was found to have the same back then.    Measurements have been as follows:  10/29/2019: 3.3 x 2.4 cm (CT)  1/8/2024: 3.7 x 2.8 cm (CT)  7/24/2024: 3.1 x 3.4 cm (US)    Other medical history includes Klinefelter syndrome, type 2 diabetes mellitus (previously controlled, recently A1c up to 9.4% due to multiple recent prednisone use for asthma exacerbation), former smoker quit 32 years ago, hypertension, dyslipidemia and mild persistent asthma .        Past medical history, surgical history, medications, family history, social history and allergies were reviewed. Pertinent points mentioned under HPI.        OBJECTIVE:    Vital signs:  BP (!) 160/81   Pulse 63   Temp 97.7  F (36.5  C)   Resp 16   SpO2 97%   Wt Readings from Last 1 Encounters:   06/28/24 222 lb (100.7 kg)     There is no height or weight on file to calculate BMI.    Physical exam:  General appearance: Pleasant male in no apparent distress.    HEENT: NC/AT.    Neck: Carotids +2/2 bilaterally without bruits.  No jugular venous distension.   Heart: RRR. Normal S1, S2. No murmur, rub, click, or gallop.   Chest: Clear to  auscultation bilaterally.  Abdomen: Soft, nontender, nondistended. No pulsatile mass.  No bruits.   Neurological: Alert, awake and oriented         DIAGNOSTIC STUDIES:   Labs and diagnostics reviewed including outside records. Pertinent points are mentioned under HPI and assessment and plan sections.        ASSESSMENT AND PLAN:    Asymptomatic infrarenal saccular abdominal aortic aneurysm 3.1 x 3.4 cm  Long history of hypertension on 4 blood pressure medications  Former smoker with approximately 70-pack-year, quit 32 years ago  Dyslipidemia on statin  Type 2 diabetes mellitus previously controlled, recently uncontrolled due to prednisone use, A1c 9.4%  Klinefelter syndrome    We had him obtain AAA ultrasound today 6 months from his previous CT to ensure no progression.  As above, his measurement on today's ultrasound was 3.1 x 3.4 cm compared to 3.7 x 2.8 cm on his CT abdomen 6 months ago.  We discussed that CTs typically slightly overestimate the measurements when compared to ultrasounds.  Overall, his aneurysm is small and stable at this time and we discussed the very low risk of spontaneous rupture at this stage that ranges between 0-0.5% in a year.    Recommend continuing medical management with annual surveillance.    Recommendations:  Repeat aortoiliac ultrasound in 1 year with follow-up.  Continue aspirin 81 mg indefinitely.  Continue high intensity statin/rosuvastatin 20 mg.  Repeat LDL from this year is 51.   Asked patient to start checking his blood pressure at home and charting it.  If continues to have blood pressure more than 140/90, discussed with PCP to adjust blood pressure medications.  Blood pressure was high in the clinic today 160/81 but per patient gets blood pressure ranging between 120s-140s systolic at home.  Ideally, blood pressure should be less than 130/80 when there is an aneurysm.    It was a pleasure meeting Mr. Shi in our clinic again today.    Alvarado Penaloza MD  Vascular  Medicine  July 25, 2024

## 2024-07-28 DIAGNOSIS — J45.51 SEVERE PERSISTENT ASTHMA WITH EXACERBATION (H): ICD-10-CM

## 2024-07-29 RX ORDER — FLUTICASONE PROPIONATE AND SALMETEROL 500; 50 UG/1; UG/1
1 POWDER RESPIRATORY (INHALATION) 2 TIMES DAILY
Qty: 60 EACH | Refills: 3 | Status: SHIPPED | OUTPATIENT
Start: 2024-07-29

## 2024-07-29 NOTE — TELEPHONE ENCOUNTER
Sent email again    NEFTALY Escobar, [facility]  Specialty Pharmacy Clinic Liaison     [facility] Specialty    [email or web address]     Phone: [phone]  Fax: [phone]

## 2024-08-12 NOTE — TELEPHONE ENCOUNTER
Sent patient 1 more email:    Hello    I have been unable to reach you and your voicemail box is full    Please let me know if you have any questions regarding applying to the [facility]      Thank you      NEFTALY Escobar, [facility]  Specialty Pharmacy Clinic Liaison     [facility]    [email or web address]     Phone: [phone]  Fax: [phone]

## 2024-08-21 ENCOUNTER — TRANSFERRED RECORDS (OUTPATIENT)
Dept: HEALTH INFORMATION MANAGEMENT | Facility: CLINIC | Age: 74
End: 2024-08-21
Payer: COMMERCIAL

## 2024-08-29 ENCOUNTER — TELEPHONE (OUTPATIENT)
Dept: INTERNAL MEDICINE | Facility: CLINIC | Age: 74
End: 2024-08-29
Payer: COMMERCIAL

## 2024-08-29 ENCOUNTER — OFFICE VISIT (OUTPATIENT)
Dept: PHARMACY | Facility: CLINIC | Age: 74
End: 2024-08-29
Payer: COMMERCIAL

## 2024-08-29 VITALS
SYSTOLIC BLOOD PRESSURE: 143 MMHG | DIASTOLIC BLOOD PRESSURE: 70 MMHG | WEIGHT: 225.4 LBS | HEART RATE: 81 BPM | BODY MASS INDEX: 30.57 KG/M2

## 2024-08-29 DIAGNOSIS — Z79.4 TYPE 2 DIABETES MELLITUS WITH DIABETIC CATARACT, WITH LONG-TERM CURRENT USE OF INSULIN (H): ICD-10-CM

## 2024-08-29 DIAGNOSIS — E11.36 TYPE 2 DIABETES MELLITUS WITH DIABETIC CATARACT, WITHOUT LONG-TERM CURRENT USE OF INSULIN (H): Primary | ICD-10-CM

## 2024-08-29 DIAGNOSIS — N40.1 BENIGN PROSTATIC HYPERPLASIA WITH URINARY FREQUENCY: ICD-10-CM

## 2024-08-29 DIAGNOSIS — E11.36 TYPE 2 DIABETES MELLITUS WITH DIABETIC CATARACT, WITH LONG-TERM CURRENT USE OF INSULIN (H): ICD-10-CM

## 2024-08-29 DIAGNOSIS — R35.0 BENIGN PROSTATIC HYPERPLASIA WITH URINARY FREQUENCY: ICD-10-CM

## 2024-08-29 DIAGNOSIS — I10 PRIMARY HYPERTENSION: Primary | ICD-10-CM

## 2024-08-29 PROCEDURE — 99207 PR NO CHARGE LOS: CPT

## 2024-08-29 RX ORDER — LIRAGLUTIDE 6 MG/ML
0.6 INJECTION SUBCUTANEOUS DAILY
Status: SHIPPED
Start: 2024-08-29

## 2024-08-29 NOTE — Clinical Note
Seeing if he qualifies for Victoza PAP  He has urinary incontinence. Could you discuss with him in October? I didn't think tamsulosin helped with incontinence symptoms but let me know if you think I should increase it for the time being.  Thanks, Lissa Victor, PharmD Medication Therapy Management (MTM) Pharmacist

## 2024-08-29 NOTE — PATIENT INSTRUCTIONS
"Recommendations from today's MTM visit:                                                         I will connect you with patient assistance team for Larisa  Bring in Dexcom and blood pressure cuff to next visit  Connect with Jatin about urinary incontinence when you see him in October    Follow-up: Return in 6 weeks (on 10/10/2024) for Follow up, with me, in person.    It was great speaking with you today.  I value your experience and would be very thankful for your time in providing feedback in our clinic survey. In the next few days, you may receive an email or text message from [employer] with a link to a survey related to your  clinical pharmacist.\"     To schedule another MTM appointment, please call the clinic directly or you may call the MTM scheduling line at [phone].    My Clinical Pharmacist's contact information:                                                      Please feel free to contact me with any questions or concerns you have.      Lissa Victor, PharmD  Medication Therapy Management (MTM) Pharmacist   "

## 2024-08-29 NOTE — PROGRESS NOTES
Medication Therapy Management (MTM) Encounter    ASSESSMENT:                            Medication Adherence/Access: See below for considerations    Diabetes   Patient is not meeting A1c goal of < 8% given age and comorbidities. Self monitoring of blood glucose is not at goal of fasting  mg/dL. Patient would benefit from additional therapy such as GLP-1. He may qualify for patient assistance program for Victoza. Did not tolerate Ozempic and Trulicity PAP not accepting new patients. If this is not viable we will need to restart Actos as he prefers not to use insulin. Recommended bringing Dexcom to follow-up to go over use and functions.     Hypertension   Patient not at goal of <140/90 however will not adjust medications at this time since home monitoring is at goal on his current regimen.     BPH  Patient may need further workup of incontinence symptoms to determine next steps.     PLAN:                            I will connect you with patient assistance team for Victoza  Bring in Dexcom and blood pressure cuff to next visit  Connect with Jatin about urinary incontinence when you see him in October    Follow-up: Return in 6 weeks (on 10/10/2024) for Follow up, with me, in person.    SUBJECTIVE/OBJECTIVE:                          Lester Shi is a 74 year old male seen for a follow-up visit from 6/20/24.       Reason for visit: follow-up     Allergies/ADRs: Reviewed in chart  Past Medical History: Reviewed in chart  Tobacco: He reports that he quit smoking about 45 years ago. His smoking use included cigarettes. He started smoking about 57 years ago. He has a 24 pack-year smoking history. He has never been exposed to tobacco smoke. He has never used smokeless tobacco.  Alcohol: Less than 1 beverage / month  Caffeine: maybe once a day    Medication Adherence/Access: Forgets to take Advair about once a week. Currently in donut [facility] - getting 1 month of Jardiance at a time instead of 3 months.     Diabetes    Metformin XR 2000mg daily   Jardiance 25mg daily   Trulicity 1.5mg weekly - not taking due to cost    Needs A1c <8.5% to proceed with knee replacement.  Fasting - 250s, 130s. Denies hypoglycemia symptoms.   Diet/Exercise: ice cream a couple times a month    Medication history:  Did not tolerate Ozempic.  Victoza not covered.  Insulin but prefers to avoid if possible     Hypertension   Losartan 100mg daily   Hydrochlorothiazide 25mg daily  Atenolol 100mg daily  Amlodipine 10mg once daily    Denies side effects. Denies LE edema.  Patient self monitors blood pressure.  Home BP monitoring 132/70s      BPH:  Tamsulosin 0.4mg daily  Patient reports he has issues with urinary incontinence. He reports normal urine stream but sometimes does not make it to the bathroom on time. Wears Depends if he goes out.  Per previous note, he started tamsulosin for urinary frequency.     Today's Vitals: BP (!) 143/70   Pulse 81   Wt 225 lb 6.4 oz (102.2 kg)   BMI 30.57 kg/m    ----------------    I spent 30 minutes with this patient today. All changes were made via collaborative practice agreement with Garret Pope CNP. A copy of the visit note was provided to the patient's provider(s).    A summary of these recommendations was sent via [employer].    Lissa Victor, PharmD  Medication Therapy Management (MTM) Pharmacist         Medication Therapy Recommendations  Type 2 diabetes mellitus with diabetic cataract, with long-term current use of insulin (H)    Current Medication: dulaglutide (TRULICITY) 0.75 MG/0.5ML pen (Discontinued)   Rationale: Cannot afford medication product - Cost - Adherence   Recommendation: Change Medication - VICTOZA SC   Status: Accepted per CPA

## 2024-08-29 NOTE — TELEPHONE ENCOUNTER
Is this a new referral or dose change: new patient    Patient preferred phone number (please verify with pt): [phone]    Additional helpful info for PAP team:  [employer] patient assistance program. Did not tolerate Ozempic.    Notes for MTM team:   Route TE to p RXASSIST    New patient referral  PAP team member will contact the patient via phone within 1-2 business days to schedule a medication consult.   PAP team will notify MTM via telephone encounter that a consult is scheduled.   PAP team will document next steps in an Epic telephone encounter, after the consult is completed.   Dose change/refill for current PAP patient  Enter new prescription in Epic (no print-out)  PAP team will document next steps in an Epic telephone encounter

## 2024-08-30 RX ORDER — METFORMIN HCL 500 MG
2000 TABLET, EXTENDED RELEASE 24 HR ORAL DAILY
Qty: 360 TABLET | Refills: 1 | Status: SHIPPED | OUTPATIENT
Start: 2024-08-30

## 2024-09-13 NOTE — TELEPHONE ENCOUNTER
SAUNDRA INITIATED    Medication: DUPIXENT 300 MG/2ML SC SOPN  [facility] Name: [facility]  Date submitted: 9/13/2024 11:47 AM   Foundation Phone:    [facility] Fax: NA  Patient applied to [facility]-- finally send income documents       NEFTALY Escobar, [facility]  Specialty Pharmacy Clinic Liaison     [facility] Specialty    [email or web address]     Phone: [phone]  Fax: [phone]

## 2024-09-19 NOTE — TELEPHONE ENCOUNTER
SAUNDRA APPROVED    Medication: DUPIXENT 300 MG/2ML SC SOPN     [facility] Name: [facility] Effective Date: 9/19/2024  [employer] Expiration Date: 12/31/2024          NEFTALY Escobar, [facility]  Specialty Pharmacy Clinic Liaison     [facility] Specialty    [email or web address]     Phone: [phone]  Fax: [phone]

## 2024-10-02 DIAGNOSIS — I10 ESSENTIAL HYPERTENSION, BENIGN: ICD-10-CM

## 2024-10-03 ENCOUNTER — PHARMACY VISIT (OUTPATIENT)
Dept: ADMINISTRATIVE | Facility: CLINIC | Age: 74
End: 2024-10-03
Payer: COMMERCIAL

## 2024-10-03 RX ORDER — AMLODIPINE BESYLATE 10 MG/1
10 TABLET ORAL DAILY
Qty: 90 TABLET | Refills: 2 | Status: SHIPPED | OUTPATIENT
Start: 2024-10-03

## 2024-10-03 NOTE — PROGRESS NOTES
Asthma Clinical Follow Up Assessment   Completed on 2024/10/03 15:32:54 [facility], by Batsheva Jaffe        Activity Date 2024/10/03     Activity Medications    DUPIXENT        Care Details    What is the patient's diagnosis?   ? Asthma      Would you like to document a follow-up evaluation and updates? For asthma patients only   ? YES    Have you ever been prescribed an injectable product to help manage asthma?   ? Patient has never been prescribed an injectable to help manage asthma    Is the patient currently taking any oral steroids (i.e. prednisone or dexamethasone) to help manage their asthma?   ? No          Is it appropriate to collect an MPR or PDC at this time? (An MPR or PDC would not be appropriate for cycled medications or if the patient is on therapy   ? No      Based on the patient's report or refill record over the last 6-12 months, does the patient appear to be taking less than 80% of their medication?   ? No      Would you like to review quality of life questions with the patient?   ? YES (asthma patient ?10 years old)     In the PAST 4 WEEKS how much of the time did your ASTHMA keep you from getting as much done at work, school or at home   ? A little of the time (4)     During the PAST 4 WEEKS, how often have you had shortness of breath?   ? Once or twice a week (4)     During the PAST 4 WEEKS, how often did your asthma symptoms (wheezing, coughing, shortness of breath, chest tightness or pain) wake you up at night or earlier than usual in the morning?   ? Not at all (5)     During the PAST 4 WEEKS, how often have you used your rescue inhaler or nebulizer medication (such as albuterol)?   ? Not at all (5)     How would you rate your asthma control during the PAST 4 WEEKS?   ? Well controlled (4)     Please enter the patient's ACT Score   ? 22    Has the patient been on their current medication for more than 3 months and not experienced symptom improvement?   ? No         Does the patient also have a  diagnosis of Atopic Dermatitis?   ? No       Please select CURRENT side effect(s) for monitoring:   ? Other          Summary Notes  I had the pleasure of speaking to pt for initial TM.   Dupixent: Pt did loading dose this past Sunday around 1-2pm.   Side effects: 1. Feels a little tired. Pt states they feel this way from other pills they are on, but felt the day after injection it may have been a bit heightened. Pt feels good today. 2. A little headache. Pt feels this may have started before Dupixent and is likely a sinus headache due to allergies this time of year. Pt confirmed they are taking an allergy medication and staying well hydrated.   Doses: uses a phone calendar to keep track of doses. Has an alert the day before and hour before dose. T  Asthma: ACT= 22, well controlled. States they are doing very well currently. No wheezing and breathing is good. Not on prednisone- prednisone helps during a flare, but skyrockets pt s blood sugar.   Goal: Prevent ER/hospitalizations. Previously every winter pt has had a bad asthma flare and/or pneumonia; this is to prevent that.   No changes. Question/concern: Asked if after the first of the year, will need to re-enroll in FPAP. I wasn t sure so pt will reach out to clinic liaison they were working with.   Pt opted in to TM. Will follow up in 1 month for TM.       Batsheva ASENCIO, PharmD, CSP  Therapy Management Pharmacist  [address] [address]   [email or web address]  [email or web address]   Specialty: [phone]  Mail Order: [phone]

## 2024-10-05 ENCOUNTER — HEALTH MAINTENANCE LETTER (OUTPATIENT)
Age: 74
End: 2024-10-05

## 2024-10-13 ASSESSMENT — ASTHMA QUESTIONNAIRES
QUESTION_1 LAST FOUR WEEKS HOW MUCH OF THE TIME DID YOUR ASTHMA KEEP YOU FROM GETTING AS MUCH DONE AT WORK, SCHOOL OR AT HOME: NONE OF THE TIME
HOSPITALIZATION_OVERNIGHT_LAST_YEAR_TOTAL: TWO
QUESTION_2 LAST FOUR WEEKS HOW OFTEN HAVE YOU HAD SHORTNESS OF BREATH: NOT AT ALL
QUESTION_3 LAST FOUR WEEKS HOW OFTEN DID YOUR ASTHMA SYMPTOMS (WHEEZING, COUGHING, SHORTNESS OF BREATH, CHEST TIGHTNESS OR PAIN) WAKE YOU UP AT NIGHT OR EARLIER THAN USUAL IN THE MORNING: NOT AT ALL
QUESTION_5 LAST FOUR WEEKS HOW WOULD YOU RATE YOUR ASTHMA CONTROL: WELL CONTROLLED
ACT_TOTALSCORE: 24
EMERGENCY_ROOM_LAST_YEAR_TOTAL: TWO
QUESTION_4 LAST FOUR WEEKS HOW OFTEN HAVE YOU USED YOUR RESCUE INHALER OR NEBULIZER MEDICATION (SUCH AS ALBUTEROL): NOT AT ALL
ACT_TOTALSCORE: 24

## 2024-10-14 ENCOUNTER — OFFICE VISIT (OUTPATIENT)
Dept: FAMILY MEDICINE | Facility: CLINIC | Age: 74
End: 2024-10-14
Payer: COMMERCIAL

## 2024-10-14 VITALS
SYSTOLIC BLOOD PRESSURE: 178 MMHG | OXYGEN SATURATION: 96 % | DIASTOLIC BLOOD PRESSURE: 91 MMHG | RESPIRATION RATE: 16 BRPM | TEMPERATURE: 98.2 F | HEART RATE: 62 BPM

## 2024-10-14 DIAGNOSIS — T14.8XXA HEMATOMA OF SKIN: Primary | ICD-10-CM

## 2024-10-14 PROCEDURE — 99213 OFFICE O/P EST LOW 20 MIN: CPT | Performed by: PHYSICIAN ASSISTANT

## 2024-10-14 NOTE — PROGRESS NOTES
Patient presents with:  Leg Problem: Has bruise on rt lower leg noticed it today does not remember injurying it      Clinical Decision Making:  Hematoma of the skin present on the right posterior calf.  No other evidence of DVT such as edema asymmetry present.  Patient reassured that this will likely be self-limiting and not a major cause for concern.  We discussed signs and symptoms of DVT to be watching for.      ICD-10-CM    1. Hematoma of skin  T14.8XXA           Patient Instructions   This appears consistent with a hematoma.  No other evidence of DVT.  This will likely be something that resolves  on its own over the course the next 2 weeks or so.  Follow-up if you develop worsening pain, swelling of the rest of the leg.    HPI:  Lester Shi is a 74 year old male who presents today with concerns of bruise on the back of the right leg noted today. He doesn't remember hitting himself there. His wife recommended getting it looked at.     History obtained from the patient.    Problem List:  2024-03: Ascending aorta dilation (H)  2024-03: Kidney lesion  2024-03: Abnormal chest CT  2023-12: Mild intermittent asthma with exacerbation  2023-12: Respiratory failure (H)  2023-05: Polyp of colon  2022-10: Hyperlipidemia, unspecified hyperlipidemia type  2021-07: Lung consolidation (H)  2021-07: Lesion of right native kidney  2021-05: Nodule of right lung  2020-07: Squamous cell carcinoma of skin of face  2020-07: Status post total left knee replacement  2020-03: Severe persistent asthma with exacerbation (H)  2020-03: Spinal stenosis of lumbar region with neurogenic claudication  2019-10: Sepsis (H)  2019-10: Abdominal pain, left upper quadrant  2019-03: Dizziness  2019-02: Type 2 diabetes mellitus with diabetic cataract, with long-  term current use of insulin (H)  2019-02: Pseudophakia of left eye  2019-02: Regular astigmatism of both eyes  2019-02: Encounter for examination following treatment at hospital  2019-02:  Uncontrolled type 2 diabetes mellitus with hyperglycemia (H)  2019: Uncontrolled type 1 diabetes mellitus with hyperglycemia (H)  2019: Pneumonia of both lungs due to infectious organism  2018: Mild persistent asthma with acute exacerbation  2015: Varicose vein  Eczema  Anemia  Otitis media  Essential Hypercholesterolemia  Essential hypertension, benign  Joint Pain, Localized In The Knee  Obesity  Allergic Rhinitis  Klinefelter's syndrome  Hyperglycemia  Acute kidney injury (H)  Acute respiratory failure, unspecified whether with hypoxia or   hypercapnia (H)  Morbid obesity (H)      Past Medical History:   Diagnosis Date    Anemia, unspecified     Created by Conversion     Cataract     left eye removed, present in right eye    Chronic osteoarthritis     right knee    Colon polyps     Contact dermatitis and other eczema, due to unspecified cause     Created by Conversion     Diabetes mellitus, type 2 (H)     Klinefelter's syndrome     Created by Conversion     Obesity, unspecified     Created by Conversion     Pure hypercholesterolemia     Created by Conversion     Type II or unspecified type diabetes mellitus without mention of complication, not stated as uncontrolled     Created by Conversion     Uncomplicated asthma     adult asthma- wheezing    Unspecified essential hypertension     Created by Conversion     Varicose vein 2015       Social History     Tobacco Use    Smoking status: Former     Current packs/day: 0.00     Average packs/day: 2.0 packs/day for 12.0 years (24.0 ttl pk-yrs)     Types: Cigarettes     Start date: 1967     Quit date: 1979     Years since quittin.8     Passive exposure: Never    Smokeless tobacco: Never   Substance Use Topics    Alcohol use: Yes     Comment: Alcoholic Drinks/day: rare         Review of Systems    Vitals:    10/14/24 1357 10/14/24 1500   BP: (!) 175/66 (!) 178/91   BP Location:  Right arm   Patient Position:  Sitting   Cuff Size:  Adult  Regular   Pulse: 65 62   Resp: 16    Temp: 98.2  F (36.8  C)    TempSrc: Oral    SpO2: 96%        Physical Exam  Vitals and nursing note reviewed.   Constitutional:       General: He is not in acute distress.     Appearance: He is not toxic-appearing or diaphoretic.   HENT:      Head: Normocephalic and atraumatic.      Right Ear: External ear normal.      Left Ear: External ear normal.   Eyes:      Conjunctiva/sclera: Conjunctivae normal.   Pulmonary:      Effort: Pulmonary effort is normal. No respiratory distress.   Musculoskeletal:      Right lower le+ Pitting Edema present.      Left lower le+ Pitting Edema present.        Legs:    Neurological:      Mental Status: He is alert.   Psychiatric:         Mood and Affect: Mood normal.         Behavior: Behavior normal.         Thought Content: Thought content normal.         Judgment: Judgment normal.         At the end of the encounter, I discussed results, diagnosis, medications. Discussed red flags for immediate return to clinic/ER, as well as indications for follow up if no improvement. Patient understood and agreed to plan. Patient was stable for discharge.

## 2024-10-16 ENCOUNTER — HOSPITAL ENCOUNTER (OUTPATIENT)
Dept: CT IMAGING | Facility: CLINIC | Age: 74
Discharge: HOME | End: 2024-10-16
Attending: NURSE PRACTITIONER | Admitting: NURSE PRACTITIONER
Payer: COMMERCIAL

## 2024-10-16 DIAGNOSIS — R91.8 PULMONARY INFILTRATES: ICD-10-CM

## 2024-10-16 PROCEDURE — 71250 CT THORAX DX C-: CPT

## 2024-10-18 ENCOUNTER — OFFICE VISIT (OUTPATIENT)
Dept: PULMONOLOGY | Facility: CLINIC | Age: 74
End: 2024-10-18
Payer: COMMERCIAL

## 2024-10-18 VITALS
HEART RATE: 71 BPM | OXYGEN SATURATION: 96 % | BODY MASS INDEX: 30.79 KG/M2 | DIASTOLIC BLOOD PRESSURE: 80 MMHG | WEIGHT: 227 LBS | SYSTOLIC BLOOD PRESSURE: 136 MMHG

## 2024-10-18 DIAGNOSIS — R91.8 OPACITY OF LUNG ON IMAGING STUDY: Primary | ICD-10-CM

## 2024-10-18 PROCEDURE — 99214 OFFICE O/P EST MOD 30 MIN: CPT | Mod: 25 | Performed by: INTERNAL MEDICINE

## 2024-10-18 PROCEDURE — G0008 ADMIN INFLUENZA VIRUS VAC: HCPCS | Performed by: INTERNAL MEDICINE

## 2024-10-18 PROCEDURE — 90662 IIV NO PRSV INCREASED AG IM: CPT | Performed by: INTERNAL MEDICINE

## 2024-10-18 NOTE — LETTER
10/18/2024      Lester Shi  [address]  [place] [address]      Dear Colleague,    Thank you for referring your patient, Lester Shi, to the [facility]. Please see a copy of my visit note below.    Pulmonary Clinic Outpatient Consultation    Assessment and Plan:   74M with a history of obesity, DM, HLD, Klinefelter's syndrome, seasonal allergies, hospitalization in Dec 2023 for LLL pneumonia and influenza and asthma, history of peripheral eosinophilia, presents for follow up of asthma with frequent exacerbations. PET/CT  last Spring showed abnormal FDG uptake in the LLL opacity; biopsy attempted but unable to be done due to significant wheezing and hyperglycemia. Bronchoscopy with BAL was done which showed moraxella species in the BAL; no malignancy found on the BAL. He completed treatment for this. Recent CT chest showed increase in size of the LLL opacity compared to last February; however I reviewed the imaged with radiology today (Dr. Hunter) and the opacity has actually slightly decreased in size compared to July and has decreased significantly since last December. The opacity is likely due to focal area of inflammation e.g. organizing pneumonia. Importantly, he has started dupilumab and is doing very well from an asthma standpoint. We discussed reattempt at biopsy vs. Monitoring with serial imaging. Since he is doing so well clinically, Lester wants to hold off on another biopsy attempt which I feel is reasonable.     Recommendations:  - continue high dose Wixela BID. Rinse/gargle/spit after use  - continue albuterol inhaler vs. Neb as needed   - encouraged to exercise and remain active.  - continue montelukast 10mg daily for allergy control  -  continue dupilumab and follow up with Dr. Rendon.   - repeat CT chest in 6 months.   - UTD with PCV20. Flu shot today. Declines covid-19 vaccination.     Follow up in 6 months with CT chest, as above    Action plan: prednisone  40mg daily x5 days + z-pack    The longitudinal plan of care for the diagnosis(es)/condition(s) as documented were addressed during this visit. Due to the added complexity in care, I will continue to support Lester in the subsequent management and with ongoing continuity of care.     Andrea Davis MD (Avi)  [facility]  Clinic [phone]  Fax [phone]     CCx: asthma, history of recurrent pneumonia    HPI: Interim history: Lester was last seen by Paty Carlisle CNP on 6/28. Since that time, he reports he's doing well. Just started dupilumab.   His breathing is very good; no wheezing or chest tightness.     ROS:  A 12-system review was obtained and was negative with the exception of the symptoms endorsed in the history of present illness.    PMH:  Past Medical History:   Diagnosis Date     Anemia, unspecified     Created by Conversion      Cataract     left eye removed, present in right eye     Chronic osteoarthritis     right knee     Colon polyps      Contact dermatitis and other eczema, due to unspecified cause     Created by Conversion      Diabetes mellitus, type 2 (H)      Klinefelter's syndrome     Created by Conversion      Obesity, unspecified     Created by Conversion      Pure hypercholesterolemia     Created by Conversion      Type II or unspecified type diabetes mellitus without mention of complication, not stated as uncontrolled     Created by Conversion      Uncomplicated asthma     adult asthma- wheezing     Unspecified essential hypertension     Created by Conversion      Varicose vein 09/28/2015        PSH:  Past Surgical History:   Procedure Laterality Date     CATARACT EXTRACTION Left 2000     DENTAL SURGERY  1985     JOINT REPLACEMENT      left knee TKA 11/2013     ORTHOPEDIC SURGERY         Allergies:  Allergies   Allergen Reactions     Amoxicillin Anaphylaxis     Codeine Nausea and Vomiting     Sulfamethoxazole-Trimethoprim  [Sulfamethoxazole-Trimethoprim] Nausea and Vomiting     Tachycardia       Levaquin [Levofloxacin] Nausea and Vomiting     Ozempic (0.25 Or 0.5 Mg-Dose) [Semaglutide(0.25 Or 0.5mg-Dos)] GI Disturbance     Seasonal Allergies Other (See Comments)     SPRINGTIME Nasal, bronchial     Simvastatin Muscle Pain (Myalgia)       Family HX:  Family History   Problem Relation Age of Onset     Cerebrovascular Disease Mother      Clotting Disorder Father      Heart Disease Brother        Social Hx:  Social History     Socioeconomic History     Marital status:      Spouse name: Not on file     Number of children: Not on file     Years of education: Not on file     Highest education level: Not on file   Occupational History     Not on file   Tobacco Use     Smoking status: Former     Current packs/day: 0.00     Average packs/day: 2.0 packs/day for 12.0 years (24.0 ttl pk-yrs)     Types: Cigarettes     Start date: 1967     Quit date: 1979     Years since quittin.8     Passive exposure: Never     Smokeless tobacco: Never   Vaping Use     Vaping status: Never Used   Substance and Sexual Activity     Alcohol use: Yes     Comment: Alcoholic Drinks/day: rare     Drug use: No     Sexual activity: Yes     Partners: Female   Other Topics Concern     Not on file   Social History Narrative     Not on file     Social Determinants of Health     Financial Resource Strain: High Risk (2023)    Financial Resource Strain      Within the past 12 months, have you or your family members you live with been unable to get utilities (heat, electricity) when it was really needed?: Yes   Food Insecurity: Low Risk  (2023)    Food Insecurity      Within the past 12 months, did you worry that your food would run out before you got money to buy more?: No      Within the past 12 months, did the food you bought just not last and you didn t have money to get more?: No   Transportation Needs: Low Risk  (2023)    Transportation  Needs      Within the past 12 months, has lack of transportation kept you from medical appointments, getting your medicines, non-medical meetings or appointments, work, or from getting things that you need?: No   Physical Activity: Not on file   Stress: Not on file   Social Connections: Unknown (1/24/2024)    [facility]    Social Connections      Frequency of Communication with Friends and Family: Not on file   Interpersonal Safety: Low Risk  (1/29/2024)    Interpersonal Safety      Do you feel physically and emotionally safe where you currently live?: Yes      Within the past 12 months, have you been hit, slapped, kicked or otherwise physically hurt by someone?: No      Within the past 12 months, have you been humiliated or emotionally abused in other ways by your partner or ex-partner?: No   Housing Stability: Low Risk  (12/25/2023)    Housing Stability      Do you have housing? : Yes      Are you worried about losing your housing?: No       Current Meds:  Current Outpatient Medications   Medication Sig Dispense Refill     albuterol (PROAIR HFA/PROVENTIL HFA/VENTOLIN HFA) 108 (90 Base) MCG/ACT inhaler Inhale 2 puffs into the lungs every 6 hours as needed for wheezing 8.5 g 1     albuterol (PROVENTIL) (2.5 MG/3ML) 0.083% neb solution Take 1 vial (2.5 mg) by nebulization every 6 hours as needed for shortness of breath, wheezing or cough 90 mL 1     amLODIPine (NORVASC) 10 MG tablet TAKE 1 TABLET(10 MG) BY MOUTH DAILY 90 tablet 2     atenolol (TENORMIN) 100 MG tablet Take 1 tablet (100 mg) by mouth daily 90 tablet 2     azelastine (ASTELIN) 0.1 % nasal spray 2 sprays each nostril 1-2x daily as needed for nasal congestion (use nightly for first 2 week) 30 mL 11     blood glucose (ACCU-CHEK GUIDE) test strip 1 strip by In Vitro route 2 times daily Use to test blood sugar 2 times daily or as directed. 200 strip 3     cetirizine  (ZYRTEC) 10 MG tablet Take 10 mg by mouth daily       Continuous Blood Gluc Sensor (DEXCOM G7 SENSOR) MISC 1 each every 10 days Change sensor every 10 days 3 each 5     dupilumab (DUPIXENT) 300 MG/2ML prefilled pen Inject 2 mLs (300 mg) Subcutaneous every 14 days Inject 600 mg with first dose 4 mL 2     empagliflozin (JARDIANCE) 25 MG TABS tablet Take 1 tablet (25 mg) by mouth daily. 30 tablet 3     famotidine (PEPCID) 20 MG tablet Take 20 mg by mouth 2 times daily as needed       fluticasone-salmeterol (ADVAIR) 500-50 MCG/ACT inhaler INHALE 1 PUFF INTO THE LUNGS TWICE DAILY 60 each 3     hydrochlorothiazide (HYDRODIURIL) 25 MG tablet Take 1 tablet (25 mg) by mouth every morning 90 tablet 3     ibuprofen (ADVIL/MOTRIN) 200 MG tablet Take 400 mg by mouth every 4 hours as needed for pain       insulin pen needle (ULTICARE MINI) 31G X 6 MM miscellaneous Use 1 pen needles daily or as directed. 100 each 3     ipratropium - albuterol 0.5 mg/2.5 mg/3 mL (DUONEB) 0.5-2.5 (3) MG/3ML neb solution Take 1 vial (3 mLs) by nebulization every 6 hours as needed for shortness of breath, wheezing or cough 540 mL 3     lidocaine (XYLOCAINE) 5 % external ointment Apply topically as needed for moderate pain 30 g 0     liraglutide (VICTOZA) 18 MG/3ML solution Inject 0.6 mg subcutaneously daily.       losartan (COZAAR) 100 MG tablet Take 1 tablet (100 mg) by mouth daily 90 tablet 2     Melatonin 10 MG TABS tablet Take 20 mg by mouth nightly as needed for sleep       metFORMIN (GLUCOPHAGE XR) 500 MG 24 hr tablet Take 4 tablets (2,000 mg) by mouth daily. 360 tablet 1     montelukast (SINGULAIR) 10 MG tablet Take 1 tablet (10 mg) by mouth at bedtime 30 tablet 6     nystatin (MYCOSTATIN) 693318 UNIT/GM external powder Apply topically 2 times daily 60 g 0     rosuvastatin (CRESTOR) 20 MG tablet Take 1 tablet (20 mg) by mouth daily 90 tablet 2     tamsulosin (FLOMAX) 0.4 MG capsule Take 1 capsule (0.4 mg) by mouth daily 90 capsule 1      traZODone (DESYREL) 50 MG tablet Take 1 tablet (50 mg) by mouth nightly as needed 30 tablet 0     triamcinolone (KENALOG) 0.1 % cream [TRIAMCINOLONE (KENALOG) 0.1 % CREAM] APPLY EXTERNALLY TO THE AFFECTED AREA TWICE DAILY FOR 14 DAYS 45 g 0       Physical Exam:  There were no vitals taken for this visit.  Gen: awake, alert, oriented, no distress  HEENT: nasal turbinates are unremarkable, no oropharyngeal lesions, no cervical or supraclavicular lymphadenopathy  CV: RRR, no M/G/R  Resp: CTAB, no focal crackles or wheezes  Skin: no apparent rashes  Ext: no cyanosis, clubbing or edema  Neuro: alert, nonfocal    Labs:  Reviewed    Eos 900 on CBC 3/11.  CBC from 3/11: normal eos count  [place] allergen panel: elevated IgE to cat dander, dog dander, maple and house khoury dust  ROSEANN neg  Blaso Ab neg    Bronch/BAL 5/29/2024  Total nuc cells 683; 36% PMN, 29% lymphs, 7% mono/mac, 4% eos, 24% lining cells  RVP neg  Cytology notable for hemorrhage and acute inflammation; neg for malignant cells  Culture + for moraxella and actinomyces odontolytica  Aspergillus Ag neg  PJP neg  Fungal cx neg      Imaging studies:  Personally reviewed    EXAM: CT CHEST W/O CONTRAST  LOCATION: [facility]  DATE: 10/16/2024     INDICATION: follow up mass consolidation LLL.  COMPARISON: Multiple prior CT exams, as recent is July 19, 2024 and as remote as February 20, 2024, PET/CT May 7, 2024.  TECHNIQUE: CT chest without IV contrast. Multiplanar reformats were obtained. Dose reduction techniques were used.  CONTRAST: None.     FINDINGS:   LUNGS AND PLEURA: Masslike opacity in the medial left lower lobe is again observed, and although the greatest dimensions are similar as on the February 20, 2024 exam, the lesion appears thicker along its anterior and posterior margins than before. On   image 6:97 today, the lesion measures 48 x 15 mm axial and 79 mm craniocaudad as demonstrated on image 8:74. At the same level February  2024, the lesion measured 46 x 15 mm axial and 80 mm craniocaudad. However, the anterior marginal lesion on axial   image 6:97 measures about 11 mm transverse thickness today compared with 7 mm thick in the same site previously. The lesion is similarly thicker along its posterior margin.     The other portion of the lesion along its cephalad edge which measured 32 x 16 mm axial on image 6:86 and February 2024 now measures 38 x 19 mm on image 6:80.     Numerous sub-6 mm solid and subsolid pulmonary nodules and 8mm groundglass nodule in the posterior right middle lobe on image 6:98 are again observed and appears similar as in February.     MEDIASTINUM/AXILLAE: No mediastinal or hilar lymphadenopathy. Small hiatal hernia.     CORONARY ARTERY CALCIFICATION: Severe.     UPPER ABDOMEN: Dense cyst at the right renal upper pole are unchanged, no further evaluation required.     MUSCULOSKELETAL: Unremarkable.                                                                      IMPRESSION:   1. There is some interval increase in the size of the medial left lower lobe subpleural parenchymal opacity or mass lesion relative to CT February 20, 2024, which is substantially larger than on remote prior chest CT exams from 2021. 2. Numerous other   small solid and subsolid pulmonary nodules are unchanged compared with the CT February 2024. Several areas of tree-in-bud nodularity and clusters of nodules with septal thickening seen on the July 19, 2024 CT have resolved    Study Result    Narrative & Impression   EXAM: PET ONCOLOGY (EYES TO THIGHS)  LOCATION: [facility]  DATE: 5/7/2024     INDICATION: Solitary pulmonary nodule. Masslike consolidation left lower lobe. Other nonspecific abnormal finding of lung field.  COMPARISON: CT chest 4/21/2024, 2/20/2024, PET/CT 8/13/2021  TECHNIQUE: Serum glucose level 152 mg/dL. One hour post intravenous administration of 13.2 mCi F-18 FDG, PET imaging was performed from  the skull vertex to mid thighs, utilizing attenuation correction with concurrent axial CT and PET/CT image fusion.   Dose reduction techniques were used.     FINDINGS: An irregularly-shaped masslike consolidative pulmonary opacity in the posteromedial left lower lobe is similar to 4/21/2024 although larger than 8/13/2021, largest component 2.7 x 3.4 x 6.6 cm, and demonstrates increase in marked uptake (SUVmax   7.3, previously 3.2). No FDG avid adenopathy in the chest or elsewhere. No suspicious focal uptake in the liver or skeleton. Normal adrenal glands.     Moderate senescent intracranial changes. Mucous retention cyst right maxillary sinus. Benign sebaceous cyst midline upper back. Moderate atherosclerotic calcifications including the coronary arteries. Mild bandlike scarring or atelectasis in the lower   lungs. Few scattered benign calcified pulmonary granulomas. Small esophageal hiatal hernia. Few small benign bilateral renal cysts, no follow-up needed. Splenule. Small fat-containing umbilical hernia. Moderate colonic diverticulosis, greatest in the   sigmoid region. Mild scattered degenerative changes in the spine.                                                                      IMPRESSION:  1. An enlarging area of masslike consolidation in the posteromedial left lower lobe with increased marked uptake is indeterminate, suspicious for malignancy. This is amenable to percutaneous CT guided biopsy.  2. No evidence of carrie or distant metastasis.       Pulmonary Function Testing  3/14/2024  FEV1 1.68L, 56%  FVC 70%  Ratio 0.6  No BD testing done  TLC 6.36L, 84%  %  Dlco 95% court for hgb  Flow volume curve suggests obstruction  NB: pt used Advair 4 hours prior to testing    Again, thank you for allowing me to participate in the care of your patient.        Sincerely,        Andrea Davis MD

## 2024-10-18 NOTE — PROGRESS NOTES
Pulmonary Clinic Follow-up Visit    Assessment and Plan:   74M with a history of obesity, DM, HLD, Klinefelter's syndrome, seasonal allergies, hospitalization in Dec 2023 for LLL pneumonia and influenza and asthma, history of peripheral eosinophilia, presents for follow up of asthma with frequent exacerbations. PET/CT  last Spring showed abnormal FDG uptake in the LLL opacity; biopsy attempted but unable to be done due to significant wheezing and hyperglycemia. Bronchoscopy with BAL was done which showed moraxella species in the BAL; no malignancy found on the BAL. He completed treatment for this. Recent CT chest showed increase in size of the LLL opacity compared to last February; however I reviewed the imaged with radiology today (Dr. Hunter) and the opacity has actually slightly decreased in size compared to July and has decreased significantly since last December. The opacity is likely due to focal area of inflammation e.g. organizing pneumonia. Importantly, he has started dupilumab and is doing very well from an asthma standpoint. We discussed reattempt at biopsy vs. Monitoring with serial imaging. Since he is doing so well clinically, Lester wants to hold off on another biopsy attempt which I feel is reasonable.     Recommendations:  - continue high dose Wixela BID. Rinse/gargle/spit after use  - continue albuterol inhaler vs. Neb as needed   - encouraged to exercise and remain active.  - continue montelukast 10mg daily for allergy control  -  continue dupilumab and follow up with Dr. Rendon.   - repeat CT chest in 6 months.   - UTD with PCV20. Flu shot today. Declines covid-19 vaccination.     Follow up in 6 months with CT chest, as above    Action plan: prednisone 40mg daily x5 days + z-pack    The longitudinal plan of care for the diagnosis(es)/condition(s) as documented were addressed during this visit. Due to the added complexity in care, I will continue to support Lester in the subsequent management and with  ongoing continuity of care.     Andrea Davis MD (Avi)  [facility] ([facility])  Clinic [phone]  Fax [phone]     CCx: asthma, history of recurrent pneumonia    HPI: Interim history: Lester was last seen by Paty Carlisle CNP on 6/28. Since that time, he reports he's doing well. Just started dupilumab.   His breathing is very good; no wheezing or chest tightness.     ROS:  A 12-system review was obtained and was negative with the exception of the symptoms endorsed in the history of present illness.    PMH:  Past Medical History:   Diagnosis Date    Anemia, unspecified     Created by Conversion     Cataract     left eye removed, present in right eye    Chronic osteoarthritis     right knee    Colon polyps     Contact dermatitis and other eczema, due to unspecified cause     Created by Conversion     Diabetes mellitus, type 2 (H)     Klinefelter's syndrome     Created by Conversion     Obesity, unspecified     Created by Conversion     Pure hypercholesterolemia     Created by Conversion     Type II or unspecified type diabetes mellitus without mention of complication, not stated as uncontrolled     Created by Conversion     Uncomplicated asthma     adult asthma- wheezing    Unspecified essential hypertension     Created by Conversion     Varicose vein 09/28/2015        PSH:  Past Surgical History:   Procedure Laterality Date    CATARACT EXTRACTION Left 2000    DENTAL SURGERY  1985    JOINT REPLACEMENT      left knee TKA 11/2013    ORTHOPEDIC SURGERY         Allergies:  Allergies   Allergen Reactions    Amoxicillin Anaphylaxis    Codeine Nausea and Vomiting    Sulfamethoxazole-Trimethoprim [Sulfamethoxazole-Trimethoprim] Nausea and Vomiting     Tachycardia      Levaquin [Levofloxacin] Nausea and Vomiting    Ozempic (0.25 Or 0.5 Mg-Dose) [Semaglutide(0.25 Or 0.5mg-Dos)] GI Disturbance    Seasonal Allergies Other (See Comments)     SPRINGTIME Nasal, bronchial    Simvastatin  Muscle Pain (Myalgia)       Family HX:  Family History   Problem Relation Age of Onset    Cerebrovascular Disease Mother     Clotting Disorder Father     Heart Disease Brother        Social Hx:  Social History     Socioeconomic History    Marital status:      Spouse name: Not on file    Number of children: Not on file    Years of education: Not on file    Highest education level: Not on file   Occupational History    Not on file   Tobacco Use    Smoking status: Former     Current packs/day: 0.00     Average packs/day: 2.0 packs/day for 12.0 years (24.0 ttl pk-yrs)     Types: Cigarettes     Start date: 1967     Quit date: 1979     Years since quittin.8     Passive exposure: Never    Smokeless tobacco: Never   Vaping Use    Vaping status: Never Used   Substance and Sexual Activity    Alcohol use: Yes     Comment: Alcoholic Drinks/day: rare    Drug use: No    Sexual activity: Yes     Partners: Female   Other Topics Concern    Not on file   Social History Narrative    Not on file     Social Determinants of Health     Financial Resource Strain: High Risk (2023)    Financial Resource Strain     Within the past 12 months, have you or your family members you live with been unable to get utilities (heat, electricity) when it was really needed?: Yes   Food Insecurity: Low Risk  (2023)    Food Insecurity     Within the past 12 months, did you worry that your food would run out before you got money to buy more?: No     Within the past 12 months, did the food you bought just not last and you didn t have money to get more?: No   Transportation Needs: Low Risk  (2023)    Transportation Needs     Within the past 12 months, has lack of transportation kept you from medical appointments, getting your medicines, non-medical meetings or appointments, work, or from getting things that you need?: No   Physical Activity: Not on file   Stress: Not on file   Social Connections: Unknown (2024)     [facility]    Social Connections     Frequency of Communication with Friends and Family: Not on file   Interpersonal Safety: Low Risk  (1/29/2024)    Interpersonal Safety     Do you feel physically and emotionally safe where you currently live?: Yes     Within the past 12 months, have you been hit, slapped, kicked or otherwise physically hurt by someone?: No     Within the past 12 months, have you been humiliated or emotionally abused in other ways by your partner or ex-partner?: No   Housing Stability: Low Risk  (12/25/2023)    Housing Stability     Do you have housing? : Yes     Are you worried about losing your housing?: No       Current Meds:  Current Outpatient Medications   Medication Sig Dispense Refill    albuterol (PROAIR HFA/PROVENTIL HFA/VENTOLIN HFA) 108 (90 Base) MCG/ACT inhaler Inhale 2 puffs into the lungs every 6 hours as needed for wheezing 8.5 g 1    albuterol (PROVENTIL) (2.5 MG/3ML) 0.083% neb solution Take 1 vial (2.5 mg) by nebulization every 6 hours as needed for shortness of breath, wheezing or cough 90 mL 1    amLODIPine (NORVASC) 10 MG tablet TAKE 1 TABLET(10 MG) BY MOUTH DAILY 90 tablet 2    atenolol (TENORMIN) 100 MG tablet Take 1 tablet (100 mg) by mouth daily 90 tablet 2    azelastine (ASTELIN) 0.1 % nasal spray 2 sprays each nostril 1-2x daily as needed for nasal congestion (use nightly for first 2 week) 30 mL 11    blood glucose (ACCU-CHEK GUIDE) test strip 1 strip by In Vitro route 2 times daily Use to test blood sugar 2 times daily or as directed. 200 strip 3    cetirizine (ZYRTEC) 10 MG tablet Take 10 mg by mouth daily      Continuous Blood Gluc Sensor (DEXCOM G7 SENSOR) MISC 1 each every 10 days Change sensor every 10 days 3 each 5    dupilumab (DUPIXENT) 300 MG/2ML prefilled pen Inject 2 mLs (300 mg) Subcutaneous every 14 days Inject 600 mg with first dose 4 mL 2    empagliflozin  (JARDIANCE) 25 MG TABS tablet Take 1 tablet (25 mg) by mouth daily. 30 tablet 3    famotidine (PEPCID) 20 MG tablet Take 20 mg by mouth 2 times daily as needed      fluticasone-salmeterol (ADVAIR) 500-50 MCG/ACT inhaler INHALE 1 PUFF INTO THE LUNGS TWICE DAILY 60 each 3    hydrochlorothiazide (HYDRODIURIL) 25 MG tablet Take 1 tablet (25 mg) by mouth every morning 90 tablet 3    ibuprofen (ADVIL/MOTRIN) 200 MG tablet Take 400 mg by mouth every 4 hours as needed for pain      insulin pen needle (ULTICARE MINI) 31G X 6 MM miscellaneous Use 1 pen needles daily or as directed. 100 each 3    ipratropium - albuterol 0.5 mg/2.5 mg/3 mL (DUONEB) 0.5-2.5 (3) MG/3ML neb solution Take 1 vial (3 mLs) by nebulization every 6 hours as needed for shortness of breath, wheezing or cough 540 mL 3    lidocaine (XYLOCAINE) 5 % external ointment Apply topically as needed for moderate pain 30 g 0    liraglutide (VICTOZA) 18 MG/3ML solution Inject 0.6 mg subcutaneously daily.      losartan (COZAAR) 100 MG tablet Take 1 tablet (100 mg) by mouth daily 90 tablet 2    Melatonin 10 MG TABS tablet Take 20 mg by mouth nightly as needed for sleep      metFORMIN (GLUCOPHAGE XR) 500 MG 24 hr tablet Take 4 tablets (2,000 mg) by mouth daily. 360 tablet 1    montelukast (SINGULAIR) 10 MG tablet Take 1 tablet (10 mg) by mouth at bedtime 30 tablet 6    nystatin (MYCOSTATIN) 322860 UNIT/GM external powder Apply topically 2 times daily 60 g 0    rosuvastatin (CRESTOR) 20 MG tablet Take 1 tablet (20 mg) by mouth daily 90 tablet 2    tamsulosin (FLOMAX) 0.4 MG capsule Take 1 capsule (0.4 mg) by mouth daily 90 capsule 1    traZODone (DESYREL) 50 MG tablet Take 1 tablet (50 mg) by mouth nightly as needed 30 tablet 0    triamcinolone (KENALOG) 0.1 % cream [TRIAMCINOLONE (KENALOG) 0.1 % CREAM] APPLY EXTERNALLY TO THE AFFECTED AREA TWICE DAILY FOR 14 DAYS 45 g 0       Physical Exam:  There were no vitals taken for this visit.  Gen: awake, alert, oriented, no  distress  HEENT: nasal turbinates are unremarkable, no oropharyngeal lesions, no cervical or supraclavicular lymphadenopathy  CV: RRR, no M/G/R  Resp: CTAB, no focal crackles or wheezes  Skin: no apparent rashes  Ext: no cyanosis, clubbing or edema  Neuro: alert, nonfocal    Labs:  Reviewed    Eos 900 on CBC 3/11.  CBC from 3/11: normal eos count  [place] allergen panel: elevated IgE to cat dander, dog dander, maple and house khoury dust  ROSEANN neg  Blaso Ab neg    Bronch/BAL 5/29/2024  Total nuc cells 683; 36% PMN, 29% lymphs, 7% mono/mac, 4% eos, 24% lining cells  RVP neg  Cytology notable for hemorrhage and acute inflammation; neg for malignant cells  Culture + for moraxella and actinomyces odontolytica  Aspergillus Ag neg  PJP neg  Fungal cx neg      Imaging studies:  Personally reviewed    EXAM: CT CHEST W/O CONTRAST  LOCATION: [facility]  DATE: 10/16/2024     INDICATION: follow up mass consolidation LLL.  COMPARISON: Multiple prior CT exams, as recent is July 19, 2024 and as remote as February 20, 2024, PET/CT May 7, 2024.  TECHNIQUE: CT chest without IV contrast. Multiplanar reformats were obtained. Dose reduction techniques were used.  CONTRAST: None.     FINDINGS:   LUNGS AND PLEURA: Masslike opacity in the medial left lower lobe is again observed, and although the greatest dimensions are similar as on the February 20, 2024 exam, the lesion appears thicker along its anterior and posterior margins than before. On   image 6:97 today, the lesion measures 48 x 15 mm axial and 79 mm craniocaudad as demonstrated on image 8:74. At the same level February 2024, the lesion measured 46 x 15 mm axial and 80 mm craniocaudad. However, the anterior marginal lesion on axial   image 6:97 measures about 11 mm transverse thickness today compared with 7 mm thick in the same site previously. The lesion is similarly thicker along its posterior margin.     The other portion of the lesion along its cephalad  edge which measured 32 x 16 mm axial on image 6:86 and February 2024 now measures 38 x 19 mm on image 6:80.     Numerous sub-6 mm solid and subsolid pulmonary nodules and 8mm groundglass nodule in the posterior right middle lobe on image 6:98 are again observed and appears similar as in February.     MEDIASTINUM/AXILLAE: No mediastinal or hilar lymphadenopathy. Small hiatal hernia.     CORONARY ARTERY CALCIFICATION: Severe.     UPPER ABDOMEN: Dense cyst at the right renal upper pole are unchanged, no further evaluation required.     MUSCULOSKELETAL: Unremarkable.                                                                      IMPRESSION:   1. There is some interval increase in the size of the medial left lower lobe subpleural parenchymal opacity or mass lesion relative to CT February 20, 2024, which is substantially larger than on remote prior chest CT exams from 2021. 2. Numerous other   small solid and subsolid pulmonary nodules are unchanged compared with the CT February 2024. Several areas of tree-in-bud nodularity and clusters of nodules with septal thickening seen on the July 19, 2024 CT have resolved    Study Result    Narrative & Impression   EXAM: PET ONCOLOGY (EYES TO THIGHS)  LOCATION: [facility]  DATE: 5/7/2024     INDICATION: Solitary pulmonary nodule. Masslike consolidation left lower lobe. Other nonspecific abnormal finding of lung field.  COMPARISON: CT chest 4/21/2024, 2/20/2024, PET/CT 8/13/2021  TECHNIQUE: Serum glucose level 152 mg/dL. One hour post intravenous administration of 13.2 mCi F-18 FDG, PET imaging was performed from the skull vertex to mid thighs, utilizing attenuation correction with concurrent axial CT and PET/CT image fusion.   Dose reduction techniques were used.     FINDINGS: An irregularly-shaped masslike consolidative pulmonary opacity in the posteromedial left lower lobe is similar to 4/21/2024 although larger than 8/13/2021, largest component 2.7  x 3.4 x 6.6 cm, and demonstrates increase in marked uptake (SUVmax   7.3, previously 3.2). No FDG avid adenopathy in the chest or elsewhere. No suspicious focal uptake in the liver or skeleton. Normal adrenal glands.     Moderate senescent intracranial changes. Mucous retention cyst right maxillary sinus. Benign sebaceous cyst midline upper back. Moderate atherosclerotic calcifications including the coronary arteries. Mild bandlike scarring or atelectasis in the lower   lungs. Few scattered benign calcified pulmonary granulomas. Small esophageal hiatal hernia. Few small benign bilateral renal cysts, no follow-up needed. Splenule. Small fat-containing umbilical hernia. Moderate colonic diverticulosis, greatest in the   sigmoid region. Mild scattered degenerative changes in the spine.                                                                      IMPRESSION:  1. An enlarging area of masslike consolidation in the posteromedial left lower lobe with increased marked uptake is indeterminate, suspicious for malignancy. This is amenable to percutaneous CT guided biopsy.  2. No evidence of carrie or distant metastasis.       Pulmonary Function Testing  3/14/2024  FEV1 1.68L, 56%  FVC 70%  Ratio 0.6  No BD testing done  TLC 6.36L, 84%  %  Dlco 95% court for hgb  Flow volume curve suggests obstruction  NB: pt used Advair 4 hours prior to testing

## 2024-10-21 ENCOUNTER — TELEPHONE (OUTPATIENT)
Dept: INTERNAL MEDICINE | Facility: CLINIC | Age: 74
End: 2024-10-21
Payer: COMMERCIAL

## 2024-10-21 DIAGNOSIS — Z79.4 TYPE 2 DIABETES MELLITUS WITH DIABETIC CATARACT, WITH LONG-TERM CURRENT USE OF INSULIN (H): Primary | ICD-10-CM

## 2024-10-21 DIAGNOSIS — E11.36 TYPE 2 DIABETES MELLITUS WITH DIABETIC CATARACT, WITH LONG-TERM CURRENT USE OF INSULIN (H): Primary | ICD-10-CM

## 2024-10-21 NOTE — TELEPHONE ENCOUNTER
On 10/21/2024, it was a pleasure speaking with Lester.     Discussed was financial assistance regarding specific high cost medications; prescription list,  assistance programs, gross income and insurance.    Lester is over income by approximately $10,000 for the Prescription [employer] assistance program. He did state that Victoza was in the range in which he could afford in the past. There has not been a test claim, that he is aware of, recently, and would like to know the current cost.    Communicating to us the test claim results prior to them connecting with us is extremely helpful in determining the level of assistance that is needed, ie. if we could use the $500 cesar to get them started for a month or more;if they are already on SSEXTRAHLP or MA. And much appreciated.  :)    Lauren Sanders  Prescription   Please send responses to [employer]

## 2024-10-21 NOTE — TELEPHONE ENCOUNTER
Would you like me to send in a prescription of Victoza for him? If so, does it matter which pharmacy?

## 2024-10-22 DIAGNOSIS — J45.30 MILD PERSISTENT ASTHMA WITHOUT COMPLICATION: ICD-10-CM

## 2024-10-22 RX ORDER — ALBUTEROL SULFATE 90 UG/1
2 INHALANT RESPIRATORY (INHALATION) EVERY 6 HOURS PRN
Qty: 8.5 G | Refills: 1 | Status: SHIPPED | OUTPATIENT
Start: 2024-10-22

## 2024-10-28 ENCOUNTER — TRANSFERRED RECORDS (OUTPATIENT)
Dept: HEALTH INFORMATION MANAGEMENT | Facility: CLINIC | Age: 74
End: 2024-10-28
Payer: COMMERCIAL

## 2024-11-02 ENCOUNTER — MYC MEDICAL ADVICE (OUTPATIENT)
Dept: PULMONOLOGY | Facility: CLINIC | Age: 74
End: 2024-11-02
Payer: COMMERCIAL

## 2024-11-02 DIAGNOSIS — J45.31 MILD PERSISTENT ASTHMA WITH EXACERBATION: Primary | ICD-10-CM

## 2024-11-04 RX ORDER — PREDNISONE 20 MG/1
TABLET ORAL
Qty: 10 TABLET | Refills: 0 | Status: SHIPPED | OUTPATIENT
Start: 2024-11-04 | End: 2024-11-11

## 2024-11-04 RX ORDER — AZITHROMYCIN 250 MG/1
TABLET, FILM COATED ORAL
Qty: 6 TABLET | Refills: 0 | Status: SHIPPED | OUTPATIENT
Start: 2024-11-04 | End: 2024-11-11

## 2024-11-08 ENCOUNTER — TELEPHONE (OUTPATIENT)
Dept: PHARMACY | Facility: CLINIC | Age: 74
End: 2024-11-08
Payer: COMMERCIAL

## 2024-11-08 DIAGNOSIS — Z79.4 TYPE 2 DIABETES MELLITUS WITH DIABETIC CATARACT, WITH LONG-TERM CURRENT USE OF INSULIN (H): Primary | ICD-10-CM

## 2024-11-08 DIAGNOSIS — E11.36 TYPE 2 DIABETES MELLITUS WITH DIABETIC CATARACT, WITH LONG-TERM CURRENT USE OF INSULIN (H): Primary | ICD-10-CM

## 2024-11-08 RX ORDER — TIRZEPATIDE 2.5 MG/.5ML
2.5 INJECTION, SOLUTION SUBCUTANEOUS
Qty: 2 ML | Refills: 0 | Status: SHIPPED | OUTPATIENT
Start: 2024-11-08

## 2024-11-08 NOTE — TELEPHONE ENCOUNTER
Called patient to schedule MTM follow-up. He has been taking prednisone recently and also had a steroid injection. Reports blood glucose 294 recently. Still not using Dexcom, checking manually. He is open to Mounjaro since Victoza not covered through his insurance. I will send a prescription and we will follow-up on this next month.     Lissa Victor, PharmD  Medication Therapy Management (MTM) Pharmacist

## 2024-11-11 ENCOUNTER — MYC MEDICAL ADVICE (OUTPATIENT)
Dept: PULMONOLOGY | Facility: CLINIC | Age: 74
End: 2024-11-11
Payer: COMMERCIAL

## 2024-11-11 ENCOUNTER — MYC REFILL (OUTPATIENT)
Dept: PULMONOLOGY | Facility: CLINIC | Age: 74
End: 2024-11-11
Payer: COMMERCIAL

## 2024-11-11 DIAGNOSIS — J45.31 MILD PERSISTENT ASTHMA WITH EXACERBATION: ICD-10-CM

## 2024-11-11 RX ORDER — AZITHROMYCIN 250 MG/1
TABLET, FILM COATED ORAL
Qty: 6 TABLET | Refills: 0 | Status: SHIPPED | OUTPATIENT
Start: 2024-11-11 | End: 2024-11-15

## 2024-11-11 RX ORDER — PREDNISONE 20 MG/1
TABLET ORAL
Qty: 10 TABLET | Refills: 0 | Status: SHIPPED | OUTPATIENT
Start: 2024-11-11

## 2024-11-14 DIAGNOSIS — J45.50 SEVERE PERSISTENT ASTHMA WITHOUT COMPLICATION (H): ICD-10-CM

## 2024-11-14 RX ORDER — DUPILUMAB 300 MG/2ML
300 INJECTION, SOLUTION SUBCUTANEOUS
Qty: 4 ML | Refills: 0 | Status: SHIPPED | OUTPATIENT
Start: 2024-11-14

## 2024-11-21 ENCOUNTER — TELEPHONE (OUTPATIENT)
Dept: PULMONOLOGY | Facility: CLINIC | Age: 74
End: 2024-11-21
Payer: COMMERCIAL

## 2024-11-23 DIAGNOSIS — R35.0 BENIGN PROSTATIC HYPERPLASIA WITH URINARY FREQUENCY: ICD-10-CM

## 2024-11-23 DIAGNOSIS — N40.1 BENIGN PROSTATIC HYPERPLASIA WITH URINARY FREQUENCY: ICD-10-CM

## 2024-11-23 DIAGNOSIS — R10.13 ABDOMINAL PAIN, EPIGASTRIC: ICD-10-CM

## 2024-11-25 RX ORDER — OMEPRAZOLE 40 MG/1
40 CAPSULE, DELAYED RELEASE ORAL DAILY
Qty: 30 CAPSULE | Refills: 1 | OUTPATIENT
Start: 2024-11-25

## 2024-11-25 RX ORDER — TAMSULOSIN HYDROCHLORIDE 0.4 MG/1
0.4 CAPSULE ORAL DAILY
Qty: 90 CAPSULE | Refills: 1 | Status: SHIPPED | OUTPATIENT
Start: 2024-11-25

## 2024-11-27 DIAGNOSIS — E78.5 HYPERLIPIDEMIA, UNSPECIFIED HYPERLIPIDEMIA TYPE: ICD-10-CM

## 2024-11-27 DIAGNOSIS — I10 ESSENTIAL HYPERTENSION: ICD-10-CM

## 2024-11-27 RX ORDER — ROSUVASTATIN CALCIUM 20 MG/1
20 TABLET, COATED ORAL DAILY
Qty: 90 TABLET | Refills: 2 | Status: SHIPPED | OUTPATIENT
Start: 2024-11-27

## 2024-12-05 ENCOUNTER — VIRTUAL VISIT (OUTPATIENT)
Dept: PHARMACY | Facility: CLINIC | Age: 74
End: 2024-12-05
Payer: COMMERCIAL

## 2024-12-05 DIAGNOSIS — N40.1 BENIGN PROSTATIC HYPERPLASIA WITH URINARY FREQUENCY: ICD-10-CM

## 2024-12-05 DIAGNOSIS — J45.50 SEVERE PERSISTENT ASTHMA WITHOUT COMPLICATION (H): ICD-10-CM

## 2024-12-05 DIAGNOSIS — R35.0 BENIGN PROSTATIC HYPERPLASIA WITH URINARY FREQUENCY: ICD-10-CM

## 2024-12-05 DIAGNOSIS — Z79.4 TYPE 2 DIABETES MELLITUS WITH DIABETIC CATARACT, WITH LONG-TERM CURRENT USE OF INSULIN (H): Primary | ICD-10-CM

## 2024-12-05 DIAGNOSIS — R12 HEARTBURN: ICD-10-CM

## 2024-12-05 DIAGNOSIS — I10 PRIMARY HYPERTENSION: ICD-10-CM

## 2024-12-05 DIAGNOSIS — E11.36 TYPE 2 DIABETES MELLITUS WITH DIABETIC CATARACT, WITH LONG-TERM CURRENT USE OF INSULIN (H): Primary | ICD-10-CM

## 2024-12-05 RX ORDER — DUPILUMAB 300 MG/2ML
INJECTION, SOLUTION SUBCUTANEOUS
Qty: 4 ML | Refills: 0 | Status: SHIPPED | OUTPATIENT
Start: 2024-12-05

## 2024-12-05 RX ORDER — FAMOTIDINE 20 MG/1
TABLET, FILM COATED ORAL
Qty: 180 TABLET | OUTPATIENT
Start: 2024-12-05

## 2024-12-05 RX ORDER — FAMOTIDINE 20 MG/1
20 TABLET, FILM COATED ORAL 2 TIMES DAILY PRN
Qty: 60 TABLET | Refills: 3 | Status: SHIPPED | OUTPATIENT
Start: 2024-12-05

## 2024-12-05 ASSESSMENT — ASTHMA QUESTIONNAIRES
QUESTION_2 LAST FOUR WEEKS HOW OFTEN HAVE YOU HAD SHORTNESS OF BREATH: ONCE OR TWICE A WEEK
ACT_TOTALSCORE: 22
QUESTION_3 LAST FOUR WEEKS HOW OFTEN DID YOUR ASTHMA SYMPTOMS (WHEEZING, COUGHING, SHORTNESS OF BREATH, CHEST TIGHTNESS OR PAIN) WAKE YOU UP AT NIGHT OR EARLIER THAN USUAL IN THE MORNING: NOT AT ALL
QUESTION_5 LAST FOUR WEEKS HOW WOULD YOU RATE YOUR ASTHMA CONTROL: WELL CONTROLLED
QUESTION_4 LAST FOUR WEEKS HOW OFTEN HAVE YOU USED YOUR RESCUE INHALER OR NEBULIZER MEDICATION (SUCH AS ALBUTEROL): ONCE A WEEK OR LESS
QUESTION_1 LAST FOUR WEEKS HOW MUCH OF THE TIME DID YOUR ASTHMA KEEP YOU FROM GETTING AS MUCH DONE AT WORK, SCHOOL OR AT HOME: NONE OF THE TIME
ACT_TOTALSCORE: 22
ACUTE_EXACERBATION_TODAY: NO

## 2024-12-05 NOTE — PATIENT INSTRUCTIONS
"Recommendations from today's MTM visit:                                                         Start Trulicity 0.75mg weekly for 4 weeks. If no side effects, increase to 1.5mg weekly  Refilled famotidine 20mg twice daily as needed for heartburn  Schedule an appointment with Jatin and get labs checked.    Follow-up: Return in 9 weeks (on 2/6/2025) for Follow up, with me, using a phone visit.    It was great speaking with you today.  I value your experience and would be very thankful for your time in providing feedback in our clinic survey. In the next few days, you may receive an email or text message from [employer] with a link to a survey related to your  clinical pharmacist.\"     To schedule another MTM appointment, please call the clinic directly or you may call the MTM scheduling line at [phone].    My Clinical Pharmacist's contact information:                                                      Please feel free to contact me with any questions or concerns you have.      Lissa Victor, PharmD  Medication Therapy Management (MTM) Pharmacist   "

## 2024-12-05 NOTE — PROGRESS NOTES
Medication Therapy Management (MTM) Encounter    ASSESSMENT:                            Medication Adherence/Access: See below for considerations.    Diabetes   Patient is not meeting A1c goal of < 8% given age and comorbidities. Self monitoring of blood glucose is not at goal of fasting  mg/dL. Patient would benefit from switching GLP1. We will retry Trulicity since it appears this is covered and he has tolerated in the past.  Due for A1c and microalbumin with PCP.     Hypertension   Patient at goal of <140/90.     BPH  Stable.    Heartburn   Famotidine refilled.    PLAN:                            Start Trulicity 0.75mg weekly for 4 weeks. If no side effects, increase to 1.5mg weekly  Refilled famotidine 20mg twice daily as needed for heartburn  Schedule an appointment with Jatin and get labs checked.    Follow-up: Return in 9 weeks (on 2/6/2025) for Follow up, with me, using a phone visit.    SUBJECTIVE/OBJECTIVE:                          Lester Shi is a 74 year old male seen for a follow-up visit from 8/29/24.       Reason for visit: follow-up     Allergies/ADRs: Reviewed in chart  Past Medical History: Reviewed in chart  Tobacco: He reports that he quit smoking about 45 years ago. His smoking use included cigarettes. He started smoking about 57 years ago. He has a 24 pack-year smoking history. He has never been exposed to tobacco smoke. He has never used smokeless tobacco.  Alcohol: Less than 1 beverage / month    Medication Adherence/Access: Forgets to take Advair about once a week.     Diabetes   Metformin XR 2000mg daily   Jardiance 25mg daily - wants to know symptoms of yeast infection  Mounjaro - took it for 2 weeks but had allergic reaction (tongue tingling/swollen, heartburn, stomach cramps). Denies difficulty swallowing/breathing.    Needs A1c <8.5% to proceed with knee replacement.  Denies hypoglycemia symptoms.   Diet/Exercise: ice cream a couple times a month. Started E circuit at [facility] last  Monday.    Medication history:  Did not tolerate Ozempic or Mounjaro (allergic reaction)  Victoza not covered.  Insulin but prefers to avoid if possible  Trulicity - not taking due to cost     Blood sugar monitorin time(s) daily; Ranges: (patient reported) Fasting- 130-190s   Eye exam in the last 12 months? No  Foot exam: due    Heartburn  Famotidine 20mg twice daily as needed - needs refills  Does have heartburn, worsened by Mounjaro.    Hypertension   Losartan 100mg daily   Hydrochlorothiazide 25mg daily  Atenolol 100mg daily  Amlodipine 10mg once daily    Denies side effects. Denies LE edema.  Patient self monitors blood pressure.  Home BP monitoring 132/70s      BPH:  Tamsulosin 0.4mg daily  Patient reports he has issues with urinary incontinence. He reports normal urine stream and empties well but sometimes does not make it to the bathroom on time. Wears Depends if he goes out.  Does drink 7-8 glasses water/day and wakes up 1-2 times at night to urinate.  Per previous note, he started tamsulosin for urinary frequency.       Today's Vitals: There were no vitals taken for this visit.  ----------------    I spent 20 minutes with this patient today. All changes were made via collaborative practice agreement with Garret Pope CNP. A copy of the visit note was provided to the patient's provider(s).    A summary of these recommendations was sent via [employer].    Lissa Victor PharmD  Medication Therapy Management (MTM) Pharmacist    Telemedicine Visit Details  The patient's medications can be safely assessed via a telemedicine encounter.  Type of service:  Telephone visit  Originating Location (pt. Location): Home    Distant Location (provider location):  On-site  Start Time: 10:01 AM  End Time: 10:21 AM     Medication Therapy Recommendations  Heartburn   1 Rationale: Untreated condition - Needs additional medication therapy - Indication   Recommendation: Start Medication - famotidine 20 MG tablet   Status:  Accepted per CPA   Identified Date: 12/5/2024 Completed Date: 12/5/2024         Type 2 diabetes mellitus with diabetic cataract, with long-term current use of insulin (H)   1 Current Medication: MOUNJARO 2.5 MG/0.5ML SOAJ (Discontinued)   Current Medication Sig: Inject 0.5 mLs (2.5 mg) subcutaneously every 7 days.   Rationale: Undesirable effect - Adverse medication event - Safety   Recommendation: Change Medication - TRULICITY SC   Status: Accepted per CPA   Identified Date: 12/5/2024 Completed Date: 12/5/2024

## 2024-12-06 ENCOUNTER — TELEPHONE (OUTPATIENT)
Dept: ALLERGY | Facility: CLINIC | Age: 74
End: 2024-12-06

## 2024-12-06 NOTE — TELEPHONE ENCOUNTER
PA Initiation    Medication: DUPIXENT 300 MG/2ML SC SOAJ  Insurance Company: [employer] Part D - Phone [phone] Fax [phone]  Pharmacy Filling the Rx: [facility] - [place], [place] - [facility] [address]  Filling Pharmacy Phone: [phone]  Filling Pharmacy Fax: [phone]  Start Date: 12/6/2024         Thank you,     Piter Calix [facility]  Pharmacy Clinic [facility]  [email or web address]   Phone: [phone]  Fax: [phone]

## 2024-12-09 NOTE — TELEPHONE ENCOUNTER
Prior Authorization Approval    Medication: DUPIXENT 300 MG/2ML SC SOAJ  Authorization Effective Date: 12/6/2024  Authorization Expiration Date: 12/31/2025  Approved Dose/Quantity: 4 ml per 28 days  Reference #: [id number]   Insurance Company: [employer] Part D - Phone [phone] Fax [phone]  Expected CoPay: $    CoPay Card Available:      Financial Assistance Needed: Yes - enrolled with FPAP  Which Pharmacy is filling the prescription: [facility] - [address]  Pharmacy Notified: No - renewal only  Patient Notified: Yes           Thank you,     Piter Calix CP  [facility] [email or web address]   Phone: [phone]  Fax: [phone]

## 2024-12-10 DIAGNOSIS — I10 ESSENTIAL HYPERTENSION: ICD-10-CM

## 2024-12-11 RX ORDER — ATENOLOL 100 MG/1
100 TABLET ORAL DAILY
Qty: 90 TABLET | Refills: 2 | Status: SHIPPED | OUTPATIENT
Start: 2024-12-11

## 2024-12-16 ENCOUNTER — OFFICE VISIT (OUTPATIENT)
Dept: ALLERGY | Facility: CLINIC | Age: 74
End: 2024-12-16
Payer: COMMERCIAL

## 2024-12-16 VITALS — OXYGEN SATURATION: 96 % | HEART RATE: 91 BPM | RESPIRATION RATE: 20 BRPM

## 2024-12-16 DIAGNOSIS — J45.50 SEVERE PERSISTENT ASTHMA WITHOUT COMPLICATION (H): Primary | ICD-10-CM

## 2024-12-16 LAB
BASOPHILS # BLD AUTO: 0.1 10E3/UL (ref 0–0.2)
BASOPHILS NFR BLD AUTO: 1 %
EOSINOPHIL # BLD AUTO: 0.2 10E3/UL (ref 0–0.7)
EOSINOPHIL NFR BLD AUTO: 3 %
ERYTHROCYTE [DISTWIDTH] IN BLOOD BY AUTOMATED COUNT: 15 % (ref 10–15)
HCT VFR BLD AUTO: 35.7 % (ref 40–53)
HGB BLD-MCNC: 11.5 G/DL (ref 13.3–17.7)
IMM GRANULOCYTES # BLD: 0 10E3/UL
IMM GRANULOCYTES NFR BLD: 0 %
LYMPHOCYTES # BLD AUTO: 2.4 10E3/UL (ref 0.8–5.3)
LYMPHOCYTES NFR BLD AUTO: 40 %
MCH RBC QN AUTO: 28.6 PG (ref 26.5–33)
MCHC RBC AUTO-ENTMCNC: 32.2 G/DL (ref 31.5–36.5)
MCV RBC AUTO: 89 FL (ref 78–100)
MONOCYTES # BLD AUTO: 0.5 10E3/UL (ref 0–1.3)
MONOCYTES NFR BLD AUTO: 9 %
NEUTROPHILS # BLD AUTO: 2.8 10E3/UL (ref 1.6–8.3)
NEUTROPHILS NFR BLD AUTO: 47 %
PLATELET # BLD AUTO: 293 10E3/UL (ref 150–450)
RBC # BLD AUTO: 4.02 10E6/UL (ref 4.4–5.9)
WBC # BLD AUTO: 6 10E3/UL (ref 4–11)

## 2024-12-16 PROCEDURE — 99213 OFFICE O/P EST LOW 20 MIN: CPT | Performed by: ALLERGY & IMMUNOLOGY

## 2024-12-16 PROCEDURE — 85025 COMPLETE CBC W/AUTO DIFF WBC: CPT | Performed by: ALLERGY & IMMUNOLOGY

## 2024-12-16 RX ORDER — DUPILUMAB 300 MG/2ML
300 INJECTION, SOLUTION SUBCUTANEOUS
Qty: 4 ML | Refills: 5 | Status: SHIPPED | OUTPATIENT
Start: 2024-12-16

## 2024-12-16 NOTE — PROGRESS NOTES
Salvador Batista is a 74 year old, presenting for the following health issues:  RECHECK (Dupixent return)    HPI     Chief complaint: Follow-up Dupixent    History of present illness: This is a pleasant 74-year-old gentleman with a history of severe persistent asthma last seen in June.  He is here for to follow-up of Dupixent.  Using 300 mg every 2 weeks.  Denies any eye irritation numbness and tingling in his limbs or skin rashes.  He continues on Wixela twice daily but mitts to missing some doses due to the fact that he is doing quite well.  Denies any cough, wheeze or shortness of breath or asthma exacerbations since I last saw him.  He also follows with Dr. Davis.          Objective    Pulse 91   Resp 20   SpO2 96%   There is no height or weight on file to calculate BMI.  Physical Exam   Gen: Pleasant male not in acute distress  HEENT: Eyes no erythema of the bulbar or palpebral conjunctiva, no edema.  Respiratory: Clear to auscultation bilaterally, no adventitious breath sounds  Skin: No rashes or lesions  Psych: Alert and oriented times 3    Impression report and plan:  1.  Severe persistent asthma    Patient to continue Dupixent 300 mg every 2 weeks.  I will see if he can transition his Dupixent to the pulmonary clinic given that he is now following with them.  He should follow every 6 months.  Check CBC today.  Reviewed risk of eye irritation and eosinophilic granulomatous polyangitis and cutaneous t-cell lymphoma.            Signed Electronically by: Carlie Rendon MD

## 2024-12-16 NOTE — LETTER
12/16/2024      Lester Shi  [address]  [place] [address]      Dear Colleague,    Thank you for referring your patient, Lester Shi, to the [facility]. Please see a copy of my visit note below.          Subjective  Lester is a 74 year old, presenting for the following health issues:  RECHECK (Dupixent return)    HPI     Chief complaint: Follow-up Dupixent    History of present illness: This is a pleasant 74-year-old gentleman with a history of severe persistent asthma last seen in June.  He is here for to follow-up of Dupixent.  Using 300 mg every 2 weeks.  Denies any eye irritation numbness and tingling in his limbs or skin rashes.  He continues on Wixela twice daily but mitts to missing some doses due to the fact that he is doing quite well.  Denies any cough, wheeze or shortness of breath or asthma exacerbations since I last saw him.  He also follows with Dr. Davis.          Objective   Pulse 91   Resp 20   SpO2 96%   There is no height or weight on file to calculate BMI.  Physical Exam   Gen: Pleasant male not in acute distress  HEENT: Eyes no erythema of the bulbar or palpebral conjunctiva, no edema.  Respiratory: Clear to auscultation bilaterally, no adventitious breath sounds  Skin: No rashes or lesions  Psych: Alert and oriented times 3    Impression report and plan:  1.  Severe persistent asthma    Patient to continue Dupixent 300 mg every 2 weeks.  I will see if he can transition his Dupixent to the pulmonary clinic given that he is now following with them.  He should follow every 6 months.  Check CBC today.  Reviewed risk of eye irritation and eosinophilic granulomatous polyangitis and cutaneous t-cell lymphoma.            Signed Electronically by: Carlie Rendon MD      Again, thank you for allowing me to participate in the care of your patient.        Sincerely,        Carlie Rendon MD

## 2024-12-16 NOTE — Clinical Note
Patient has a follow-up with you in April.  He is okay if he transitions his Dupixent to you at that time?  He is doing very well with Dupixent.  Thanks Carlie

## 2024-12-23 NOTE — TELEPHONE ENCOUNTER
Sent [employer] message about FPAP renewal for 2025.    Thank you,     Piter Calix [facility]  Pharmacy Clinic [facility]  [email or web address]   Phone: [phone]  Fax: [phone]

## 2024-12-30 ENCOUNTER — PATIENT OUTREACH (OUTPATIENT)
Dept: CARE COORDINATION | Facility: CLINIC | Age: 74
End: 2024-12-30
Payer: COMMERCIAL

## 2024-12-31 NOTE — TELEPHONE ENCOUNTER
Called and spoke to Lester. He wanted me to send the information to him via e-mail. He stated he would send a copy of his proof of income via e-mail. Will submit AP renewal application once received.    Thank you,     Piter [facility]  Pharmacy Clinic [facility]  [email or web address]   Phone: [phone]  Fax: [phone]

## 2025-01-02 NOTE — TELEPHONE ENCOUNTER
SAUNDRA INITIATED    [facility] 300 [facility] [place]  Date submitted: 12/18/2024  3:36 PM         Thank you,     Piter [facility]  Pharmacy Clinic [facility]  [email or web address]   Phone: [phone]  Fax: [phone]

## 2025-01-04 NOTE — TELEPHONE ENCOUNTER
SAUNDRA APPROVED    Medication: DUPIXENT 300 MG/2ML SC SOAJ  Amount: $ 60,823.8  Foundation Name: [facility] Effective Date: 1/4/2025  Foundation Expiration Date: 12/31/2025  Additional Information:   Patient Notified: Yes      Thank you,     Piter [facility]  Pharmacy Clinic [facility]  [email or web address]   Phone: [phone]  Fax: [phone]

## 2025-01-07 DIAGNOSIS — E11.36 TYPE 2 DIABETES MELLITUS WITH DIABETIC CATARACT, WITH LONG-TERM CURRENT USE OF INSULIN (H): ICD-10-CM

## 2025-01-07 DIAGNOSIS — Z79.4 TYPE 2 DIABETES MELLITUS WITH DIABETIC CATARACT, WITH LONG-TERM CURRENT USE OF INSULIN (H): ICD-10-CM

## 2025-01-13 ENCOUNTER — PATIENT OUTREACH (OUTPATIENT)
Dept: CARE COORDINATION | Facility: CLINIC | Age: 75
End: 2025-01-13
Payer: COMMERCIAL

## 2025-01-21 ENCOUNTER — TRANSFERRED RECORDS (OUTPATIENT)
Dept: MULTI SPECIALTY CLINIC | Facility: CLINIC | Age: 75
End: 2025-01-21

## 2025-01-21 LAB — RETINOPATHY: NORMAL

## 2025-02-03 ENCOUNTER — PATIENT OUTREACH (OUTPATIENT)
Dept: CARE COORDINATION | Facility: CLINIC | Age: 75
End: 2025-02-03
Payer: COMMERCIAL

## 2025-02-20 ENCOUNTER — TELEPHONE (OUTPATIENT)
Dept: PULMONOLOGY | Facility: CLINIC | Age: 75
End: 2025-02-20
Payer: COMMERCIAL

## 2025-02-20 NOTE — TELEPHONE ENCOUNTER
Left message for patient to move CT scan before Dr. Davis appointment or move Dr. Davis after CT appointment so information is available for Dr. Davis

## 2025-03-04 DIAGNOSIS — I10 ESSENTIAL HYPERTENSION, BENIGN: ICD-10-CM

## 2025-03-04 RX ORDER — LOSARTAN POTASSIUM 100 MG/1
100 TABLET ORAL DAILY
Qty: 90 TABLET | Refills: 1 | Status: SHIPPED | OUTPATIENT
Start: 2025-03-04

## 2025-03-04 NOTE — TELEPHONE ENCOUNTER
Refill Request  Medication name: Pending Prescriptions:                       Disp   Refills    losartan (COZAAR) 100 MG tablet           90 tab*2            Sig: Take 1 tablet (100 mg) by mouth daily.    Requested Pharmacy:  [id number] [address] AT [facility] [address]

## 2025-03-05 NOTE — PROGRESS NOTES
Medication Therapy Management (MTM) Encounter    ASSESSMENT:                            Medication Adherence/Access: No issues identified.    Diabetes   Patient is not meeting A1c goal of < 8% given age and comorbidities. Since self monitoring is not at goal, recommend increasing Trulicity today.      Hypertension   Blood pressure not at goal <140/90. Consider additional therapy if continues to be elevated.     Hyperlipidemia    LDL at goal <100 on high intensity statin.     Asthma /Allergy  Symptoms are improved on Dupixent. Recommend step down therapy at this time, can consider discontinuing montelukast in the future as well. Follows with pulmonology and allergy.     GERD    Stable.      Insomnia   Stable.      Benign prostatic hyperplasia   Recommend dose increase for symptoms.     Rash  Stable.     Pain    Stable. Looking at minimally invasive treatment for knee pain.    PLAN:                            Decrease Advair to 250/50mcg twice daily. Rinse mouth after use.  Increase Trulicity to 3mg weekly  Increase tamsulosin to 0.8mg (two 0.4mg capsules) once daily   If blood pressure remains elevated, we should look at other medications  Labs with Jatin & A1c with me in June    Follow-up: Return in 16 weeks (on 6/26/2025) for Follow up, with me, in person.    SUBJECTIVE/OBJECTIVE:                          Lester Shi is a 75 year old male seen for a follow-up visit from 12/5/24.       Reason for visit: comprehensive medication review     Allergies/ADRs: Reviewed in chart  Past Medical History: Reviewed in chart  Tobacco: He reports that he quit smoking about 46 years ago. His smoking use included cigarettes. He started smoking about 58 years ago. He has a 24 pack-year smoking history. He has never been exposed to tobacco smoke. He has never used smokeless tobacco.  Alcohol: Less than 1 beverages / week    Medication Adherence/Access: no issues reported. Retired, drives for [employer]    Diabetes   Metformin XR  2000mg daily   Jardiance 25mg daily   Trulicity 1.5mg weekly - denies side effects, no reactions    Diet/Exercise: ice cream a couple times a month. Started E circuit at [facility]  Has Dexcom at home but doesn't use.     Medication history:  Did not tolerate Ozempic or Mounjaro (allergic reaction)  Victoza not covered.  Insulin but prefers to avoid if possible since he is      Blood sugar monitorin time(s) daily; Ranges: 140-200s.   Eye exam in the last 12 months? Yes- Date of last eye exam: 25,  Location: [facility]  Foot exam: due    Hypertension   Losartan 100mg daily   Hydrochlorothiazide 25mg daily  Atenolol 100mg daily  Amlodipine 10mg once daily    Denies side effects. Denies LE edema.  Has not checked at home lately.     Hyperlipidemia   Rosuvastatin 20mg once daily   Patient reports no significant myalgias or other side effects.     Asthma /Allergy  Albuterol HFA as needed - once a month with exercise   Albuterol nebs as needed - twice a month    Duonebs every 6 hours as needed - sometimes uses in place of albuterol nebs  Advair 500-50mcg twice daily   Montelukast 10mg at bedtime   Azelastine 0.1% nasal spray as needed - during allergies  Zyrtec 10mg daily only during spring  Dupixent 300mg every 14 days - started in Nov and Dec  Prednisone for 5 days - has not needed for a while    Since starting Dupixent, albuterol/neb use has decreased significantly. He has not been using Advair at all for the past month, thinks he has the lower dose at home though.  Previously had asthma flares and pneumonia every winter.      GERD    Famotidine 20 twice daily as needed   Patient reports works really well.      Insomnia   trazodone 50 mg at bedtime - patient reports as needed   melatonin 10 mg at bedtime - as needed, doesn't necessarily help him fall asleep but helps with staying asleep  Able to sleep 6 hours per night  Sleeps in lounge chair because wife uses CPAP  which can be noisy.  He stays up late, 4am and sleeps until 10am. Playing cards.     Benign prostatic hyperplasia   Tamsulosin 0.4mg once daily   Patient reports urination is good but not great. Some nocturia and doesn't always empty completely.    Rash  Triamcinolone 0.1% cream twice daily for 14 days - heat rash in the summer  Works well.     Pain    Ibuprofen 400mg once daily   Tylenol 325mg - patient reports taking 2-4 tabs per week  Tiger Berne as needed works well  Looking into knee embolization, will have MRI done. Follows with [facility]  Reports pain 1/10, more so stiffness.      Today's Vitals: BP (!) 148/79   Pulse 80   Wt 231 lb 11.2 oz (105.1 kg)   BMI 31.42 kg/m    ----------------    I spent 35 minutes with this patient today. All changes were made via collaborative practice agreement with Garret Pope CNP.     A summary of these recommendations was sent via [facility].    Lissa Victor, PharmD  Medication Therapy Management (MTM) Pharmacist         Medication Therapy Recommendations  Benign prostatic hyperplasia with urinary frequency   1 Current Medication: tamsulosin (FLOMAX) 0.4 MG capsule (Discontinued)   Current Medication Sig: TAKE 1 CAPSULE(0.4 MG) BY MOUTH DAILY   Rationale: Dose too low - Dosage too low - Effectiveness   Recommendation: Increase Dose   Status: Accepted per CPA   Identified Date: 3/6/2025 Completed Date: 3/6/2025         Mild persistent asthma with acute exacerbation   1 Current Medication: fluticasone-salmeterol (ADVAIR) 500-50 MCG/ACT inhaler (Discontinued)   Current Medication Sig: INHALE 1 PUFF INTO THE LUNGS TWICE DAILY   Rationale: Dose too high - Dosage too high - Safety   Recommendation: Decrease Dose   Status: Accepted per CPA   Identified Date: 3/6/2025 Completed Date: 3/6/2025         Type 2 diabetes mellitus with diabetic cataract, with long-term current use of insulin (H)   1 Current Medication: dulaglutide (TRULICITY) 1.5 MG/0.5ML pen   Current  Medication Sig: Inject 1.5 mg subcutaneously every 7 days.   Rationale: Dose too low - Dosage too low - Effectiveness   Recommendation: Increase Dose   Status: Accepted per CPA   Identified Date: 3/6/2025 Completed Date: 3/6/2025

## 2025-03-06 ENCOUNTER — OFFICE VISIT (OUTPATIENT)
Dept: PHARMACY | Facility: CLINIC | Age: 75
End: 2025-03-06
Payer: COMMERCIAL

## 2025-03-06 ENCOUNTER — MYC MEDICAL ADVICE (OUTPATIENT)
Dept: PHARMACY | Facility: CLINIC | Age: 75
End: 2025-03-06
Payer: COMMERCIAL

## 2025-03-06 VITALS
WEIGHT: 231.7 LBS | BODY MASS INDEX: 31.42 KG/M2 | DIASTOLIC BLOOD PRESSURE: 79 MMHG | HEART RATE: 80 BPM | SYSTOLIC BLOOD PRESSURE: 148 MMHG

## 2025-03-06 DIAGNOSIS — M25.569 ARTHRALGIA OF LOWER LEG, UNSPECIFIED LATERALITY: ICD-10-CM

## 2025-03-06 DIAGNOSIS — R35.0 BENIGN PROSTATIC HYPERPLASIA WITH URINARY FREQUENCY: ICD-10-CM

## 2025-03-06 DIAGNOSIS — J45.31 MILD PERSISTENT ASTHMA WITH ACUTE EXACERBATION: Primary | ICD-10-CM

## 2025-03-06 DIAGNOSIS — G47.00 INSOMNIA, UNSPECIFIED TYPE: ICD-10-CM

## 2025-03-06 DIAGNOSIS — Z79.4 TYPE 2 DIABETES MELLITUS WITH DIABETIC CATARACT, WITH LONG-TERM CURRENT USE OF INSULIN (H): ICD-10-CM

## 2025-03-06 DIAGNOSIS — E11.36 TYPE 2 DIABETES MELLITUS WITH DIABETIC CATARACT, WITH LONG-TERM CURRENT USE OF INSULIN (H): ICD-10-CM

## 2025-03-06 DIAGNOSIS — N40.1 BENIGN PROSTATIC HYPERPLASIA WITH URINARY FREQUENCY: ICD-10-CM

## 2025-03-06 DIAGNOSIS — E78.5 HYPERLIPIDEMIA, UNSPECIFIED HYPERLIPIDEMIA TYPE: ICD-10-CM

## 2025-03-06 DIAGNOSIS — I10 PRIMARY HYPERTENSION: ICD-10-CM

## 2025-03-06 DIAGNOSIS — J30.2 SEASONAL ALLERGIC RHINITIS, UNSPECIFIED TRIGGER: ICD-10-CM

## 2025-03-06 DIAGNOSIS — L30.9 ECZEMA, UNSPECIFIED TYPE: ICD-10-CM

## 2025-03-06 DIAGNOSIS — R12 HEARTBURN: ICD-10-CM

## 2025-03-06 RX ORDER — FLUTICASONE PROPIONATE AND SALMETEROL 250; 50 UG/1; UG/1
1 POWDER RESPIRATORY (INHALATION) EVERY 12 HOURS
Qty: 60 EACH | Refills: 3 | Status: SHIPPED | OUTPATIENT
Start: 2025-03-06

## 2025-03-06 RX ORDER — TAMSULOSIN HYDROCHLORIDE 0.4 MG/1
0.8 CAPSULE ORAL DAILY
Qty: 180 CAPSULE | Refills: 1 | Status: SHIPPED | OUTPATIENT
Start: 2025-03-06

## 2025-03-06 RX ORDER — DULAGLUTIDE 3 MG/.5ML
3 INJECTION, SOLUTION SUBCUTANEOUS WEEKLY
Qty: 2 ML | Refills: 3 | Status: SHIPPED | OUTPATIENT
Start: 2025-03-06 | End: 2025-03-06

## 2025-03-06 NOTE — PATIENT INSTRUCTIONS
"Recommendations from today's MTM visit:                                                         Decrease Advair to 250/50mcg twice daily. Rinse mouth after use.  Increase Trulicity to 3mg weekly  Increase tamsulosin to 0.8mg (two 0.4mg capsules) once daily   If blood pressure remains elevated, we should look at other medications  Labs with Jatin & A1c with me in June    Follow-up: Return in 16 weeks (on 6/26/2025) for Follow up, with me, in person.    It was great speaking with you today.  I value your experience and would be very thankful for your time in providing feedback in our clinic survey. In the next few days, you may receive an email or text message from [employer] with a link to a survey related to your  clinical pharmacist.\"     To schedule another MTM appointment, please call the clinic directly or you may call the MTM scheduling line at [phone].    My Clinical Pharmacist's contact information:                                                      Please feel free to contact me with any questions or concerns you have.      Lissa Victor, PharmD  Medication Therapy Management (MTM) Pharmacist   "

## 2025-03-14 ENCOUNTER — TRANSFERRED RECORDS (OUTPATIENT)
Dept: HEALTH INFORMATION MANAGEMENT | Facility: CLINIC | Age: 75
End: 2025-03-14
Payer: COMMERCIAL

## 2025-03-14 SDOH — HEALTH STABILITY: PHYSICAL HEALTH: ON AVERAGE, HOW MANY MINUTES DO YOU ENGAGE IN EXERCISE AT THIS LEVEL?: 10 MIN

## 2025-03-14 SDOH — HEALTH STABILITY: PHYSICAL HEALTH: ON AVERAGE, HOW MANY DAYS PER WEEK DO YOU ENGAGE IN MODERATE TO STRENUOUS EXERCISE (LIKE A BRISK WALK)?: 1 DAY

## 2025-03-14 ASSESSMENT — SOCIAL DETERMINANTS OF HEALTH (SDOH): HOW OFTEN DO YOU GET TOGETHER WITH FRIENDS OR RELATIVES?: THREE TIMES A WEEK

## 2025-03-19 ENCOUNTER — OFFICE VISIT (OUTPATIENT)
Dept: INTERNAL MEDICINE | Facility: CLINIC | Age: 75
End: 2025-03-19
Payer: COMMERCIAL

## 2025-03-19 VITALS
BODY MASS INDEX: 30.75 KG/M2 | OXYGEN SATURATION: 96 % | SYSTOLIC BLOOD PRESSURE: 130 MMHG | HEIGHT: 72 IN | HEART RATE: 93 BPM | TEMPERATURE: 97.9 F | WEIGHT: 227 LBS | DIASTOLIC BLOOD PRESSURE: 70 MMHG | RESPIRATION RATE: 16 BRPM

## 2025-03-19 DIAGNOSIS — Z00.00 ANNUAL WELLNESS VISIT: Primary | ICD-10-CM

## 2025-03-19 DIAGNOSIS — E66.811 CLASS 1 OBESITY WITH SERIOUS COMORBIDITY AND BODY MASS INDEX (BMI) OF 30.0 TO 30.9 IN ADULT, UNSPECIFIED OBESITY TYPE: ICD-10-CM

## 2025-03-19 DIAGNOSIS — I77.810 ASCENDING AORTA DILATION: ICD-10-CM

## 2025-03-19 DIAGNOSIS — R29.818 SUSPECTED SLEEP APNEA: ICD-10-CM

## 2025-03-19 DIAGNOSIS — E78.00 PURE HYPERCHOLESTEROLEMIA: ICD-10-CM

## 2025-03-19 DIAGNOSIS — I10 ESSENTIAL HYPERTENSION, BENIGN: ICD-10-CM

## 2025-03-19 DIAGNOSIS — E11.36 TYPE 2 DIABETES MELLITUS WITH DIABETIC CATARACT, WITH LONG-TERM CURRENT USE OF INSULIN (H): ICD-10-CM

## 2025-03-19 DIAGNOSIS — Q98.4 KLINEFELTER'S SYNDROME: ICD-10-CM

## 2025-03-19 DIAGNOSIS — R32 URINARY INCONTINENCE, UNSPECIFIED TYPE: ICD-10-CM

## 2025-03-19 DIAGNOSIS — H61.23 BILATERAL IMPACTED CERUMEN: ICD-10-CM

## 2025-03-19 DIAGNOSIS — Z12.5 SPECIAL SCREENING FOR MALIGNANT NEOPLASM OF PROSTATE: ICD-10-CM

## 2025-03-19 DIAGNOSIS — Z79.4 TYPE 2 DIABETES MELLITUS WITH DIABETIC CATARACT, WITH LONG-TERM CURRENT USE OF INSULIN (H): ICD-10-CM

## 2025-03-19 DIAGNOSIS — I71.40 ABDOMINAL AORTIC ANEURYSM (AAA) WITHOUT RUPTURE, UNSPECIFIED PART: ICD-10-CM

## 2025-03-19 PROBLEM — J45.51 SEVERE PERSISTENT ASTHMA WITH EXACERBATION (H): Status: RESOLVED | Noted: 2020-03-27 | Resolved: 2025-03-19

## 2025-03-19 LAB
CREAT UR-MCNC: 105.6 MG/DL
EST. AVERAGE GLUCOSE BLD GHB EST-MCNC: 235 MG/DL
HBA1C MFR BLD: 9.8 % (ref 0–5.6)
MICROALBUMIN UR-MCNC: 41.6 MG/L
MICROALBUMIN/CREAT UR: 39.39 MG/G CR (ref 0–17)

## 2025-03-19 PROCEDURE — 36415 COLL VENOUS BLD VENIPUNCTURE: CPT | Performed by: NURSE PRACTITIONER

## 2025-03-19 PROCEDURE — 83036 HEMOGLOBIN GLYCOSYLATED A1C: CPT | Performed by: NURSE PRACTITIONER

## 2025-03-19 RX ORDER — DULAGLUTIDE 3 MG/.5ML
3 INJECTION, SOLUTION SUBCUTANEOUS WEEKLY
COMMUNITY
Start: 2025-03-07

## 2025-03-19 NOTE — PATIENT INSTRUCTIONS
Please follow-up with the sleep medicine clinic to discuss testing for sleep apnea.    Schedule an appointment with urology to discuss your incontinence issues.    Lab work today.  Results will come through [facility].    Ear lavage today.    You need to be careful about dehydration.  Please stay well-hydrated.  If you are lightheaded/dizziness episodes with low blood pressure reoccurred despite adequate hydration, we may need to make some adjustments to your blood pressure medication.    Colonoscopy will be due next year.  It may be reasonable to stop doing screening colonoscopies at your age.  We can have that discussion next year.    Follow-up in 6 months

## 2025-03-19 NOTE — PROGRESS NOTES
Preventive Care Visit  [facility]  Garret Pope, CNP, Nurse Practitioner - Family  Mar 19, 2025      Assessment & Plan     Annual wellness visit  In general, Lester seems to be doing well.  He is a  for [employer].    He is excited to start fishing again this spring    Class 1 obesity with serious comorbidity and body mass index (BMI) of 30.0 to 30.9 in adult, unspecified obesity type  His weight is more or less the same as it was last year, we are titrating up on his Trulicity.  Exercise is difficult given significant chronic bilateral knee pain    Essential Hypercholesterolemia  Continues on rosuvastatin  - Lipid panel reflex to direct LDL Non-fasting; Future    Type 2 diabetes mellitus with diabetic cataract, with long-term current use of insulin (H)  Last year he had difficulty with glycemic control due to recurrent prednisone burst secondary to respiratory illness.    Now working with [facility] pharmacy, titrating up on Trulicity.  Continues on metformin.  Continues on Jardiance.  - HEMOGLOBIN A1C; Future  - Albumin Random Urine Quantitative with Creat Ratio; Future    Ascending aorta dilation  Normal echocardiogram 2024    Essential hypertension, benign  Controlled on amlodipine, atenolol, hydrochlorothiazide.  He did have some orthostasis last week but suspects dehydration.  We talked about maintaining adequate hydration but possibly reducing amlodipine if he were to have recurrent low blood pressures  - Comprehensive metabolic panel; Future    Klinefelter's syndrome  Moderate suspicion of sleep apnea.  He does snore.  Agreeable to sleep medicine referral    Abdominal aortic aneurysm (AAA) without rupture, unspecified part  Following with vascular medicine    Severe persistent asthma  Following with pulmonology.  Continues on Advair, Dupixent, Singulair.  In a much better place now from a respiratory standpoint.    Bilateral impacted cerumen  Ear lavage  "performed by the clinical assistant today without the use of instrumentation    Special screening for malignant neoplasm of prostate  PSA has historically been normal  - PSA, screen; Future    Urinary incontinence, unspecified type  He wears depends.  He has noticed \"leakage.\"  At times will have urinary urgency.  Continues on Flomax although we need to be careful about titrating up on dosage based on his orthostasis last week.  He would like to see urology  - Adult Urology  Referral; Future    Suspected sleep apnea  As above  - Adult Sleep Eval & Management  Referral; Future    Patient Instructions   Please follow-up with the sleep medicine clinic to discuss testing for sleep apnea.    Schedule an appointment with urology to discuss your incontinence issues.    Lab work today.  Results will come through MyChart.    Ear lavage today.    You need to be careful about dehydration.  Please stay well-hydrated.  If you are lightheaded/dizziness episodes with low blood pressure reoccurred despite adequate hydration, we may need to make some adjustments to your blood pressure medication.    Colonoscopy will be due next year.  It may be reasonable to stop doing screening colonoscopies at your age.  We can have that discussion next year.    Follow-up in 6 months    The longitudinal plan of care for the diagnosis(es)/condition(s) as documented were addressed during this visit. Due to the added complexity in care, I will continue to support Lester in the subsequent management and with ongoing continuity of care.              BMI  Estimated body mass index is 30.79 kg/m  as calculated from the following:    Height as of this encounter: 1.829 m (6').    Weight as of this encounter: 103 kg (227 lb).       Counseling  Appropriate preventive services were addressed with this patient via screening, questionnaire, or discussion as appropriate for fall prevention, nutrition, physical activity, Tobacco-use cessation, " social engagement, weight loss and cognition.  Checklist reviewing preventive services available has been given to the patient.  Reviewed patient's diet, addressing concerns and/or questions.   He is at risk for lack of exercise and has been provided with information to increase physical activity for the benefit of his well-being.   The patient was instructed to see the dentist every 6 months.   He is at risk for psychosocial distress and has been provided with information to reduce risk.   Discussed possible causes of fatigue. The patient was provided with written information regarding signs of hearing loss.   Information on urinary incontinence and treatment options given to patient.           Salvador Batista is a 75 year old, presenting for the following:  Physical (AWV, fasting)        3/19/2025    10:07 AM   Additional Questions   Roomed by [facility]  Here for annual wellness         Advance Care Planning  Patient does not have a Health Care Directive: Patient states has Advance Directive and will bring in a copy to clinic.      3/14/2025   General Health   How would you rate your overall physical health? (!) FAIR   Feel stress (tense, anxious, or unable to sleep) To some extent   (!) STRESS CONCERN      3/14/2025   Nutrition   Diet: Diabetic         3/14/2025   Exercise   Days per week of moderate/strenous exercise 1 day   Average minutes spent exercising at this level 10 min   (!) EXERCISE CONCERN      3/14/2025   Social Factors   Frequency of gathering with friends or relatives Three times a week   Worry food won't last until get money to buy more No   Food not last or not have enough money for food? No   Do you have housing? (Housing is defined as stable permanent housing and does not include staying ouside in a car, in a tent, in an abandoned building, in an overnight shelter, or couch-surfing.) Yes   Are you worried about losing your housing? No   Lack of transportation? No   Unable to get  utilities (heat,electricity)? No         3/19/2025   Fall Risk   Gait Speed Test Interpretation Less than or equal to 5.00 seconds - PASS           3/14/2025   Activities of Daily Living- Home Safety   Needs help with the following daily activites None of the above   Safety concerns in the home None of the above         3/14/2025   Dental   Dentist two times every year? (!) NO         3/14/2025   Hearing Screening   Hearing concerns? (!) IT'S HARD TO FOLLOW A CONVERSATION IN A NOISY RESTAURANT OR CROWDED ROOM.         3/14/2025   Driving Risk Screening   Patient/family members have concerns about driving No         3/14/2025   General Alertness/Fatigue Screening   Have you been more tired than usual lately? (!) YES         3/14/2025   Urinary Incontinence Screening   Bothered by leaking urine in past 6 months Yes           Today's PHQ-2 Score:       3/19/2025    10:02 AM   PHQ-2 (  [employer])   Q1: Little interest or pleasure in doing things 0   Q2: Feeling down, depressed or hopeless 0   PHQ-2 Score 0    Q1: Little interest or pleasure in doing things Not at all   Q2: Feeling down, depressed or hopeless Not at all   PHQ-2 Score 0       Patient-reported           3/14/2025   Substance Use   Alcohol more than 3/day or more than 7/wk No   Do you have a current opioid prescription? No   How severe/bad is pain from 1 to 10? 2/10   Do you use any other substances recreationally? No     Social History     Tobacco Use    Smoking status: Former     Current packs/day: 0.00     Average packs/day: 2.0 packs/day for 12.0 years (24.0 ttl pk-yrs)     Types: Cigarettes     Start date: 1967     Quit date: 1979     Years since quittin.2     Passive exposure: Never    Smokeless tobacco: Never   Vaping Use    Vaping status: Never Used   Substance Use Topics    Alcohol use: Yes     Comment: Alcoholic Drinks/day: rare    Drug use: No       ASCVD Risk   The 10-year ASCVD risk score (Shad CROWLEY, et al., 2019) is:  48.2%    Values used to calculate the score:      Age: 75 years      Sex: Male      Is Non- : No      Diabetic: Yes      Tobacco smoker: No      Systolic Blood Pressure: 130 mmHg      Is BP treated: Yes      HDL Cholesterol: 55 mg/dL      Total Cholesterol: 133 mg/dL            Reviewed and updated as needed this visit by Provider                      Current providers sharing in care for this patient include:  Patient Care Team:  Garret Pope CNP as PCP - General (Nurse Practitioner - Family)  Nora Campos, PharmD as Pharmacist (Pharmacist)  Alvarado Penaloza MD as Assigned Heart and Vascular Provider  Andrea Davis MD as Hospitalist (Critical Care)  Andrea Davis MD as Assigned Pulmonology Provider  Garret Pope CNP as Assigned PCP  Lissa Victor (Pharmacist)  Lissa Victor as Assigned MTM Pharmacist  Carlie Rendon MD as Assigned Allergy Provider  Carlie Rendon MD as MD (Allergy & Immunology)    The following health maintenance items are reviewed in Epic and correct as of today:  Health Maintenance   Topic Date Due    RSV VACCINE (1 - 1-dose 75+ series) Never done    DIABETIC FOOT EXAM  06/07/2023    ASTHMA ACTION PLAN  07/17/2024    ANNUAL REVIEW OF HM ORDERS  08/07/2024    COVID-19 Vaccine (4 - 2024-25 season) 09/01/2024    A1C  09/11/2024    MICROALBUMIN  11/27/2024    MEDICARE ANNUAL WELLNESS VISIT  01/29/2025    LIPID  01/29/2025    BMP  05/22/2025    ASTHMA CONTROL TEST  06/05/2025    EYE EXAM  01/21/2026    FALL RISK ASSESSMENT  03/19/2026    DTAP/TDAP/TD IMMUNIZATION (2 - Td or Tdap) 04/25/2027    ADVANCE CARE PLANNING  01/29/2029    HEPATITIS C SCREENING  Completed    PHQ-2 (once per calendar year)  Completed    INFLUENZA VACCINE  Completed    Pneumococcal Vaccine: 50+ Years  Completed    ZOSTER IMMUNIZATION  Completed    HPV IMMUNIZATION  Aged Out    MENINGITIS IMMUNIZATION  Aged Out    COLORECTAL CANCER SCREENING  Discontinued            Objective     Exam  /70 (BP Location: Right arm, Patient Position: Sitting, Cuff Size: Adult Regular)   Pulse 93   Temp 97.9  F (36.6  C) (Oral)   Resp 16   Ht 1.829 m (6')   Wt 103 kg (227 lb)   SpO2 96%   BMI 30.79 kg/m     Estimated body mass index is 30.79 kg/m  as calculated from the following:    Height as of this encounter: 1.829 m (6').    Weight as of this encounter: 103 kg (227 lb).    Physical Exam  GENERAL: alert and no distress  NECK: no adenopathy, no asymmetry, masses, or scars  RESP: lungs clear to auscultation - no rales, rhonchi or wheezes  CV: regular rate and rhythm, normal S1 S2, no S3 or S4, no murmur, click or rub, no peripheral edema  ABDOMEN: soft, nontender, no hepatosplenomegaly, no masses and bowel sounds normal  MS: no gross musculoskeletal defects noted, no edema        3/19/2025   Mini Cog   Clock Draw Score 2 Normal   3 Item Recall 3 objects recalled   Mini Cog Total Score 5              Signed Electronically by: Garret Pope, CNP

## 2025-03-20 LAB
ALBUMIN SERPL BCG-MCNC: 4.2 G/DL (ref 3.5–5.2)
ALP SERPL-CCNC: 88 U/L (ref 40–150)
ALT SERPL W P-5'-P-CCNC: 20 U/L (ref 0–70)
ANION GAP SERPL CALCULATED.3IONS-SCNC: 12 MMOL/L (ref 7–15)
AST SERPL W P-5'-P-CCNC: 18 U/L (ref 0–45)
BILIRUB SERPL-MCNC: 0.5 MG/DL
BUN SERPL-MCNC: 26.4 MG/DL (ref 8–23)
CALCIUM SERPL-MCNC: 10.3 MG/DL (ref 8.8–10.4)
CHLORIDE SERPL-SCNC: 103 MMOL/L (ref 98–107)
CHOLEST SERPL-MCNC: 122 MG/DL
CREAT SERPL-MCNC: 1.1 MG/DL (ref 0.67–1.17)
EGFRCR SERPLBLD CKD-EPI 2021: 69 ML/MIN/1.73M2
FASTING STATUS PATIENT QL REPORTED: YES
FASTING STATUS PATIENT QL REPORTED: YES
GLUCOSE SERPL-MCNC: 193 MG/DL (ref 70–99)
HCO3 SERPL-SCNC: 26 MMOL/L (ref 22–29)
HDLC SERPL-MCNC: 42 MG/DL
LDLC SERPL CALC-MCNC: 45 MG/DL
NONHDLC SERPL-MCNC: 80 MG/DL
POTASSIUM SERPL-SCNC: 4.1 MMOL/L (ref 3.4–5.3)
PROT SERPL-MCNC: 7.9 G/DL (ref 6.4–8.3)
PSA SERPL DL<=0.01 NG/ML-MCNC: 0.1 NG/ML (ref 0–6.5)
SODIUM SERPL-SCNC: 141 MMOL/L (ref 135–145)
TRIGL SERPL-MCNC: 175 MG/DL

## 2025-03-21 ENCOUNTER — HOSPITAL ENCOUNTER (EMERGENCY)
Facility: CLINIC | Age: 75
Discharge: HOME | End: 2025-03-21
Attending: EMERGENCY MEDICINE | Admitting: EMERGENCY MEDICINE
Payer: COMMERCIAL

## 2025-03-21 VITALS
HEIGHT: 72 IN | SYSTOLIC BLOOD PRESSURE: 156 MMHG | TEMPERATURE: 97.7 F | HEART RATE: 74 BPM | OXYGEN SATURATION: 97 % | DIASTOLIC BLOOD PRESSURE: 72 MMHG | RESPIRATION RATE: 20 BRPM | BODY MASS INDEX: 30.75 KG/M2 | WEIGHT: 227 LBS

## 2025-03-21 DIAGNOSIS — I95.1 ORTHOSTASIS: ICD-10-CM

## 2025-03-21 DIAGNOSIS — R42 LIGHTHEADEDNESS: ICD-10-CM

## 2025-03-21 LAB
ALBUMIN UR-MCNC: NEGATIVE MG/DL
ANION GAP SERPL CALCULATED.3IONS-SCNC: 14 MMOL/L (ref 7–15)
APPEARANCE UR: CLEAR
ATRIAL RATE - MUSE: 76 BPM
BILIRUB UR QL STRIP: NEGATIVE
BUN SERPL-MCNC: 29.6 MG/DL (ref 8–23)
CALCIUM SERPL-MCNC: 10.1 MG/DL (ref 8.8–10.4)
CHLORIDE SERPL-SCNC: 100 MMOL/L (ref 98–107)
COLOR UR AUTO: ABNORMAL
CREAT SERPL-MCNC: 1.17 MG/DL (ref 0.67–1.17)
DIASTOLIC BLOOD PRESSURE - MUSE: NORMAL MMHG
EGFRCR SERPLBLD CKD-EPI 2021: 64 ML/MIN/1.73M2
ERYTHROCYTE [DISTWIDTH] IN BLOOD BY AUTOMATED COUNT: 14.9 % (ref 10–15)
FLUAV RNA SPEC QL NAA+PROBE: NEGATIVE
FLUBV RNA RESP QL NAA+PROBE: NEGATIVE
GLUCOSE SERPL-MCNC: 154 MG/DL (ref 70–99)
GLUCOSE UR STRIP-MCNC: >1000 MG/DL
HCO3 SERPL-SCNC: 23 MMOL/L (ref 22–29)
HCT VFR BLD AUTO: 34.4 % (ref 40–53)
HGB BLD-MCNC: 11.7 G/DL (ref 13.3–17.7)
HGB UR QL STRIP: NEGATIVE
HYALINE CASTS: 1 /LPF
INTERPRETATION ECG - MUSE: NORMAL
KETONES UR STRIP-MCNC: NEGATIVE MG/DL
LEUKOCYTE ESTERASE UR QL STRIP: NEGATIVE
MAGNESIUM SERPL-MCNC: 2 MG/DL (ref 1.7–2.3)
MCH RBC QN AUTO: 29.1 PG (ref 26.5–33)
MCHC RBC AUTO-ENTMCNC: 34 G/DL (ref 31.5–36.5)
MCV RBC AUTO: 86 FL (ref 78–100)
MUCOUS THREADS #/AREA URNS LPF: PRESENT /LPF
NITRATE UR QL: NEGATIVE
P AXIS - MUSE: 85 DEGREES
PH UR STRIP: 5 [PH] (ref 5–7)
PLATELET # BLD AUTO: 280 10E3/UL (ref 150–450)
POTASSIUM SERPL-SCNC: 3.8 MMOL/L (ref 3.4–5.3)
PR INTERVAL - MUSE: 196 MS
QRS DURATION - MUSE: 98 MS
QT - MUSE: 408 MS
QTC - MUSE: 459 MS
R AXIS - MUSE: 41 DEGREES
RBC # BLD AUTO: 4.02 10E6/UL (ref 4.4–5.9)
RBC URINE: <1 /HPF
RSV RNA SPEC NAA+PROBE: NEGATIVE
SARS-COV-2 RNA RESP QL NAA+PROBE: NEGATIVE
SODIUM SERPL-SCNC: 137 MMOL/L (ref 135–145)
SP GR UR STRIP: 1.01 (ref 1–1.03)
SYSTOLIC BLOOD PRESSURE - MUSE: NORMAL MMHG
T AXIS - MUSE: 43 DEGREES
TROPONIN T SERPL HS-MCNC: <6 NG/L
TSH SERPL DL<=0.005 MIU/L-ACNC: 1.93 UIU/ML (ref 0.3–4.2)
UROBILINOGEN UR STRIP-MCNC: <2 MG/DL
VENTRICULAR RATE- MUSE: 76 BPM
WBC # BLD AUTO: 8.2 10E3/UL (ref 4–11)
WBC URINE: <1 /HPF

## 2025-03-21 PROCEDURE — 84443 ASSAY THYROID STIM HORMONE: CPT | Performed by: EMERGENCY MEDICINE

## 2025-03-21 PROCEDURE — 99284 EMERGENCY DEPT VISIT MOD MDM: CPT | Mod: 25

## 2025-03-21 PROCEDURE — 87637 SARSCOV2&INF A&B&RSV AMP PRB: CPT | Performed by: EMERGENCY MEDICINE

## 2025-03-21 PROCEDURE — 258N000003 HC RX IP 258 OP 636: Performed by: EMERGENCY MEDICINE

## 2025-03-21 PROCEDURE — 84484 ASSAY OF TROPONIN QUANT: CPT | Performed by: PHYSICIAN ASSISTANT

## 2025-03-21 PROCEDURE — 93005 ELECTROCARDIOGRAM TRACING: CPT | Performed by: PHYSICIAN ASSISTANT

## 2025-03-21 PROCEDURE — 85027 COMPLETE CBC AUTOMATED: CPT | Performed by: PHYSICIAN ASSISTANT

## 2025-03-21 PROCEDURE — 36415 COLL VENOUS BLD VENIPUNCTURE: CPT | Performed by: PHYSICIAN ASSISTANT

## 2025-03-21 PROCEDURE — 81001 URINALYSIS AUTO W/SCOPE: CPT | Performed by: EMERGENCY MEDICINE

## 2025-03-21 PROCEDURE — 83735 ASSAY OF MAGNESIUM: CPT | Performed by: EMERGENCY MEDICINE

## 2025-03-21 PROCEDURE — 80048 BASIC METABOLIC PNL TOTAL CA: CPT | Performed by: PHYSICIAN ASSISTANT

## 2025-03-21 PROCEDURE — 96360 HYDRATION IV INFUSION INIT: CPT

## 2025-03-21 RX ADMIN — SODIUM CHLORIDE 500 ML: 0.9 INJECTION, SOLUTION INTRAVENOUS at 18:36

## 2025-03-21 ASSESSMENT — COLUMBIA-SUICIDE SEVERITY RATING SCALE - C-SSRS
1. IN THE PAST MONTH, HAVE YOU WISHED YOU WERE DEAD OR WISHED YOU COULD GO TO SLEEP AND NOT WAKE UP?: NO
2. HAVE YOU ACTUALLY HAD ANY THOUGHTS OF KILLING YOURSELF IN THE PAST MONTH?: NO
6. HAVE YOU EVER DONE ANYTHING, STARTED TO DO ANYTHING, OR PREPARED TO DO ANYTHING TO END YOUR LIFE?: NO

## 2025-03-21 ASSESSMENT — ACTIVITIES OF DAILY LIVING (ADL): ADLS_ACUITY_SCORE: 53

## 2025-03-21 NOTE — ED TRIAGE NOTES
Pt arrives via EMS from home. Pt c/o lightheadedness with position change and generalized fatigue x1 week. Today had a near syncopal episode trying to get out of the car. Denies CP or SOB. Had an annual physical this week and was told everything is fine.      Triage Assessment (Adult)       Row Name 03/21/25 1519          Triage Assessment    Airway WDL WDL        Respiratory WDL    Respiratory WDL WDL        Skin Circulation/Temperature WDL    Skin Circulation/Temperature WDL WDL        Cardiac WDL    Cardiac WDL WDL        Peripheral/Neurovascular WDL    Peripheral Neurovascular WDL WDL        Cognitive/Neuro/Behavioral WDL    Cognitive/Neuro/Behavioral WDL WDL

## 2025-03-21 NOTE — ED PROVIDER NOTES
EMERGENCY DEPARTMENT ENCOUNTER     NAME: Lester Shi   AGE: 75 year old male   YOB: 1950   MRN: [id number]   EVALUATION DATE & TIME: No admission date for patient encounter.   PCP: Garret Pope     Chief Complaint   Patient presents with    Dizziness   :    FINAL IMPRESSION       1. Lightheadedness    2. Orthostasis           ED COURSE & MEDICAL DECISION MAKING      Pertinent Labs & Imaging studies reviewed. (See chart for details)   75 year old male  presents to the Emergency Department for evaluation of about 1 week of lightheadedness.  This initially began after he had some sort of a febrile illness about 1 week ago which has since resolved, but he still having episodes of lightheadedness and presyncope when standing up.. Initial Vitals Reviewed. Initial exam notable for fairly well-appearing male with normal vitals, but when orthostatics were checked they are positive and that not only did elicit symptoms but his blood pressure dropped over 20 points between lying and standing.  I did do a workup to look for other things and did viral testing to see if the fever last week was from COVID, influenza and these were negative.  ACS workup is unremarkable, there have not been any noted arrhythmias.  I did give him some IV fluids after the discovery of orthostasis, and after this he had no further orthostasis when standing and the lightheadedness completely resolved.  I discussed the results with him and his wife and they feel reassured and are comfortable with discharge with home supportive care and return precautions.           At the conclusion of the encounter I discussed the results of all of the tests and the disposition. The questions were answered. The patient or family acknowledged understanding and was agreeable with the care plan.     0 minutes critical care time, see procedure note below for details if relevant    Medical Decision Making    History:  Supplemental history from:  Family Member/Significant Other  External Record(s) reviewed: Outpatient Record: Office visit 3/19/2025    Work Up:  Chart documentation includes differential considered and any EKGs or imaging independently interpreted by provider, where specified.  In additional to work up documented, I considered the following work up: Other: na    External consultation:  Discussion of management with another provider: Documented in chart, if applicable    Complicating factors:  Care impacted by chronic illness: Hypertension  Care affected by social determinants of health: Access to Medical Care    Disposition considerations: Discharge. No recommendations on prescription strength medication(s). I considered admission, but ultimately discharged patient with reassuring evaluation and clinical improvement.    Not Applicable   None              MEDICATIONS GIVEN IN THE EMERGENCY:   Medications   sodium chloride 0.9% BOLUS 500 mL (has no administration in time range)      NEW PRESCRIPTIONS STARTED AT TODAY'S ER VISIT   New Prescriptions    No medications on file     ================================================================   HISTORY OF PRESENT ILLNESS       Patient information was obtained from: Patient and spouse  Use of Intrepreter: N/A   Lester Shi is a 75 year old male with history of hypertension on medication, diabetes who presents for evaluation of lightheadedness.  Patient notes that about a week ago he had a little bit of a fever for short time.  Ever since then, he has been feeling very lightheaded when he attempts to stand up, and is getting presyncopal.  He went to get out of his vehicle today and when he went to stand up he felt this way and he states he almost passed out and his wife caught him and he did not fall or get injured.  He did not truly pass out as he remembers the event but states that he felt presyncopal.  Of note, he saw his PCP just over a week ago for his routine physical and there were no med  changes at that time.    ================================================================        PAST HISTORY     PAST MEDICAL HISTORY:   Past Medical History:   Diagnosis Date    Anemia, unspecified     Created by Conversion     Cataract     left eye removed, present in right eye    Chronic osteoarthritis     right knee    Colon polyps     Contact dermatitis and other eczema, due to unspecified cause     Created by Conversion     Diabetes mellitus, type 2 (H)     Klinefelter's syndrome     Created by Conversion     Obesity, unspecified     Created by Conversion     Pure hypercholesterolemia     Created by Conversion     Type II or unspecified type diabetes mellitus without mention of complication, not stated as uncontrolled     Created by Conversion     Uncomplicated asthma     adult asthma- wheezing    Unspecified essential hypertension     Created by Conversion     Varicose vein 09/28/2015      PAST SURGICAL HISTORY:   Past Surgical History:   Procedure Laterality Date    CATARACT EXTRACTION Left 2000    DENTAL SURGERY  1985    JOINT REPLACEMENT      left knee TKA 11/2013    ORTHOPEDIC SURGERY        CURRENT MEDICATIONS:   albuterol (PROAIR HFA/PROVENTIL HFA/VENTOLIN HFA) 108 (90 Base) MCG/ACT inhaler  albuterol (PROVENTIL) (2.5 MG/3ML) 0.083% neb solution  amLODIPine (NORVASC) 10 MG tablet  atenolol (TENORMIN) 100 MG tablet  azelastine (ASTELIN) 0.1 % nasal spray  blood glucose (ACCU-CHEK GUIDE) test strip  cetirizine (ZYRTEC) 10 MG tablet  Continuous Blood Gluc Sensor (DEXCOM G7 SENSOR) MISC  dupilumab (DUPIXENT) 300 MG/2ML prefilled pen  empagliflozin (JARDIANCE) 25 MG TABS tablet  famotidine (PEPCID) 20 MG tablet  fluticasone-salmeterol (ADVAIR) 250-50 MCG/ACT inhaler  hydrochlorothiazide (HYDRODIURIL) 25 MG tablet  ibuprofen (ADVIL/MOTRIN) 200 MG tablet  insulin pen needle (ULTICARE MINI) 31G X 6 MM miscellaneous  ipratropium - albuterol 0.5 mg/2.5 mg/3 mL (DUONEB) 0.5-2.5 (3) MG/3ML neb solution  losartan  (COZAAR) 100 MG tablet  Melatonin 10 MG TABS tablet  metFORMIN (GLUCOPHAGE XR) 500 MG 24 hr tablet  montelukast (SINGULAIR) 10 MG tablet  predniSONE (DELTASONE) 20 MG tablet  rosuvastatin (CRESTOR) 20 MG tablet  tamsulosin (FLOMAX) 0.4 MG capsule  traZODone (DESYREL) 50 MG tablet  triamcinolone (KENALOG) 0.1 % cream  TRULICITY 3 MG/0.5ML SOAJ      ALLERGIES:   Allergies   Allergen Reactions    Amoxicillin Anaphylaxis    Codeine Nausea and Vomiting    Sulfamethoxazole-Trimethoprim [Sulfamethoxazole-Trimethoprim] Nausea and Vomiting     Tachycardia      Levaquin [Levofloxacin] Nausea and Vomiting    Mounjaro [Tirzepatide] Angioedema    Ozempic (0.25 Or 0.5 Mg-Dose) [Semaglutide(0.25 Or 0.5mg-Dos)] GI Disturbance    Seasonal Allergies Other (See Comments)     SPRINGTIME Nasal, bronchial    Simvastatin Muscle Pain (Myalgia)      FAMILY HISTORY:   Family History   Problem Relation Age of Onset    Cerebrovascular Disease Mother     Clotting Disorder Father     Heart Disease Brother       SOCIAL HISTORY:   Social History     Socioeconomic History    Marital status:    Tobacco Use    Smoking status: Former     Current packs/day: 0.00     Average packs/day: 2.0 packs/day for 12.0 years (24.0 ttl pk-yrs)     Types: Cigarettes     Start date: 1967     Quit date: 1979     Years since quittin.2     Passive exposure: Never    Smokeless tobacco: Never   Vaping Use    Vaping status: Never Used   Substance and Sexual Activity    Alcohol use: Yes     Comment: Alcoholic Drinks/day: rare    Drug use: No    Sexual activity: Yes     Partners: Female     Social Drivers of Health     Financial Resource Strain: Low Risk  (3/14/2025)    Financial Resource Strain     Within the past 12 months, have you or your family members you live with been unable to get utilities (heat, electricity) when it was really needed?: No   Food Insecurity: Low Risk  (3/14/2025)    Food Insecurity     Within the past 12 months, did you worry  that your food would run out before you got money to buy more?: No     Within the past 12 months, did the food you bought just not last and you didn t have money to get more?: No   Transportation Needs: Low Risk  (3/14/2025)    Transportation Needs     Within the past 12 months, has lack of transportation kept you from medical appointments, getting your medicines, non-medical meetings or appointments, work, or from getting things that you need?: No   Physical Activity: Insufficiently Active (3/14/2025)    Exercise Vital Sign     Days of Exercise per Week: 1 day     Minutes of Exercise per Session: 10 min   Stress: Stress Concern Present (3/14/2025)    [employer] [place] [employer] - Occupational Stress Questionnaire     Feeling of Stress : To some extent   Social Connections: Unknown (3/14/2025)    Social Connection and Isolation Panel [NHANES]     Frequency of Social Gatherings with Friends and Family: Three times a week   Interpersonal Safety: Low Risk  (3/19/2025)    Interpersonal Safety     Do you feel physically and emotionally safe where you currently live?: Yes     Within the past 12 months, have you been hit, slapped, kicked or otherwise physically hurt by someone?: No     Within the past 12 months, have you been humiliated or emotionally abused in other ways by your partner or ex-partner?: No   Housing Stability: Low Risk  (3/14/2025)    Housing Stability     Do you have housing? : Yes     Are you worried about losing your housing?: No        VITALS  Patient Vitals for the past 24 hrs:   BP Temp Temp src Pulse Resp SpO2 Height Weight   03/21/25 1518 (!) 151/72 97.7  F (36.5  C) Temporal 81 18 95 % 1.829 m (6') 103 kg (227 lb)        ================================================================    PHYSICAL EXAM     VITAL SIGNS: BP (!) 151/72   Pulse 81   Temp 97.7  F (36.5  C) (Temporal)   Resp 18   Ht 1.829 m (6')   Wt 103 kg (227 lb)   SpO2 95%   BMI 30.79 kg/m     Constitutional:   Awake, no acute distress, fatigued appearing, sitting in a wheelchair  HENT:  Atraumatic, oropharynx without exudate or erythema, membranes moist  Lymph:  No adenopathy  Eyes: EOM intact, PERRL, no injection  Neck: Supple  Respiratory:  Clear to auscultation bilaterally, no wheezes or crackles   Cardiovascular:  Regular rate and rhythm, single S1 and S2   GI:  Soft, nontender, nondistended, no rebound or guarding   Musculoskeletal:  Moves all extremities, no lower extremity edema, no deformities    Skin:  Warm, dry  Neurologic:  Alert and oriented x3, no focal deficits noted       ================================================================  LAB       All pertinent labs reviewed and interpreted.   Labs Ordered and Resulted from Time of ED Arrival to Time of ED Departure   CBC WITH PLATELETS - Abnormal       Result Value    WBC Count 8.2      RBC Count 4.02 (*)     Hemoglobin 11.7 (*)     Hematocrit 34.4 (*)     MCV 86      MCH 29.1      MCHC 34.0      RDW 14.9      Platelet Count 280     BASIC METABOLIC PANEL - Abnormal    Sodium 137      Potassium 3.8      Chloride 100      Carbon Dioxide (CO2) 23      Anion Gap 14      Urea Nitrogen 29.6 (*)     Creatinine 1.17      GFR Estimate 64      Calcium 10.1      Glucose 154 (*)    ROUTINE UA WITH MICROSCOPIC REFLEX TO CULTURE - Abnormal    Color Urine Light Yellow      Appearance Urine Clear      Glucose Urine >1000 (*)     Bilirubin Urine Negative      Ketones Urine Negative      Specific Gravity Urine 1.015      Blood Urine Negative      pH Urine 5.0      Protein Albumin Urine Negative      Urobilinogen Urine <2.0      Nitrite Urine Negative      Leukocyte Esterase Urine Negative      Mucus Urine Present (*)     RBC Urine <1      WBC Urine <1      Hyaline Casts Urine 1     TROPONIN T, HIGH SENSITIVITY - Normal    Troponin T, High Sensitivity <6     MAGNESIUM - Normal    Magnesium 2.0     TSH WITH FREE T4 REFLEX - Normal    TSH 1.93     INFLUENZA A/B, RSV AND  SARS-COV2 PCR - Normal    Influenza A PCR Negative      Influenza B PCR Negative      RSV PCR Negative      SARS CoV2 PCR Negative          ===============================================================  RADIOLOGY       Reviewed all pertinent imaging. Please see official radiology report.   No orders to display         ================================================================  EKG     EKG reviewed interpreted by me shows sinus rhythm with rate of 76, normal axis, QTc 459 with no acute ST or T wave changes since 11 March    I have independently reviewed and interpreted the EKG(s) documented above.     ================================================================  PROCEDURES           Mallorie Plaza M.D.   Emergency Medicine   [facility] EMERGENCY ROOM  [address] [facility] [address]  [phone]  Dept: [phone]        Mallorie Plaza MD  03/21/25 1947

## 2025-03-22 NOTE — DISCHARGE INSTRUCTIONS
As we discussed, fortunately tests for all the other scary things are negative, and what we did find is that your blood pressure dropped some when you stand up which is something called orthostasis.  This often happens if you been slightly dehydrated or if you have been under the weather and might have gotten behind on fluids from what ever viral illness gave you the fever last week.  Fortunately after some IV fluids you are feeling improved and it is safe to go home.  Make sure to drink enough fluids, not miss meals and stand up slowly and if you feel any of the symptoms recur.  If still not improving, please follow-up with your doctor for further tests.

## 2025-03-22 NOTE — ED NOTES
Ambulation trial done. Pt able to ambulate with no observed shortness of breath, dizziness. Vital signs rechecked. Provider updated

## 2025-03-24 DIAGNOSIS — Z79.4 TYPE 2 DIABETES MELLITUS WITH DIABETIC CATARACT, WITH LONG-TERM CURRENT USE OF INSULIN (H): ICD-10-CM

## 2025-03-24 DIAGNOSIS — E11.36 TYPE 2 DIABETES MELLITUS WITH DIABETIC CATARACT, WITH LONG-TERM CURRENT USE OF INSULIN (H): ICD-10-CM

## 2025-03-24 RX ORDER — METFORMIN HYDROCHLORIDE 500 MG/1
2000 TABLET, EXTENDED RELEASE ORAL DAILY
Qty: 360 TABLET | Refills: 2 | Status: SHIPPED | OUTPATIENT
Start: 2025-03-24

## 2025-04-03 ENCOUNTER — HOSPITAL ENCOUNTER (OUTPATIENT)
Dept: CT IMAGING | Facility: CLINIC | Age: 75
Discharge: HOME | End: 2025-04-03
Attending: INTERNAL MEDICINE
Payer: COMMERCIAL

## 2025-04-03 PROCEDURE — 71250 CT THORAX DX C-: CPT

## 2025-04-08 DIAGNOSIS — R12 HEARTBURN: ICD-10-CM

## 2025-04-08 RX ORDER — FAMOTIDINE 20 MG/1
20 TABLET, FILM COATED ORAL 2 TIMES DAILY PRN
Qty: 60 TABLET | Refills: 3 | Status: SHIPPED | OUTPATIENT
Start: 2025-04-08

## 2025-04-16 ENCOUNTER — OFFICE VISIT (OUTPATIENT)
Dept: PULMONOLOGY | Facility: CLINIC | Age: 75
End: 2025-04-16
Payer: COMMERCIAL

## 2025-04-16 VITALS
HEART RATE: 80 BPM | SYSTOLIC BLOOD PRESSURE: 138 MMHG | DIASTOLIC BLOOD PRESSURE: 84 MMHG | OXYGEN SATURATION: 97 % | WEIGHT: 225.2 LBS | BODY MASS INDEX: 30.54 KG/M2

## 2025-04-16 DIAGNOSIS — J82.83 EOSINOPHILIC ASTHMA: Primary | ICD-10-CM

## 2025-04-16 PROCEDURE — 3075F SYST BP GE 130 - 139MM HG: CPT | Performed by: INTERNAL MEDICINE

## 2025-04-16 PROCEDURE — 3079F DIAST BP 80-89 MM HG: CPT | Performed by: INTERNAL MEDICINE

## 2025-04-16 PROCEDURE — 99213 OFFICE O/P EST LOW 20 MIN: CPT | Performed by: INTERNAL MEDICINE

## 2025-04-16 PROCEDURE — G2211 COMPLEX E/M VISIT ADD ON: HCPCS | Performed by: INTERNAL MEDICINE

## 2025-04-16 NOTE — PROGRESS NOTES
Pulmonary Clinic Follow-up Visit    Assessment and Plan:   75M with a history of obesity, DM, HLD, Klinefelter's syndrome, seasonal allergies, hospitalization in Dec 2023 for LLL pneumonia and influenza and asthma, history of peripheral eosinophilia, presents for follow up of asthma with frequent exacerbations. PET/CT last Spring 2024 showed abnormal FDG uptake in the LLL opacity; biopsy attempted but unable to be done due to significant wheezing and hyperglycemia. Bronchoscopy with BAL was done which showed moraxella species in the BAL; no malignancy found on the BAL. He completed treatment for this. Recent CT chest showed significant decrease in size of the LLL opacity compared to imaging from Dec 2023 and early 2024; overall the opacity has been stable for the last year. The opacity is likely due to focal area of inflammation e.g. organizing pneumonia. Importantly, he has started dupilumab and is doing very well from an asthma standpoint. Lung exam and SpO2 are unremarkable today.    Recommendations:  - continue medium dose Wixela BID. Rinse/gargle/spit after use  - continue albuterol inhaler vs. Neb as needed   - encouraged to exercise and remain active.  - continue montelukast 10mg daily for allergy control  - continue dupilumab and follow up with Dr. Rendon.   - UTD with vaccinations. Should consider RSV vaccine; defer to his PMD.     Follow up in 6 months.    Action plan: prednisone 40mg daily x5 days + z-pack    The longitudinal plan of care for the diagnosis(es)/condition(s) as documented were addressed during this visit. Due to the added complexity in care, I will continue to support Lseter in the subsequent management and with ongoing continuity of care.     Andrea Davis MD (Avi)  [facility] ([facility])  Clinic [phone]  Fax [phone]     CCx: asthma, history of recurrent pneumonia    HPI: Interim history: I last saw Lester on 10/18/2024. Since that time, he  reports he's been doing very well. No cough or SOB. Rare use of rescue inhaler.   Feels the dupilumab has been very helpful. He is doing well with diabetes control as well.     ROS:  A 12-system review was obtained and was negative with the exception of the symptoms endorsed in the history of present illness.    PMH:  Past Medical History:   Diagnosis Date    Anemia, unspecified     Created by Conversion     Cataract     left eye removed, present in right eye    Chronic osteoarthritis     right knee    Colon polyps     Contact dermatitis and other eczema, due to unspecified cause     Created by Conversion     Diabetes mellitus, type 2 (H)     Klinefelter's syndrome     Created by Conversion     Obesity, unspecified     Created by Conversion     Pure hypercholesterolemia     Created by Conversion     Type II or unspecified type diabetes mellitus without mention of complication, not stated as uncontrolled     Created by Conversion     Uncomplicated asthma     adult asthma- wheezing    Unspecified essential hypertension     Created by Conversion     Varicose vein 09/28/2015        PSH:  Past Surgical History:   Procedure Laterality Date    CATARACT EXTRACTION Left 2000    DENTAL SURGERY  1985    JOINT REPLACEMENT      left knee TKA 11/2013    ORTHOPEDIC SURGERY         Allergies:  Allergies   Allergen Reactions    Amoxicillin Anaphylaxis    Codeine Nausea and Vomiting    Sulfamethoxazole-Trimethoprim [Sulfamethoxazole-Trimethoprim] Nausea and Vomiting     Tachycardia      Levaquin [Levofloxacin] Nausea and Vomiting    Mounjaro [Tirzepatide] Angioedema    Ozempic (0.25 Or 0.5 Mg-Dose) [Semaglutide(0.25 Or 0.5mg-Dos)] GI Disturbance    Seasonal Allergies Other (See Comments)     SPRINGTIME Nasal, bronchial    Simvastatin Muscle Pain (Myalgia)       Family HX:  Family History   Problem Relation Age of Onset    Cerebrovascular Disease Mother     Clotting Disorder Father     Heart Disease Brother        Social Hx:  Social  History     Socioeconomic History    Marital status:      Spouse name: Not on file    Number of children: Not on file    Years of education: Not on file    Highest education level: Not on file   Occupational History    Not on file   Tobacco Use    Smoking status: Former     Current packs/day: 0.00     Average packs/day: 2.0 packs/day for 12.0 years (24.0 ttl pk-yrs)     Types: Cigarettes     Start date: 1967     Quit date: 1979     Years since quittin.3     Passive exposure: Never    Smokeless tobacco: Never   Vaping Use    Vaping status: Never Used   Substance and Sexual Activity    Alcohol use: Yes     Comment: Alcoholic Drinks/day: rare    Drug use: No    Sexual activity: Yes     Partners: Female   Other Topics Concern    Not on file   Social History Narrative    Not on file     Social Drivers of Health     Financial Resource Strain: Low Risk  (3/14/2025)    Financial Resource Strain     Within the past 12 months, have you or your family members you live with been unable to get utilities (heat, electricity) when it was really needed?: No   Food Insecurity: Low Risk  (3/14/2025)    Food Insecurity     Within the past 12 months, did you worry that your food would run out before you got money to buy more?: No     Within the past 12 months, did the food you bought just not last and you didn t have money to get more?: No   Transportation Needs: Low Risk  (3/14/2025)    Transportation Needs     Within the past 12 months, has lack of transportation kept you from medical appointments, getting your medicines, non-medical meetings or appointments, work, or from getting things that you need?: No   Physical Activity: Insufficiently Active (3/14/2025)    Exercise Vital Sign     Days of Exercise per Week: 1 day     Minutes of Exercise per Session: 10 min   Stress: Stress Concern Present (3/14/2025)    Citizen of [place] [employer] - Occupational Stress Questionnaire     Feeling of Stress : To  some extent   Social Connections: Unknown (3/14/2025)    Social Connection and Isolation Panel [NHANES]     Frequency of Communication with Friends and Family: Not on file     Frequency of Social Gatherings with Friends and Family: Three times a week     Attends Orthodoxy Services: Not on file     Active Member of Clubs or Organizations: Not on file     Attends Club or Organization Meetings: Not on file     Marital Status: Not on file   Interpersonal Safety: Low Risk  (3/19/2025)    Interpersonal Safety     Do you feel physically and emotionally safe where you currently live?: Yes     Within the past 12 months, have you been hit, slapped, kicked or otherwise physically hurt by someone?: No     Within the past 12 months, have you been humiliated or emotionally abused in other ways by your partner or ex-partner?: No   Housing Stability: Low Risk  (3/14/2025)    Housing Stability     Do you have housing? : Yes     Are you worried about losing your housing?: No       Current Meds:  Current Outpatient Medications   Medication Sig Dispense Refill    albuterol (PROAIR HFA/PROVENTIL HFA/VENTOLIN HFA) 108 (90 Base) MCG/ACT inhaler Inhale 2 puffs into the lungs every 6 hours as needed for wheezing. 8.5 g 1    albuterol (PROVENTIL) (2.5 MG/3ML) 0.083% neb solution Take 1 vial (2.5 mg) by nebulization every 6 hours as needed for shortness of breath, wheezing or cough 90 mL 1    amLODIPine (NORVASC) 10 MG tablet TAKE 1 TABLET(10 MG) BY MOUTH DAILY 90 tablet 2    atenolol (TENORMIN) 100 MG tablet TAKE 1 TABLET(100 MG) BY MOUTH DAILY 90 tablet 2    azelastine (ASTELIN) 0.1 % nasal spray 2 sprays each nostril 1-2x daily as needed for nasal congestion (use nightly for first 2 week) 30 mL 11    blood glucose (ACCU-CHEK GUIDE) test strip 1 strip by In Vitro route 2 times daily Use to test blood sugar 2 times daily or as directed. 200 strip 3    cetirizine (ZYRTEC) 10 MG tablet Take 10 mg by mouth daily      Continuous Blood Gluc  Sensor (DEXCOM G7 SENSOR) MISC 1 each every 10 days Change sensor every 10 days 3 each 5    dupilumab (DUPIXENT) 300 MG/2ML prefilled pen Inject 2 mLs (300 mg) subcutaneously every 14 days. 4 mL 5    empagliflozin (JARDIANCE) 25 MG TABS tablet Take 1 tablet (25 mg) by mouth daily. 30 tablet 3    famotidine (PEPCID) 20 MG tablet Take 1 tablet (20 mg) by mouth 2 times daily as needed. 60 tablet 3    fluticasone-salmeterol (ADVAIR) 250-50 MCG/ACT inhaler Inhale 1 puff into the lungs every 12 hours. 60 each 3    hydrochlorothiazide (HYDRODIURIL) 25 MG tablet Take 1 tablet (25 mg) by mouth every morning 90 tablet 3    ibuprofen (ADVIL/MOTRIN) 200 MG tablet Take 400 mg by mouth every 4 hours as needed for pain      insulin pen needle (ULTICARE MINI) 31G X 6 MM miscellaneous Use 1 pen needles daily or as directed. 100 each 3    ipratropium - albuterol 0.5 mg/2.5 mg/3 mL (DUONEB) 0.5-2.5 (3) MG/3ML neb solution Take 1 vial (3 mLs) by nebulization every 6 hours as needed for shortness of breath, wheezing or cough 540 mL 3    losartan (COZAAR) 100 MG tablet Take 1 tablet (100 mg) by mouth daily. 90 tablet 1    Melatonin 10 MG TABS tablet Take 10 mg by mouth nightly as needed for sleep.      metFORMIN (GLUCOPHAGE XR) 500 MG 24 hr tablet Take 4 tablets (2,000 mg) by mouth daily. 360 tablet 2    montelukast (SINGULAIR) 10 MG tablet Take 1 tablet (10 mg) by mouth at bedtime 30 tablet 6    rosuvastatin (CRESTOR) 20 MG tablet TAKE 1 TABLET(20 MG) BY MOUTH DAILY 90 tablet 2    tamsulosin (FLOMAX) 0.4 MG capsule Take 2 capsules (0.8 mg) by mouth daily. 180 capsule 1    traZODone (DESYREL) 50 MG tablet Take 1 tablet (50 mg) by mouth nightly as needed 30 tablet 0    triamcinolone (KENALOG) 0.1 % cream [TRIAMCINOLONE (KENALOG) 0.1 % CREAM] APPLY EXTERNALLY TO THE AFFECTED AREA TWICE DAILY FOR 14 DAYS 45 g 0    TRULICITY 3 MG/0.5ML SOAJ Inject 3 mg subcutaneously once a week.         Physical Exam:  /84 (BP Location: Right arm,  Patient Position: Sitting, Cuff Size: Adult Large)   Pulse 80   Wt 102.2 kg (225 lb 3.2 oz)   SpO2 97%   BMI 30.54 kg/m    Gen: awake, alert, oriented, no distress  HEENT: nasal turbinates are unremarkable, no oropharyngeal lesions, no cervical or supraclavicular lymphadenopathy  CV: RRR, no M/G/R  Resp: CTAB, no focal crackles or wheezes  Skin: no apparent rashes  Ext: no cyanosis, clubbing or edema  Neuro: alert, nonfocal    Labs:  Reviewed    Eos 900 on CBC 3/11.  CBC from recently: normal eos   [place] allergen panel: elevated IgE to cat dander, dog dander, maple and house khoury dust  ROSEANN neg  Blaso Ab neg    Bronch/BAL 5/29/2024  Total nuc cells 683; 36% PMN, 29% lymphs, 7% mono/mac, 4% eos, 24% lining cells  RVP neg  Cytology notable for hemorrhage and acute inflammation; neg for malignant cells  Culture + for moraxella and actinomyces odontolytica  Aspergillus Ag neg  PJP neg  Fungal cx neg      Imaging studies:  Personally reviewed    EXAM: CT CHEST W/O CONTRAST  LOCATION: [facility]  DATE: 4/3/2025     INDICATION: follow up LLL opacity  COMPARISON: CT chest 10/16/2024, 7/19/2024 and older studies going back to 2021, CT PET 5/7/2020  TECHNIQUE: CT chest without IV contrast. Multiplanar reformats were obtained. Dose reduction techniques were used.  CONTRAST: None.     FINDINGS:   LUNGS AND PLEURA: Left para-aortic irregular area of consolidation in the left lower lobe is stable going back to 2/20/2024 CT and notably decreased from study done 12/18/2023. Superiorly, largest component measures 1.8 cm in short axis, unchanged. There   are numerous, scattered calcified and noncalcified pulmonary nodules. The largest  measures 5 x 6 mm in the right middle lobe, stable going back to 2021. No new parenchymal disease. No effusions. Airways are unremarkable.     MEDIASTINUM/AXILLAE: No adenopathy. Aorta and branches are normal caliber with moderate atherosclerotic calcification.      CORONARY ARTERY CALCIFICATION: Severe.     UPPER ABDOMEN: Bilateral simple and hyperdense renal cysts again noted. Incidental splenule. Pancreatic atrophy.     MUSCULOSKELETAL: Asymmetric gynecomastia on the left. No concerning bone lesions.                                                                      IMPRESSION:   1.  Stable appearance to the presumed area of organized pneumonia in the left lower lobe as noted above over the past 14 months.  2.  No new signs of acute disease.  3.  Other numerous noncritical findings as noted above.       Study Result    Narrative & Impression   EXAM: PET ONCOLOGY (EYES TO THIGHS)  LOCATION: [facility]  DATE: 5/7/2024     INDICATION: Solitary pulmonary nodule. Masslike consolidation left lower lobe. Other nonspecific abnormal finding of lung field.  COMPARISON: CT chest 4/21/2024, 2/20/2024, PET/CT 8/13/2021  TECHNIQUE: Serum glucose level 152 mg/dL. One hour post intravenous administration of 13.2 mCi F-18 FDG, PET imaging was performed from the skull vertex to mid thighs, utilizing attenuation correction with concurrent axial CT and PET/CT image fusion.   Dose reduction techniques were used.     FINDINGS: An irregularly-shaped masslike consolidative pulmonary opacity in the posteromedial left lower lobe is similar to 4/21/2024 although larger than 8/13/2021, largest component 2.7 x 3.4 x 6.6 cm, and demonstrates increase in marked uptake (SUVmax   7.3, previously 3.2). No FDG avid adenopathy in the chest or elsewhere. No suspicious focal uptake in the liver or skeleton. Normal adrenal glands.     Moderate senescent intracranial changes. Mucous retention cyst right maxillary sinus. Benign sebaceous cyst midline upper back. Moderate atherosclerotic calcifications including the coronary arteries. Mild bandlike scarring or atelectasis in the lower   lungs. Few scattered benign calcified pulmonary granulomas. Small esophageal hiatal hernia. Few small  benign bilateral renal cysts, no follow-up needed. Splenule. Small fat-containing umbilical hernia. Moderate colonic diverticulosis, greatest in the   sigmoid region. Mild scattered degenerative changes in the spine.                                                                      IMPRESSION:  1. An enlarging area of masslike consolidation in the posteromedial left lower lobe with increased marked uptake is indeterminate, suspicious for malignancy. This is amenable to percutaneous CT guided biopsy.  2. No evidence of carrie or distant metastasis.       Pulmonary Function Testing  3/14/2024  FEV1 1.68L, 56%  FVC 70%  Ratio 0.6  No BD testing done  TLC 6.36L, 84%  %  Dlco 95% court for hgb  Flow volume curve suggests obstruction  NB: pt used Advair 4 hours prior to testing

## 2025-04-16 NOTE — LETTER
4/16/2025      Lester Shi  [address]  [place] [address]      Dear Colleague,    Thank you for referring your patient, Lester Shi, to the [facility]. Please see a copy of my visit note below.    Pulmonary Clinic Follow-up Visit    Assessment and Plan:   75M with a history of obesity, DM, HLD, Klinefelter's syndrome, seasonal allergies, hospitalization in Dec 2023 for LLL pneumonia and influenza and asthma, history of peripheral eosinophilia, presents for follow up of asthma with frequent exacerbations. PET/CT last Spring 2024 showed abnormal FDG uptake in the LLL opacity; biopsy attempted but unable to be done due to significant wheezing and hyperglycemia. Bronchoscopy with BAL was done which showed moraxella species in the BAL; no malignancy found on the BAL. He completed treatment for this. Recent CT chest showed significant decrease in size of the LLL opacity compared to imaging from Dec 2023 and early 2024; overall the opacity has been stable for the last year. The opacity is likely due to focal area of inflammation e.g. organizing pneumonia. Importantly, he has started dupilumab and is doing very well from an asthma standpoint. Lung exam and SpO2 are unremarkable today.    Recommendations:  - continue medium dose Wixela BID. Rinse/gargle/spit after use  - continue albuterol inhaler vs. Neb as needed   - encouraged to exercise and remain active.  - continue montelukast 10mg daily for allergy control  - continue dupilumab and follow up with Dr. Rendon.   - UTD with vaccinations. Should consider RSV vaccine; defer to his PMD.     Follow up in 6 months.    Action plan: prednisone 40mg daily x5 days + z-pack    The longitudinal plan of care for the diagnosis(es)/condition(s) as documented were addressed during this visit. Due to the added complexity in care, I will continue to support Lester in the subsequent management and with ongoing continuity of care.     Andrea  ([place]) MD [place]  [facility] ([facility])  Clinic [phone]  Fax [phone]     CCx: asthma, history of recurrent pneumonia    HPI: Interim history: I last saw Lester on 10/18/2024. Since that time, he reports he's been doing very well. No cough or SOB. Rare use of rescue inhaler.   Feels the dupilumab has been very helpful. He is doing well with diabetes control as well.     ROS:  A 12-system review was obtained and was negative with the exception of the symptoms endorsed in the history of present illness.    PMH:  Past Medical History:   Diagnosis Date     Anemia, unspecified     Created by Conversion      Cataract     left eye removed, present in right eye     Chronic osteoarthritis     right knee     Colon polyps      Contact dermatitis and other eczema, due to unspecified cause     Created by Conversion      Diabetes mellitus, type 2 (H)      Klinefelter's syndrome     Created by Conversion      Obesity, unspecified     Created by Conversion      Pure hypercholesterolemia     Created by Conversion      Type II or unspecified type diabetes mellitus without mention of complication, not stated as uncontrolled     Created by Conversion      Uncomplicated asthma     adult asthma- wheezing     Unspecified essential hypertension     Created by Conversion      Varicose vein 09/28/2015        PSH:  Past Surgical History:   Procedure Laterality Date     CATARACT EXTRACTION Left 2000     DENTAL SURGERY  1985     JOINT REPLACEMENT      left knee TKA 11/2013     ORTHOPEDIC SURGERY         Allergies:  Allergies   Allergen Reactions     Amoxicillin Anaphylaxis     Codeine Nausea and Vomiting     Sulfamethoxazole-Trimethoprim [Sulfamethoxazole-Trimethoprim] Nausea and Vomiting     Tachycardia       Levaquin [Levofloxacin] Nausea and Vomiting     Mounjaro [Tirzepatide] Angioedema     Ozempic (0.25 Or 0.5 Mg-Dose) [Semaglutide(0.25 Or 0.5mg-Dos)] GI Disturbance     Seasonal Allergies Other  (See Comments)     SPRINGTIME Nasal, bronchial     Simvastatin Muscle Pain (Myalgia)       Family HX:  Family History   Problem Relation Age of Onset     Cerebrovascular Disease Mother      Clotting Disorder Father      Heart Disease Brother        Social Hx:  Social History     Socioeconomic History     Marital status:      Spouse name: Not on file     Number of children: Not on file     Years of education: Not on file     Highest education level: Not on file   Occupational History     Not on file   Tobacco Use     Smoking status: Former     Current packs/day: 0.00     Average packs/day: 2.0 packs/day for 12.0 years (24.0 ttl pk-yrs)     Types: Cigarettes     Start date: 1967     Quit date: 1979     Years since quittin.3     Passive exposure: Never     Smokeless tobacco: Never   Vaping Use     Vaping status: Never Used   Substance and Sexual Activity     Alcohol use: Yes     Comment: Alcoholic Drinks/day: rare     Drug use: No     Sexual activity: Yes     Partners: Female   Other Topics Concern     Not on file   Social History Narrative     Not on file     Social Drivers of Health     Financial Resource Strain: Low Risk  (3/14/2025)    Financial Resource Strain      Within the past 12 months, have you or your family members you live with been unable to get utilities (heat, electricity) when it was really needed?: No   Food Insecurity: Low Risk  (3/14/2025)    Food Insecurity      Within the past 12 months, did you worry that your food would run out before you got money to buy more?: No      Within the past 12 months, did the food you bought just not last and you didn t have money to get more?: No   Transportation Needs: Low Risk  (3/14/2025)    Transportation Needs      Within the past 12 months, has lack of transportation kept you from medical appointments, getting your medicines, non-medical meetings or appointments, work, or from getting things that you need?: No   Physical Activity:  Insufficiently Active (3/14/2025)    Exercise Vital Sign      Days of Exercise per Week: 1 day      Minutes of Exercise per Session: 10 min   Stress: Stress Concern Present (3/14/2025)    [employer] [place] [employer] - Occupational Stress Questionnaire      Feeling of Stress : To some extent   Social Connections: Unknown (3/14/2025)    Social Connection and Isolation Panel [NHANES]      Frequency of Communication with Friends and Family: Not on file      Frequency of Social Gatherings with Friends and Family: Three times a week      Attends [employer] Services: Not on file      Active Member of Clubs or Organizations: Not on file      Attends Club or Organization Meetings: Not on file      Marital Status: Not on file   Interpersonal Safety: Low Risk  (3/19/2025)    Interpersonal Safety      Do you feel physically and emotionally safe where you currently live?: Yes      Within the past 12 months, have you been hit, slapped, kicked or otherwise physically hurt by someone?: No      Within the past 12 months, have you been humiliated or emotionally abused in other ways by your partner or ex-partner?: No   Housing Stability: Low Risk  (3/14/2025)    Housing Stability      Do you have housing? : Yes      Are you worried about losing your housing?: No       Current Meds:  Current Outpatient Medications   Medication Sig Dispense Refill     albuterol (PROAIR HFA/PROVENTIL HFA/VENTOLIN HFA) 108 (90 Base) MCG/ACT inhaler Inhale 2 puffs into the lungs every 6 hours as needed for wheezing. 8.5 g 1     albuterol (PROVENTIL) (2.5 MG/3ML) 0.083% neb solution Take 1 vial (2.5 mg) by nebulization every 6 hours as needed for shortness of breath, wheezing or cough 90 mL 1     amLODIPine (NORVASC) 10 MG tablet TAKE 1 TABLET(10 MG) BY MOUTH DAILY 90 tablet 2     atenolol (TENORMIN) 100 MG tablet TAKE 1 TABLET(100 MG) BY MOUTH DAILY 90 tablet 2     azelastine (ASTELIN) 0.1 % nasal spray 2 sprays each nostril 1-2x daily as  needed for nasal congestion (use nightly for first 2 week) 30 mL 11     blood glucose (ACCU-CHEK GUIDE) test strip 1 strip by In Vitro route 2 times daily Use to test blood sugar 2 times daily or as directed. 200 strip 3     cetirizine (ZYRTEC) 10 MG tablet Take 10 mg by mouth daily       Continuous Blood Gluc Sensor (DEXCOM G7 SENSOR) MISC 1 each every 10 days Change sensor every 10 days 3 each 5     dupilumab (DUPIXENT) 300 MG/2ML prefilled pen Inject 2 mLs (300 mg) subcutaneously every 14 days. 4 mL 5     empagliflozin (JARDIANCE) 25 MG TABS tablet Take 1 tablet (25 mg) by mouth daily. 30 tablet 3     famotidine (PEPCID) 20 MG tablet Take 1 tablet (20 mg) by mouth 2 times daily as needed. 60 tablet 3     fluticasone-salmeterol (ADVAIR) 250-50 MCG/ACT inhaler Inhale 1 puff into the lungs every 12 hours. 60 each 3     hydrochlorothiazide (HYDRODIURIL) 25 MG tablet Take 1 tablet (25 mg) by mouth every morning 90 tablet 3     ibuprofen (ADVIL/MOTRIN) 200 MG tablet Take 400 mg by mouth every 4 hours as needed for pain       insulin pen needle (ULTICARE MINI) 31G X 6 MM miscellaneous Use 1 pen needles daily or as directed. 100 each 3     ipratropium - albuterol 0.5 mg/2.5 mg/3 mL (DUONEB) 0.5-2.5 (3) MG/3ML neb solution Take 1 vial (3 mLs) by nebulization every 6 hours as needed for shortness of breath, wheezing or cough 540 mL 3     losartan (COZAAR) 100 MG tablet Take 1 tablet (100 mg) by mouth daily. 90 tablet 1     Melatonin 10 MG TABS tablet Take 10 mg by mouth nightly as needed for sleep.       metFORMIN (GLUCOPHAGE XR) 500 MG 24 hr tablet Take 4 tablets (2,000 mg) by mouth daily. 360 tablet 2     montelukast (SINGULAIR) 10 MG tablet Take 1 tablet (10 mg) by mouth at bedtime 30 tablet 6     rosuvastatin (CRESTOR) 20 MG tablet TAKE 1 TABLET(20 MG) BY MOUTH DAILY 90 tablet 2     tamsulosin (FLOMAX) 0.4 MG capsule Take 2 capsules (0.8 mg) by mouth daily. 180 capsule 1     traZODone (DESYREL) 50 MG tablet Take 1  tablet (50 mg) by mouth nightly as needed 30 tablet 0     triamcinolone (KENALOG) 0.1 % cream [TRIAMCINOLONE (KENALOG) 0.1 % CREAM] APPLY EXTERNALLY TO THE AFFECTED AREA TWICE DAILY FOR 14 DAYS 45 g 0     TRULICITY 3 MG/0.5ML SOAJ Inject 3 mg subcutaneously once a week.         Physical Exam:  /84 (BP Location: Right arm, Patient Position: Sitting, Cuff Size: Adult Large)   Pulse 80   Wt 102.2 kg (225 lb 3.2 oz)   SpO2 97%   BMI 30.54 kg/m    Gen: awake, alert, oriented, no distress  HEENT: nasal turbinates are unremarkable, no oropharyngeal lesions, no cervical or supraclavicular lymphadenopathy  CV: RRR, no M/G/R  Resp: CTAB, no focal crackles or wheezes  Skin: no apparent rashes  Ext: no cyanosis, clubbing or edema  Neuro: alert, nonfocal    Labs:  Reviewed    Eos 900 on CBC 3/11.  CBC from recently: normal eos   [place] allergen panel: elevated IgE to cat dander, dog dander, maple and house khoury dust  ROSEANN neg  Blaso Ab neg    Bronch/BAL 5/29/2024  Total nuc cells 683; 36% PMN, 29% lymphs, 7% mono/mac, 4% eos, 24% lining cells  RVP neg  Cytology notable for hemorrhage and acute inflammation; neg for malignant cells  Culture + for moraxella and actinomyces odontolytica  Aspergillus Ag neg  PJP neg  Fungal cx neg      Imaging studies:  Personally reviewed    EXAM: CT CHEST W/O CONTRAST  LOCATION: [facility]  DATE: 4/3/2025     INDICATION: follow up LLL opacity  COMPARISON: CT chest 10/16/2024, 7/19/2024 and older studies going back to 2021, CT PET 5/7/2020  TECHNIQUE: CT chest without IV contrast. Multiplanar reformats were obtained. Dose reduction techniques were used.  CONTRAST: None.     FINDINGS:   LUNGS AND PLEURA: Left para-aortic irregular area of consolidation in the left lower lobe is stable going back to 2/20/2024 CT and notably decreased from study done 12/18/2023. Superiorly, largest component measures 1.8 cm in short axis, unchanged. There   are numerous, scattered  calcified and noncalcified pulmonary nodules. The largest  measures 5 x 6 mm in the right middle lobe, stable going back to 2021. No new parenchymal disease. No effusions. Airways are unremarkable.     MEDIASTINUM/AXILLAE: No adenopathy. Aorta and branches are normal caliber with moderate atherosclerotic calcification.     CORONARY ARTERY CALCIFICATION: Severe.     UPPER ABDOMEN: Bilateral simple and hyperdense renal cysts again noted. Incidental splenule. Pancreatic atrophy.     MUSCULOSKELETAL: Asymmetric gynecomastia on the left. No concerning bone lesions.                                                                      IMPRESSION:   1.  Stable appearance to the presumed area of organized pneumonia in the left lower lobe as noted above over the past 14 months.  2.  No new signs of acute disease.  3.  Other numerous noncritical findings as noted above.       Study Result    Narrative & Impression   EXAM: PET ONCOLOGY (EYES TO THIGHS)  LOCATION: [facility]  DATE: 5/7/2024     INDICATION: Solitary pulmonary nodule. Masslike consolidation left lower lobe. Other nonspecific abnormal finding of lung field.  COMPARISON: CT chest 4/21/2024, 2/20/2024, PET/CT 8/13/2021  TECHNIQUE: Serum glucose level 152 mg/dL. One hour post intravenous administration of 13.2 mCi F-18 FDG, PET imaging was performed from the skull vertex to mid thighs, utilizing attenuation correction with concurrent axial CT and PET/CT image fusion.   Dose reduction techniques were used.     FINDINGS: An irregularly-shaped masslike consolidative pulmonary opacity in the posteromedial left lower lobe is similar to 4/21/2024 although larger than 8/13/2021, largest component 2.7 x 3.4 x 6.6 cm, and demonstrates increase in marked uptake (SUVmax   7.3, previously 3.2). No FDG avid adenopathy in the chest or elsewhere. No suspicious focal uptake in the liver or skeleton. Normal adrenal glands.     Moderate senescent intracranial  changes. Mucous retention cyst right maxillary sinus. Benign sebaceous cyst midline upper back. Moderate atherosclerotic calcifications including the coronary arteries. Mild bandlike scarring or atelectasis in the lower   lungs. Few scattered benign calcified pulmonary granulomas. Small esophageal hiatal hernia. Few small benign bilateral renal cysts, no follow-up needed. Splenule. Small fat-containing umbilical hernia. Moderate colonic diverticulosis, greatest in the   sigmoid region. Mild scattered degenerative changes in the spine.                                                                      IMPRESSION:  1. An enlarging area of masslike consolidation in the posteromedial left lower lobe with increased marked uptake is indeterminate, suspicious for malignancy. This is amenable to percutaneous CT guided biopsy.  2. No evidence of carrie or distant metastasis.       Pulmonary Function Testing  3/14/2024  FEV1 1.68L, 56%  FVC 70%  Ratio 0.6  No BD testing done  TLC 6.36L, 84%  %  Dlco 95% court for hgb  Flow volume curve suggests obstruction  NB: pt used Advair 4 hours prior to testing    Again, thank you for allowing me to participate in the care of your patient.        Sincerely,        Andrea Davis MD    Electronically signed

## 2025-05-13 ENCOUNTER — TRANSFERRED RECORDS (OUTPATIENT)
Dept: HEALTH INFORMATION MANAGEMENT | Facility: CLINIC | Age: 75
End: 2025-05-13
Payer: COMMERCIAL

## 2025-05-20 DIAGNOSIS — J45.50 SEVERE PERSISTENT ASTHMA WITHOUT COMPLICATION (H): ICD-10-CM

## 2025-05-20 RX ORDER — DUPILUMAB 300 MG/2ML
300 INJECTION, SOLUTION SUBCUTANEOUS
Qty: 4 ML | Refills: 5 | Status: SHIPPED | OUTPATIENT
Start: 2025-05-20

## 2025-06-02 ENCOUNTER — OFFICE VISIT (OUTPATIENT)
Dept: UROLOGY | Facility: CLINIC | Age: 75
End: 2025-06-02
Payer: COMMERCIAL

## 2025-06-02 VITALS — DIASTOLIC BLOOD PRESSURE: 67 MMHG | TEMPERATURE: 97.6 F | SYSTOLIC BLOOD PRESSURE: 129 MMHG | HEART RATE: 69 BPM

## 2025-06-02 DIAGNOSIS — R32 URINARY INCONTINENCE, UNSPECIFIED TYPE: ICD-10-CM

## 2025-06-02 DIAGNOSIS — R39.15 URINARY URGENCY: ICD-10-CM

## 2025-06-02 DIAGNOSIS — E11.69 TYPE 2 DIABETES MELLITUS WITH OTHER SPECIFIED COMPLICATION, WITHOUT LONG-TERM CURRENT USE OF INSULIN (H): ICD-10-CM

## 2025-06-02 DIAGNOSIS — R81 GLUCOSE FOUND IN URINE ON EXAMINATION: Primary | ICD-10-CM

## 2025-06-02 DIAGNOSIS — R35.0 URINARY FREQUENCY: ICD-10-CM

## 2025-06-02 DIAGNOSIS — N32.81 OVERACTIVE BLADDER: ICD-10-CM

## 2025-06-02 LAB
ALBUMIN UR-MCNC: NEGATIVE MG/DL
APPEARANCE UR: CLEAR
BACTERIA #/AREA URNS HPF: ABNORMAL /HPF
BILIRUB UR QL STRIP: NEGATIVE
COLOR UR AUTO: YELLOW
GLUCOSE UR STRIP-MCNC: 100 MG/DL
HGB UR QL STRIP: NEGATIVE
KETONES UR STRIP-MCNC: NEGATIVE MG/DL
LEUKOCYTE ESTERASE UR QL STRIP: NEGATIVE
NITRATE UR QL: NEGATIVE
PH UR STRIP: 6 [PH] (ref 5–8)
RBC #/AREA URNS AUTO: ABNORMAL /HPF
SP GR UR STRIP: 1.02 (ref 1–1.03)
SQUAMOUS #/AREA URNS AUTO: ABNORMAL /LPF
UROBILINOGEN UR STRIP-ACNC: 0.2 E.U./DL
WBC #/AREA URNS AUTO: ABNORMAL /HPF

## 2025-06-02 PROCEDURE — 1126F AMNT PAIN NOTED NONE PRSNT: CPT | Performed by: PHYSICIAN ASSISTANT

## 2025-06-02 PROCEDURE — 51798 US URINE CAPACITY MEASURE: CPT | Performed by: PHYSICIAN ASSISTANT

## 2025-06-02 PROCEDURE — 3078F DIAST BP <80 MM HG: CPT | Performed by: PHYSICIAN ASSISTANT

## 2025-06-02 PROCEDURE — 81001 URINALYSIS AUTO W/SCOPE: CPT | Performed by: PHYSICIAN ASSISTANT

## 2025-06-02 PROCEDURE — 99204 OFFICE O/P NEW MOD 45 MIN: CPT | Mod: 25 | Performed by: PHYSICIAN ASSISTANT

## 2025-06-02 PROCEDURE — 3074F SYST BP LT 130 MM HG: CPT | Performed by: PHYSICIAN ASSISTANT

## 2025-06-02 RX ORDER — SOLIFENACIN SUCCINATE 5 MG/1
5 TABLET, FILM COATED ORAL DAILY
Qty: 90 TABLET | Refills: 3 | Status: SHIPPED | OUTPATIENT
Start: 2025-06-02

## 2025-06-02 ASSESSMENT — PAIN SCALES - GENERAL: PAINLEVEL_OUTOF10: NO PAIN (0)

## 2025-06-02 NOTE — PROGRESS NOTES
Urology Outpatient Clinic Visit     Chief Complaint:   LUTS     Referring Provider   Garret Pope     History of Present Illness:   Lester Shi is a 75 year old male with a history of DM2, HTN, obesity who presents for evaluation of LUTS. He is currently on flomax.    Symptoms have been ongoing for 1 and are progressively worsening. The patient voids q2-3 hours during the day, nocturia x 2-3. He reports double voiding, weak stream, frequency, urgency, urge incontinence. Denies dysuria, gross hematuria, history of UTIs or kidney stones, and constipation. No prior history of prostate or bladder issues.     Drinks 60 oz amount of water daily, 16 oz coffee, 12 oz pop, minimal alcohol.  The patient is a former smoker.  2 PPD  The patient does not history of  malignancy.      PSA 3/19/25 of 0.10.  Hbg A1C 3/19/25 of 9.8. UA 3/21 with >1000 glucose     Review of Systems:    Negative 14 system review except as noted on HPI, nurse's note     Past Medical History:     Past Medical History:   Diagnosis Date    Anemia, unspecified     Created by Conversion     Cataract     left eye removed, present in right eye    Chronic osteoarthritis     right knee    Colon polyps     Contact dermatitis and other eczema, due to unspecified cause     Created by Conversion     Diabetes mellitus, type 2 (H)     Klinefelter's syndrome     Created by Conversion     Obesity, unspecified     Created by Conversion     Pure hypercholesterolemia     Created by Conversion     Type II or unspecified type diabetes mellitus without mention of complication, not stated as uncontrolled     Created by Conversion     Uncomplicated asthma     adult asthma- wheezing    Unspecified essential hypertension     Created by Conversion     Varicose vein 09/28/2015        Past Surgical History:     Past Surgical History:   Procedure Laterality Date    CATARACT EXTRACTION Left 2000    DENTAL SURGERY  1985    JOINT REPLACEMENT      left knee TKA 11/2013     ORTHOPEDIC SURGERY          Medications:     Current Outpatient Medications   Medication Sig Dispense Refill    solifenacin (VESICARE) 5 MG tablet Take 1 tablet (5 mg) by mouth daily. 90 tablet 3    albuterol (PROAIR HFA/PROVENTIL HFA/VENTOLIN HFA) 108 (90 Base) MCG/ACT inhaler Inhale 2 puffs into the lungs every 6 hours as needed for wheezing. 8.5 g 1    albuterol (PROVENTIL) (2.5 MG/3ML) 0.083% neb solution Take 1 vial (2.5 mg) by nebulization every 6 hours as needed for shortness of breath, wheezing or cough 90 mL 1    amLODIPine (NORVASC) 10 MG tablet TAKE 1 TABLET(10 MG) BY MOUTH DAILY 90 tablet 2    atenolol (TENORMIN) 100 MG tablet TAKE 1 TABLET(100 MG) BY MOUTH DAILY 90 tablet 2    azelastine (ASTELIN) 0.1 % nasal spray 2 sprays each nostril 1-2x daily as needed for nasal congestion (use nightly for first 2 week) 30 mL 11    blood glucose (ACCU-CHEK GUIDE) test strip 1 strip by In Vitro route 2 times daily Use to test blood sugar 2 times daily or as directed. 200 strip 3    cetirizine (ZYRTEC) 10 MG tablet Take 10 mg by mouth daily      Continuous Blood Gluc Sensor (DEXCOM G7 SENSOR) MISC 1 each every 10 days Change sensor every 10 days 3 each 5    dupilumab (DUPIXENT) 300 MG/2ML prefilled pen Inject 2 mLs (300 mg) subcutaneously every 14 days. 4 mL 5    empagliflozin (JARDIANCE) 25 MG TABS tablet Take 1 tablet (25 mg) by mouth daily. 30 tablet 3    famotidine (PEPCID) 20 MG tablet Take 1 tablet (20 mg) by mouth 2 times daily as needed. 60 tablet 3    fluticasone-salmeterol (ADVAIR) 250-50 MCG/ACT inhaler Inhale 1 puff into the lungs every 12 hours. 60 each 3    hydrochlorothiazide (HYDRODIURIL) 25 MG tablet Take 1 tablet (25 mg) by mouth every morning 90 tablet 3    ibuprofen (ADVIL/MOTRIN) 200 MG tablet Take 400 mg by mouth every 4 hours as needed for pain      insulin pen needle (ULTICARE MINI) 31G X 6 MM miscellaneous Use 1 pen needles daily or as directed. 100 each 3    ipratropium - albuterol 0.5  mg/2.5 mg/3 mL (DUONEB) 0.5-2.5 (3) MG/3ML neb solution Take 1 vial (3 mLs) by nebulization every 6 hours as needed for shortness of breath, wheezing or cough 540 mL 3    losartan (COZAAR) 100 MG tablet Take 1 tablet (100 mg) by mouth daily. 90 tablet 1    Melatonin 10 MG TABS tablet Take 10 mg by mouth nightly as needed for sleep.      metFORMIN (GLUCOPHAGE XR) 500 MG 24 hr tablet Take 4 tablets (2,000 mg) by mouth daily. 360 tablet 2    montelukast (SINGULAIR) 10 MG tablet Take 1 tablet (10 mg) by mouth at bedtime 30 tablet 6    rosuvastatin (CRESTOR) 20 MG tablet TAKE 1 TABLET(20 MG) BY MOUTH DAILY 90 tablet 2    tamsulosin (FLOMAX) 0.4 MG capsule Take 2 capsules (0.8 mg) by mouth daily. 180 capsule 1    traZODone (DESYREL) 50 MG tablet Take 1 tablet (50 mg) by mouth nightly as needed 30 tablet 0    triamcinolone (KENALOG) 0.1 % cream [TRIAMCINOLONE (KENALOG) 0.1 % CREAM] APPLY EXTERNALLY TO THE AFFECTED AREA TWICE DAILY FOR 14 DAYS 45 g 0     No current facility-administered medications for this visit.        Allergies:   Amoxicillin, Codeine, Sulfamethoxazole-trimethoprim [sulfamethoxazole-trimethoprim], Levaquin [levofloxacin], Mounjaro [tirzepatide], Ozempic (0.25 or 0.5 mg-dose) [semaglutide(0.25 or 0.5mg-dos)], Seasonal allergies, and Simvastatin         Physical Exam:   Patient is a 75 year old  male   Vitals: Blood pressure 129/67, pulse 69, temperature 97.6  F (36.4  C), temperature source Temporal.  General Appearance Adult: Alert, no acute distress, oriented.  Lungs: Non-labored breathing.  Skin: no suspicious lesions or rashes  Neuro: Alert, oriented, speech and mentation normal  : SORAYA anodular, symmetric    PVR: 3 mL      Labs and Pathology:    I personally reviewed all applicable laboratory data and went over findings with patient  Significant for:    CBC RESULTS:  Recent Labs   Lab Test 03/21/25  1614 12/16/24  1624 05/22/24  0740 03/19/24  1158 03/11/24  1542   WBC 8.2 6.0  --  9.6 6.7   HGB  11.7* 11.5* 10.7* 11.1* 11.4*    293 251 354 309        BMP RESULTS:  Recent Labs   Lab Test 03/21/25  1614 03/19/25  1124 05/29/24  0826 05/22/24  0742 05/22/24  0740 05/07/24  1003 03/11/24  1542 07/26/21  0843 04/23/21  1238 09/11/20  1139 11/18/19  1136 10/29/19  2058 10/29/19  1624    141  --   --  140  --  141   < > 141 139 142  --  139  139   POTASSIUM 3.8 4.1  --   --  3.8  --  3.9   < > 3.9 3.8 4.0  --  3.6  3.6   CHLORIDE 100 103  --   --  107  --  103   < > 106 102 107  --  102  102   CO2 23 26  --   --  22  --  27   < > 23 25 25  --  27  26   ANIONGAP 14 12  --   --  11  --  11   < > 12 12 10  --  10  11   * 193* 265* 299* 341*   < > 213*   < > 192* 205* 141*   < > 143*  145*   BUN 29.6* 26.4*  --   --  33.7*  --  24.5*   < > 26 25 31*  --  28  29*   CR 1.17 1.10  --   --  1.09  --  1.11   < > 1.01 1.18 1.09  --  1.14  1.11   GFRESTIMATED 64 69  --   --  70  --  69   < > >60 >60 >60  --  >60  >60   GFRESTBLACK  --   --   --   --   --   --   --   --  >60 >60 >60  --  >60  >60   MADELEINE 10.1 10.3  --   --  9.5  --  9.7   < > 9.4 10.2 10.2  --  10.2  10.1    < > = values in this interval not displayed.       UA RESULTS:   Recent Labs   Lab Test 03/21/25  1622 06/07/22  1320 10/29/19  1658   SG 1.015 1.025 1.015   URINEPH 5.0 5.5 6.0   NITRITE Negative Negative Negative   RBCU <1 2-5* 0-2   WBCU <1 0-5 0-5       PSA RESULTS  Prostate Specific Antigen Screen   Date Value Ref Range Status   03/19/2025 0.10 0.00 - 6.50 ng/mL Final   01/29/2024 0.12 0.00 - 6.50 ng/mL Final   06/07/2022 0.10 0.00 - 6.50 ug/L Final   11/02/2018 <0.1 0.0 - 6.5 ng/mL Final   10/11/2017 <0.1 0.0 - 4.5 ng/mL Final   09/28/2015 <0.1 0.0 - 4.5 ng/mL Final   09/29/2014 <0.1 0.0 - 4.5 ng/mL Final   11/14/2012 0.1 <4.6 ng/mL Final     Comment:     Method is Abbott Prostate-Specific Antigen (PSA)            Standard-WHO 1st International (90:10) as of 09/26/05 06/24/2011 0.1 <4.6 ng/mL Final     Comment:      Method is Abbott Prostate-Specific Antigen (PSA)            Standard-WHO 1st International (90:10) as of 09/26/05 01/06/2010 0.1 <4.6 ng/mL Final     Comment:     Method is Abbott Prostate-Specific Antigen (PSA)            Standard-WHO 1st International (90:10) as of 09/26/05           Imaging:    I personally reviewed all applicable imaging and went over findings with patient.  Significant for:    Results for orders placed or performed during the hospital encounter of 04/03/25   CT Chest w/o Contrast    Narrative    EXAM: CT CHEST W/O CONTRAST  LOCATION: [facility]  DATE: 4/3/2025    INDICATION: follow up LLL opacity  COMPARISON: CT chest 10/16/2024, 7/19/2024 and older studies going back to 2021, CT PET 5/7/2020  TECHNIQUE: CT chest without IV contrast. Multiplanar reformats were obtained. Dose reduction techniques were used.  CONTRAST: None.    FINDINGS:   LUNGS AND PLEURA: Left para-aortic irregular area of consolidation in the left lower lobe is stable going back to 2/20/2024 CT and notably decreased from study done 12/18/2023. Superiorly, largest component measures 1.8 cm in short axis, unchanged. There   are numerous, scattered calcified and noncalcified pulmonary nodules. The largest  measures 5 x 6 mm in the right middle lobe, stable going back to 2021. No new parenchymal disease. No effusions. Airways are unremarkable.    MEDIASTINUM/AXILLAE: No adenopathy. Aorta and branches are normal caliber with moderate atherosclerotic calcification.    CORONARY ARTERY CALCIFICATION: Severe.    UPPER ABDOMEN: Bilateral simple and hyperdense renal cysts again noted. Incidental splenule. Pancreatic atrophy.    MUSCULOSKELETAL: Asymmetric gynecomastia on the left. No concerning bone lesions.      Impression    IMPRESSION:   1.  Stable appearance to the presumed area of organized pneumonia in the left lower lobe as noted above over the past 14 months.  2.  No new signs of acute disease.  3.   Other numerous noncritical findings as noted above.              Assessment and Plan:   Lester Shi is a 75 year old male with a history of DM2, HTN, obesity  presenting with symptoms of urgency, frequency and urge incontinence not consistent with BPH w/ LUTS. SORAYA today is without enlarge prostate.      - Bladder irritants discussed  - Bowel regimen, recommend Citrucel  - Encouraged PFPT, declined at this time  - Encouraged to control diabetes as this can be a factor in urgency and frequency  - Vesicare prescribed, side effects discussed  - Follow up in 3 months for PVR, AUA symptom score and reassessment.  - Video urodynamics and/or cystoscopic evaluation would be the next appropriate diagnostic steps fail conservative therapy    Monica English PA-C  [facility]    50 minutes spent on the date of the encounter doing chart review, review of outside records, review of test results, interpretation of tests, patient visit and documentation

## 2025-06-02 NOTE — PROGRESS NOTES
Patient educated regarding bladder scan procedure which writer performed.  Patient voiced understanding of information.  3ml of urine remaining in bladder.

## 2025-06-02 NOTE — PATIENT INSTRUCTIONS
Below is a list of things that can irritate the bladder and should be eliminated:  Caffeinated soft drinks.  Coffee.  Tea.  Chocolate.  Tomato-based foods.  Acidic juices and fruits. (includes cranberry juice)  Alcohol.  Nicotine  Carbonated drinks.  Aspartame/Nutrasweet.    ________________________________     Bladder Retraining  In this packet, you will learn 3 steps to help you improve your bladder control. If you have any questions regarding any of these steps once you are home, please feel free to call the office.   The 3 steps you will learn are:   Double Voiding   Fluid Guidelines  Timed Voiding   Double Voiding   A technique called double voiding can be used to help ensure that you have fully emptied your bladder while on the toilet. The main idea behind double voiding is to try to void two or even three times during each trip to the bathroom.  By doing this you can reduce residual urine volumes and minimize your chance of having an accident, an infection, or leakage later on.   The technique is simple.  Some people find that they can void, remain on the toilet for a rest period of two to five to ten minutes, and void again.  Others find it useful to void, then stand up and sit back down and attempt to void again.   You can also compress the bladder in order to empty more fully.  To do the Crede maneuver, press firmly with one hand (or both hands) directly into the abdomen over the bladder.  You may also find it helpful to lean forward or rock while sitting on the toilet in order to help empty the bladder better.  Fluid Guidelines   Managing your fluid intake can also help you improve your bladder control.  It is VERY important that you drink at least 5 to 7 glasses of fluid each day. The bulk of this fluid should be water. Drinking appropriate amounts of fluid daily and emptying your bladder at regular intervals helps decrease bladder infections. Managing your problem by restricting fluid intake is  counterproductive, and NOT recommended.     Suggestions Regarding Fluid Intake  Try to spread your fluid consumption out over the course of the day rather than consuming large amounts at one sitting and then going long periods of time without drinking.   Try to minimize fluid consumption after your evening meal.   Try to minimize caffeine and alcohol consumption. Use only decaffeinated coffee, tea or soda when possible.      Timed Voiding    It might be a good idea to start this approach to managing your problem on a weekend or when you plan to be at home or near a bathroom. The purpose of timed voiding is to gradually:   increase the amount of time between emptying your bladder   increase the amount of fluid your bladder can hold, and hopefully,   diminish the sense of urgency and/or leakage associated with your problem.     Week 1 - After awakening, empty your bladder every half-hour on the hour (even if you do NOT feel the need to go). Make sure you are drinking frequently. During the night only go to the bathroom if you waken and find it necessary   Week 2 - Increase the time between emptying your bladder to once per hour, following the above fluid and night instructions.   Week 3 - Increase the time between emptying your bladder to every 1 1/2 hours, following the above fluid and night instructions.   Week 4 - Increase the time between emptying your bladder to every 2 hours, following the above fluid and night instructions.  Work up to voiding every 3 to 3   hours if you can.     If you can already hold your bladder longer than 1/2 hour, you do not need to start at Week 1. Start at the point that is appropriate for you and work up from there. Just remember to 1) increase your voiding intervals by no more than 30 minutes at a time, 2) void regularly even if you do not feel the need to go, and 3) during the night, only go to the bathroom if you awaken and find it necessary.   You will be the best  of how  quickly you can advance to the next step. These instructions are an outline which you can modify (for example, you may find it more comfortable to stretch from 1 to 1 1/4 hours).   You may also increase the pace of this sample schedule, depending on your individual symptoms and bladder capacity. For example, you may increase the hourly increments every 5 days, instead of every 7 days.     Don t Get Discouraged  This program works! The keys to success are self-motivation and gradual increases in the time interval between voids. If you try to progress too rapidly, you will exceed your capabilities and become frustrated.    Some Additional Behavioral Techniques to Help Bladder Control  Do not rush to the bathroom. Try to be calm and maintain control. Rushing to the bathroom can intensify the urge for the bladder to contract.  Do several quick contractions of the pelvic floor muscles. Use effort to keep from leaking. If possible, sit down for direct pressure on the pelvic floor.  Relax. If you have practiced diaphragmatic breathing in the past, use that skill to relax. (Take slow, deep breaths through your nose, and then slowly breathe out through pursed lips.) Use distraction techniques to try and make the urinary urge go away.  When you feel the urge subside, walk slowly and normally to the bathroom. You can repeat the above steps to gain control if the urge returns.  You can slowly proceed to the toilet to empty your bladder once the urge has subsided.     _______________________________    Today:  -Discussed bladder irritants  -Discussed bowel regimen, recommend Citrucel   -Recommend getting diabetes under control as having high sugar in the urine can be contributing to your symptoms  -Recommend pelvic floor physical therapy, declined at this time. Please consider in the future  - Prescription for Vesicare. 5 mg tablet daily, can increase to 10 if symptoms uncontrolled but little side effects  -3-6 months follow  up

## 2025-06-03 ENCOUNTER — PATIENT OUTREACH (OUTPATIENT)
Dept: CARE COORDINATION | Facility: CLINIC | Age: 75
End: 2025-06-03
Payer: COMMERCIAL

## 2025-06-05 ENCOUNTER — TRANSFERRED RECORDS (OUTPATIENT)
Dept: HEALTH INFORMATION MANAGEMENT | Facility: CLINIC | Age: 75
End: 2025-06-05
Payer: COMMERCIAL

## 2025-06-05 ENCOUNTER — PATIENT OUTREACH (OUTPATIENT)
Dept: CARE COORDINATION | Facility: CLINIC | Age: 75
End: 2025-06-05
Payer: COMMERCIAL

## 2025-08-11 ENCOUNTER — PATIENT OUTREACH (OUTPATIENT)
Dept: CARE COORDINATION | Facility: CLINIC | Age: 75
End: 2025-08-11
Payer: COMMERCIAL

## 2025-08-13 ENCOUNTER — PATIENT OUTREACH (OUTPATIENT)
Dept: CARE COORDINATION | Facility: CLINIC | Age: 75
End: 2025-08-13

## 2025-08-13 ENCOUNTER — ANCILLARY PROCEDURE (OUTPATIENT)
Dept: VASCULAR ULTRASOUND | Facility: CLINIC | Age: 75
End: 2025-08-13
Attending: HOSPITALIST
Payer: COMMERCIAL

## 2025-08-13 ENCOUNTER — OFFICE VISIT (OUTPATIENT)
Dept: VASCULAR SURGERY | Facility: CLINIC | Age: 75
End: 2025-08-13
Attending: HOSPITALIST
Payer: COMMERCIAL

## 2025-08-13 VITALS
SYSTOLIC BLOOD PRESSURE: 192 MMHG | WEIGHT: 230.6 LBS | HEIGHT: 72 IN | BODY MASS INDEX: 31.23 KG/M2 | OXYGEN SATURATION: 97 % | DIASTOLIC BLOOD PRESSURE: 108 MMHG | HEART RATE: 71 BPM

## 2025-08-13 DIAGNOSIS — I71.40 ABDOMINAL AORTIC ANEURYSM (AAA) WITHOUT RUPTURE, UNSPECIFIED PART: Primary | ICD-10-CM

## 2025-08-13 DIAGNOSIS — I71.40 ABDOMINAL AORTIC ANEURYSM (AAA) 3.0 CM TO 5.5 CM IN DIAMETER IN MALE: ICD-10-CM

## 2025-08-13 PROCEDURE — 93978 VASCULAR STUDY: CPT | Mod: 26 | Performed by: SURGERY

## 2025-08-13 PROCEDURE — G0463 HOSPITAL OUTPT CLINIC VISIT: HCPCS | Performed by: HOSPITALIST

## 2025-08-13 PROCEDURE — 93978 VASCULAR STUDY: CPT

## 2025-08-13 ASSESSMENT — PAIN SCALES - GENERAL: PAINLEVEL_OUTOF10: NO PAIN (0)

## 2025-08-14 ENCOUNTER — ALLIED HEALTH/NURSE VISIT (OUTPATIENT)
Dept: EDUCATION SERVICES | Facility: CLINIC | Age: 75
End: 2025-08-14
Attending: PHYSICIAN ASSISTANT
Payer: COMMERCIAL

## 2025-08-14 VITALS — BODY MASS INDEX: 30.65 KG/M2 | WEIGHT: 226 LBS

## 2025-08-14 DIAGNOSIS — E10.65 TYPE 1 DIABETES MELLITUS WITH HYPERGLYCEMIA (H): ICD-10-CM

## 2025-08-14 RX ORDER — INSULIN DEGLUDEC 100 U/ML
10 INJECTION, SOLUTION SUBCUTANEOUS AT BEDTIME
Qty: 15 ML | Refills: 4 | Status: SHIPPED | OUTPATIENT
Start: 2025-08-14

## 2025-08-14 RX ORDER — PEN NEEDLE, DIABETIC 30 GX3/16"
1 NEEDLE, DISPOSABLE MISCELLANEOUS DAILY
Qty: 100 EACH | Refills: 4 | Status: SHIPPED | OUTPATIENT
Start: 2025-08-14

## 2025-08-15 ENCOUNTER — MYC MEDICAL ADVICE (OUTPATIENT)
Dept: INTERNAL MEDICINE | Facility: CLINIC | Age: 75
End: 2025-08-15

## 2025-08-15 DIAGNOSIS — I10 ESSENTIAL HYPERTENSION, BENIGN: ICD-10-CM

## 2025-08-18 RX ORDER — AMLODIPINE BESYLATE 10 MG/1
10 TABLET ORAL DAILY
Qty: 90 TABLET | Refills: 2 | Status: SHIPPED | OUTPATIENT
Start: 2025-08-18

## 2025-08-20 ENCOUNTER — PATIENT OUTREACH (OUTPATIENT)
Dept: CARE COORDINATION | Facility: CLINIC | Age: 75
End: 2025-08-20
Payer: COMMERCIAL

## 2025-08-20 DIAGNOSIS — I10 ESSENTIAL HYPERTENSION, BENIGN: ICD-10-CM

## 2025-08-20 RX ORDER — HYDROCHLOROTHIAZIDE 25 MG/1
25 TABLET ORAL EVERY MORNING
Qty: 90 TABLET | Refills: 3 | Status: SHIPPED | OUTPATIENT
Start: 2025-08-20

## 2025-08-21 ENCOUNTER — ALLIED HEALTH/NURSE VISIT (OUTPATIENT)
Dept: EDUCATION SERVICES | Facility: CLINIC | Age: 75
End: 2025-08-21
Payer: COMMERCIAL

## 2025-08-21 ENCOUNTER — TELEPHONE (OUTPATIENT)
Dept: EDUCATION SERVICES | Facility: CLINIC | Age: 75
End: 2025-08-21

## 2025-08-21 ENCOUNTER — HOSPITAL ENCOUNTER (OUTPATIENT)
Dept: CT IMAGING | Facility: CLINIC | Age: 75
End: 2025-08-21
Attending: HOSPITALIST
Payer: COMMERCIAL

## 2025-08-21 DIAGNOSIS — I71.40 ABDOMINAL AORTIC ANEURYSM (AAA) WITHOUT RUPTURE, UNSPECIFIED PART: ICD-10-CM

## 2025-08-21 DIAGNOSIS — E10.65 TYPE 1 DIABETES MELLITUS WITH HYPERGLYCEMIA (H): Primary | ICD-10-CM

## 2025-08-21 PROCEDURE — 75635 CT ANGIO ABDOMINAL ARTERIES: CPT

## 2025-08-21 PROCEDURE — 250N000011 HC RX IP 250 OP 636: Performed by: HOSPITALIST

## 2025-08-21 RX ORDER — IOPAMIDOL 755 MG/ML
90 INJECTION, SOLUTION INTRAVASCULAR ONCE
Status: COMPLETED | OUTPATIENT
Start: 2025-08-21 | End: 2025-08-21

## 2025-08-21 RX ORDER — INSULIN LISPRO 100 [IU]/ML
INJECTION, SOLUTION INTRAVENOUS; SUBCUTANEOUS
Qty: 15 ML | Refills: 1 | Status: SHIPPED | OUTPATIENT
Start: 2025-08-21

## 2025-08-21 RX ORDER — PEN NEEDLE, DIABETIC 30 GX3/16"
1 NEEDLE, DISPOSABLE MISCELLANEOUS 4 TIMES DAILY
Qty: 200 EACH | Refills: 4 | Status: SHIPPED | OUTPATIENT
Start: 2025-08-21

## 2025-08-21 RX ADMIN — IOPAMIDOL 90 ML: 755 INJECTION, SOLUTION INTRAVENOUS at 19:44

## 2025-08-25 ENCOUNTER — MYC MEDICAL ADVICE (OUTPATIENT)
Dept: EDUCATION SERVICES | Facility: CLINIC | Age: 75
End: 2025-08-25
Payer: COMMERCIAL

## 2025-08-25 DIAGNOSIS — I10 ESSENTIAL HYPERTENSION, BENIGN: ICD-10-CM

## 2025-08-25 RX ORDER — LOSARTAN POTASSIUM 100 MG/1
100 TABLET ORAL DAILY
Qty: 90 TABLET | Refills: 1 | Status: SHIPPED | OUTPATIENT
Start: 2025-08-25

## 2025-08-26 ENCOUNTER — NURSE TRIAGE (OUTPATIENT)
Dept: INTERNAL MEDICINE | Facility: CLINIC | Age: 75
End: 2025-08-26
Payer: COMMERCIAL

## 2025-08-26 ENCOUNTER — MYC MEDICAL ADVICE (OUTPATIENT)
Dept: INTERNAL MEDICINE | Facility: CLINIC | Age: 75
End: 2025-08-26
Payer: COMMERCIAL

## 2025-08-27 ENCOUNTER — OFFICE VISIT (OUTPATIENT)
Dept: FAMILY MEDICINE | Facility: CLINIC | Age: 75
End: 2025-08-27
Payer: COMMERCIAL

## 2025-08-27 ENCOUNTER — ANCILLARY PROCEDURE (OUTPATIENT)
Dept: GENERAL RADIOLOGY | Facility: CLINIC | Age: 75
End: 2025-08-27
Attending: NURSE PRACTITIONER
Payer: COMMERCIAL

## 2025-08-27 VITALS
SYSTOLIC BLOOD PRESSURE: 142 MMHG | WEIGHT: 227 LBS | OXYGEN SATURATION: 96 % | TEMPERATURE: 98.3 F | RESPIRATION RATE: 14 BRPM | HEART RATE: 75 BPM | BODY MASS INDEX: 30.79 KG/M2 | DIASTOLIC BLOOD PRESSURE: 79 MMHG

## 2025-08-27 DIAGNOSIS — R06.2 WHEEZING: Primary | ICD-10-CM

## 2025-08-27 DIAGNOSIS — R04.0 EPISTAXIS: ICD-10-CM

## 2025-08-27 DIAGNOSIS — R06.2 WHEEZING: ICD-10-CM

## 2025-08-27 DIAGNOSIS — J45.51 SEVERE PERSISTENT ASTHMA WITH EXACERBATION (H): ICD-10-CM

## 2025-08-27 PROCEDURE — 3078F DIAST BP <80 MM HG: CPT | Performed by: NURSE PRACTITIONER

## 2025-08-27 PROCEDURE — 71046 X-RAY EXAM CHEST 2 VIEWS: CPT | Mod: TC | Performed by: RADIOLOGY

## 2025-08-27 PROCEDURE — 99214 OFFICE O/P EST MOD 30 MIN: CPT | Performed by: NURSE PRACTITIONER

## 2025-08-27 PROCEDURE — 3077F SYST BP >= 140 MM HG: CPT | Performed by: NURSE PRACTITIONER

## 2025-08-27 RX ORDER — AZITHROMYCIN 250 MG/1
TABLET, FILM COATED ORAL
Qty: 6 TABLET | Refills: 0 | Status: SHIPPED | OUTPATIENT
Start: 2025-08-27 | End: 2025-09-01

## 2025-08-27 RX ORDER — AZELASTINE 1 MG/ML
SPRAY, METERED NASAL
Qty: 30 ML | Refills: 11 | Status: SHIPPED | OUTPATIENT
Start: 2025-08-27

## 2025-08-27 RX ORDER — PREDNISONE 20 MG/1
40 TABLET ORAL DAILY
Qty: 10 TABLET | Refills: 0 | Status: SHIPPED | OUTPATIENT
Start: 2025-08-27 | End: 2025-09-01

## 2025-08-28 ENCOUNTER — TELEPHONE (OUTPATIENT)
Dept: VASCULAR SURGERY | Facility: CLINIC | Age: 75
End: 2025-08-28

## 2025-08-28 ENCOUNTER — OFFICE VISIT (OUTPATIENT)
Dept: ENDOCRINOLOGY | Facility: CLINIC | Age: 75
End: 2025-08-28
Payer: COMMERCIAL

## 2025-08-28 VITALS
WEIGHT: 229 LBS | BODY MASS INDEX: 31.06 KG/M2 | OXYGEN SATURATION: 98 % | DIASTOLIC BLOOD PRESSURE: 81 MMHG | HEART RATE: 75 BPM | SYSTOLIC BLOOD PRESSURE: 150 MMHG

## 2025-08-28 DIAGNOSIS — E11.65 TYPE 2 DIABETES MELLITUS WITH HYPERGLYCEMIA, WITHOUT LONG-TERM CURRENT USE OF INSULIN (H): ICD-10-CM

## 2025-08-28 DIAGNOSIS — E78.5 HYPERLIPIDEMIA, UNSPECIFIED HYPERLIPIDEMIA TYPE: ICD-10-CM

## 2025-08-28 DIAGNOSIS — Q98.4 KLINEFELTER'S SYNDROME: Primary | ICD-10-CM

## 2025-08-28 DIAGNOSIS — M81.8 OTHER OSTEOPOROSIS WITHOUT CURRENT PATHOLOGICAL FRACTURE: ICD-10-CM

## 2025-08-28 DIAGNOSIS — I10 ESSENTIAL HYPERTENSION: ICD-10-CM

## 2025-08-28 RX ORDER — ROSUVASTATIN CALCIUM 20 MG/1
20 TABLET, COATED ORAL DAILY
Qty: 90 TABLET | Refills: 2 | Status: SHIPPED | OUTPATIENT
Start: 2025-08-28

## 2025-08-28 RX ORDER — ACYCLOVIR 400 MG/1
1 TABLET ORAL
Qty: 9 EACH | Refills: 4 | OUTPATIENT
Start: 2025-08-28